# Patient Record
Sex: FEMALE | Race: WHITE | NOT HISPANIC OR LATINO | Employment: FULL TIME | URBAN - METROPOLITAN AREA
[De-identification: names, ages, dates, MRNs, and addresses within clinical notes are randomized per-mention and may not be internally consistent; named-entity substitution may affect disease eponyms.]

---

## 2017-01-05 ENCOUNTER — ALLSCRIPTS OFFICE VISIT (OUTPATIENT)
Dept: OTHER | Facility: OTHER | Age: 47
End: 2017-01-05

## 2017-01-05 LAB
BILIRUB UR QL STRIP: NORMAL
CLARITY UR: NORMAL
COLOR UR: YELLOW
GLUCOSE (HISTORICAL): NORMAL
HGB UR QL STRIP.AUTO: NORMAL
KETONES UR STRIP-MCNC: NORMAL MG/DL
LEUKOCYTE ESTERASE UR QL STRIP: NORMAL
NITRITE UR QL STRIP: NORMAL
PH UR STRIP.AUTO: 6 [PH]
PROT UR STRIP-MCNC: NORMAL MG/DL
SP GR UR STRIP.AUTO: 1.02
UROBILINOGEN UR QL STRIP.AUTO: NORMAL

## 2017-01-09 ENCOUNTER — TRANSCRIBE ORDERS (OUTPATIENT)
Dept: ADMINISTRATIVE | Facility: HOSPITAL | Age: 47
End: 2017-01-09

## 2017-01-09 ENCOUNTER — ALLSCRIPTS OFFICE VISIT (OUTPATIENT)
Dept: OTHER | Facility: OTHER | Age: 47
End: 2017-01-09

## 2017-01-09 ENCOUNTER — GENERIC CONVERSION - ENCOUNTER (OUTPATIENT)
Dept: OTHER | Facility: OTHER | Age: 47
End: 2017-01-09

## 2017-01-09 ENCOUNTER — APPOINTMENT (OUTPATIENT)
Dept: LAB | Facility: CLINIC | Age: 47
End: 2017-01-09
Payer: COMMERCIAL

## 2017-01-09 ENCOUNTER — HOSPITAL ENCOUNTER (OUTPATIENT)
Dept: CT IMAGING | Facility: HOSPITAL | Age: 47
Discharge: HOME | End: 2017-01-09
Payer: COMMERCIAL

## 2017-01-09 DIAGNOSIS — R10.11 RIGHT UPPER QUADRANT PAIN: ICD-10-CM

## 2017-01-09 DIAGNOSIS — R10.11 ABDOMINAL PAIN, RIGHT UPPER QUADRANT: Primary | ICD-10-CM

## 2017-01-09 DIAGNOSIS — R10.11 ABDOMINAL PAIN, RIGHT UPPER QUADRANT: ICD-10-CM

## 2017-01-09 LAB
ALBUMIN SERPL BCP-MCNC: 3.7 G/DL (ref 3.5–5)
ALP SERPL-CCNC: 80 U/L (ref 46–116)
ALT SERPL W P-5'-P-CCNC: 24 U/L (ref 12–78)
AMYLASE SERPL-CCNC: 29 IU/L (ref 25–115)
ANION GAP SERPL CALCULATED.3IONS-SCNC: 7 MMOL/L (ref 4–13)
AST SERPL W P-5'-P-CCNC: 19 U/L (ref 5–45)
B-HCG SERPL-ACNC: <2 MIU/ML
BASOPHILS # BLD AUTO: 0.03 THOUSANDS/ΜL (ref 0–0.1)
BASOPHILS NFR BLD AUTO: 0 % (ref 0–1)
BILIRUB SERPL-MCNC: 0.6 MG/DL (ref 0.2–1)
BILIRUB UR QL STRIP: NORMAL
BUN SERPL-MCNC: 11 MG/DL (ref 5–25)
CALCIUM SERPL-MCNC: 9.2 MG/DL (ref 8.3–10.1)
CHLORIDE SERPL-SCNC: 106 MMOL/L (ref 100–108)
CLARITY UR: NORMAL
CO2 SERPL-SCNC: 26 MMOL/L (ref 21–32)
COLOR UR: YELLOW
CREAT SERPL-MCNC: 0.91 MG/DL (ref 0.6–1.3)
EOSINOPHIL # BLD AUTO: 0.12 THOUSAND/ΜL (ref 0–0.61)
EOSINOPHIL NFR BLD AUTO: 1 % (ref 0–6)
ERYTHROCYTE [DISTWIDTH] IN BLOOD BY AUTOMATED COUNT: 14 % (ref 11.6–15.1)
ERYTHROCYTE [SEDIMENTATION RATE] IN BLOOD: 10 MM/HOUR (ref 0–20)
GFR SERPL CREATININE-BSD FRML MDRD: >60 ML/MIN/1.73SQ M
GLUCOSE (HISTORICAL): NORMAL
GLUCOSE SERPL-MCNC: 104 MG/DL (ref 65–140)
HCT VFR BLD AUTO: 46.6 % (ref 34.8–46.1)
HGB BLD-MCNC: 14.8 G/DL (ref 11.5–15.4)
HGB UR QL STRIP.AUTO: NORMAL
KETONES UR STRIP-MCNC: NORMAL MG/DL
LEUKOCYTE ESTERASE UR QL STRIP: NORMAL
LIPASE SERPL-CCNC: 63 U/L (ref 73–393)
LYMPHOCYTES # BLD AUTO: 2.18 THOUSANDS/ΜL (ref 0.6–4.47)
LYMPHOCYTES NFR BLD AUTO: 20 % (ref 14–44)
MCH RBC QN AUTO: 27.5 PG (ref 26.8–34.3)
MCHC RBC AUTO-ENTMCNC: 31.8 G/DL (ref 31.4–37.4)
MCV RBC AUTO: 87 FL (ref 82–98)
MONOCYTES # BLD AUTO: 0.85 THOUSAND/ΜL (ref 0.17–1.22)
MONOCYTES NFR BLD AUTO: 8 % (ref 4–12)
NEUTROPHILS # BLD AUTO: 7.77 THOUSANDS/ΜL (ref 1.85–7.62)
NEUTS SEG NFR BLD AUTO: 71 % (ref 43–75)
NITRITE UR QL STRIP: NORMAL
PH UR STRIP.AUTO: 6 [PH]
PLATELET # BLD AUTO: 374 THOUSANDS/UL (ref 149–390)
PMV BLD AUTO: 9.8 FL (ref 8.9–12.7)
POTASSIUM SERPL-SCNC: 4.5 MMOL/L (ref 3.5–5.3)
PROT SERPL-MCNC: 7.8 G/DL (ref 6.4–8.2)
PROT UR STRIP-MCNC: NORMAL MG/DL
RBC # BLD AUTO: 5.38 MILLION/UL (ref 3.81–5.12)
SODIUM SERPL-SCNC: 139 MMOL/L (ref 136–145)
SP GR UR STRIP.AUTO: 1.02
UROBILINOGEN UR QL STRIP.AUTO: NORMAL
WBC # BLD AUTO: 10.95 THOUSAND/UL (ref 4.31–10.16)

## 2017-01-09 PROCEDURE — 36415 COLL VENOUS BLD VENIPUNCTURE: CPT

## 2017-01-09 PROCEDURE — 85652 RBC SED RATE AUTOMATED: CPT

## 2017-01-09 PROCEDURE — 82150 ASSAY OF AMYLASE: CPT

## 2017-01-09 PROCEDURE — 80053 COMPREHEN METABOLIC PANEL: CPT

## 2017-01-09 PROCEDURE — 85025 COMPLETE CBC W/AUTO DIFF WBC: CPT

## 2017-01-09 PROCEDURE — 84702 CHORIONIC GONADOTROPIN TEST: CPT

## 2017-01-09 PROCEDURE — 83690 ASSAY OF LIPASE: CPT

## 2017-01-09 PROCEDURE — 74177 CT ABD & PELVIS W/CONTRAST: CPT

## 2017-01-09 RX ADMIN — IOHEXOL 100 ML: 350 INJECTION, SOLUTION INTRAVENOUS at 14:52

## 2017-01-09 RX ADMIN — IOHEXOL 50 ML: 240 INJECTION, SOLUTION INTRATHECAL; INTRAVASCULAR; INTRAVENOUS; ORAL at 14:51

## 2017-06-15 ENCOUNTER — GENERIC CONVERSION - ENCOUNTER (OUTPATIENT)
Dept: OTHER | Facility: OTHER | Age: 47
End: 2017-06-15

## 2017-06-15 DIAGNOSIS — K76.0 FATTY (CHANGE OF) LIVER, NOT ELSEWHERE CLASSIFIED: ICD-10-CM

## 2017-06-15 DIAGNOSIS — I10 ESSENTIAL (PRIMARY) HYPERTENSION: ICD-10-CM

## 2017-06-15 DIAGNOSIS — E03.9 HYPOTHYROIDISM: ICD-10-CM

## 2017-06-15 DIAGNOSIS — F41.9 ANXIETY DISORDER: ICD-10-CM

## 2017-06-15 DIAGNOSIS — E78.5 HYPERLIPIDEMIA: ICD-10-CM

## 2017-06-15 DIAGNOSIS — E66.9 OBESITY: ICD-10-CM

## 2017-06-26 ENCOUNTER — LAB CONVERSION - ENCOUNTER (OUTPATIENT)
Dept: OTHER | Facility: OTHER | Age: 47
End: 2017-06-26

## 2017-06-26 LAB
A/G RATIO (HISTORICAL): 1.4 (CALC) (ref 1–2.5)
ALBUMIN SERPL BCP-MCNC: 4 G/DL (ref 3.6–5.1)
ALP SERPL-CCNC: 84 U/L (ref 33–115)
ALT SERPL W P-5'-P-CCNC: 17 U/L (ref 6–29)
AST SERPL W P-5'-P-CCNC: 16 U/L (ref 10–35)
BASOPHILS # BLD AUTO: 0.5 %
BASOPHILS # BLD AUTO: 47 CELLS/UL (ref 0–200)
BILIRUB SERPL-MCNC: 0.8 MG/DL (ref 0.2–1.2)
BUN SERPL-MCNC: 12 MG/DL (ref 7–25)
BUN/CREA RATIO (HISTORICAL): ABNORMAL (CALC) (ref 6–22)
CALCIUM SERPL-MCNC: 9.1 MG/DL (ref 8.6–10.2)
CHLORIDE SERPL-SCNC: 104 MMOL/L (ref 98–110)
CHOLEST SERPL-MCNC: 202 MG/DL (ref 125–200)
CHOLEST/HDLC SERPL: 5.6 (CALC)
CO2 SERPL-SCNC: 24 MMOL/L (ref 20–31)
CREAT SERPL-MCNC: 0.82 MG/DL (ref 0.5–1.1)
DEPRECATED RDW RBC AUTO: 14.8 % (ref 11–15)
EGFR AFRICAN AMERICAN (HISTORICAL): 100 ML/MIN/1.73M2
EGFR-AMERICAN CALC (HISTORICAL): 86 ML/MIN/1.73M2
EOSINOPHIL # BLD AUTO: 2.8 %
EOSINOPHIL # BLD AUTO: 260 CELLS/UL (ref 15–500)
GAMMA GLOBULIN (HISTORICAL): 2.8 G/DL (CALC) (ref 1.9–3.7)
GLUCOSE (HISTORICAL): 123 MG/DL (ref 65–99)
HBA1C MFR BLD HPLC: 6.1 % OF TOTAL HGB
HCT VFR BLD AUTO: 42.6 % (ref 35–45)
HDLC SERPL-MCNC: 36 MG/DL
HGB BLD-MCNC: 14 G/DL (ref 11.7–15.5)
LDL CHOLESTEROL (HISTORICAL): 126 MG/DL (CALC)
LYMPHOCYTES # BLD AUTO: 2353 CELLS/UL (ref 850–3900)
LYMPHOCYTES # BLD AUTO: 25.3 %
MCH RBC QN AUTO: 28.4 PG (ref 27–33)
MCHC RBC AUTO-ENTMCNC: 32.9 G/DL (ref 32–36)
MCV RBC AUTO: 86.1 FL (ref 80–100)
MONOCYTES # BLD AUTO: 688 CELLS/UL (ref 200–950)
MONOCYTES (HISTORICAL): 7.4 %
NEUTROPHILS # BLD AUTO: 5952 CELLS/UL (ref 1500–7800)
NEUTROPHILS # BLD AUTO: 64 %
NON-HDL-CHOL (CHOL-HDL) (HISTORICAL): 166 MG/DL (CALC)
PLATELET # BLD AUTO: 359 THOUSAND/UL (ref 140–400)
PMV BLD AUTO: 8.6 FL (ref 7.5–12.5)
POTASSIUM SERPL-SCNC: 4.2 MMOL/L (ref 3.5–5.3)
RBC # BLD AUTO: 4.95 MILLION/UL (ref 3.8–5.1)
SODIUM SERPL-SCNC: 138 MMOL/L (ref 135–146)
T4 FREE SERPL-MCNC: 1.2 NG/DL (ref 0.8–1.8)
TOTAL PROTEIN (HISTORICAL): 6.8 G/DL (ref 6.1–8.1)
TRIGL SERPL-MCNC: 198 MG/DL
TSH SERPL DL<=0.05 MIU/L-ACNC: 5.76 MIU/L
WBC # BLD AUTO: 9.3 THOUSAND/UL (ref 3.8–10.8)

## 2017-06-27 ENCOUNTER — GENERIC CONVERSION - ENCOUNTER (OUTPATIENT)
Dept: OTHER | Facility: OTHER | Age: 47
End: 2017-06-27

## 2017-07-03 ENCOUNTER — LAB CONVERSION - ENCOUNTER (OUTPATIENT)
Dept: OTHER | Facility: OTHER | Age: 47
End: 2017-07-03

## 2017-07-03 LAB
BACTERIA UR QL AUTO: ABNORMAL /HPF
BILIRUB UR QL STRIP: NEGATIVE
CALCIUM OXALATE (HISTORICAL): ABNORMAL /HPF
COLOR UR: YELLOW
COMMENT (HISTORICAL): ABNORMAL
CULTURE RESULT (HISTORICAL): ABNORMAL
FECAL OCCULT BLOOD DIAGNOSTIC (HISTORICAL): NEGATIVE
GLUCOSE (HISTORICAL): NEGATIVE
HYALINE CASTS #/AREA URNS LPF: ABNORMAL /LPF
KETONES UR STRIP-MCNC: NEGATIVE MG/DL
LEUKOCYTE ESTERASE UR QL STRIP: NEGATIVE
NITRITE UR QL STRIP: NEGATIVE
PH UR STRIP.AUTO: 6 [PH] (ref 5–8)
PROT UR STRIP-MCNC: NEGATIVE MG/DL
RBC (HISTORICAL): ABNORMAL /HPF
SP GR UR STRIP.AUTO: 1.02 (ref 1–1.03)
SQUAMOUS EPITHELIAL CELLS (HISTORICAL): ABNORMAL /HPF
WBC # BLD AUTO: ABNORMAL /HPF

## 2017-09-24 ENCOUNTER — APPOINTMENT (EMERGENCY)
Dept: CT IMAGING | Facility: HOSPITAL | Age: 47
End: 2017-09-24
Payer: COMMERCIAL

## 2017-09-24 ENCOUNTER — HOSPITAL ENCOUNTER (EMERGENCY)
Facility: HOSPITAL | Age: 47
Discharge: HOME | End: 2017-09-24
Attending: EMERGENCY MEDICINE
Payer: COMMERCIAL

## 2017-09-24 VITALS
WEIGHT: 248 LBS | DIASTOLIC BLOOD PRESSURE: 67 MMHG | OXYGEN SATURATION: 96 % | SYSTOLIC BLOOD PRESSURE: 138 MMHG | HEART RATE: 82 BPM | RESPIRATION RATE: 16 BRPM | TEMPERATURE: 99.4 F

## 2017-09-24 DIAGNOSIS — R10.9 ABDOMINAL PAIN: Primary | ICD-10-CM

## 2017-09-24 DIAGNOSIS — J21.9 BRONCHIOLITIS: ICD-10-CM

## 2017-09-24 LAB
ALBUMIN SERPL BCP-MCNC: 3.8 G/DL (ref 3.5–5)
ALP SERPL-CCNC: 85 U/L (ref 46–116)
ALT SERPL W P-5'-P-CCNC: 31 U/L (ref 12–78)
ANION GAP SERPL CALCULATED.3IONS-SCNC: 9 MMOL/L (ref 4–13)
AST SERPL W P-5'-P-CCNC: 18 U/L (ref 5–45)
BACTERIA UR QL AUTO: ABNORMAL /HPF
BASOPHILS # BLD AUTO: 0.1 THOUSANDS/ΜL (ref 0–0.1)
BASOPHILS NFR BLD AUTO: 1 % (ref 0–1)
BILIRUB DIRECT SERPL-MCNC: 0.09 MG/DL (ref 0–0.2)
BILIRUB SERPL-MCNC: 0.7 MG/DL (ref 0.2–1)
BILIRUB UR QL STRIP: NEGATIVE
BUN SERPL-MCNC: 9 MG/DL (ref 5–25)
CALCIUM SERPL-MCNC: 9.1 MG/DL (ref 8.3–10.1)
CHLORIDE SERPL-SCNC: 103 MMOL/L (ref 100–108)
CLARITY UR: CLEAR
CO2 SERPL-SCNC: 25 MMOL/L (ref 21–32)
COLOR UR: YELLOW
CREAT SERPL-MCNC: 0.97 MG/DL (ref 0.6–1.3)
EOSINOPHIL # BLD AUTO: 0.12 THOUSAND/ΜL (ref 0–0.61)
EOSINOPHIL NFR BLD AUTO: 1 % (ref 0–6)
ERYTHROCYTE [DISTWIDTH] IN BLOOD BY AUTOMATED COUNT: 14.1 % (ref 11.6–15.1)
GFR SERPL CREATININE-BSD FRML MDRD: 71 ML/MIN/1.73SQ M
GLUCOSE SERPL-MCNC: 95 MG/DL (ref 65–140)
GLUCOSE UR STRIP-MCNC: NEGATIVE MG/DL
HCT VFR BLD AUTO: 44.7 % (ref 34.8–46.1)
HGB BLD-MCNC: 14.2 G/DL (ref 11.5–15.4)
HGB UR QL STRIP.AUTO: ABNORMAL
KETONES UR STRIP-MCNC: NEGATIVE MG/DL
LEUKOCYTE ESTERASE UR QL STRIP: NEGATIVE
LIPASE SERPL-CCNC: 68 U/L (ref 73–393)
LYMPHOCYTES # BLD AUTO: 2.23 THOUSANDS/ΜL (ref 0.6–4.47)
LYMPHOCYTES NFR BLD AUTO: 20 % (ref 14–44)
MCH RBC QN AUTO: 27.7 PG (ref 26.8–34.3)
MCHC RBC AUTO-ENTMCNC: 31.8 G/DL (ref 31.4–37.4)
MCV RBC AUTO: 87 FL (ref 82–98)
MONOCYTES # BLD AUTO: 0.73 THOUSAND/ΜL (ref 0.17–1.22)
MONOCYTES NFR BLD AUTO: 7 % (ref 4–12)
MUCOUS THREADS UR QL AUTO: ABNORMAL
NEUTROPHILS # BLD AUTO: 7.74 THOUSANDS/ΜL (ref 1.85–7.62)
NEUTS SEG NFR BLD AUTO: 71 % (ref 43–75)
NITRITE UR QL STRIP: NEGATIVE
NON-SQ EPI CELLS URNS QL MICRO: ABNORMAL /HPF
PH UR STRIP.AUTO: 6 [PH] (ref 4.5–8)
PLATELET # BLD AUTO: 353 THOUSANDS/UL (ref 149–390)
PMV BLD AUTO: 9.4 FL (ref 8.9–12.7)
POTASSIUM SERPL-SCNC: 4.2 MMOL/L (ref 3.5–5.3)
PROT SERPL-MCNC: 7.8 G/DL (ref 6.4–8.2)
PROT UR STRIP-MCNC: NEGATIVE MG/DL
RBC # BLD AUTO: 5.13 MILLION/UL (ref 3.81–5.12)
RBC #/AREA URNS AUTO: ABNORMAL /HPF
SODIUM SERPL-SCNC: 137 MMOL/L (ref 136–145)
SP GR UR STRIP.AUTO: 1.01 (ref 1–1.03)
UROBILINOGEN UR QL STRIP.AUTO: 0.2 E.U./DL
WBC # BLD AUTO: 10.92 THOUSAND/UL (ref 4.31–10.16)
WBC #/AREA URNS AUTO: ABNORMAL /HPF

## 2017-09-24 PROCEDURE — 99284 EMERGENCY DEPT VISIT MOD MDM: CPT

## 2017-09-24 PROCEDURE — 81001 URINALYSIS AUTO W/SCOPE: CPT | Performed by: PHYSICIAN ASSISTANT

## 2017-09-24 PROCEDURE — 80076 HEPATIC FUNCTION PANEL: CPT | Performed by: PHYSICIAN ASSISTANT

## 2017-09-24 PROCEDURE — 85025 COMPLETE CBC W/AUTO DIFF WBC: CPT | Performed by: PHYSICIAN ASSISTANT

## 2017-09-24 PROCEDURE — 83690 ASSAY OF LIPASE: CPT | Performed by: PHYSICIAN ASSISTANT

## 2017-09-24 PROCEDURE — 36415 COLL VENOUS BLD VENIPUNCTURE: CPT | Performed by: PHYSICIAN ASSISTANT

## 2017-09-24 PROCEDURE — 74177 CT ABD & PELVIS W/CONTRAST: CPT

## 2017-09-24 PROCEDURE — 80048 BASIC METABOLIC PNL TOTAL CA: CPT | Performed by: PHYSICIAN ASSISTANT

## 2017-09-24 RX ORDER — LOSARTAN POTASSIUM 100 MG/1
100 TABLET ORAL DAILY
COMMUNITY
End: 2018-07-06 | Stop reason: SDUPTHER

## 2017-09-24 RX ORDER — DOXYCYCLINE HYCLATE 100 MG/1
100 CAPSULE ORAL 2 TIMES DAILY
Qty: 20 CAPSULE | Refills: 0 | Status: SHIPPED | OUTPATIENT
Start: 2017-09-24 | End: 2017-10-04

## 2017-09-24 RX ORDER — LEVOTHYROXINE SODIUM 0.03 MG/1
25 TABLET ORAL DAILY
COMMUNITY
End: 2018-01-03

## 2017-09-24 RX ADMIN — IOHEXOL 100 ML: 350 INJECTION, SOLUTION INTRAVENOUS at 14:27

## 2017-09-25 DIAGNOSIS — E78.5 HYPERLIPIDEMIA: ICD-10-CM

## 2017-09-25 DIAGNOSIS — E03.9 HYPOTHYROIDISM: ICD-10-CM

## 2018-01-03 ENCOUNTER — HOSPITAL ENCOUNTER (INPATIENT)
Facility: HOSPITAL | Age: 48
LOS: 11 days | Discharge: HOME HEALTH | DRG: 330 | End: 2018-01-14
Attending: EMERGENCY MEDICINE | Admitting: SURGERY
Payer: COMMERCIAL

## 2018-01-03 ENCOUNTER — APPOINTMENT (EMERGENCY)
Dept: CT IMAGING | Facility: HOSPITAL | Age: 48
DRG: 330 | End: 2018-01-03
Payer: COMMERCIAL

## 2018-01-03 DIAGNOSIS — K57.20 ABSCESS OF SIGMOID COLON DUE TO DIVERTICULITIS: ICD-10-CM

## 2018-01-03 DIAGNOSIS — K57.20 COLONIC DIVERTICULAR ABSCESS: ICD-10-CM

## 2018-01-03 DIAGNOSIS — K57.32 SIGMOID DIVERTICULITIS: Primary | ICD-10-CM

## 2018-01-03 PROBLEM — I10 HYPERTENSION: Status: ACTIVE | Noted: 2018-01-03

## 2018-01-03 PROBLEM — E03.9 HYPOTHYROIDISM: Status: ACTIVE | Noted: 2018-01-03

## 2018-01-03 PROBLEM — E78.5 HLD (HYPERLIPIDEMIA): Status: ACTIVE | Noted: 2018-01-03

## 2018-01-03 LAB
ALBUMIN SERPL BCP-MCNC: 3.4 G/DL (ref 3.5–5)
ALP SERPL-CCNC: 76 U/L (ref 46–116)
ALT SERPL W P-5'-P-CCNC: 22 U/L (ref 12–78)
ANION GAP SERPL CALCULATED.3IONS-SCNC: 9 MMOL/L (ref 4–13)
APTT PPP: 34 SECONDS (ref 23–35)
AST SERPL W P-5'-P-CCNC: 15 U/L (ref 5–45)
BACTERIA UR QL AUTO: ABNORMAL /HPF
BASOPHILS # BLD AUTO: 0.02 THOUSANDS/ΜL (ref 0–0.1)
BASOPHILS NFR BLD AUTO: 0 % (ref 0–1)
BILIRUB SERPL-MCNC: 0.5 MG/DL (ref 0.2–1)
BILIRUB UR QL STRIP: NEGATIVE
BUN SERPL-MCNC: 7 MG/DL (ref 5–25)
CALCIUM SERPL-MCNC: 9.2 MG/DL (ref 8.3–10.1)
CHLORIDE SERPL-SCNC: 101 MMOL/L (ref 100–108)
CLARITY UR: CLEAR
CO2 SERPL-SCNC: 26 MMOL/L (ref 21–32)
COLOR UR: YELLOW
CREAT SERPL-MCNC: 0.89 MG/DL (ref 0.6–1.3)
EOSINOPHIL # BLD AUTO: 0.07 THOUSAND/ΜL (ref 0–0.61)
EOSINOPHIL NFR BLD AUTO: 1 % (ref 0–6)
ERYTHROCYTE [DISTWIDTH] IN BLOOD BY AUTOMATED COUNT: 13.4 % (ref 11.6–15.1)
EXT PREG TEST URINE: NEGATIVE
GFR SERPL CREATININE-BSD FRML MDRD: 78 ML/MIN/1.73SQ M
GLUCOSE SERPL-MCNC: 101 MG/DL (ref 65–140)
GLUCOSE UR STRIP-MCNC: NEGATIVE MG/DL
HCT VFR BLD AUTO: 41.7 % (ref 34.8–46.1)
HGB BLD-MCNC: 13.1 G/DL (ref 11.5–15.4)
HGB UR QL STRIP.AUTO: ABNORMAL
INR PPP: 1.03 (ref 0.86–1.16)
KETONES UR STRIP-MCNC: NEGATIVE MG/DL
LACTATE SERPL-SCNC: 1 MMOL/L (ref 0.5–2)
LEUKOCYTE ESTERASE UR QL STRIP: NEGATIVE
LIPASE SERPL-CCNC: 56 U/L (ref 73–393)
LYMPHOCYTES # BLD AUTO: 1.87 THOUSANDS/ΜL (ref 0.6–4.47)
LYMPHOCYTES NFR BLD AUTO: 14 % (ref 14–44)
MCH RBC QN AUTO: 27.5 PG (ref 26.8–34.3)
MCHC RBC AUTO-ENTMCNC: 31.4 G/DL (ref 31.4–37.4)
MCV RBC AUTO: 88 FL (ref 82–98)
MONOCYTES # BLD AUTO: 0.91 THOUSAND/ΜL (ref 0.17–1.22)
MONOCYTES NFR BLD AUTO: 7 % (ref 4–12)
NEUTROPHILS # BLD AUTO: 10.28 THOUSANDS/ΜL (ref 1.85–7.62)
NEUTS SEG NFR BLD AUTO: 78 % (ref 43–75)
NITRITE UR QL STRIP: NEGATIVE
NON-SQ EPI CELLS URNS QL MICRO: ABNORMAL /HPF
PH UR STRIP.AUTO: 5.5 [PH] (ref 4.5–8)
PLATELET # BLD AUTO: 428 THOUSANDS/UL (ref 149–390)
PMV BLD AUTO: 9.3 FL (ref 8.9–12.7)
POTASSIUM SERPL-SCNC: 3.7 MMOL/L (ref 3.5–5.3)
PROT SERPL-MCNC: 7.8 G/DL (ref 6.4–8.2)
PROT UR STRIP-MCNC: NEGATIVE MG/DL
PROTHROMBIN TIME: 13.8 SECONDS (ref 12.1–14.4)
RBC # BLD AUTO: 4.76 MILLION/UL (ref 3.81–5.12)
RBC #/AREA URNS AUTO: ABNORMAL /HPF
SODIUM SERPL-SCNC: 136 MMOL/L (ref 136–145)
SP GR UR STRIP.AUTO: >=1.03 (ref 1–1.03)
UROBILINOGEN UR QL STRIP.AUTO: 0.2 E.U./DL
WBC # BLD AUTO: 13.15 THOUSAND/UL (ref 4.31–10.16)
WBC #/AREA URNS AUTO: ABNORMAL /HPF

## 2018-01-03 PROCEDURE — 74177 CT ABD & PELVIS W/CONTRAST: CPT

## 2018-01-03 PROCEDURE — 81001 URINALYSIS AUTO W/SCOPE: CPT | Performed by: PHYSICIAN ASSISTANT

## 2018-01-03 PROCEDURE — 85730 THROMBOPLASTIN TIME PARTIAL: CPT | Performed by: PHYSICIAN ASSISTANT

## 2018-01-03 PROCEDURE — 83690 ASSAY OF LIPASE: CPT | Performed by: PHYSICIAN ASSISTANT

## 2018-01-03 PROCEDURE — 96361 HYDRATE IV INFUSION ADD-ON: CPT

## 2018-01-03 PROCEDURE — 87040 BLOOD CULTURE FOR BACTERIA: CPT | Performed by: PHYSICIAN ASSISTANT

## 2018-01-03 PROCEDURE — 80053 COMPREHEN METABOLIC PANEL: CPT | Performed by: PHYSICIAN ASSISTANT

## 2018-01-03 PROCEDURE — 99285 EMERGENCY DEPT VISIT HI MDM: CPT

## 2018-01-03 PROCEDURE — 96374 THER/PROPH/DIAG INJ IV PUSH: CPT

## 2018-01-03 PROCEDURE — 83605 ASSAY OF LACTIC ACID: CPT | Performed by: PHYSICIAN ASSISTANT

## 2018-01-03 PROCEDURE — 36415 COLL VENOUS BLD VENIPUNCTURE: CPT | Performed by: PHYSICIAN ASSISTANT

## 2018-01-03 PROCEDURE — 85025 COMPLETE CBC W/AUTO DIFF WBC: CPT | Performed by: PHYSICIAN ASSISTANT

## 2018-01-03 PROCEDURE — 85610 PROTHROMBIN TIME: CPT | Performed by: PHYSICIAN ASSISTANT

## 2018-01-03 PROCEDURE — 81025 URINE PREGNANCY TEST: CPT | Performed by: PHYSICIAN ASSISTANT

## 2018-01-03 RX ORDER — ONDANSETRON 2 MG/ML
4 INJECTION INTRAMUSCULAR; INTRAVENOUS EVERY 6 HOURS PRN
Status: DISCONTINUED | OUTPATIENT
Start: 2018-01-03 | End: 2018-01-05

## 2018-01-03 RX ORDER — LOSARTAN POTASSIUM 50 MG/1
100 TABLET ORAL DAILY
Status: DISCONTINUED | OUTPATIENT
Start: 2018-01-04 | End: 2018-01-14 | Stop reason: HOSPADM

## 2018-01-03 RX ORDER — KETOROLAC TROMETHAMINE 30 MG/ML
15 INJECTION, SOLUTION INTRAMUSCULAR; INTRAVENOUS ONCE
Status: COMPLETED | OUTPATIENT
Start: 2018-01-03 | End: 2018-01-03

## 2018-01-03 RX ORDER — METRONIDAZOLE 500 MG/1
500 TABLET ORAL ONCE
Status: DISCONTINUED | OUTPATIENT
Start: 2018-01-03 | End: 2018-01-03

## 2018-01-03 RX ORDER — NICOTINE 21 MG/24HR
1 PATCH, TRANSDERMAL 24 HOURS TRANSDERMAL DAILY
Status: DISCONTINUED | OUTPATIENT
Start: 2018-01-04 | End: 2018-01-14 | Stop reason: HOSPADM

## 2018-01-03 RX ORDER — METOPROLOL TARTRATE 5 MG/5ML
5 INJECTION INTRAVENOUS EVERY 6 HOURS PRN
Status: DISCONTINUED | OUTPATIENT
Start: 2018-01-03 | End: 2018-01-14 | Stop reason: HOSPADM

## 2018-01-03 RX ORDER — SODIUM CHLORIDE 9 MG/ML
125 INJECTION, SOLUTION INTRAVENOUS CONTINUOUS
Status: DISCONTINUED | OUTPATIENT
Start: 2018-01-03 | End: 2018-01-07

## 2018-01-03 RX ORDER — HEPARIN SODIUM 5000 [USP'U]/ML
5000 INJECTION, SOLUTION INTRAVENOUS; SUBCUTANEOUS EVERY 8 HOURS SCHEDULED
Status: DISCONTINUED | OUTPATIENT
Start: 2018-01-03 | End: 2018-01-14 | Stop reason: HOSPADM

## 2018-01-03 RX ADMIN — METRONIDAZOLE 500 MG: 500 INJECTION, SOLUTION INTRAVENOUS at 20:47

## 2018-01-03 RX ADMIN — CEFEPIME HYDROCHLORIDE 2000 MG: 2 INJECTION, POWDER, FOR SOLUTION INTRAVENOUS at 20:08

## 2018-01-03 RX ADMIN — IOHEXOL 100 ML: 350 INJECTION, SOLUTION INTRAVENOUS at 18:06

## 2018-01-03 RX ADMIN — KETOROLAC TROMETHAMINE 15 MG: 30 INJECTION, SOLUTION INTRAMUSCULAR at 17:19

## 2018-01-03 RX ADMIN — SODIUM CHLORIDE 125 ML/HR: 0.9 INJECTION, SOLUTION INTRAVENOUS at 21:25

## 2018-01-03 RX ADMIN — HEPARIN SODIUM 5000 UNITS: 5000 INJECTION, SOLUTION INTRAVENOUS; SUBCUTANEOUS at 22:21

## 2018-01-03 RX ADMIN — SODIUM CHLORIDE 1000 ML: 0.9 INJECTION, SOLUTION INTRAVENOUS at 17:18

## 2018-01-03 NOTE — Clinical Note
Case was discussed with gabriel Dang and the patient's admission status was agreed to be Admission Status: inpatient status to the service of Dr Jones President

## 2018-01-03 NOTE — ED PROVIDER NOTES
History  Chief Complaint   Patient presents with    Abdominal Pain     abd pain for weeks, Hx of diverticulitis     HPI   57 yo female with hx of diverticulitis presenting with bilateral lower abd pain x 1 week  States pain is equal across the lower abd  No appetite  Able to drink liquids  No vomiting  No upper abd pain  Had some loose stool at one point that since resolved  No blood in stool or urine or dysuria  No other recent illness, such as URI; no sick contacts  Denies any pelvic pain or unusual vaginal discharge  Pain reminiscent of previous episodes of diverticulitis  Spoke to her PCP, advised her to call her Gi, who advised her to come here  To have a colonoscopy next month  Ddx includes but is not limited to: diverticulitis, appendicitis, UTI, ovarian torsion or abscess, PID, colitis  Prior to Admission Medications   Prescriptions Last Dose Informant Patient Reported? Taking?   levothyroxine 25 mcg tablet   Yes No   Sig: Take 25 mcg by mouth daily   losartan (COZAAR) 100 MG tablet   Yes No   Sig: Take 100 mg by mouth daily      Facility-Administered Medications: None       Past Medical History:   Diagnosis Date    Disease of thyroid gland     Diverticulitis     HLD (hyperlipidemia)     Hypertension        Past Surgical History:   Procedure Laterality Date    CHOLECYSTECTOMY  1999    laparoscopic    HERNIA REPAIR  0335    umbilical    REDUCTION MAMMAPLASTY  1999       Family History   Problem Relation Age of Onset    Alcohol abuse Mother     Heart disease Father     Diverticulitis Sister     Cancer Brother     No Known Problems Sister      I have reviewed and agree with the history as documented  Social History   Substance Use Topics    Smoking status: Current Every Day Smoker     Packs/day: 1 00     Years: 28 00    Smokeless tobacco: Never Used    Alcohol use No        Review of Systems   Constitutional: Positive for chills  HENT: Negative for congestion and rhinorrhea  Respiratory: Negative for apnea, cough, chest tightness and shortness of breath  Cardiovascular: Negative for chest pain  Gastrointestinal: Positive for abdominal pain, diarrhea and nausea  Negative for anal bleeding, blood in stool, constipation and vomiting  Genitourinary: Negative for dysuria, frequency, hematuria, pelvic pain, vaginal bleeding, vaginal discharge and vaginal pain  Musculoskeletal: Negative for arthralgias and myalgias  All other systems reviewed and are negative  Physical Exam  ED Triage Vitals   Temperature Pulse Respirations Blood Pressure SpO2   01/03/18 1339 01/03/18 1339 01/03/18 1339 01/03/18 1339 01/03/18 1339   97 9 °F (36 6 °C) (!) 111 18 (!) 173/80 99 %      Temp Source Heart Rate Source Patient Position - Orthostatic VS BP Location FiO2 (%)   01/03/18 1339 01/03/18 1648 01/03/18 1339 01/03/18 1339 --   Oral Monitor Sitting Left arm       Pain Score       01/03/18 1339       Worst Possible Pain           Orthostatic Vital Signs  Vitals:    01/03/18 1339 01/03/18 1648 01/03/18 1945   BP: (!) 173/80 132/60 118/70   Pulse: (!) 111 98 99   Patient Position - Orthostatic VS: Sitting Sitting Lying       Physical Exam   Constitutional: She is oriented to person, place, and time  She appears well-developed and well-nourished  No distress  HENT:   Head: Normocephalic and atraumatic  Right Ear: External ear normal    Left Ear: External ear normal    Eyes: EOM are normal  Pupils are equal, round, and reactive to light  Neck: Normal range of motion  Cardiovascular: Normal rate, regular rhythm and normal heart sounds  Pulmonary/Chest: Effort normal and breath sounds normal  No respiratory distress  She has no wheezes  She has no rales  Abdominal: Soft  She exhibits no distension  Tender from the LLQ across the suprapubic area and into the RLQ  No guarding or rigidity   Musculoskeletal: Normal range of motion     Neurological: She is alert and oriented to person, place, and time  Skin: Skin is warm and dry  She is not diaphoretic  Psychiatric: She has a normal mood and affect  Her behavior is normal  Judgment and thought content normal        ED Medications  Medications   cefepime (MAXIPIME) 2 g/50 mL dextrose IVPB (2,000 mg Intravenous New Bag 1/3/18 2008)   metroNIDAZOLE (FLAGYL) tablet 500 mg (0 mg Oral Hold 1/3/18 2000)   sodium chloride 0 9 % infusion (not administered)   ondansetron (ZOFRAN) injection 4 mg (not administered)   nicotine (NICODERM CQ) 21 mg/24 hr TD 24 hr patch 1 patch (not administered)   heparin (porcine) subcutaneous injection 5,000 Units (not administered)   cefepime (MAXIPIME) 2 g/50 mL dextrose IVPB (not administered)   metroNIDAZOLE (FLAGYL) IVPB (premix) 500 mg (not administered)   metoprolol (LOPRESSOR) injection 5 mg (not administered)   HYDROmorphone (DILAUDID) 1 mg/mL injection 1 mg (not administered)   sodium chloride 0 9 % bolus 1,000 mL (0 mL Intravenous Stopped 1/3/18 1939)   ketorolac (TORADOL) injection 15 mg (15 mg Intravenous Given 1/3/18 1719)   iohexol (OMNIPAQUE) 350 MG/ML injection (MULTI-DOSE) 100 mL (100 mL Intravenous Given 1/3/18 1806)       Diagnostic Studies  Results Reviewed     Procedure Component Value Units Date/Time    Lactic acid, plasma [[id number]]  (Normal) Collected:  01/03/18 1942    Lab Status:  Final result Specimen:  Blood from Arm, Left Updated:  01/03/18 2017     LACTIC ACID 1 0 mmol/L     Narrative:         Result may be elevated if tourniquet was used during collection  Blood culture #1 [[id number]] Collected:  01/03/18 2005    Lab Status:   In process Specimen:  Blood from Arm, Right Updated:  01/03/18 2007    Protime-INR [[id number]]  (Normal) Collected:  01/03/18 1942    Lab Status:  Final result Specimen:  Blood from Arm, Right Updated:  01/03/18 2005     Protime 13 8 seconds      INR 1 03    APTT [[id number]]  (Normal) Collected:  01/03/18 1942    Lab Status:  Final result Specimen:  Blood from Arm, Right Updated:  01/03/18 2005     PTT 34 seconds     Narrative: Therapeutic Heparin Range = 60-90 seconds    Platelet count [[id number]]     Lab Status:  No result Specimen:  Blood     Blood culture #2 [[id number]] Collected:  01/03/18 1942    Lab Status: In process Specimen:  Blood from Arm, Left Updated:  01/03/18 1954    Comprehensive metabolic panel [[id number]]  (Abnormal) Collected:  01/03/18 1718    Lab Status:  Final result Specimen:  Blood from Arm, Left Updated:  01/03/18 1743     Sodium 136 mmol/L      Potassium 3 7 mmol/L      Chloride 101 mmol/L      CO2 26 mmol/L      Anion Gap 9 mmol/L      BUN 7 mg/dL      Creatinine 0 89 mg/dL      Glucose 101 mg/dL      Calcium 9 2 mg/dL      AST 15 U/L      ALT 22 U/L      Alkaline Phosphatase 76 U/L      Total Protein 7 8 g/dL      Albumin 3 4 (L) g/dL      Total Bilirubin 0 50 mg/dL      eGFR 78 ml/min/1 73sq m     Narrative:         National Kidney Disease Education Program recommendations are as follows:  GFR calculation is accurate only with a steady state creatinine  Chronic Kidney disease less than 60 ml/min/1 73 sq  meters  Kidney failure less than 15 ml/min/1 73 sq  meters      Lipase [[id number]]  (Abnormal) Collected:  01/03/18 1718    Lab Status:  Final result Specimen:  Blood from Arm, Left Updated:  01/03/18 1743     Lipase 56 (L) u/L     CBC and differential [[id number]]  (Abnormal) Collected:  01/03/18 1718    Lab Status:  Final result Specimen:  Blood from Arm, Left Updated:  01/03/18 1729     WBC 13 15 (H) Thousand/uL      RBC 4 76 Million/uL      Hemoglobin 13 1 g/dL      Hematocrit 41 7 %      MCV 88 fL      MCH 27 5 pg      MCHC 31 4 g/dL      RDW 13 4 %      MPV 9 3 fL      Platelets 155 (H) Thousands/uL      Neutrophils Relative 78 (H) %      Lymphocytes Relative 14 %      Monocytes Relative 7 %      Eosinophils Relative 1 %      Basophils Relative 0 %      Neutrophils Absolute 10 28 (H) Thousands/µL      Lymphocytes Absolute 1 87 Thousands/µL Monocytes Absolute 0 91 Thousand/µL      Eosinophils Absolute 0 07 Thousand/µL      Basophils Absolute 0 02 Thousands/µL     Urine Microscopic [[id number]]  (Abnormal) Collected:  01/03/18 1650    Lab Status:  Final result Specimen:  Urine from Urine, Clean Catch Updated:  01/03/18 1723     RBC, UA None Seen /hpf      WBC, UA 0-1 (A) /hpf      Epithelial Cells Moderate (A) /hpf      Bacteria, UA Occasional /hpf     UA w Reflex to Microscopic w Reflex to Culture [[id number]]  (Abnormal) Collected:  01/03/18 1650    Lab Status:  Final result Specimen:  Urine from Urine, Clean Catch Updated:  01/03/18 1657     Color, UA Yellow     Clarity, UA Clear     Specific Gravity, UA >=1 030     pH, UA 5 5     Leukocytes, UA Negative     Nitrite, UA Negative     Protein, UA Negative mg/dl      Glucose, UA Negative mg/dl      Ketones, UA Negative mg/dl      Urobilinogen, UA 0 2 E U /dl      Bilirubin, UA Negative     Blood, UA Small (A)    POCT pregnancy, urine [[id number]]  (Normal) Resulted:  01/03/18 1655    Lab Status:  Final result Updated:  01/03/18 1655     EXT PREG TEST UR (Ref: Negative) negative                 CT abdomen pelvis with contrast   Final Result by Yeny Yang MD (01/03 1843)         Diverticular abscess containing fecal material, gas and fluid measuring 7 cm  Adjacent diverticulitis in a loop of redundant sigmoid colon  No bowel obstruction  Findings discussed Dr Nina Vora at 6:40 PM EST  Workstation performed: [id number]         IR consult    (Results Pending)              Procedures  Procedures       Phone Contacts  ED Phone Contact    ED Course  ED Course as of Jan 03 2021 Wed Jan 03, 2018   1738 Pt re-evaluated, feeling a little better after Toradol  Resting comfortably  1845 Ct abd/pelvis shows sigmoid diverticulitis and 7cm adjacent abscess  Spoke with surgical resident who will evaluate the patient  Pt updated and feeling well  [address] with surgical PA, Matilde Yanez   Will admit to Dr Demetria Wilson  Per Dr Demetria Wilson, give one dose Flagyl and cefepime now  Initial Sepsis Screening     Row Name 01/03/18 5504                Is the patient's history suggestive of a new or worsening infection? (!)  Yes (Proceed)  -CR        Suspected source of infection acute abdominal infection  -CR        Are two or more of the following signs & symptoms of infection both present and new to the patient? (!)  Yes (Proceed)  -CR        Indicate SIRS criteria Tachycardia > 90 bpm;Leukocytosis (WBC > 14343 IJL)  -CR        If the answer is yes to both questions, suspicion of sepsis is present          If severe sepsis is present AND tissue hypoperfusion perists in the hour after fluid resuscitation or lactate > 4, the patient meets criteria for SEPTIC SHOCK          Are any of the following organ dysfunction criteria present within 6 hours of suspected infection and SIRS criteria that are NOT considered to be chronic conditions? No  -CR        Organ dysfunction          Date of presentation of severe sepsis          Time of presentation of severe sepsis          Tissue hypoperfusion persists in the hour after crystalloid fluid administration, evidenced, by either:          Was hypotension present within one hour of the conclusion of crystalloid fluid administration?         Date of presentation of septic shock          Time of presentation of septic shock            User Key  (r) = Recorded By, (t) = Taken By, (c) = Cosigned By    Initials Name Provider Type    CR Moon Sierra PA-C Physician Assistant                  MDM  Number of Diagnoses or Management Options  Abscess of sigmoid colon due to diverticulitis:   Sigmoid diverticulitis:   Diagnosis management comments: 57 yo female with hx of diverticulitis presenting with bilat lower abd pain x ~1 week  Labs showed mild leukocytosis of 13, mild thrombocytosis; otherwise unremarkable   CT abd showed sigmoid diverticulitis with 7cm adjacent abscess  Her pain had improved after Toradol and 1L NSS  Overall well-appearing and feeling better  Surgery was contacted; seen by Tammy Herzog PA-C  Will admit to Dr Ronnie Mobley service  Cefepime and Flagyl ordered and to be given after blood cx drawn (lactic also ordered)  Dr Joan Jimenez aware of PCN allergy, but low change for crossover with cephalosporins  Pt understood and agreed with the treatment plan and had no questions  Amount and/or Complexity of Data Reviewed  Clinical lab tests: ordered and reviewed  Tests in the radiology section of CPT®: ordered and reviewed  Tests in the medicine section of CPT®: ordered and reviewed    Patient Progress  Patient progress: improved    CritCare Time    Disposition  Final diagnoses:   Sigmoid diverticulitis   Abscess of sigmoid colon due to diverticulitis     Time reflects when diagnosis was documented in both MDM as applicable and the Disposition within this note     Time User Action Codes Description Comment    1/3/2018  7:47 PM Sima Santana [K57 32] Sigmoid diverticulitis     1/3/2018  7:47 PM Sebas Edge [K57 20] Abscess of sigmoid colon due to diverticulitis       ED Disposition     ED Disposition Condition Comment    Admit  Case was discussed with Tammy Herzog PA-C and the patient's admission status was agreed to be Admission Status: inpatient status to the service of Dr Joan Jimenez   Follow-up Information    None       Patient's Medications   Discharge Prescriptions    No medications on file     No discharge procedures on file      ED Provider  Electronically Signed by           Terry Pop PA-C  01/03/18 2021

## 2018-01-04 ENCOUNTER — APPOINTMENT (INPATIENT)
Dept: CT IMAGING | Facility: HOSPITAL | Age: 48
DRG: 330 | End: 2018-01-04
Attending: SURGERY
Payer: COMMERCIAL

## 2018-01-04 LAB
ANION GAP SERPL CALCULATED.3IONS-SCNC: 12 MMOL/L (ref 4–13)
BUN SERPL-MCNC: 7 MG/DL (ref 5–25)
CALCIUM SERPL-MCNC: 8.6 MG/DL (ref 8.3–10.1)
CHLORIDE SERPL-SCNC: 105 MMOL/L (ref 100–108)
CO2 SERPL-SCNC: 21 MMOL/L (ref 21–32)
CREAT SERPL-MCNC: 0.8 MG/DL (ref 0.6–1.3)
ERYTHROCYTE [DISTWIDTH] IN BLOOD BY AUTOMATED COUNT: 13.5 % (ref 11.6–15.1)
GFR SERPL CREATININE-BSD FRML MDRD: 89 ML/MIN/1.73SQ M
GLUCOSE SERPL-MCNC: 87 MG/DL (ref 65–140)
HCT VFR BLD AUTO: 38.9 % (ref 34.8–46.1)
HGB BLD-MCNC: 12 G/DL (ref 11.5–15.4)
MCH RBC QN AUTO: 27.4 PG (ref 26.8–34.3)
MCHC RBC AUTO-ENTMCNC: 30.8 G/DL (ref 31.4–37.4)
MCV RBC AUTO: 89 FL (ref 82–98)
PLATELET # BLD AUTO: 353 THOUSANDS/UL (ref 149–390)
PMV BLD AUTO: 9.9 FL (ref 8.9–12.7)
POTASSIUM SERPL-SCNC: 3.7 MMOL/L (ref 3.5–5.3)
RBC # BLD AUTO: 4.38 MILLION/UL (ref 3.81–5.12)
SODIUM SERPL-SCNC: 138 MMOL/L (ref 136–145)
WBC # BLD AUTO: 10.87 THOUSAND/UL (ref 4.31–10.16)

## 2018-01-04 PROCEDURE — C1729 CATH, DRAINAGE: HCPCS

## 2018-01-04 PROCEDURE — 0W9G30Z DRAINAGE OF PERITONEAL CAVITY WITH DRAINAGE DEVICE, PERCUTANEOUS APPROACH: ICD-10-PCS | Performed by: RADIOLOGY

## 2018-01-04 PROCEDURE — 99152 MOD SED SAME PHYS/QHP 5/>YRS: CPT

## 2018-01-04 PROCEDURE — C1769 GUIDE WIRE: HCPCS

## 2018-01-04 PROCEDURE — 87186 SC STD MICRODIL/AGAR DIL: CPT | Performed by: SURGERY

## 2018-01-04 PROCEDURE — 99153 MOD SED SAME PHYS/QHP EA: CPT

## 2018-01-04 PROCEDURE — 87205 SMEAR GRAM STAIN: CPT | Performed by: SURGERY

## 2018-01-04 PROCEDURE — 80048 BASIC METABOLIC PNL TOTAL CA: CPT | Performed by: PHYSICIAN ASSISTANT

## 2018-01-04 PROCEDURE — 85027 COMPLETE CBC AUTOMATED: CPT | Performed by: PHYSICIAN ASSISTANT

## 2018-01-04 PROCEDURE — 87070 CULTURE OTHR SPECIMN AEROBIC: CPT | Performed by: SURGERY

## 2018-01-04 PROCEDURE — 49406 IMAGE CATH FLUID PERI/RETRO: CPT

## 2018-01-04 RX ORDER — KETOROLAC TROMETHAMINE 30 MG/ML
15 INJECTION, SOLUTION INTRAMUSCULAR; INTRAVENOUS EVERY 6 HOURS PRN
Status: DISPENSED | OUTPATIENT
Start: 2018-01-04 | End: 2018-01-07

## 2018-01-04 RX ORDER — MIDAZOLAM HYDROCHLORIDE 1 MG/ML
INJECTION INTRAMUSCULAR; INTRAVENOUS CODE/TRAUMA/SEDATION MEDICATION
Status: COMPLETED | OUTPATIENT
Start: 2018-01-04 | End: 2018-01-04

## 2018-01-04 RX ORDER — FENTANYL CITRATE 50 UG/ML
INJECTION, SOLUTION INTRAMUSCULAR; INTRAVENOUS CODE/TRAUMA/SEDATION MEDICATION
Status: COMPLETED | OUTPATIENT
Start: 2018-01-04 | End: 2018-01-04

## 2018-01-04 RX ORDER — KETOROLAC TROMETHAMINE 30 MG/ML
30 INJECTION, SOLUTION INTRAMUSCULAR; INTRAVENOUS EVERY 6 HOURS PRN
Status: COMPLETED | OUTPATIENT
Start: 2018-01-04 | End: 2018-01-05

## 2018-01-04 RX ADMIN — KETOROLAC TROMETHAMINE 30 MG: 30 INJECTION, SOLUTION INTRAMUSCULAR at 11:43

## 2018-01-04 RX ADMIN — ONDANSETRON 4 MG: 2 INJECTION INTRAMUSCULAR; INTRAVENOUS at 11:45

## 2018-01-04 RX ADMIN — SODIUM CHLORIDE 125 ML/HR: 0.9 INJECTION, SOLUTION INTRAVENOUS at 21:30

## 2018-01-04 RX ADMIN — MIDAZOLAM HYDROCHLORIDE 1 MG: 1 INJECTION, SOLUTION INTRAMUSCULAR; INTRAVENOUS at 14:33

## 2018-01-04 RX ADMIN — HYDROMORPHONE HYDROCHLORIDE 1 MG: 1 INJECTION, SOLUTION INTRAMUSCULAR; INTRAVENOUS; SUBCUTANEOUS at 03:50

## 2018-01-04 RX ADMIN — CEFEPIME HYDROCHLORIDE 2000 MG: 2 INJECTION, POWDER, FOR SOLUTION INTRAVENOUS at 07:44

## 2018-01-04 RX ADMIN — ONDANSETRON 4 MG: 2 INJECTION INTRAMUSCULAR; INTRAVENOUS at 15:25

## 2018-01-04 RX ADMIN — FENTANYL CITRATE 50 MCG: 50 INJECTION INTRAMUSCULAR; INTRAVENOUS at 14:41

## 2018-01-04 RX ADMIN — CEFEPIME HYDROCHLORIDE 2000 MG: 2 INJECTION, POWDER, FOR SOLUTION INTRAVENOUS at 20:33

## 2018-01-04 RX ADMIN — METRONIDAZOLE 500 MG: 500 INJECTION, SOLUTION INTRAVENOUS at 21:16

## 2018-01-04 RX ADMIN — METRONIDAZOLE 500 MG: 500 INJECTION, SOLUTION INTRAVENOUS at 03:50

## 2018-01-04 RX ADMIN — KETOROLAC TROMETHAMINE 30 MG: 30 INJECTION, SOLUTION INTRAMUSCULAR at 16:08

## 2018-01-04 RX ADMIN — HEPARIN SODIUM 5000 UNITS: 5000 INJECTION, SOLUTION INTRAVENOUS; SUBCUTANEOUS at 21:16

## 2018-01-04 RX ADMIN — SODIUM CHLORIDE 125 ML/HR: 0.9 INJECTION, SOLUTION INTRAVENOUS at 05:13

## 2018-01-04 RX ADMIN — METRONIDAZOLE 500 MG: 500 INJECTION, SOLUTION INTRAVENOUS at 13:00

## 2018-01-04 RX ADMIN — ONDANSETRON 4 MG: 2 INJECTION INTRAMUSCULAR; INTRAVENOUS at 22:10

## 2018-01-04 RX ADMIN — SODIUM CHLORIDE 125 ML/HR: 0.9 INJECTION, SOLUTION INTRAVENOUS at 13:28

## 2018-01-04 RX ADMIN — FENTANYL CITRATE 50 MCG: 50 INJECTION INTRAMUSCULAR; INTRAVENOUS at 14:26

## 2018-01-04 RX ADMIN — MIDAZOLAM HYDROCHLORIDE 1 MG: 1 INJECTION, SOLUTION INTRAMUSCULAR; INTRAVENOUS at 14:26

## 2018-01-04 RX ADMIN — FENTANYL CITRATE 50 MCG: 50 INJECTION INTRAMUSCULAR; INTRAVENOUS at 14:33

## 2018-01-04 RX ADMIN — FENTANYL CITRATE 50 MCG: 50 INJECTION INTRAMUSCULAR; INTRAVENOUS at 14:47

## 2018-01-04 RX ADMIN — KETOROLAC TROMETHAMINE 30 MG: 30 INJECTION, SOLUTION INTRAMUSCULAR at 22:10

## 2018-01-04 NOTE — BRIEF OP NOTE (RAD/CATH)
Drainage Procedure Note    PATIENT NAME: Josephine Siegel  : 1970  MRN: [id number]     Pre-op Diagnosis:   1  Sigmoid diverticulitis    2  Abscess of sigmoid colon due to diverticulitis      Post-op Diagnosis:   1  Sigmoid diverticulitis    2  Abscess of sigmoid colon due to diverticulitis        Surgeon:   Nicki Membreno MD    Estimated Blood Loss:  Minimal  Findings:  8 Kiswahili drain placed into mid abdominal abscess using CT guidance  10 cc of thick bloody fluid was aspirated        Specimens:  Culture    Complications:  None    Anesthesia: Conscious sedation    Nicki Membreno MD     Date: 2018  Time: 3:14 PM

## 2018-01-04 NOTE — CASE MANAGEMENT
Thank you,  [address] [facility] in the [facility] [address] for 2017  Network Utilization Review Department  Phone: [phone]; Fax [phone]  ATTENTION: The Network Utilization Review Department is now centralized for our 7 Facilities  Make a note that we have a new phone and fax numbers for our Department  Please call with any questions or concerns to [phone] and carefully follow the prompts so that you are directed to the right person  All voicemails are confidential  Fax any determinations, approvals, denials, and requests for initial or continue stay review clinical to [phone]  Due to HIGH CALL volume, it would be easier if you could please send faxed requests to expedite your requests and in part, help us provide discharge notifications faster  Initial Clinical Review    Admission: Date/Time/Statement: 1/3/18 @ 1945     Orders Placed This Encounter   Procedures    Inpatient Admission     Standing Status:   Standing     Number of Occurrences:   1     Order Specific Question:   Admitting Physician     Answer:   Allen Arriola [[id number]]     Order Specific Question:   Level of Care     Answer:   Med Surg [[id number]]     Order Specific Question:   Estimated length of stay     Answer:   More than 2 Midnights     Order Specific Question:   Certification     Answer:   I certify that inpatient services are medically necessary for this patient for a duration of greater than two midnights  See H&P and MD Progress Notes for additional information about the patient's course of treatment           ED: Date/Time/Mode of Arrival:   ED Arrival Information     Expected Arrival Acuity Means of Arrival Escorted By Service Admission Type    - 1/3/2018 13:18 Urgent Walk-In Self Surgery-General Urgent    Arrival Complaint    abdominal pain          Chief Complaint:   Chief Complaint   Patient presents with    Abdominal Pain     abd pain for weeks, Hx of diverticulitis History of Illness:   HPI: Hailee Jaime is a 56y o  year old female with history of diverticulitis treated outpatient successfully in the past who presents complaining of worsening BLQ abdominal pain for about the last 10 days  Pain does not radiate nor does it anything make it better  The pain does worsen during defecation and is similar to the pain she has had with previous episodes  The pain has progressively worsened and today and she came to ED  Associated symptoms are poor appetite, decreased flatus, loose stools and chills  She denies blood in stools,  fevers, nausea or vomiting  Last colonoscopy was in 2011 and she is scheduled to have on in 6 weeks with Dr Jacquie Esteban      Cat scan today shows diverticular abscess containing fecal material, gas and fluid measuring 7 cm  Adjacent diverticulitis in a loop of redundant sigmoid colon  No bowel obstruction       Positive for chills  Negative for appetite change and fever     Gastrointestinal: Positive for abdominal pain and diarrhea    ED Vital Signs:   ED Triage Vitals   Temperature Pulse Respirations Blood Pressure SpO2   01/03/18 1339 01/03/18 1339 01/03/18 1339 01/03/18 1339 01/03/18 1339   97 9 °F (36 6 °C) (!) 111 18 (!) 173/80 99 %      Temp Source Heart Rate Source Patient Position - Orthostatic VS BP Location FiO2 (%)   01/03/18 1339 01/03/18 1648 01/03/18 1339 01/03/18 1339 --   Oral Monitor Sitting Left arm       Pain Score       01/03/18 1339       Worst Possible Pain        Wt Readings from Last 1 Encounters:   01/03/18 107 kg (236 lb 15 9 oz)       Vital Signs (abnormal):   01/03/18 2030  --  86  --  --  --  95 %  --  --   01/03/18 1945  --  99  18  118/70  --  95 %  None (Room air)  Lying   01/03/18 1648  --  98  18  132/60  --  100 %  None (Room air)  Sitting   01/03/18 1339  97 9 °F (36 6 °C)   111  18   173/80  --  99 %  None (Room air)           Abnormal Labs/Diagnostic Test Results:   Admission on 01/03/2018   Component Date Value    WBC 01/03/2018 13 15*    RBC 01/03/2018 4 76     Hemoglobin 01/03/2018 13 1     Hematocrit 01/03/2018 41 7     MCV 01/03/2018 88     MCH 01/03/2018 27 5     MCHC 01/03/2018 31 4     RDW 01/03/2018 13 4     MPV 01/03/2018 9 3     Platelets 47/45/6702 428*    Neutrophils Relative 01/03/2018 78*    Lymphocytes Relative 01/03/2018 14     Monocytes Relative 01/03/2018 7     Eosinophils Relative 01/03/2018 1     Basophils Relative 01/03/2018 0     Neutrophils Absolute 01/03/2018 10 28*    Lymphocytes Absolute 01/03/2018 1 87     Monocytes Absolute 01/03/2018 0 91     Eosinophils Absolute 01/03/2018 0 07     Basophils Absolute 01/03/2018 0 02     Sodium 01/03/2018 136     Potassium 01/03/2018 3 7     Chloride 01/03/2018 101     CO2 01/03/2018 26     Anion Gap 01/03/2018 9     BUN 01/03/2018 7     Creatinine 01/03/2018 0 89     Glucose 01/03/2018 101     Calcium 01/03/2018 9 2     AST 01/03/2018 15     ALT 01/03/2018 22     Alkaline Phosphatase 01/03/2018 76     Total Protein 01/03/2018 7 8     Albumin 01/03/2018 3 4*    Total Bilirubin 01/03/2018 0 50     eGFR 01/03/2018 78     Lipase 01/03/2018 56*    EXT PREG TEST UR (Ref: N* 01/03/2018 negative     Color, UA 01/03/2018 Yellow     Clarity, UA 01/03/2018 Clear     Specific Gravity, UA 01/03/2018 >=1 030     pH, UA 01/03/2018 5 5     Leukocytes, UA 01/03/2018 Negative     Nitrite, UA 01/03/2018 Negative     Protein, UA 01/03/2018 Negative     Glucose, UA 01/03/2018 Negative     Ketones, UA 01/03/2018 Negative     Urobilinogen, UA 01/03/2018 0 2     Bilirubin, UA 01/03/2018 Negative     Blood, UA 01/03/2018 Small*    RBC, UA 01/03/2018 None Seen     WBC, UA 01/03/2018 0-1*    Epithelial Cells 01/03/2018 Moderate*    Bacteria, UA 01/03/2018 Occasional       Imaging Studies: I have personally reviewed pertinent reports  1/3- CT A/P-Diverticular abscess containing fecal material, gas and fluid measuring 7 cm  Adjacent diverticulitis in a loop of redundant sigmoid colon  No bowel obstruction          ED Treatment:   Medication Administration from 01/03/2018 1318 to 01/03/2018 2148       Date/Time Order Dose Route Action Action by Comments     01/03/2018 1939 sodium chloride 0 9 % bolus 1,000 mL 0 mL Intravenous Stopped Nasim Neumann RN      01/03/2018 1718 sodium chloride 0 9 % bolus 1,000 mL 1,000 mL Intravenous Abhinav 37 Jae Kapoor RN      01/03/2018 1719 ketorolac (TORADOL) injection 15 mg 15 mg Intravenous Given Colon JOON Kapoor      01/03/2018 1806 iohexol (OMNIPAQUE) 350 MG/ML injection (MULTI-DOSE) 100 mL 100 mL Intravenous Given Danya Woods      01/03/2018 2043 cefepime (MAXIPIME) 2 g/50 mL dextrose IVPB 0 mg Intravenous Stopped Tena Moore RN      01/03/2018 2008 cefepime (MAXIPIME) 2 g/50 mL dextrose IVPB 2,000 mg Intravenous New Bag Merari Neumann RN ok to give as per Dr Janene Riley after speaking to [facility]     01/03/2018 2000 metroNIDAZOLE (FLAGYL) tablet 500 mg 0 mg Oral Hold Jae Kapoor RN to be given IV instead     01/03/2018 2125 sodium chloride 0 9 % infusion 125 mL/hr Intravenous [facility], RN      01/03/2018 2047 metroNIDAZOLE (FLAGYL) IVPB (premix) 500 mg 500 mg Intravenous New Selina Moore RN           Past Medical/Surgical History: Active Ambulatory Problems     Diagnosis Date Noted    No Active Ambulatory Problems     Resolved Ambulatory Problems     Diagnosis Date Noted    No Resolved Ambulatory Problems     Past Medical History:   Diagnosis Date    Disease of thyroid gland     Diverticulitis     HLD (hyperlipidemia)     Hypertension        Admitting Diagnosis: Sigmoid diverticulitis [K57 32]  Unspecified abdominal pain [R10 9]  Abscess of sigmoid colon due to diverticulitis [K57 20]    Age/Sex: 56 y o  female    Assessment/Plan:      Diverticulitis with abscess in 57 yo with leukocytosis   Will admit at this time   - NPO  - IVF  - IV abx  - IR consult for drainage  - Pain control  - AM labs, follow leukocytosis  - serial exams  - DVT PPX with heparin SC  - OOB and ambulate     HTN  - Hold Losartan and will put on IV metoprolol PRN      This patient will require > 2 night hospital stay      Attestation signed by Ghada Jovel DO at 1/4/2018 11:41 AM       Split/Shared Statement  I saw/examined the patient  I agree with the Advanced Practitioner's note with the following additions/exceptions:      Abdomen soft with some lower abdominal tenderness      For IR drainage of diverticular abscess later today  Continue cefepime and Flagyl for now         Admission Orders:  INPT 1/3 @1955  OOB  IS  NPO  SEQ COMP DEVICE    Scheduled Meds:   cefepime 2,000 mg Intravenous Q12H   heparin (porcine) 5,000 Units Subcutaneous Q8H [address]   losartan 100 mg Oral Daily   metroNIDAZOLE 500 mg Intravenous Q8H   nicotine 1 patch Transdermal Daily     Continuous Infusions:   sodium chloride 125 mL/hr Last Rate: 125 mL/hr (01/04/18 0513)     PRN Meds: HYDROmorphone X1    influenza vaccine    Ketorolac X1    ketorolac    metoprolol    Ondansetron X1

## 2018-01-04 NOTE — PLAN OF CARE
DISCHARGE PLANNING     Discharge to home or other facility with appropriate resources Progressing        GASTROINTESTINAL - ADULT     Minimal or absence of nausea and/or vomiting Progressing     Maintains or returns to baseline bowel function Progressing     Maintains adequate nutritional intake Progressing     Establish and maintain optimal ostomy function Progressing        INFECTION - ADULT     Absence or prevention of progression during hospitalization Progressing     Absence of fever/infection during neutropenic period Progressing        Knowledge Deficit     Patient/family/caregiver demonstrates understanding of disease process, treatment plan, medications, and discharge instructions Progressing        METABOLIC, FLUID AND ELECTROLYTES - ADULT     Electrolytes maintained within normal limits Progressing     Fluid balance maintained Progressing     Glucose maintained within target range Progressing        PAIN - ADULT     Verbalizes/displays adequate comfort level or baseline comfort level Progressing        SAFETY ADULT     Patient will remain free of falls Progressing     Maintain or return to baseline ADL function Progressing     Maintain or return mobility status to optimal level Progressing

## 2018-01-04 NOTE — PROGRESS NOTES
Patient medicated for 8/10 lower abdominal pain  Antibiotics administered  Patient resting in bed with call bell at bedside  Will continue to monitor

## 2018-01-04 NOTE — ED PROVIDER NOTES
History  Chief Complaint   Patient presents with    Abdominal Pain     abd pain for weeks, Hx of diverticulitis     HPI    Prior to Admission Medications   Prescriptions Last Dose Informant Patient Reported? Taking?   levothyroxine 25 mcg tablet   Yes No   Sig: Take 25 mcg by mouth daily   losartan (COZAAR) 100 MG tablet   Yes No   Sig: Take 100 mg by mouth daily      Facility-Administered Medications: None       Past Medical History:   Diagnosis Date    Disease of thyroid gland     Diverticulitis     HLD (hyperlipidemia)     Hypertension        Past Surgical History:   Procedure Laterality Date    CHOLECYSTECTOMY  1999    laparoscopic    HERNIA REPAIR  7758    umbilical    REDUCTION MAMMAPLASTY  1999       Family History   Problem Relation Age of Onset    Alcohol abuse Mother     Heart disease Father     Diverticulitis Sister     Cancer Brother     No Known Problems Sister      I have reviewed and agree with the history as documented      Social History   Substance Use Topics    Smoking status: Current Every Day Smoker     Packs/day: 1 00     Years: 28 00    Smokeless tobacco: Never Used    Alcohol use No        Review of Systems    Physical Exam  ED Triage Vitals   Temperature Pulse Respirations Blood Pressure SpO2   01/03/18 1339 01/03/18 1339 01/03/18 1339 01/03/18 1339 01/03/18 1339   97 9 °F (36 6 °C) (!) 111 18 (!) 173/80 99 %      Temp Source Heart Rate Source Patient Position - Orthostatic VS BP Location FiO2 (%)   01/03/18 1339 01/03/18 1648 01/03/18 1339 01/03/18 1339 --   Oral Monitor Sitting Left arm       Pain Score       01/03/18 1339       Worst Possible Pain           Orthostatic Vital Signs  Vitals:    01/03/18 1339 01/03/18 1648   BP: (!) 173/80 132/60   Pulse: (!) 111 98   Patient Position - Orthostatic VS: Sitting Sitting       Physical Exam    ED Medications  Medications   sodium chloride 0 9 % bolus 1,000 mL (0 mL Intravenous Stopped 1/3/18 1939)   ketorolac (TORADOL) injection 15 mg (15 mg Intravenous Given 1/3/18 1719)   iohexol (OMNIPAQUE) 350 MG/ML injection (MULTI-DOSE) 100 mL (100 mL Intravenous Given 1/3/18 1806)       Diagnostic Studies  Results Reviewed     Procedure Component Value Units Date/Time    Protime-INR [[id number]]     Lab Status:  No result Specimen:  Blood     APTT [[id number]]     Lab Status:  No result Specimen:  Blood     Lactic acid, plasma [[id number]]     Lab Status:  No result Specimen:  Blood     Blood culture #1 [[id number]]     Lab Status:  No result Specimen:  Blood from Line, Venous     Blood culture #2 [[id number]]     Lab Status:  No result Specimen:  Blood from Line, Venous     Comprehensive metabolic panel [[id number]]  (Abnormal) Collected:  01/03/18 1718    Lab Status:  Final result Specimen:  Blood from Arm, Left Updated:  01/03/18 1743     Sodium 136 mmol/L      Potassium 3 7 mmol/L      Chloride 101 mmol/L      CO2 26 mmol/L      Anion Gap 9 mmol/L      BUN 7 mg/dL      Creatinine 0 89 mg/dL      Glucose 101 mg/dL      Calcium 9 2 mg/dL      AST 15 U/L      ALT 22 U/L      Alkaline Phosphatase 76 U/L      Total Protein 7 8 g/dL      Albumin 3 4 (L) g/dL      Total Bilirubin 0 50 mg/dL      eGFR 78 ml/min/1 73sq m     Narrative:         National Kidney Disease Education Program recommendations are as follows:  GFR calculation is accurate only with a steady state creatinine  Chronic Kidney disease less than 60 ml/min/1 73 sq  meters  Kidney failure less than 15 ml/min/1 73 sq  meters      Lipase [[id number]]  (Abnormal) Collected:  01/03/18 1718    Lab Status:  Final result Specimen:  Blood from Arm, Left Updated:  01/03/18 1743     Lipase 56 (L) u/L     CBC and differential [[id number]]  (Abnormal) Collected:  01/03/18 1718    Lab Status:  Final result Specimen:  Blood from Arm, Left Updated:  01/03/18 1729     WBC 13 15 (H) Thousand/uL      RBC 4 76 Million/uL      Hemoglobin 13 1 g/dL      Hematocrit 41 7 %      MCV 88 fL      MCH 27 5 pg      MCHC 31 4 g/dL      RDW 13 4 %      MPV 9 3 fL      Platelets 121 (H) Thousands/uL      Neutrophils Relative 78 (H) %      Lymphocytes Relative 14 %      Monocytes Relative 7 %      Eosinophils Relative 1 %      Basophils Relative 0 %      Neutrophils Absolute 10 28 (H) Thousands/µL      Lymphocytes Absolute 1 87 Thousands/µL      Monocytes Absolute 0 91 Thousand/µL      Eosinophils Absolute 0 07 Thousand/µL      Basophils Absolute 0 02 Thousands/µL     Urine Microscopic [[id number]]  (Abnormal) Collected:  01/03/18 1650    Lab Status:  Final result Specimen:  Urine from Urine, Clean Catch Updated:  01/03/18 1723     RBC, UA None Seen /hpf      WBC, UA 0-1 (A) /hpf      Epithelial Cells Moderate (A) /hpf      Bacteria, UA Occasional /hpf     UA w Reflex to Microscopic w Reflex to Culture [[id number]]  (Abnormal) Collected:  01/03/18 1650    Lab Status:  Final result Specimen:  Urine from Urine, Clean Catch Updated:  01/03/18 1657     Color, UA Yellow     Clarity, UA Clear     Specific Gravity, UA >=1 030     pH, UA 5 5     Leukocytes, UA Negative     Nitrite, UA Negative     Protein, UA Negative mg/dl      Glucose, UA Negative mg/dl      Ketones, UA Negative mg/dl      Urobilinogen, UA 0 2 E U /dl      Bilirubin, UA Negative     Blood, UA Small (A)    POCT pregnancy, urine [[id number]]  (Normal) Resulted:  01/03/18 1655    Lab Status:  Final result Updated:  01/03/18 1655     EXT PREG TEST UR (Ref: Negative) negative                 CT abdomen pelvis with contrast   Final Result by Moise Alston MD (01/03 1843)         Diverticular abscess containing fecal material, gas and fluid measuring 7 cm  Adjacent diverticulitis in a loop of redundant sigmoid colon  No bowel obstruction  Findings discussed Dr Kelvin Contreras at 6:40 PM EST           Workstation performed: [id number]                    Procedures  Procedures       Phone Contacts  ED Phone Contact    ED Course  ED Course                          Initial Sepsis Screening [id number] Name 01/03/18 1827                Is the patient's history suggestive of a new or worsening infection? (!)  Yes (Proceed)  -CR        Suspected source of infection acute abdominal infection  -CR        Are two or more of the following signs & symptoms of infection both present and new to the patient? (!)  Yes (Proceed)  -CR        Indicate SIRS criteria Tachycardia > 90 bpm;Leukocytosis (WBC > 12042 IJL)  -CR        If the answer is yes to both questions, suspicion of sepsis is present          If severe sepsis is present AND tissue hypoperfusion perists in the hour after fluid resuscitation or lactate > 4, the patient meets criteria for SEPTIC SHOCK          Are any of the following organ dysfunction criteria present within 6 hours of suspected infection and SIRS criteria that are NOT considered to be chronic conditions? No  -CR        Organ dysfunction          Date of presentation of severe sepsis          Time of presentation of severe sepsis          Tissue hypoperfusion persists in the hour after crystalloid fluid administration, evidenced, by either:          Was hypotension present within one hour of the conclusion of crystalloid fluid administration?         Date of presentation of septic shock          Time of presentation of septic shock            User Key  (r) = Recorded By, (t) = Taken By, (c) = Cosigned By    Initials Name Provider Type    CR Jamel Tavarez PA-C Physician Assistant                  [facility] Time    Disposition  Final diagnoses:   None     ED Disposition     None      Follow-up Information    None       Patient's Medications   Discharge Prescriptions    No medications on file     No discharge procedures on file      ED Provider  Electronically Signed by           Atif Tran MD  01/03/18 0144

## 2018-01-04 NOTE — H&P
History and Physical -General Surgery  Laura West 56 y o  female MRN: [id number]  Unit/Bed#: ED 25 Encounter: [id number]        Reason for Consult / Principal Problem: BLQ abdominal pain    HPI: Laura West is a 56y o  year old female with history of diverticulitis treated outpatient successfully in the past who presents complaining of worsening BLQ abdominal pain for about the last 10 days  Pain does not radiate nor does it anything make it better  The pain does worsen during defecation and is similar to the pain she has had with previous episodes  The pain has progressively worsened and today and she came to ED  Associated symptoms are poor appetite, decreased flatus, loose stools and chills  She denies blood in stools,  fevers, nausea or vomiting  Last colonoscopy was in 2011 and she is scheduled to have on in 6 weeks with Dr Magda Krishnan  Cat scan today shows diverticular abscess containing fecal material, gas and fluid measuring 7 cm  Adjacent diverticulitis in a loop of redundant sigmoid colon  No bowel obstruction  Review of Systems   Constitutional: Positive for chills  Negative for appetite change and fever  Gastrointestinal: Positive for abdominal pain and diarrhea  Negative for blood in stool, constipation, nausea and vomiting  Skin: Negative for color change         Historical Information   Past Medical History:   Diagnosis Date    Disease of thyroid gland     Diverticulitis     HLD (hyperlipidemia)     Hypertension      Past Surgical History:   Procedure Laterality Date    CHOLECYSTECTOMY  1999    laparoscopic    HERNIA REPAIR  2454    umbilical    REDUCTION MAMMAPLASTY  1999     Social History   History   Alcohol Use No     History   Drug Use No     History   Smoking Status    Current Every Day Smoker    Packs/day: 1 00    Years: 28 00   Smokeless Tobacco    Never Used     Family History   Problem Relation Age of Onset    Alcohol abuse Mother     Heart disease Father  Diverticulitis Sister     Cancer Brother     No Known Problems Sister        Meds/Allergies     Home medications:   Losartan 100 mg QD    Allergies   Allergen Reactions    Penicillins Angioedema       Objective     Blood pressure 132/60, pulse 98, temperature 97 9 °F (36 6 °C), temperature source Oral, resp  rate 18, height 5' 6" (1 676 m), weight 108 kg (237 lb), SpO2 100 %    No intake or output data in the 24 hours ending 01/03/18 1934    PHYSICAL EXAM  General appearance: alert, no distress and morbidly obese  Skin: Skin color, texture, turgor normal  No rashes or lesions  Head: Normocephalic, without obvious abnormality  Heart: regular rate and rhythm, S1, S2 normal, no murmur, click, rub or gallop  Lungs: clear to auscultation bilaterally  Abdomen: obese, rounded and soft, min BL, no rebound or guarding  Neurological: normal without focal findings    Lab Results:   Admission on 01/03/2018   Component Date Value    WBC 01/03/2018 13 15*    RBC 01/03/2018 4 76     Hemoglobin 01/03/2018 13 1     Hematocrit 01/03/2018 41 7     MCV 01/03/2018 88     MCH 01/03/2018 27 5     MCHC 01/03/2018 31 4     RDW 01/03/2018 13 4     MPV 01/03/2018 9 3     Platelets 23/02/7691 428*    Neutrophils Relative 01/03/2018 78*    Lymphocytes Relative 01/03/2018 14     Monocytes Relative 01/03/2018 7     Eosinophils Relative 01/03/2018 1     Basophils Relative 01/03/2018 0     Neutrophils Absolute 01/03/2018 10 28*    Lymphocytes Absolute 01/03/2018 1 87     Monocytes Absolute 01/03/2018 0 91     Eosinophils Absolute 01/03/2018 0 07     Basophils Absolute 01/03/2018 0 02     Sodium 01/03/2018 136     Potassium 01/03/2018 3 7     Chloride 01/03/2018 101     CO2 01/03/2018 26     Anion Gap 01/03/2018 9     BUN 01/03/2018 7     Creatinine 01/03/2018 0 89     Glucose 01/03/2018 101     Calcium 01/03/2018 9 2     AST 01/03/2018 15     ALT 01/03/2018 22     Alkaline Phosphatase 01/03/2018 76     Total Protein 01/03/2018 7 8     Albumin 01/03/2018 3 4*    Total Bilirubin 01/03/2018 0 50     eGFR 01/03/2018 78     Lipase 01/03/2018 56*    EXT PREG TEST UR (Ref: N* 01/03/2018 negative     Color, UA 01/03/2018 Yellow     Clarity, UA 01/03/2018 Clear     Specific Gravity, UA 01/03/2018 >=1 030     pH, UA 01/03/2018 5 5     Leukocytes, UA 01/03/2018 Negative     Nitrite, UA 01/03/2018 Negative     Protein, UA 01/03/2018 Negative     Glucose, UA 01/03/2018 Negative     Ketones, UA 01/03/2018 Negative     Urobilinogen, UA 01/03/2018 0 2     Bilirubin, UA 01/03/2018 Negative     Blood, UA 01/03/2018 Small*    RBC, UA 01/03/2018 None Seen     WBC, UA 01/03/2018 0-1*    Epithelial Cells 01/03/2018 Moderate*    Bacteria, UA 01/03/2018 Occasional      Imaging Studies: I have personally reviewed pertinent reports  1/3- CT A/P-Diverticular abscess containing fecal material, gas and fluid measuring 7 cm  Adjacent diverticulitis in a loop of redundant sigmoid colon  No bowel obstruction  ASSESSMENT/PLAN:    Diverticulitis with abscess in 57 yo with leukocytosis  Will admit at this time   - NPO  - IVF  - IV abx  - IR consult for drainage  - Pain control  - AM labs, follow leukocytosis  - serial exams  - DVT PPX with heparin SC  - OOB and ambulate    HTN  - Hold Losartan and will put on IV metoprolol PRN     This patient will require > 2 night hospital stay  Counseling / Coordination of Care  Total time spent today  30 minutes  Greater than 50% of total time was spent with the patient and / or family counseling and / or coordination of care

## 2018-01-04 NOTE — ED ATTENDING ATTESTATION
Mabel King MD, saw and evaluated the patient  I have discussed the patient with the resident/non-physician practitioner and agree with the resident's/non-physician practitioner's findings, Plan of Care, and MDM as documented in the resident's/non-physician practitioner's note, except where noted  All available labs and Radiology studies were reviewed  At this point I agree with the current assessment done in the Emergency Department  I have conducted an independent evaluation of this patient a history and physical is as follows:      Critical Care Time  CritCare Time    Procedures patient seen and examined in conjunction with the physician assistant fellow Ruthy Daigle  Workup has diagnosed her with sigmoid diverticulitis with an abscess  No perforation  She will be admitted to the surgical service with IV antibiotics

## 2018-01-04 NOTE — PROGRESS NOTES
H and P from admission reviewed prior to procedure  There have been no interval changes  Prior imaging was reviewed  Mid abdominal air-fluid collection most consistent with diverticular abscess  Plan for percutaneous drainage was sedation  Questions answered  Informed written consent was obtained      Mitul Allison MD

## 2018-01-04 NOTE — PROGRESS NOTES
Progress Note -Surgery PA  Larissa Anand 56 y o  female MRN: [id number]  Unit/Bed#: -01 Encounter: [id number]      Subjective/Objective     Subjective: States her pain is about the same this AM  Would rather toradol than dilaudid for pain  No N/V overnight  WBC 11      Objective:     /84   Pulse 91   Temp 98 1 °F (36 7 °C) (Oral)   Resp 16   Ht 5' 6" (1 676 m)   Wt 107 kg (236 lb 15 9 oz)   SpO2 97%   BMI 38 25 kg/m²       Intake/Output Summary (Last 24 hours) at 01/04/18 0757  Last data filed at 01/04/18 0744   Gross per 24 hour   Intake                0 ml   Output              175 ml   Net             -175 ml       Invasive Devices     Peripheral Intravenous Line            Peripheral IV 01/03/18 Left Antecubital less than 1 day                Physical Exam:  General appearance: alert, appears stated age and cooperative  Lungs: clear to auscultation bilaterally  Heart: regular rate and rhythm, S1, S2 normal, no murmur, click, rub or gallop  Abdomen: soft, bowel sounds +   Tender to palpation pelvic region  Extremities: extremities normal, atraumatic, no cyanosis or edema      Current Facility-Administered Medications:     cefepime (MAXIPIME) 2 g/50 mL dextrose IVPB, 2,000 mg, Intravenous, Q12H, Bridgett Aguero PA-C, Last Rate: 100 mL/hr at 01/04/18 0744, 2,000 mg at 01/04/18 0744    heparin (porcine) subcutaneous injection 5,000 Units, 5,000 Units, Subcutaneous, Q8H [address], 5,000 Units at 01/03/18 2221 **AND** Platelet count, , , Once, Bridgett Aguero PA-C    HYDROmorphone (DILAUDID) 1 mg/mL injection 1 mg, 1 mg, Intravenous, Q3H PRN, Bridgett Aguero PA-C, 1 mg at 01/04/18 0350    influenza inactivated quadrivalent vaccine (FLULAVAL) IM injection 0 5 mL, 0 5 mL, Intramuscular, Prior to discharge, Walt Gardiner DO    ketorolac (TORADOL) injection 15 mg, 15 mg, Intravenous, Q6H PRN, Karen D Fatmata, PA-C    ketorolac (TORADOL) injection 30 mg, 30 mg, Intravenous, Q6H PRN, Naylor Romance, PA-C    losartan (COZAAR) tablet 100 mg, 100 mg, Oral, Daily, Dar [place], PA-C    metoprolol (LOPRESSOR) injection 5 mg, 5 mg, Intravenous, Q6H PRN, Dar Ksenia, PA-C    metroNIDAZOLE (FLAGYL) IVPB (premix) 500 mg, 500 mg, Intravenous, Q8H, Dar [place], PA-C, Last Rate: 200 mL/hr at 01/04/18 0350, 500 mg at 01/04/18 0350    nicotine (NICODERM CQ) 21 mg/24 hr TD 24 hr patch 1 patch, 1 patch, Transdermal, Daily, Dar [place], PA-C    ondansetron [facility]) injection 4 mg, 4 mg, Intravenous, Q6H PRN, Dar [place], PA-C    sodium chloride 0 9 % infusion, 125 mL/hr, Intravenous, Continuous, Dar Ksenia, PA-C, Last Rate: 125 mL/hr at 01/04/18 0513, 125 mL/hr at 01/04/18 0513              Lab, Imaging and other studies:  I have personally reviewed pertinent lab results  , CBC:   Lab Results   Component Value Date    WBC 10 87 (H) 01/04/2018    HGB 12 0 01/04/2018    HCT 38 9 01/04/2018    MCV 89 01/04/2018     01/04/2018    MCH 27 4 01/04/2018    MCHC 30 8 (L) 01/04/2018    RDW 13 5 01/04/2018    MPV 9 9 01/04/2018   , CMP:   Lab Results   Component Value Date     01/04/2018    K 3 7 01/04/2018     01/04/2018    CO2 21 01/04/2018    ANIONGAP 12 01/04/2018    BUN 7 01/04/2018    CREATININE 0 80 01/04/2018    GLUCOSE 87 01/04/2018    CALCIUM 8 6 01/04/2018    AST 15 01/03/2018    ALT 22 01/03/2018    ALKPHOS 76 01/03/2018    PROT 7 8 01/03/2018    ALBUMIN 3 4 (L) 01/03/2018    BILITOT 0 50 01/03/2018    EGFR 89 01/04/2018     Labs in chart were reviewed    Lab Results   Component Value Date    WBC 10 87 (H) 01/04/2018    WBC 8 97 07/17/2014    HGB 12 0 01/04/2018    HGB 14 1 07/17/2014    HCT 38 9 01/04/2018    HCT 45 0 07/17/2014     01/04/2018     07/17/2014     Lab Results   Component Value Date     01/04/2018     07/17/2014    K 3 7 01/04/2018    K 4 6 07/17/2014     01/04/2018     07/17/2014    CO2 21 01/04/2018    CO2 28 07/17/2014    BUN 7 01/04/2018    BUN 11 07/17/2014    CREATININE 0 80 01/04/2018    CREATININE 0 79 07/17/2014    GLUCOSE 87 01/04/2018    GLUCOSE 108 07/17/2014       VTE Pharmacologic Prophylaxis: Heparin  VTE Mechanical Prophylaxis: sequential compression device    Assessment:    56year old female with diverticulitis with abscess  - Appreciate IR consult for possible drain placement  - Continue IV cefapime/flagyl  - Keep NPO/IVF  - Pain control with toradol  - Follow up AM labs  - DVT ppx    HTN  - IV metoprolol as needed until can restart home medication    This text is generated with voice recognition software  There may be translation, syntax,  or grammatical errors  If you have any questions, please contact the dictating provider      Qamar Sousa PA-C

## 2018-01-05 LAB
ANION GAP SERPL CALCULATED.3IONS-SCNC: 10 MMOL/L (ref 4–13)
BUN SERPL-MCNC: 4 MG/DL (ref 5–25)
CALCIUM SERPL-MCNC: 8.3 MG/DL (ref 8.3–10.1)
CHLORIDE SERPL-SCNC: 107 MMOL/L (ref 100–108)
CO2 SERPL-SCNC: 20 MMOL/L (ref 21–32)
CREAT SERPL-MCNC: 0.73 MG/DL (ref 0.6–1.3)
ERYTHROCYTE [DISTWIDTH] IN BLOOD BY AUTOMATED COUNT: 13.3 % (ref 11.6–15.1)
GFR SERPL CREATININE-BSD FRML MDRD: 99 ML/MIN/1.73SQ M
GLUCOSE SERPL-MCNC: 113 MG/DL (ref 65–140)
HCT VFR BLD AUTO: 37.8 % (ref 34.8–46.1)
HGB BLD-MCNC: 11.7 G/DL (ref 11.5–15.4)
MCH RBC QN AUTO: 27.3 PG (ref 26.8–34.3)
MCHC RBC AUTO-ENTMCNC: 31 G/DL (ref 31.4–37.4)
MCV RBC AUTO: 88 FL (ref 82–98)
PLATELET # BLD AUTO: 315 THOUSANDS/UL (ref 149–390)
PMV BLD AUTO: 9.5 FL (ref 8.9–12.7)
POTASSIUM SERPL-SCNC: 3.4 MMOL/L (ref 3.5–5.3)
RBC # BLD AUTO: 4.29 MILLION/UL (ref 3.81–5.12)
SODIUM SERPL-SCNC: 137 MMOL/L (ref 136–145)
WBC # BLD AUTO: 9.11 THOUSAND/UL (ref 4.31–10.16)

## 2018-01-05 PROCEDURE — 85027 COMPLETE CBC AUTOMATED: CPT | Performed by: PHYSICIAN ASSISTANT

## 2018-01-05 PROCEDURE — 80048 BASIC METABOLIC PNL TOTAL CA: CPT | Performed by: PHYSICIAN ASSISTANT

## 2018-01-05 PROCEDURE — 87493 C DIFF AMPLIFIED PROBE: CPT | Performed by: SURGERY

## 2018-01-05 RX ORDER — METOCLOPRAMIDE HYDROCHLORIDE 5 MG/ML
10 INJECTION INTRAMUSCULAR; INTRAVENOUS EVERY 6 HOURS PRN
Status: DISCONTINUED | OUTPATIENT
Start: 2018-01-05 | End: 2018-01-14 | Stop reason: HOSPADM

## 2018-01-05 RX ORDER — ONDANSETRON 2 MG/ML
4 INJECTION INTRAMUSCULAR; INTRAVENOUS EVERY 4 HOURS PRN
Status: DISCONTINUED | OUTPATIENT
Start: 2018-01-05 | End: 2018-01-14 | Stop reason: HOSPADM

## 2018-01-05 RX ADMIN — POTASSIUM CHLORIDE 20 MEQ: 2 INJECTION, SOLUTION, CONCENTRATE INTRAVENOUS at 12:49

## 2018-01-05 RX ADMIN — KETOROLAC TROMETHAMINE 30 MG: 30 INJECTION, SOLUTION INTRAMUSCULAR at 11:52

## 2018-01-05 RX ADMIN — KETOROLAC TROMETHAMINE 15 MG: 30 INJECTION, SOLUTION INTRAMUSCULAR at 22:50

## 2018-01-05 RX ADMIN — HEPARIN SODIUM 5000 UNITS: 5000 INJECTION, SOLUTION INTRAVENOUS; SUBCUTANEOUS at 20:49

## 2018-01-05 RX ADMIN — KETOROLAC TROMETHAMINE 15 MG: 30 INJECTION, SOLUTION INTRAMUSCULAR at 15:04

## 2018-01-05 RX ADMIN — HEPARIN SODIUM 5000 UNITS: 5000 INJECTION, SOLUTION INTRAVENOUS; SUBCUTANEOUS at 04:57

## 2018-01-05 RX ADMIN — CEFEPIME HYDROCHLORIDE 2000 MG: 2 INJECTION, POWDER, FOR SOLUTION INTRAVENOUS at 19:48

## 2018-01-05 RX ADMIN — CEFEPIME HYDROCHLORIDE 2000 MG: 2 INJECTION, POWDER, FOR SOLUTION INTRAVENOUS at 09:36

## 2018-01-05 RX ADMIN — METRONIDAZOLE 500 MG: 500 INJECTION, SOLUTION INTRAVENOUS at 04:07

## 2018-01-05 RX ADMIN — SODIUM CHLORIDE 125 ML/HR: 0.9 INJECTION, SOLUTION INTRAVENOUS at 23:57

## 2018-01-05 RX ADMIN — ONDANSETRON 4 MG: 2 INJECTION INTRAMUSCULAR; INTRAVENOUS at 03:59

## 2018-01-05 RX ADMIN — LOSARTAN POTASSIUM 100 MG: 50 TABLET, FILM COATED ORAL at 09:40

## 2018-01-05 RX ADMIN — METRONIDAZOLE 500 MG: 500 INJECTION, SOLUTION INTRAVENOUS at 16:35

## 2018-01-05 RX ADMIN — KETOROLAC TROMETHAMINE 30 MG: 30 INJECTION, SOLUTION INTRAMUSCULAR at 03:59

## 2018-01-05 RX ADMIN — METRONIDAZOLE 500 MG: 500 INJECTION, SOLUTION INTRAVENOUS at 20:49

## 2018-01-05 RX ADMIN — ONDANSETRON 4 MG: 2 INJECTION INTRAMUSCULAR; INTRAVENOUS at 20:49

## 2018-01-05 RX ADMIN — HEPARIN SODIUM 5000 UNITS: 5000 INJECTION, SOLUTION INTRAVENOUS; SUBCUTANEOUS at 14:29

## 2018-01-05 RX ADMIN — KETOROLAC TROMETHAMINE 30 MG: 30 INJECTION, SOLUTION INTRAMUSCULAR at 19:48

## 2018-01-05 RX ADMIN — METOCLOPRAMIDE 10 MG: 5 INJECTION, SOLUTION INTRAMUSCULAR; INTRAVENOUS at 16:34

## 2018-01-05 RX ADMIN — HYDROMORPHONE HYDROCHLORIDE 0.5 MG: 1 INJECTION, SOLUTION INTRAMUSCULAR; INTRAVENOUS; SUBCUTANEOUS at 08:25

## 2018-01-05 RX ADMIN — ONDANSETRON 4 MG: 2 INJECTION INTRAMUSCULAR; INTRAVENOUS at 08:25

## 2018-01-05 NOTE — SOCIAL WORK
LOS 2   Not a bundle; Not a readmission  Cm met with pt and family regarding VNA services at home  Pt states the surgeon said she will be going home tomorrow  Pt will return hoem with a drain  Cm asked if she feels she needs VNA to assist her with the drain or if her family would be willing to assist her  Her daughter stated she feels comfortable working the drain at home  Cm explained nursing will teach her and daughter prior to DC  Cm reviewed DASH  CM reviewed d/c planning process including the following: identifying help at home, patient preference for d/c planning needs, [facility] Meds to Bed program, availability of treatment team to discuss questions or concerns patient and/or family may have regarding understanding medications and recognizing signs and symptoms once discharged  CM also encouraged patient to follow up with all recommended appointments after discharge  Patient advised of importance for patient and family to participate in managing patients medical well being

## 2018-01-05 NOTE — PROGRESS NOTES
Patient had 4 loose bm's spoke with surgery per protocol to rule out c-diff  A sample was sent down and patient is on contact precautions

## 2018-01-05 NOTE — PROGRESS NOTES
Progress Note -Surgery VIELKA Mcnair 56 y o  female MRN: [id number]  Unit/Bed#: -01 Encounter: [id number]      Subjective/Objective     Subjective: Pt states she had multiple episodes of nausea and vomiting overnight  Has also begun to have diarrhea  Stating she has abdominal pain  MORRIS drain put out 125cc overnight  Has changed from bloody color to light brown  Objective:     /67   Pulse 102   Temp (!) 97 4 °F (36 3 °C) (Oral)   Resp 18   Ht 5' 6" (1 676 m)   Wt 107 kg (236 lb 15 9 oz)   SpO2 96%   BMI 38 25 kg/m²       Intake/Output Summary (Last 24 hours) at 01/05/18 9343  Last data filed at 01/05/18 0751   Gross per 24 hour   Intake                5 ml   Output             1900 ml   Net            -1895 ml       Invasive Devices     Peripheral Intravenous Line            Peripheral IV 01/03/18 Left Antecubital 1 day          Drain            Closed/Suction Drain Left Abdomen Bulb 8 5 Fr  less than 1 day                Physical Exam:    General: alert and oriented, mild distress  Heart: RRR  Lungs: CTA bilaterally  Abdomen: distended, some tenderness to palpation   MORRIS drain x1 light brown output        Current Facility-Administered Medications:     cefepime (MAXIPIME) 2 g/50 mL dextrose IVPB, 2,000 mg, Intravenous, Q12H, Anup Noguera PA-C, Last Rate: 100 mL/hr at 01/04/18 2033, 2,000 mg at 01/04/18 2033    heparin (porcine) subcutaneous injection 5,000 Units, 5,000 Units, Subcutaneous, Q8H [facility] & assisted, 5,000 Units at 01/05/18 0457 **AND** Platelet count, , , Once, Anup Noguera PA-C    HYDROmorphone (DILAUDID) 1 mg/mL injection 1 mg, 1 mg, Intravenous, Q3H PRN, Anup Noguera PA-C, 1 mg at 01/04/18 0350    influenza inactivated quadrivalent vaccine (FLULAVAL) IM injection 0 5 mL, 0 5 mL, Intramuscular, Prior to discharge, Walt Gardiner,     ketorolac (TORADOL) injection 15 mg, 15 mg, Intravenous, Q6H PRN, Karen Avilez PA-C    ketorolac (TORADOL) injection 30 mg, 30 mg, Intravenous, Q6H PRN, Norma Kent PA-C, 30 mg at 01/05/18 0359    losartan (COZAAR) tablet 100 mg, 100 mg, Oral, Daily, Lissy Leon PA-C    metoclopramide (REGLAN) injection 10 mg, 10 mg, Intravenous, Q6H PRN, Karen Avielz PA-C    metoprolol (LOPRESSOR) injection 5 mg, 5 mg, Intravenous, Q6H PRN, Lissy Leon PA-C    metroNIDAZOLE (FLAGYL) IVPB (premix) 500 mg, 500 mg, Intravenous, Q8H, Lissy Leon PA-C, Last Rate: 200 mL/hr at 01/05/18 0407, 500 mg at 01/05/18 0407    nicotine (NICODERM CQ) 21 mg/24 hr TD 24 hr patch 1 patch, 1 patch, Transdermal, Daily, Lissy Leon PA-C    ondansetron [facility]) injection 4 mg, 4 mg, Intravenous, Q4H PRN, Karen Avilez PA-C    potassium chloride 20 mEq in D5W 100 mL, 20 mEq, Intravenous, Once, Karen Avilez PA-C    sodium chloride 0 9 % infusion, 125 mL/hr, Intravenous, Continuous, Lissy Leon PA-C, Last Rate: 125 mL/hr at 01/05/18 0459, 125 mL/hr at 01/05/18 0459              Lab, Imaging and other studies:  I have personally reviewed pertinent lab results  , CBC:   Lab Results   Component Value Date    WBC 9 11 01/05/2018    HGB 11 7 01/05/2018    HCT 37 8 01/05/2018    MCV 88 01/05/2018     01/05/2018    MCH 27 3 01/05/2018    MCHC 31 0 (L) 01/05/2018    RDW 13 3 01/05/2018    MPV 9 5 01/05/2018   , CMP:   Lab Results   Component Value Date     01/05/2018    K 3 4 (L) 01/05/2018     01/05/2018    CO2 20 (L) 01/05/2018    ANIONGAP 10 01/05/2018    BUN 4 (L) 01/05/2018    CREATININE 0 73 01/05/2018    GLUCOSE 113 01/05/2018    CALCIUM 8 3 01/05/2018    EGFR 99 01/05/2018     Labs in chart were reviewed    Lab Results   Component Value Date    WBC 9 11 01/05/2018    WBC 8 97 07/17/2014    HGB 11 7 01/05/2018    HGB 14 1 07/17/2014    HCT 37 8 01/05/2018    HCT 45 0 07/17/2014     01/05/2018     07/17/2014     Lab Results   Component Value Date     01/05/2018     07/17/2014    K 3 4 (L) 01/05/2018    K 4 6 07/17/2014     01/05/2018     07/17/2014    CO2 20 (L) 01/05/2018    CO2 28 07/17/2014    BUN 4 (L) 01/05/2018    BUN 11 07/17/2014    CREATININE 0 73 01/05/2018    CREATININE 0 79 07/17/2014    GLUCOSE 113 01/05/2018    GLUCOSE 108 07/17/2014       VTE Pharmacologic Prophylaxis: Heparin  VTE Mechanical Prophylaxis: sequential compression device    Assessment/plan:  56year old female with diverticulitis with pelvic abscess  - Continue IV cefapime/flagyl  - Will give reglan to help with nausea  - Currently on clears  - Pain control  Patient stating she does not want to take dilaudid and would rather take toradol    - Follow up AM labs  - DVT ppx    HTN  - IV metoprolol  Will hold off on po form due to vomiting  This text is generated with voice recognition software  There may be translation, syntax,  or grammatical errors  If you have any questions, please contact the dictating provider      Shahzad Hagan PA-C

## 2018-01-05 NOTE — CASE MANAGEMENT
Continued Stay Review    Date: 01/05/2018    Vital Signs: /67   Pulse 102   Temp (!) 97 4 °F (36 3 °C) (Oral)   Resp 18   Ht 5' 6" (1 676 m)   Wt 107 kg (236 lb 15 9 oz)   SpO2 96%   BMI 38 25 kg/m²     Medications:   Scheduled Meds:   cefepime 2,000 mg Intravenous Q12H   heparin (porcine) 5,000 Units Subcutaneous Q8H [address]   losartan 100 mg Oral Daily   metroNIDAZOLE 500 mg Intravenous Q8H   nicotine 1 patch Transdermal Daily   potassium chloride 20 mEq Intravenous Once     Continuous Infusions:   sodium chloride 125 mL/hr Last Rate: 125 mL/hr (01/05/18 2571)     Abnormal Labs/Diagnostic Results: K 3 4, Bun 4    Age/Sex: 56 y o  female     Assessment/Plan:     56year old female with diverticulitis with pelvic abscess  - Continue IV cefapime/flagyl  - Will give reglan to help with nausea  - Currently on clears  - Pain control  Patient stating she does not want to take dilaudid and would rather take toradol    - Follow up AM labs  - DVT ppx     HTN  - IV metoprolol  Will hold off on po form due to vomiting  01/04/2018  OP Note: Findings:  8 Kisw[place]ili drain placed into mid abdominal abscess using CT guidance    10 cc of thick bloody fluid was aspirated

## 2018-01-05 NOTE — PLAN OF CARE
Problem: DISCHARGE PLANNING - CARE MANAGEMENT  Goal: Discharge to post-acute care or home with appropriate resources  INTERVENTIONS:  - Conduct assessment to determine patient/family and health care team treatment goals, and need for post-acute services based on payer coverage, community resources, and patient preferences, and barriers to discharge  - Address psychosocial, clinical, and financial barriers to discharge as identified in assessment in conjunction with the patient/family and health care team  - Arrange appropriate level of post-acute services according to patient's   needs and preference and payer coverage in collaboration with the physician and health care team  - Communicate with and update the patient/family, physician, and health care team regarding progress on the discharge plan  - Arrange appropriate transportation to post-acute venues  Outcome: Progressing  LOS 2  Not a bundle; Not a readmission  Cm met with pt and family regarding VNA services at home  Pt states the surgeon said she will be going home tomorrow  Pt will return hoem with a drain  Cm asked if she feels she needs VNA to assist her with the drain or if her family would be willing to assist her  Her daughter stated she feels comfortable working the drain at home  Cm explained nursing will teach her and daughter prior to DC  Cm reviewed DASH  CM reviewed d/c planning process including the following: identifying help at home, patient preference for d/c planning needs, [facility] Meds to Bed program, availability of treatment team to discuss questions or concerns patient and/or family may have regarding understanding medications and recognizing signs and symptoms once discharged  CM also encouraged patient to follow up with all recommended appointments after discharge  Patient advised of importance for patient and family to participate in managing patients medical well being

## 2018-01-06 LAB
ANION GAP SERPL CALCULATED.3IONS-SCNC: 9 MMOL/L (ref 4–13)
BACTERIA SPEC BFLD CULT: ABNORMAL
BACTERIA SPEC BFLD CULT: ABNORMAL
BUN SERPL-MCNC: 3 MG/DL (ref 5–25)
C DIFF TOX GENS STL QL NAA+PROBE: NORMAL
CALCIUM SERPL-MCNC: 8.4 MG/DL (ref 8.3–10.1)
CHLORIDE SERPL-SCNC: 106 MMOL/L (ref 100–108)
CO2 SERPL-SCNC: 21 MMOL/L (ref 21–32)
CREAT SERPL-MCNC: 0.76 MG/DL (ref 0.6–1.3)
ERYTHROCYTE [DISTWIDTH] IN BLOOD BY AUTOMATED COUNT: 13.7 % (ref 11.6–15.1)
GFR SERPL CREATININE-BSD FRML MDRD: 94 ML/MIN/1.73SQ M
GLUCOSE SERPL-MCNC: 137 MG/DL (ref 65–140)
GRAM STN SPEC: ABNORMAL
GRAM STN SPEC: ABNORMAL
HCT VFR BLD AUTO: 42 % (ref 34.8–46.1)
HGB BLD-MCNC: 12.8 G/DL (ref 11.5–15.4)
MCH RBC QN AUTO: 27.1 PG (ref 26.8–34.3)
MCHC RBC AUTO-ENTMCNC: 30.5 G/DL (ref 31.4–37.4)
MCV RBC AUTO: 89 FL (ref 82–98)
PLATELET # BLD AUTO: 336 THOUSANDS/UL (ref 149–390)
PMV BLD AUTO: 10 FL (ref 8.9–12.7)
POTASSIUM SERPL-SCNC: 3.7 MMOL/L (ref 3.5–5.3)
RBC # BLD AUTO: 4.73 MILLION/UL (ref 3.81–5.12)
SODIUM SERPL-SCNC: 136 MMOL/L (ref 136–145)
WBC # BLD AUTO: 12.52 THOUSAND/UL (ref 4.31–10.16)

## 2018-01-06 PROCEDURE — 80048 BASIC METABOLIC PNL TOTAL CA: CPT | Performed by: PHYSICIAN ASSISTANT

## 2018-01-06 PROCEDURE — 85027 COMPLETE CBC AUTOMATED: CPT | Performed by: PHYSICIAN ASSISTANT

## 2018-01-06 RX ORDER — LEVOFLOXACIN 5 MG/ML
750 INJECTION, SOLUTION INTRAVENOUS EVERY 24 HOURS
Status: DISCONTINUED | OUTPATIENT
Start: 2018-01-06 | End: 2018-01-11

## 2018-01-06 RX ORDER — LEVOFLOXACIN 25 MG/ML
750 INJECTION INTRAVENOUS EVERY 24 HOURS
Status: DISCONTINUED | OUTPATIENT
Start: 2018-01-06 | End: 2018-01-06

## 2018-01-06 RX ADMIN — KETOROLAC TROMETHAMINE 15 MG: 30 INJECTION, SOLUTION INTRAMUSCULAR at 16:54

## 2018-01-06 RX ADMIN — LOSARTAN POTASSIUM 100 MG: 50 TABLET, FILM COATED ORAL at 07:56

## 2018-01-06 RX ADMIN — KETOROLAC TROMETHAMINE 15 MG: 30 INJECTION, SOLUTION INTRAMUSCULAR at 07:56

## 2018-01-06 RX ADMIN — HYDROMORPHONE HYDROCHLORIDE 0.5 MG: 1 INJECTION, SOLUTION INTRAMUSCULAR; INTRAVENOUS; SUBCUTANEOUS at 20:27

## 2018-01-06 RX ADMIN — ONDANSETRON 4 MG: 2 INJECTION INTRAMUSCULAR; INTRAVENOUS at 11:22

## 2018-01-06 RX ADMIN — METOCLOPRAMIDE 10 MG: 5 INJECTION, SOLUTION INTRAMUSCULAR; INTRAVENOUS at 07:56

## 2018-01-06 RX ADMIN — HYDROMORPHONE HYDROCHLORIDE 0.5 MG: 1 INJECTION, SOLUTION INTRAMUSCULAR; INTRAVENOUS; SUBCUTANEOUS at 02:44

## 2018-01-06 RX ADMIN — CEFEPIME HYDROCHLORIDE 2000 MG: 2 INJECTION, POWDER, FOR SOLUTION INTRAVENOUS at 07:56

## 2018-01-06 RX ADMIN — ONDANSETRON 4 MG: 2 INJECTION INTRAMUSCULAR; INTRAVENOUS at 05:06

## 2018-01-06 RX ADMIN — LEVOFLOXACIN 750 MG: 5 INJECTION, SOLUTION INTRAVENOUS at 11:22

## 2018-01-06 RX ADMIN — HEPARIN SODIUM 5000 UNITS: 5000 INJECTION, SOLUTION INTRAVENOUS; SUBCUTANEOUS at 05:06

## 2018-01-06 RX ADMIN — HYDROMORPHONE HYDROCHLORIDE 0.5 MG: 1 INJECTION, SOLUTION INTRAMUSCULAR; INTRAVENOUS; SUBCUTANEOUS at 11:22

## 2018-01-06 RX ADMIN — ONDANSETRON 4 MG: 2 INJECTION INTRAMUSCULAR; INTRAVENOUS at 16:54

## 2018-01-06 RX ADMIN — HEPARIN SODIUM 5000 UNITS: 5000 INJECTION, SOLUTION INTRAVENOUS; SUBCUTANEOUS at 21:12

## 2018-01-06 RX ADMIN — METRONIDAZOLE 500 MG: 500 INJECTION, SOLUTION INTRAVENOUS at 21:12

## 2018-01-06 RX ADMIN — METRONIDAZOLE 500 MG: 500 INJECTION, SOLUTION INTRAVENOUS at 05:06

## 2018-01-06 RX ADMIN — METRONIDAZOLE 500 MG: 500 INJECTION, SOLUTION INTRAVENOUS at 11:23

## 2018-01-06 RX ADMIN — HEPARIN SODIUM 5000 UNITS: 5000 INJECTION, SOLUTION INTRAVENOUS; SUBCUTANEOUS at 13:03

## 2018-01-06 NOTE — PROGRESS NOTES
Progress Note -Surgery PA  Chasidynicola Pascual 56 y o  female MRN: [id number]  Unit/Bed#: -01 Encounter: [id number]      Assessment:  56year old female with diverticulitis with abscess  Plan: 1 check cdiff  2  Ambulate  3  Continue IV antibiotics-leukocytosis with slight increase today  4  Pain control  5  MORRIS to suction with flushes  Subjective/Objective     Subjective:had 4 loose BM overnight  Cdiff pending  Has drain site pain  Drain putting out 225 of stool like material yesterday  Objective:     /62   Pulse (!) 106   Temp 97 6 °F (36 4 °C) (Oral)   Resp 18   Ht 5' 6" (1 676 m)   Wt 107 kg (236 lb 15 9 oz)   SpO2 99%   BMI 38 25 kg/m²   I/O last 24 hours: In: 4373 8 [P O :2440; I V :1933 8]  Out: 931 [Urine:700; Drains:231]        Intake/Output Summary (Last 24 hours) at 01/06/18 0813  Last data filed at 01/06/18 0801   Gross per 24 hour   Intake          4373 75 ml   Output              831 ml   Net          3542 75 ml       Invasive Devices     Peripheral Intravenous Line            Peripheral IV 01/03/18 Left Antecubital 2 days          Drain            Closed/Suction Drain Left Abdomen Bulb 8 5 Fr  1 day                Physical Exam:  /62   Pulse (!) 106   Temp 97 6 °F (36 4 °C) (Oral)   Resp 18   Ht 5' 6" (1 676 m)   Wt 107 kg (236 lb 15 9 oz)   SpO2 99%   BMI 38 25 kg/m²   General appearance: alert and oriented, in no acute distress and alert  Lungs: clear to auscultation bilaterally  Heart: regular rate and rhythm, S1, S2 normal, no murmur, click, rub or gallop  Abdomen: abdomen soft and distended  Tenderness to palp around drain site  MORRIS functioning       Current Facility-Administered Medications:     cefepime (MAXIPIME) 2 g/50 mL dextrose IVPB, 2,000 mg, Intravenous, Q12H, Krista Centeno PA-C, Last Rate: 100 mL/hr at 01/06/18 0756, 2,000 mg at 01/06/18 0756    heparin (porcine) subcutaneous injection 5,000 Units, 5,000 Units, Subcutaneous, Q8H [address], 5,000 Units at 01/06/18 0506 **AND** Platelet count, , , Once, Felice Mccullough PA-C    HYDROmorphone (DILAUDID) 1 mg/mL injection 1 mg, 1 mg, Intravenous, Q3H PRN, Felice Mccullough PA-C, 0 5 mg at 01/06/18 0244    influenza inactivated quadrivalent vaccine (FLULAVAL) IM injection 0 5 mL, 0 5 mL, Intramuscular, Prior to discharge, Walt Gardiner,     ketorolac (TORADOL) injection 15 mg, 15 mg, Intravenous, Q6H PRN, Yaakov Avilez PA-C, 15 mg at 01/06/18 0756    losartan (COZAAR) tablet 100 mg, 100 mg, Oral, Daily, Felice Mccullough PA-C, 100 mg at 01/06/18 0756    metoclopramide (REGLAN) injection 10 mg, 10 mg, Intravenous, Q6H PRN, Yaakov Avilez PA-C, 10 mg at 01/06/18 0756    metoprolol (LOPRESSOR) injection 5 mg, 5 mg, Intravenous, Q6H PRN, Felice Mccullough PA-C    metroNIDAZOLE (FLAGYL) IVPB (premix) 500 mg, 500 mg, Intravenous, Q8H, Felice Mccullough PA-C, Last Rate: 200 mL/hr at 01/06/18 0506, 500 mg at 01/06/18 0506    nicotine (NICODERM CQ) 21 mg/24 hr TD 24 hr patch 1 patch, 1 patch, Transdermal, Daily, Felice Mccullough PA-C, Stopped at 01/06/18 0900    ondansetron (ZOFRAN) injection 4 mg, 4 mg, Intravenous, Q4H PRN, Karen Avilez PA-C, 4 mg at 01/06/18 0506    sodium chloride 0 9 % infusion, 125 mL/hr, Intravenous, Continuous, Felice Mccullough PA-C, Last Rate: 125 mL/hr at 01/05/18 2357, 125 mL/hr at 01/05/18 2357           Lab, Imaging and other studies:  CBC:   Lab Results   Component Value Date    WBC 12 52 (H) 01/06/2018    HGB 12 8 01/06/2018    HCT 42 0 01/06/2018    MCV 89 01/06/2018     01/06/2018    MCH 27 1 01/06/2018    MCHC 30 5 (L) 01/06/2018    RDW 13 7 01/06/2018    MPV 10 0 01/06/2018   , CMP:   Lab Results   Component Value Date     01/06/2018    K 3 7 01/06/2018     01/06/2018    CO2 21 01/06/2018    ANIONGAP 9 01/06/2018    BUN 3 (L) 01/06/2018    CREATININE 0 76 01/06/2018    GLUCOSE 137 01/06/2018    CALCIUM 8 4 01/06/2018    EGFR 94 01/06/2018         VTE Pharmacologic Prophylaxis: Sequential compression device (Venodyne)  and Heparin  VTE Mechanical Prophylaxis: sequential compression device    Rounds performed with nursing

## 2018-01-07 ENCOUNTER — ANESTHESIA EVENT (INPATIENT)
Dept: PERIOP | Facility: HOSPITAL | Age: 48
DRG: 330 | End: 2018-01-07
Payer: COMMERCIAL

## 2018-01-07 ENCOUNTER — APPOINTMENT (INPATIENT)
Dept: CT IMAGING | Facility: HOSPITAL | Age: 48
DRG: 330 | End: 2018-01-07
Payer: COMMERCIAL

## 2018-01-07 ENCOUNTER — ANESTHESIA (INPATIENT)
Dept: PERIOP | Facility: HOSPITAL | Age: 48
DRG: 330 | End: 2018-01-07
Payer: COMMERCIAL

## 2018-01-07 PROBLEM — K57.20 ABSCESS OF SIGMOID COLON DUE TO DIVERTICULITIS: Status: ACTIVE | Noted: 2018-01-03

## 2018-01-07 LAB
ABO GROUP BLD: NORMAL
ANION GAP SERPL CALCULATED.3IONS-SCNC: 10 MMOL/L (ref 4–13)
BASOPHILS # BLD MANUAL: 0 THOUSAND/UL (ref 0–0.1)
BASOPHILS NFR MAR MANUAL: 0 % (ref 0–1)
BLD GP AB SCN SERPL QL: NEGATIVE
BUN SERPL-MCNC: 7 MG/DL (ref 5–25)
CALCIUM SERPL-MCNC: 8.1 MG/DL (ref 8.3–10.1)
CHLORIDE SERPL-SCNC: 108 MMOL/L (ref 100–108)
CO2 SERPL-SCNC: 19 MMOL/L (ref 21–32)
CREAT SERPL-MCNC: 0.88 MG/DL (ref 0.6–1.3)
EOSINOPHIL # BLD MANUAL: 0 THOUSAND/UL (ref 0–0.4)
EOSINOPHIL NFR BLD MANUAL: 0 % (ref 0–6)
ERYTHROCYTE [DISTWIDTH] IN BLOOD BY AUTOMATED COUNT: 13.9 % (ref 11.6–15.1)
GFR SERPL CREATININE-BSD FRML MDRD: 79 ML/MIN/1.73SQ M
GLUCOSE SERPL-MCNC: 133 MG/DL (ref 65–140)
HCT VFR BLD AUTO: 39.1 % (ref 34.8–46.1)
HGB BLD-MCNC: 12.3 G/DL (ref 11.5–15.4)
LYMPHOCYTES # BLD AUTO: 0.69 THOUSAND/UL (ref 0.6–4.47)
LYMPHOCYTES # BLD AUTO: 5 % (ref 14–44)
MCH RBC QN AUTO: 27.5 PG (ref 26.8–34.3)
MCHC RBC AUTO-ENTMCNC: 31.5 G/DL (ref 31.4–37.4)
MCV RBC AUTO: 87 FL (ref 82–98)
MONOCYTES # BLD AUTO: 0 THOUSAND/UL (ref 0–1.22)
MONOCYTES NFR BLD: 0 % (ref 4–12)
NEUTROPHILS # BLD MANUAL: 13.16 THOUSAND/UL (ref 1.85–7.62)
NEUTS BAND NFR BLD MANUAL: 10 % (ref 0–8)
NEUTS SEG NFR BLD AUTO: 85 % (ref 43–75)
PLATELET # BLD AUTO: 364 THOUSANDS/UL (ref 149–390)
PLATELET BLD QL SMEAR: ADEQUATE
PMV BLD AUTO: 10.1 FL (ref 8.9–12.7)
POTASSIUM SERPL-SCNC: 3.2 MMOL/L (ref 3.5–5.3)
RBC # BLD AUTO: 4.48 MILLION/UL (ref 3.81–5.12)
RH BLD: POSITIVE
SODIUM SERPL-SCNC: 137 MMOL/L (ref 136–145)
SPECIMEN EXPIRATION DATE: NORMAL
TOTAL CELLS COUNTED SPEC: 100
WBC # BLD AUTO: 13.85 THOUSAND/UL (ref 4.31–10.16)

## 2018-01-07 PROCEDURE — 94760 N-INVAS EAR/PLS OXIMETRY 1: CPT

## 2018-01-07 PROCEDURE — 74177 CT ABD & PELVIS W/CONTRAST: CPT

## 2018-01-07 PROCEDURE — 86850 RBC ANTIBODY SCREEN: CPT | Performed by: NURSE ANESTHETIST, CERTIFIED REGISTERED

## 2018-01-07 PROCEDURE — 88307 TISSUE EXAM BY PATHOLOGIST: CPT | Performed by: SURGERY

## 2018-01-07 PROCEDURE — 86900 BLOOD TYPING SEROLOGIC ABO: CPT | Performed by: NURSE ANESTHETIST, CERTIFIED REGISTERED

## 2018-01-07 PROCEDURE — 85027 COMPLETE CBC AUTOMATED: CPT | Performed by: PHYSICIAN ASSISTANT

## 2018-01-07 PROCEDURE — 0DTN0ZZ RESECTION OF SIGMOID COLON, OPEN APPROACH: ICD-10-PCS | Performed by: SURGERY

## 2018-01-07 PROCEDURE — 0D1M0Z4 BYPASS DESCENDING COLON TO CUTANEOUS, OPEN APPROACH: ICD-10-PCS | Performed by: SURGERY

## 2018-01-07 PROCEDURE — C1765 ADHESION BARRIER: HCPCS | Performed by: SURGERY

## 2018-01-07 PROCEDURE — 3E0M05Z INTRODUCTION OF ADHESION BARRIER INTO PERITONEAL CAVITY, OPEN APPROACH: ICD-10-PCS | Performed by: SURGERY

## 2018-01-07 PROCEDURE — 80048 BASIC METABOLIC PNL TOTAL CA: CPT | Performed by: PHYSICIAN ASSISTANT

## 2018-01-07 PROCEDURE — 86901 BLOOD TYPING SEROLOGIC RH(D): CPT | Performed by: NURSE ANESTHETIST, CERTIFIED REGISTERED

## 2018-01-07 PROCEDURE — 85007 BL SMEAR W/DIFF WBC COUNT: CPT | Performed by: PHYSICIAN ASSISTANT

## 2018-01-07 RX ORDER — ROCURONIUM BROMIDE 10 MG/ML
INJECTION, SOLUTION INTRAVENOUS AS NEEDED
Status: DISCONTINUED | OUTPATIENT
Start: 2018-01-07 | End: 2018-01-07 | Stop reason: SURG

## 2018-01-07 RX ORDER — ONDANSETRON 2 MG/ML
4 INJECTION INTRAMUSCULAR; INTRAVENOUS EVERY 4 HOURS PRN
Status: DISCONTINUED | OUTPATIENT
Start: 2018-01-07 | End: 2018-01-14 | Stop reason: HOSPADM

## 2018-01-07 RX ORDER — MAGNESIUM HYDROXIDE 1200 MG/15ML
LIQUID ORAL AS NEEDED
Status: DISCONTINUED | OUTPATIENT
Start: 2018-01-07 | End: 2018-01-07 | Stop reason: HOSPADM

## 2018-01-07 RX ORDER — KETOROLAC TROMETHAMINE 30 MG/ML
30 INJECTION, SOLUTION INTRAMUSCULAR; INTRAVENOUS EVERY 6 HOURS PRN
Status: ACTIVE | OUTPATIENT
Start: 2018-01-07 | End: 2018-01-12

## 2018-01-07 RX ORDER — PROPOFOL 10 MG/ML
INJECTION, EMULSION INTRAVENOUS AS NEEDED
Status: DISCONTINUED | OUTPATIENT
Start: 2018-01-07 | End: 2018-01-07 | Stop reason: SURG

## 2018-01-07 RX ORDER — FENTANYL CITRATE/PF 50 MCG/ML
50 SYRINGE (ML) INJECTION
Status: DISCONTINUED | OUTPATIENT
Start: 2018-01-07 | End: 2018-01-07 | Stop reason: HOSPADM

## 2018-01-07 RX ORDER — ONDANSETRON 2 MG/ML
INJECTION INTRAMUSCULAR; INTRAVENOUS AS NEEDED
Status: DISCONTINUED | OUTPATIENT
Start: 2018-01-07 | End: 2018-01-07 | Stop reason: SURG

## 2018-01-07 RX ORDER — SODIUM CHLORIDE 9 MG/ML
INJECTION, SOLUTION INTRAVENOUS CONTINUOUS PRN
Status: DISCONTINUED | OUTPATIENT
Start: 2018-01-07 | End: 2018-01-07 | Stop reason: SURG

## 2018-01-07 RX ORDER — GLYCOPYRROLATE 0.2 MG/ML
INJECTION INTRAMUSCULAR; INTRAVENOUS AS NEEDED
Status: DISCONTINUED | OUTPATIENT
Start: 2018-01-07 | End: 2018-01-07 | Stop reason: SURG

## 2018-01-07 RX ORDER — SUCCINYLCHOLINE/SOD CL,ISO/PF 100 MG/5ML
SYRINGE (ML) INTRAVENOUS AS NEEDED
Status: DISCONTINUED | OUTPATIENT
Start: 2018-01-07 | End: 2018-01-07 | Stop reason: SURG

## 2018-01-07 RX ORDER — POTASSIUM CHLORIDE, DEXTROSE MONOHYDRATE AND SODIUM CHLORIDE 300; 5; 900 MG/100ML; G/100ML; MG/100ML
75 INJECTION, SOLUTION INTRAVENOUS CONTINUOUS
Status: DISCONTINUED | OUTPATIENT
Start: 2018-01-07 | End: 2018-01-13

## 2018-01-07 RX ORDER — FENTANYL CITRATE 50 UG/ML
INJECTION, SOLUTION INTRAMUSCULAR; INTRAVENOUS AS NEEDED
Status: DISCONTINUED | OUTPATIENT
Start: 2018-01-07 | End: 2018-01-07 | Stop reason: SURG

## 2018-01-07 RX ADMIN — Medication: at 21:02

## 2018-01-07 RX ADMIN — FENTANYL CITRATE 50 MCG: 50 INJECTION INTRAMUSCULAR; INTRAVENOUS at 18:00

## 2018-01-07 RX ADMIN — FENTANYL CITRATE 50 MCG: 50 INJECTION INTRAMUSCULAR; INTRAVENOUS at 18:06

## 2018-01-07 RX ADMIN — ROCURONIUM BROMIDE 30 MG: 10 INJECTION INTRAVENOUS at 17:55

## 2018-01-07 RX ADMIN — HYDROMORPHONE HYDROCHLORIDE 0.5 MG: 1 INJECTION, SOLUTION INTRAMUSCULAR; INTRAVENOUS; SUBCUTANEOUS at 19:50

## 2018-01-07 RX ADMIN — IOHEXOL 100 ML: 350 INJECTION, SOLUTION INTRAVENOUS at 13:39

## 2018-01-07 RX ADMIN — NEOSTIGMINE METHYLSULFATE 3 MG: 1 INJECTION, SOLUTION INTRAMUSCULAR; INTRAVENOUS; SUBCUTANEOUS at 19:50

## 2018-01-07 RX ADMIN — POTASSIUM CHLORIDE, DEXTROSE MONOHYDRATE AND SODIUM CHLORIDE 125 ML/HR: 300; 5; 900 INJECTION, SOLUTION INTRAVENOUS at 21:34

## 2018-01-07 RX ADMIN — HYDROMORPHONE HYDROCHLORIDE 1 MG: 1 INJECTION, SOLUTION INTRAMUSCULAR; INTRAVENOUS; SUBCUTANEOUS at 09:37

## 2018-01-07 RX ADMIN — IOHEXOL 50 ML: 240 INJECTION, SOLUTION INTRATHECAL; INTRAVASCULAR; INTRAVENOUS; ORAL at 12:00

## 2018-01-07 RX ADMIN — SODIUM CHLORIDE: 0.9 INJECTION, SOLUTION INTRAVENOUS at 17:45

## 2018-01-07 RX ADMIN — FENTANYL CITRATE 50 MCG: 50 INJECTION INTRAMUSCULAR; INTRAVENOUS at 20:34

## 2018-01-07 RX ADMIN — ONDANSETRON 4 MG: 2 INJECTION INTRAMUSCULAR; INTRAVENOUS at 17:52

## 2018-01-07 RX ADMIN — HYDROMORPHONE HYDROCHLORIDE 0.5 MG: 1 INJECTION, SOLUTION INTRAMUSCULAR; INTRAVENOUS; SUBCUTANEOUS at 05:45

## 2018-01-07 RX ADMIN — METRONIDAZOLE 500 MG: 500 INJECTION, SOLUTION INTRAVENOUS at 05:04

## 2018-01-07 RX ADMIN — KETOROLAC TROMETHAMINE 15 MG: 30 INJECTION, SOLUTION INTRAMUSCULAR at 14:02

## 2018-01-07 RX ADMIN — DEXAMETHASONE SODIUM PHOSPHATE 4 MG: 10 INJECTION INTRAMUSCULAR; INTRAVENOUS at 17:50

## 2018-01-07 RX ADMIN — POTASSIUM CHLORIDE, DEXTROSE MONOHYDRATE AND SODIUM CHLORIDE 125 ML/HR: 300; 5; 900 INJECTION, SOLUTION INTRAVENOUS at 15:15

## 2018-01-07 RX ADMIN — ONDANSETRON 4 MG: 2 INJECTION INTRAMUSCULAR; INTRAVENOUS at 19:50

## 2018-01-07 RX ADMIN — LOSARTAN POTASSIUM 100 MG: 50 TABLET, FILM COATED ORAL at 09:36

## 2018-01-07 RX ADMIN — Medication 1 SPRAY: at 23:26

## 2018-01-07 RX ADMIN — SODIUM CHLORIDE 125 ML/HR: 0.9 INJECTION, SOLUTION INTRAVENOUS at 03:37

## 2018-01-07 RX ADMIN — ROCURONIUM BROMIDE 20 MG: 10 INJECTION INTRAVENOUS at 18:10

## 2018-01-07 RX ADMIN — KETOROLAC TROMETHAMINE 15 MG: 30 INJECTION, SOLUTION INTRAMUSCULAR at 02:13

## 2018-01-07 RX ADMIN — METRONIDAZOLE 500 MG: 500 INJECTION, SOLUTION INTRAVENOUS at 15:16

## 2018-01-07 RX ADMIN — SODIUM CHLORIDE: 0.9 INJECTION, SOLUTION INTRAVENOUS at 17:36

## 2018-01-07 RX ADMIN — HYDROMORPHONE HYDROCHLORIDE 0.5 MG: 1 INJECTION, SOLUTION INTRAMUSCULAR; INTRAVENOUS; SUBCUTANEOUS at 16:41

## 2018-01-07 RX ADMIN — Medication 200 MG: at 17:39

## 2018-01-07 RX ADMIN — HEPARIN SODIUM 5000 UNITS: 5000 INJECTION, SOLUTION INTRAVENOUS; SUBCUTANEOUS at 15:26

## 2018-01-07 RX ADMIN — Medication 1 SPRAY: at 21:20

## 2018-01-07 RX ADMIN — DEXMEDETOMIDINE HYDROCHLORIDE 0.4 MCG/KG/HR: 100 INJECTION, SOLUTION INTRAVENOUS at 18:00

## 2018-01-07 RX ADMIN — FENTANYL CITRATE 100 MCG: 50 INJECTION INTRAMUSCULAR; INTRAVENOUS at 17:36

## 2018-01-07 RX ADMIN — LEVOFLOXACIN 750 MG: 5 INJECTION, SOLUTION INTRAVENOUS at 12:20

## 2018-01-07 RX ADMIN — PROPOFOL 200 MG: 10 INJECTION, EMULSION INTRAVENOUS at 17:39

## 2018-01-07 RX ADMIN — GLYCOPYRROLATE 0.6 MG: 0.2 INJECTION, SOLUTION INTRAMUSCULAR; INTRAVENOUS at 19:50

## 2018-01-07 RX ADMIN — HEPARIN SODIUM 5000 UNITS: 5000 INJECTION, SOLUTION INTRAVENOUS; SUBCUTANEOUS at 05:05

## 2018-01-07 RX ADMIN — ONDANSETRON 4 MG: 2 INJECTION INTRAMUSCULAR; INTRAVENOUS at 16:41

## 2018-01-07 NOTE — PLAN OF CARE
Problem: PAIN - ADULT  Goal: Verbalizes/displays adequate comfort level or baseline comfort level  Interventions:  - Encourage patient to monitor pain and request assistance  - Assess pain using appropriate pain scale  - Administer analgesics based on type and severity of pain and evaluate response  - Implement non-pharmacological measures as appropriate and evaluate response  - Consider cultural and social influences on pain and pain management  - Notify physician/advanced practitioner if interventions unsuccessful or patient reports new pain   Outcome: Progressing      Problem: INFECTION - ADULT  Goal: Absence or prevention of progression during hospitalization  INTERVENTIONS:  - Assess and monitor for signs and symptoms of infection  - Monitor lab/diagnostic results  - Monitor all insertion sites, i e  indwelling lines, tubes, and drains  - Monitor endotracheal (as able) and nasal secretions for changes in amount and color  - [place] appropriate cooling/warming therapies per order  - Administer medications as ordered  - Instruct and encourage patient and family to use good hand hygiene technique  - Identify and instruct in appropriate isolation precautions for identified infection/condition   Outcome: Progressing      Problem: SAFETY ADULT  Goal: Patient will remain free of falls  INTERVENTIONS:  - Assess patient frequently for physical needs  -  Identify cognitive and physical deficits and behaviors that affect risk of falls    -  [place] fall precautions as indicated by assessment   - Educate patient/family on patient safety including physical limitations  - Instruct patient to call for assistance with activity based on assessment  - Modify environment to reduce risk of injury  - Consider OT/PT consult to assist with strengthening/mobility    Outcome: Progressing    Goal: Maintain or return to baseline ADL function  INTERVENTIONS:  -  Assess patient's ability to carry out ADLs; assess patient's baseline for ADL function and identify physical deficits which impact ability to perform ADLs (bathing, care of mouth/teeth, toileting, grooming, dressing, etc )  - Assess/evaluate cause of self-care deficits   - Assess range of motion  - Assess patient's mobility; develop plan if impaired  - Assess patient's need for assistive devices and provide as appropriate  - Encourage maximum independence but intervene and supervise when necessary  ¯ Involve family in performance of ADLs  ¯ Assess for home care needs following discharge   ¯ Request OT consult to assist with ADL evaluation and planning for discharge  ¯ Provide patient education as appropriate   Outcome: Progressing    Goal: Maintain or return mobility status to optimal level  INTERVENTIONS:  - Assess patient's baseline mobility status (ambulation, transfers, stairs, etc )    - Identify cognitive and physical deficits and behaviors that affect mobility  - Identify mobility aids required to assist with transfers and/or ambulation (gait belt, sit-to-stand, lift, walker, cane, etc )  - [place] fall precautions as indicated by assessment  - Record patient progress and toleration of activity level on Mobility SBAR; progress patient to next Phase/Stage  - Instruct patient to call for assistance with activity based on assessment  - Request Rehabilitation consult to assist with strengthening/weightbearing, etc    Outcome: Progressing      Problem: DISCHARGE PLANNING  Goal: Discharge to home or other facility with appropriate resources  INTERVENTIONS:  - Identify barriers to discharge w/patient and caregiver  - Arrange for needed discharge resources and transportation as appropriate  - Identify discharge learning needs (meds, wound care, etc )  - Arrange for interpretive services to assist at discharge as needed  - Refer to Case Management Department for coordinating discharge planning if the patient needs post-hospital services based on physician/advanced practitioner order or complex needs related to functional status, cognitive ability, or social support system   Outcome: Progressing      Problem: Knowledge Deficit  Goal: Patient/family/caregiver demonstrates understanding of disease process, treatment plan, medications, and discharge instructions  Complete learning assessment and assess knowledge base    Interventions:  - Provide teaching at level of understanding  - Provide teaching via preferred learning methods   Outcome: Progressing      Problem: GASTROINTESTINAL - ADULT  Goal: Minimal or absence of nausea and/or vomiting  INTERVENTIONS:  - Administer IV fluids as ordered to ensure adequate hydration  - Maintain NPO status until nausea and vomiting are resolved  - Nasogastric tube as ordered  - Administer ordered antiemetic medications as needed  - Provide nonpharmacologic comfort measures as appropriate  - Advance diet as tolerated, if ordered  - Nutrition services referral to assist patient with adequate nutrition and appropriate food choices   Outcome: Completed Date Met: 01/07/18    Goal: Maintains or returns to baseline bowel function  INTERVENTIONS:  - Assess bowel function  - Encourage oral fluids to ensure adequate hydration  - Administer IV fluids as ordered to ensure adequate hydration  - Administer ordered medications as needed  - Encourage mobilization and activity  - Nutrition services referral to assist patient with appropriate food choices   Outcome: Completed Date Met: 01/07/18    Goal: Maintains adequate nutritional intake  INTERVENTIONS:  - Monitor percentage of each meal consumed  - Identify factors contributing to decreased intake, treat as appropriate  - Assist with meals as needed  - Monitor I&O, WT and lab values  - Obtain nutrition services referral as needed   Outcome: Progressing      Problem: METABOLIC, FLUID AND ELECTROLYTES - ADULT  Goal: Electrolytes maintained within normal limits  INTERVENTIONS:  - Monitor labs and assess patient for signs and symptoms of electrolyte imbalances  - Administer electrolyte replacement as ordered  - Monitor response to electrolyte replacements, including repeat lab results as appropriate  - Instruct patient on fluid and nutrition as appropriate   Outcome: Progressing    Goal: Fluid balance maintained  INTERVENTIONS:  - Monitor labs and assess for signs and symptoms of volume excess or deficit  - Monitor I/O and WT  - Instruct patient on fluid and nutrition as appropriate   Outcome: Progressing      Problem: Potential for Falls  Goal: Patient will remain free of falls  INTERVENTIONS:  - Assess patient frequently for physical needs  -  Identify cognitive and physical deficits and behaviors that affect risk of falls    -  Rule fall precautions as indicated by assessment   - Educate patient/family on patient safety including physical limitations  - Instruct patient to call for assistance with activity based on assessment  - Modify environment to reduce risk of injury  - Consider OT/PT consult to assist with strengthening/mobility    Outcome: Progressing

## 2018-01-07 NOTE — PROGRESS NOTES
Pt IV leaking removed and inserted new peripheral   Pt returned from ct scan administered IV antx  Complained of abdominal pain toradol was given  Pt is OOB in chair tolerating well will continue to monitor

## 2018-01-07 NOTE — PROGRESS NOTES
Discussed CT scan with radiology and Interventional radiology    Has developed several undrained fluid collections and moderate amount of free air  Manipulating present catheter will do little to improve present situation      Discussed need for Fernandez's procedure with patient including risks of bowel injury, ureteral injury and need for colostomy    She agrees to surgery

## 2018-01-08 LAB
ANION GAP SERPL CALCULATED.3IONS-SCNC: 9 MMOL/L (ref 4–13)
BACTERIA BLD CULT: NORMAL
BACTERIA BLD CULT: NORMAL
BASOPHILS # BLD MANUAL: 0 THOUSAND/UL (ref 0–0.1)
BASOPHILS NFR MAR MANUAL: 0 % (ref 0–1)
BUN SERPL-MCNC: 10 MG/DL (ref 5–25)
CALCIUM SERPL-MCNC: 7.7 MG/DL (ref 8.3–10.1)
CHLORIDE SERPL-SCNC: 112 MMOL/L (ref 100–108)
CO2 SERPL-SCNC: 19 MMOL/L (ref 21–32)
CREAT SERPL-MCNC: 0.71 MG/DL (ref 0.6–1.3)
EOSINOPHIL # BLD MANUAL: 0 THOUSAND/UL (ref 0–0.4)
EOSINOPHIL NFR BLD MANUAL: 0 % (ref 0–6)
ERYTHROCYTE [DISTWIDTH] IN BLOOD BY AUTOMATED COUNT: 14.3 % (ref 11.6–15.1)
GFR SERPL CREATININE-BSD FRML MDRD: 102 ML/MIN/1.73SQ M
GLUCOSE SERPL-MCNC: 210 MG/DL (ref 65–140)
HCT VFR BLD AUTO: 37.3 % (ref 34.8–46.1)
HGB BLD-MCNC: 11.5 G/DL (ref 11.5–15.4)
LYMPHOCYTES # BLD AUTO: 0.37 THOUSAND/UL (ref 0.6–4.47)
LYMPHOCYTES # BLD AUTO: 3 % (ref 14–44)
MCH RBC QN AUTO: 27.4 PG (ref 26.8–34.3)
MCHC RBC AUTO-ENTMCNC: 30.8 G/DL (ref 31.4–37.4)
MCV RBC AUTO: 89 FL (ref 82–98)
MONOCYTES # BLD AUTO: 0.99 THOUSAND/UL (ref 0–1.22)
MONOCYTES NFR BLD: 8 % (ref 4–12)
NEUTROPHILS # BLD MANUAL: 11 THOUSAND/UL (ref 1.85–7.62)
NEUTS BAND NFR BLD MANUAL: 10 % (ref 0–8)
NEUTS SEG NFR BLD AUTO: 79 % (ref 43–75)
PLATELET # BLD AUTO: 377 THOUSANDS/UL (ref 149–390)
PLATELET BLD QL SMEAR: ADEQUATE
PMV BLD AUTO: 10.2 FL (ref 8.9–12.7)
POTASSIUM SERPL-SCNC: 4.3 MMOL/L (ref 3.5–5.3)
RBC # BLD AUTO: 4.19 MILLION/UL (ref 3.81–5.12)
SODIUM SERPL-SCNC: 140 MMOL/L (ref 136–145)
TOTAL CELLS COUNTED SPEC: 100
WBC # BLD AUTO: 12.36 THOUSAND/UL (ref 4.31–10.16)

## 2018-01-08 PROCEDURE — 85027 COMPLETE CBC AUTOMATED: CPT | Performed by: PHYSICIAN ASSISTANT

## 2018-01-08 PROCEDURE — 94760 N-INVAS EAR/PLS OXIMETRY 1: CPT

## 2018-01-08 PROCEDURE — 85007 BL SMEAR W/DIFF WBC COUNT: CPT | Performed by: PHYSICIAN ASSISTANT

## 2018-01-08 PROCEDURE — 80048 BASIC METABOLIC PNL TOTAL CA: CPT | Performed by: PHYSICIAN ASSISTANT

## 2018-01-08 RX ORDER — LORAZEPAM 2 MG/ML
0.5 INJECTION INTRAMUSCULAR EVERY 4 HOURS PRN
Status: DISCONTINUED | OUTPATIENT
Start: 2018-01-08 | End: 2018-01-14 | Stop reason: HOSPADM

## 2018-01-08 RX ADMIN — LEVOFLOXACIN 750 MG: 5 INJECTION, SOLUTION INTRAVENOUS at 12:53

## 2018-01-08 RX ADMIN — Medication 1 SPRAY: at 02:49

## 2018-01-08 RX ADMIN — METRONIDAZOLE 500 MG: 500 INJECTION, SOLUTION INTRAVENOUS at 04:27

## 2018-01-08 RX ADMIN — POTASSIUM CHLORIDE, DEXTROSE MONOHYDRATE AND SODIUM CHLORIDE 125 ML/HR: 300; 5; 900 INJECTION, SOLUTION INTRAVENOUS at 05:09

## 2018-01-08 RX ADMIN — HEPARIN SODIUM 5000 UNITS: 5000 INJECTION, SOLUTION INTRAVENOUS; SUBCUTANEOUS at 13:42

## 2018-01-08 RX ADMIN — POTASSIUM CHLORIDE, DEXTROSE MONOHYDRATE AND SODIUM CHLORIDE 125 ML/HR: 300; 5; 900 INJECTION, SOLUTION INTRAVENOUS at 15:02

## 2018-01-08 RX ADMIN — METRONIDAZOLE 500 MG: 500 INJECTION, SOLUTION INTRAVENOUS at 16:29

## 2018-01-08 RX ADMIN — Medication: at 21:16

## 2018-01-08 RX ADMIN — HEPARIN SODIUM 5000 UNITS: 5000 INJECTION, SOLUTION INTRAVENOUS; SUBCUTANEOUS at 05:09

## 2018-01-08 RX ADMIN — LORAZEPAM 0.5 MG: 2 INJECTION INTRAMUSCULAR; INTRAVENOUS at 21:38

## 2018-01-08 RX ADMIN — HEPARIN SODIUM 5000 UNITS: 5000 INJECTION, SOLUTION INTRAVENOUS; SUBCUTANEOUS at 21:44

## 2018-01-08 NOTE — CONSULTS
Patient is s/p day 1 with new ostomy creation  Patient seen in bed with NG tube, PCA pump and rubio in place  Introduced self to patient and role of assisting with ostomy care  Patient verbalized understanding but states "I am having a lot of pain and  I am tired" patient is not ready for teaching at this time  Educational materials briefly explained and left at bedside for patient to review when she is feeling better  Encouraged patient to read her materials and write down questions  Will follow along with patient during her hospital stay

## 2018-01-08 NOTE — ADDENDUM NOTE
Addendum  created 01/07/18 2019 by Chela Murillo MD    Anesthesia Intra LDAs edited, LDA properties accepted

## 2018-01-08 NOTE — PROGRESS NOTES
Pt resting comfortably in bed  All 3 MORRIS drains empty  Ice placed on her abd  Dressing dry and intact  Pt on 3 liters, capnography and a continuous pulse ox  She is 97%  Will continue to monitor

## 2018-01-08 NOTE — CASE MANAGEMENT
Continued Stay Review    Date: 1/8/2018     Vital Signs: /63   Pulse 102   Temp 97 5 °F (36 4 °C) (Oral)   Resp 20   Ht 5' 6" (1 676 m)   Wt 107 kg (236 lb 15 9 oz)   SpO2 97%   BMI 38 25 kg/m²     Medications:   Scheduled Meds:   heparin (porcine) 5,000 Units Subcutaneous Q8H [facility]   levofloxacin 750 mg Intravenous Q24H   losartan 100 mg Oral Daily   metroNIDAZOLE 500 mg Intravenous Q8H   nicotine 1 patch Transdermal Daily     Continuous Infusions:   dextrose 5 % and sodium chloride 0 9 % with KCl 40 mEq/L 125 mL/hr Last Rate: 125 mL/hr (01/08/18 1502)   HYDROmorphone       PRN Meds: HYDROmorphone    influenza vaccine    ketorolac    LORazepam    metoclopramide    metoprolol    naloxone    ondansetron    ondansetron    Phenol - used x 1  Abnormal Labs/Diagnostic Results:   Wbc 12 36  Cl 112  CO2-19  Glucose 210  Calcium 7 7  Age/Sex: 56 y o  female     Assessment/Plan: 56year old female s/p Procedure(s):  LAPAROTOMY EXPLORATORY, sigmoid colon resection, colostomy, appendectomy  Plan:  - Continue NGT until ostomy functioning    -Keep rubio for I&O's-urine is dark  - Continue PCA  -OOB to chair  -Patient can have cough drops  -Monitor MORRIS output     Discharge Plan: To be determined  Thank you,  [facility] in the [facility] by Eduardo Staley for 2017  Network Utilization Review Department  Phone: [phone]; Fax [phone]  ATTENTION: The Network Utilization Review Department is now centralized for our 7 Facilities  Make a note that we have a new phone and fax numbers for our Department  Please call with any questions or concerns to [phone] and carefully follow the prompts so that you are directed to the right person  All voicemails are confidential  Fax any determinations, approvals, denials, and requests for initial or continue stay review clinical to [phone]   Due to HIGH CALL volume, it would be easier if you could please send faxed requests to expedite your requests and in part, help us provide discharge notifications faster

## 2018-01-08 NOTE — OP NOTE
OPERATIVE REPORT  PATIENT NAME: Antionette Ochoa    :  1970  MRN: [id number]  Pt Location: AN OR ROOM 01    SURGERY DATE: 2018    Surgeon(s) and Role:     * Tracey Hernadez, DO - Primary     * Marcia Cantu, [address]  No qualified resident was available  Her assistance was required for exposure and retraction throughout the case    Preop Diagnosis:  Abscess of sigmoid colon due to diverticulitis [K57 20]    Post-Op Diagnosis Codes:     * Abscess of sigmoid colon due to diverticulitis [K57 20]    Procedure(s) (LRB):  LAPAROTOMY EXPLORATORY, sigmoid colon resection, colostomy, appendectomy (N/A)    Specimen(s):  ID Type Source Tests Collected by Time Destination   1 : sigmoid colon  single black suture marks appendix  blue stitch marks proximal sigmoid Tissue Large Intestine, Sigmoid Colon TISSUE EXAM Walt Gardiner DO 2018 1835        Estimated Blood Loss:   200 mL    Drains:  Closed/Suction Drain RLQ Bulb 19 Fr  (Active)   Dressing Status Clean;Dry; Intact 2018  8:01 PM   Drainage Appearance Bloody 2018  8:01 PM   Status To bulb suction 2018  8:01 PM   Number of days: 0       Closed/Suction Drain RUQ Bulb 19 Fr  (Active)   Dressing Status Clean;Dry; Intact 2018  8:01 PM   Drainage Appearance Bloody 2018  8:01 PM   Status To bulb suction 2018  8:01 PM   Number of days: 0       Closed/Suction Drain Left LLQ Bulb 19 Fr  (Active)   Dressing Status Clean;Dry; Intact 2018  8:01 PM   Drainage Appearance Bloody 2018  8:01 PM   Status To bulb suction 2018  8:01 PM   Number of days: 0       NG/OG/Enteral Tube Nasogastric 16 Fr Left nares (Active)   Site Assessment Clean;Dry; Intact 2018  8:01 PM   Drainage Appearance Green 2018  8:01 PM   Number of days: 0       Colostomy Descending/sigmoid LUQ (Active)   Stomal Appliance 2 piece;Clean;Dry; Intact 2018  8:01 PM   Output (mL) 0 mL 2018  8:01 PM   Number of days: 0       Urethral Catheter Latex 16 Fr  (Active)   Site Assessment Skin intact 1/7/2018  8:01 PM   Collection Container Standard drainage bag 1/7/2018  8:01 PM   Securement Method Securing device (Describe) 1/7/2018  8:01 PM   Number of days: 0       Anesthesia Type:   General    Operative Indications:  Abscess of sigmoid colon due to diverticulitis [K57 20]  Increasing air-fluid levels and pneumoperitoneum on today's CT scan    Operative Findings:  The perforated sigmoid colon with gross peritonitis  Appendix was adherent within the sigmoid phlegmon and was taken with the specimen  Review of Systems/Medical History  No history of anesthetic complications     Cardiovascular  Hyperlipidemia, Hypertension ,  Pulmonary  Smoker , ,    GI/Hepatic  Negative GI/hepatic ROS       Negative  ROS    Endo/Other  History of thyroid disease ,  GYN   Hematology  Negative hematology ROS     Musculoskeletal  Obesity ,    Neurology  Negative neurology ROS     Psychology            ASA 3  Wound class 3  Height 66 inches weight 107 kg/237 lb BMI 38      Complications:   None    Procedure and Technique:  Patient was brought into the operative suite and identified by visualization, conversation, by armband  Sequential compression pumps were placed  She was on Levaquin and Flagyl perioperatively  Once under general anesthesia Hammer catheter was placed under sterile technique  Abdomen is then prepped and draped in a sterile fashion  Time-out was performed and was assured that the prep was dry  Periumbilical midline incision was made down towards the pelvis  Underlying subcutaneous tissue was divided hot cautery down to the fascia fascia was then carefully divided gaining access into the abdominal cavity  Free air was immediately and countered  I opened up the fascia she had obvious gross spillage and contamination throughout the abdomen  I did fall and interventional radiology catheter down and this was superior to the initial bend the sigmoid colon    This was removed  A Bookwalter retractors then placed for exposure  This was in place I could visualize the sigmoid colon  She certainly had quite inflammatory action of the sigmoid colon down in the pelvis  Due to the amount of contamination I ran the entire small bowel to assure that there was nothing else noted  Aside from gross soilage all the bowel appeared normal  Bowels were packed into the upper abdomen with a Bookwalter retractor  Then scored each side of the rectosigmoid mesentery got around the top of the rectum  This was divided with a blue load of the contour stapler  Using the EnSeal device, the sigmoid mesentery was then divided  This was quite inflamed  As I got up to the mid sigmoid was obvious that the tip of the appendix was adherent into the sigmoid colon  As this was not easily peeled away from the sigmoid I did take the appendix with the sigmoid colon  GI 75 stapler was used to divide the appendix off the cecum  EnSeal device was used to divide the mesoappendix  Appendiceal stump was oversewn with 2 0 silk  The appendix was marked with a single black stitch  Junction of the descending and sigmoid colon was then divided with another fire of a JEFFERY 75 mm stapler  Remaining aspects of the sigmoid colon were then divided with the EnSeal device  I then mobilized the lateral avascular attachments as well as some of the deep mesentery to allow room for the descending colon to come up for a colostomy  Copious irrigation was carried out throughout the entire abdomen cavity  NG tube was found to be in the mid body of the stomach  Opening was made in the left anterior abdominal wall through the rectus muscle and the descending colon was brought out as a colostomy  There was no tension on it this appeared healthy and viable  Copious irrigation was carried out with several L of saline  Ultimately 3 Irving drains were placed    Right lower quadrant drain was placed into the pelvis and up into the midline where there appeared to be a phlegmonous reaction of the cut sigmoid mesentery  Right upper quadrant drain was placed along the right gutter and into the mid aspect of the small bowel where there was contamination  Final left lower quadrant drain was placed along the left gutter  All drains were anchored to the skin with a 3 0 nylon  The corners of the rectal stump staple line were marked with 2 0 Prolene  The irrigating the pelvis out I did place Seprafilm down on top of the rectal stump  Bowels placed down into the pelvis  Omentum was placed over the small bowel  I they changed gloves  Fascia was closed using 1  PDS in a running continuous fashion  Irrigation was carried to the subcutaneous tissues  Two 0 Vicryl was used to close subcutaneous tissues  Skin was then closed using skin staples  The colostomy was then matured  The staple line was cut with Metzenbaum scissors  There was good bleeding edge  It was matured using 4 0 Monocryl  The abdomen is then washed and dried clean colostomy appliance was applied  Sterile dressings were applied  She was awakened in the operating returned to the recovery area in stable condition having tolerated the procedure well     I was present for the entire procedure    Patient Disposition:  PACU     SIGNATURE: Marilu Frazier DO  DATE: January 7, 2018  TIME: 8:24 PM

## 2018-01-08 NOTE — PROGRESS NOTES
Progress Note -Surgery PA  Wendy Gupta 56 y o  female MRN: [id number]  Unit/Bed#: -01 Encounter: [id number]      Assessment:  56year old female s/p Procedure(s):  LAPAROTOMY EXPLORATORY, sigmoid colon resection, colostomy, appendectomy  Plan:  - Continue NGT until ostomy functioning    -Keep rubio for I&O's-urine is dark  - Continue PCA  -OOB to chair  -Patient can have cough drops  -Monitor MORRIS output        Subjective/Objective     Subjective: Pain is controlled with PCA  NGT bothersome  Objective:     /66   Pulse 100   Temp 98 5 °F (36 9 °C) (Oral)   Resp 20   Ht 5' 6" (1 676 m)   Wt 107 kg (236 lb 15 9 oz)   SpO2 96%   BMI 38 25 kg/m²   I/O last 24 hours: In: 3261 6 [P O :240;  I V :3001 6; NG/GT:20]  Out: 1265 [Urine:650; Emesis/NG output:50; Drains:365; Blood:200]        Intake/Output Summary (Last 24 hours) at 01/08/18 0833  Last data filed at 01/08/18 0600   Gross per 24 hour   Intake           3261 6 ml   Output             1265 ml   Net           1996 6 ml       Invasive Devices     Peripheral Intravenous Line            Peripheral IV 01/07/18 Left Arm less than 1 day    Peripheral IV 01/07/18 Right Wrist less than 1 day          Drain            Closed/Suction Drain Left LLQ Bulb 19 Fr  less than 1 day    Closed/Suction Drain RLQ Bulb 19 Fr  less than 1 day    Closed/Suction Drain RUQ Bulb 19 Fr  less than 1 day    Colostomy Descending/sigmoid LUQ less than 1 day    NG/OG/Enteral Tube Nasogastric 16 Fr Left nares less than 1 day    Urethral Catheter Latex 16 Fr  less than 1 day                Physical Exam:  /66   Pulse 100   Temp 98 5 °F (36 9 °C) (Oral)   Resp 20   Ht 5' 6" (1 676 m)   Wt 107 kg (236 lb 15 9 oz)   SpO2 96%   BMI 38 25 kg/m²   General appearance: alert and oriented, in no acute distress and alert  Lungs: clear to auscultation bilaterally  Heart: regular rate and rhythm, S1, S2 normal, no murmur, click, rub or gallop  Abdomen: soft, ostomy with sweat  Dry dressing on midline  JPs functioning        Current Facility-Administered Medications:     dextrose 5 % and sodium chloride 0 9 % with KCl 40 mEq/L infusion (premix), 125 mL/hr, Intravenous, Continuous, Walt Gardiner DO, Last Rate: 125 mL/hr at 01/08/18 0509, 125 mL/hr at 01/08/18 0509    heparin (porcine) subcutaneous injection 5,000 Units, 5,000 Units, Subcutaneous, Q8H [facility], 5,000 Units at 01/08/18 0509 **AND** Platelet count, , , Once, Vida Galindo PA-C    HYDROmorphone (DILAUDID) 1 mg/mL 50 mL PCA, , Intravenous, Continuous, Walt Gardiner DO    HYDROmorphone (DILAUDID) 1 mg/mL injection 1 mg, 1 mg, Intravenous, Q2H PRN, Walt Gardiner DO, 0 5 mg at 01/07/18 1641    influenza inactivated quadrivalent vaccine (FLULAVAL) IM injection 0 5 mL, 0 5 mL, Intramuscular, Prior to discharge, Walt Gardiner DO    ketorolac (TORADOL) injection 30 mg, 30 mg, Intravenous, Q6H PRN, Walt Gardiner DO    levofloxacin (LEVAQUIN) IVPB (premix) 750 mg, 750 mg, Intravenous, Q24H, Walt Gardiner DO, Last Rate: 100 mL/hr at 01/07/18 1356, 750 mg at 01/07/18 1356    losartan (COZAAR) tablet 100 mg, 100 mg, Oral, Daily, VIELKA Casiano-C, 100 mg at 01/07/18 0936    metoclopramide (REGLAN) injection 10 mg, 10 mg, Intravenous, Q6H PRN, VIELKA Walsh-ALEJANDRO, 10 mg at 01/06/18 0756    metoprolol (LOPRESSOR) injection 5 mg, 5 mg, Intravenous, Q6H PRN, Vida Galindo PA-C    metroNIDAZOLE (FLAGYL) IVPB (premix) 500 mg, 500 mg, Intravenous, Q8H, Vida Galindo PA-C, Last Rate: 200 mL/hr at 01/08/18 0427, 500 mg at 01/08/18 0427    naloxone (NARCAN) 0 04 mg/mL syringe 0 04 mg, 0 04 mg, Intravenous, Q1MIN PRN, Walt Gardiner DO    nicotine (NICODERM CQ) 21 mg/24 hr TD 24 hr patch 1 patch, 1 patch, Transdermal, Daily, Vida Galindo PA-C, Stopped at 01/06/18 0900    ondansetron (ZOFRAN) injection 4 mg, 4 mg, Intravenous, Q4H PRN, Karen Avilez PA-C, 4 mg at 01/07/18 1641    ondansetron (Kaleen Severe) injection 4 mg, 4 mg, Intravenous, Q4H PRN, Walt Gardiner DO    phenol (CHLORASEPTIC) 1 4 % mucosal liquid 1 spray, 1 spray, Mouth/Throat, Q2H PRN, Walt Gardiner DO, 1 spray at 01/08/18 0249           Lab, Imaging and other studies:  CBC:   Lab Results   Component Value Date    WBC 12 36 (H) 01/08/2018    HGB 11 5 01/08/2018    HCT 37 3 01/08/2018    MCV 89 01/08/2018     01/08/2018    MCH 27 4 01/08/2018    MCHC 30 8 (L) 01/08/2018    RDW 14 3 01/08/2018    MPV 10 2 01/08/2018   , CMP:   Lab Results   Component Value Date     01/08/2018    K 4 3 01/08/2018     (H) 01/08/2018    CO2 19 (L) 01/08/2018    ANIONGAP 9 01/08/2018    BUN 10 01/08/2018    CREATININE 0 71 01/08/2018    GLUCOSE 210 (H) 01/08/2018    CALCIUM 7 7 (L) 01/08/2018    EGFR 102 01/08/2018         VTE Pharmacologic Prophylaxis: Sequential compression device (Venodyne)  and Heparin  VTE Mechanical Prophylaxis: sequential compression device    Rounds performed with nursing

## 2018-01-08 NOTE — PLAN OF CARE

## 2018-01-08 NOTE — ANESTHESIA POSTPROCEDURE EVALUATION
Post-Op Assessment Note      CV Status:  Stable    Mental Status:  Alert and awake    Hydration Status:  Stable and euvolemic    PONV Controlled:  Controlled    Airway Patency:  Patent and adequate    Post Op Vitals Reviewed: Yes          Staff: CRNA           /55 (01/07/18 2001)    Temp      Pulse (!) 112 (01/07/18 2001)   Resp 20 (01/07/18 2001)    SpO2 100 % (01/07/18 2001)

## 2018-01-09 LAB
ANION GAP SERPL CALCULATED.3IONS-SCNC: 9 MMOL/L (ref 4–13)
BASOPHILS # BLD AUTO: 0.01 THOUSANDS/ΜL (ref 0–0.1)
BASOPHILS NFR BLD AUTO: 0 % (ref 0–1)
BUN SERPL-MCNC: 12 MG/DL (ref 5–25)
CALCIUM SERPL-MCNC: 8.2 MG/DL (ref 8.3–10.1)
CHLORIDE SERPL-SCNC: 116 MMOL/L (ref 100–108)
CO2 SERPL-SCNC: 22 MMOL/L (ref 21–32)
CREAT SERPL-MCNC: 0.73 MG/DL (ref 0.6–1.3)
EOSINOPHIL # BLD AUTO: 0.02 THOUSAND/ΜL (ref 0–0.61)
EOSINOPHIL NFR BLD AUTO: 0 % (ref 0–6)
ERYTHROCYTE [DISTWIDTH] IN BLOOD BY AUTOMATED COUNT: 14.6 % (ref 11.6–15.1)
GFR SERPL CREATININE-BSD FRML MDRD: 99 ML/MIN/1.73SQ M
GLUCOSE SERPL-MCNC: 151 MG/DL (ref 65–140)
HCT VFR BLD AUTO: 35.4 % (ref 34.8–46.1)
HGB BLD-MCNC: 10.6 G/DL (ref 11.5–15.4)
LYMPHOCYTES # BLD AUTO: 1.37 THOUSANDS/ΜL (ref 0.6–4.47)
LYMPHOCYTES NFR BLD AUTO: 12 % (ref 14–44)
MCH RBC QN AUTO: 27.2 PG (ref 26.8–34.3)
MCHC RBC AUTO-ENTMCNC: 29.9 G/DL (ref 31.4–37.4)
MCV RBC AUTO: 91 FL (ref 82–98)
MONOCYTES # BLD AUTO: 0.97 THOUSAND/ΜL (ref 0.17–1.22)
MONOCYTES NFR BLD AUTO: 8 % (ref 4–12)
NEUTROPHILS # BLD AUTO: 9.38 THOUSANDS/ΜL (ref 1.85–7.62)
NEUTS SEG NFR BLD AUTO: 80 % (ref 43–75)
PLATELET # BLD AUTO: 385 THOUSANDS/UL (ref 149–390)
PMV BLD AUTO: 9.4 FL (ref 8.9–12.7)
POTASSIUM SERPL-SCNC: 4.2 MMOL/L (ref 3.5–5.3)
RBC # BLD AUTO: 3.9 MILLION/UL (ref 3.81–5.12)
SODIUM SERPL-SCNC: 147 MMOL/L (ref 136–145)
WBC # BLD AUTO: 11.75 THOUSAND/UL (ref 4.31–10.16)

## 2018-01-09 PROCEDURE — 85025 COMPLETE CBC W/AUTO DIFF WBC: CPT | Performed by: PHYSICIAN ASSISTANT

## 2018-01-09 PROCEDURE — 80048 BASIC METABOLIC PNL TOTAL CA: CPT | Performed by: PHYSICIAN ASSISTANT

## 2018-01-09 RX ADMIN — POTASSIUM CHLORIDE, DEXTROSE MONOHYDRATE AND SODIUM CHLORIDE 125 ML/HR: 300; 5; 900 INJECTION, SOLUTION INTRAVENOUS at 19:43

## 2018-01-09 RX ADMIN — METRONIDAZOLE 500 MG: 500 INJECTION, SOLUTION INTRAVENOUS at 17:14

## 2018-01-09 RX ADMIN — HEPARIN SODIUM 5000 UNITS: 5000 INJECTION, SOLUTION INTRAVENOUS; SUBCUTANEOUS at 05:09

## 2018-01-09 RX ADMIN — Medication: at 22:02

## 2018-01-09 RX ADMIN — HEPARIN SODIUM 5000 UNITS: 5000 INJECTION, SOLUTION INTRAVENOUS; SUBCUTANEOUS at 13:46

## 2018-01-09 RX ADMIN — POTASSIUM CHLORIDE, DEXTROSE MONOHYDRATE AND SODIUM CHLORIDE 125 ML/HR: 300; 5; 900 INJECTION, SOLUTION INTRAVENOUS at 00:30

## 2018-01-09 RX ADMIN — METRONIDAZOLE 500 MG: 500 INJECTION, SOLUTION INTRAVENOUS at 09:00

## 2018-01-09 RX ADMIN — LOSARTAN POTASSIUM 100 MG: 50 TABLET, FILM COATED ORAL at 09:00

## 2018-01-09 RX ADMIN — METRONIDAZOLE 500 MG: 500 INJECTION, SOLUTION INTRAVENOUS at 23:39

## 2018-01-09 RX ADMIN — HEPARIN SODIUM 5000 UNITS: 5000 INJECTION, SOLUTION INTRAVENOUS; SUBCUTANEOUS at 22:07

## 2018-01-09 RX ADMIN — METRONIDAZOLE 500 MG: 500 INJECTION, SOLUTION INTRAVENOUS at 00:29

## 2018-01-09 RX ADMIN — POTASSIUM CHLORIDE, DEXTROSE MONOHYDRATE AND SODIUM CHLORIDE 125 ML/HR: 300; 5; 900 INJECTION, SOLUTION INTRAVENOUS at 08:59

## 2018-01-09 RX ADMIN — LEVOFLOXACIN 750 MG: 5 INJECTION, SOLUTION INTRAVENOUS at 11:20

## 2018-01-09 NOTE — PLAN OF CARE
DISCHARGE PLANNING - CARE MANAGEMENT     Discharge to post-acute care or home with appropriate resources Progressing        GASTROINTESTINAL - ADULT     Maintains adequate nutritional intake Progressing     Establish and maintain optimal ostomy function Progressing        INFECTION - ADULT     Absence or prevention of progression during hospitalization Progressing              Knowledge Deficit     Patient/family/caregiver demonstrates understanding of disease process, treatment plan, medications, and discharge instructions Progressing        METABOLIC, FLUID AND ELECTROLYTES - ADULT     Electrolytes maintained within normal limits Progressing     Fluid balance maintained Progressing     Glucose maintained within target range Progressing        Nutrition/Hydration-ADULT     Nutrient/Hydration intake appropriate for improving, restoring or maintaining nutritional needs Progressing        PAIN - ADULT     Verbalizes/displays adequate comfort level or baseline comfort level Progressing        Potential for Falls     Patient will remain free of falls Progressing        Prexisting or High Potential for Compromised Skin Integrity     Skin integrity is maintained or improved Progressing        SAFETY ADULT     Patient will remain free of falls Progressing     Maintain or return to baseline ADL function Progressing     Maintain or return mobility status to optimal level Progressing

## 2018-01-09 NOTE — PROGRESS NOTES
Progress Note -Surgery PA  Hailee Means 56 y o  female MRN: [id number]  Unit/Bed#: -01 Encounter: [id number]      Assessment:  56year old female s/p Procedure(s):  LAPAROTOMY EXPLORATORY, sigmoid colon resection, colostomy, appendectomy  Plan:  1  DC rubio  2  Continue PCA  3  OOB and walking  4  Continue JPs  5  pulmonary toilet  6  Continue levaquin flagyl   7  Patient can gave ice chips      Subjective/Objective     Subjective: patient feels overall well  Only pain is with movement  Objective:     /65   Pulse 105   Temp 97 8 °F (36 6 °C) (Oral)   Resp 20   Ht 5' 6" (1 676 m)   Wt 107 kg (236 lb 15 9 oz)   SpO2 96%   BMI 38 25 kg/m²   I/O last 24 hours: In: 3035 7 [I V :2975 7; NG/GT:60]  Out: 3985 [Urine:1150; Emesis/NG output:1000; Drains:270]        Intake/Output Summary (Last 24 hours) at 01/09/18 0814  Last data filed at 01/09/18 0729   Gross per 24 hour   Intake          3035 72 ml   Output             2420 ml   Net           615 72 ml       Invasive Devices     Peripheral Intravenous Line            Peripheral IV 01/07/18 Left Arm 1 day    Peripheral IV 01/07/18 Right Wrist 1 day          Drain            Closed/Suction Drain Left LLQ Bulb 19 Fr  1 day    Closed/Suction Drain RLQ Bulb 19 Fr  1 day    Closed/Suction Drain RUQ Bulb 19 Fr  1 day    Colostomy Descending/sigmoid LUQ 1 day    NG/OG/Enteral Tube Nasogastric 16 Fr Left nares 1 day    Urethral Catheter Latex 16 Fr  1 day                Physical Exam:  /65   Pulse 105   Temp 97 8 °F (36 6 °C) (Oral)   Resp 20   Ht 5' 6" (1 676 m)   Wt 107 kg (236 lb 15 9 oz)   SpO2 96%   BMI 38 25 kg/m²   General appearance: alert and oriented, in no acute distress and alert  Lungs: clear to auscultation bilaterally  Heart: regular rate and rhythm, S1, S2 normal, no murmur, click, rub or gallop  Abdomen: ostomy with some air and sweat  dressing dry  rubens functioning         Current Facility-Administered Medications:    dextrose 5 % and sodium chloride 0 9 % with KCl 40 mEq/L infusion (premix), 125 mL/hr, Intravenous, Continuous, Walt Gardiner DO, Last Rate: 125 mL/hr at 01/09/18 0030, 125 mL/hr at 01/09/18 0030    heparin (porcine) subcutaneous injection 5,000 Units, 5,000 Units, Subcutaneous, Q8H [address], 5,000 Units at 01/09/18 0509 **AND** [CANCELED] Platelet count, , , Once, Krista Centeno PA-C    HYDROmorphone (DILAUDID) 1 mg/mL 50 mL PCA, , Intravenous, Continuous, Walt Gardiner DO    HYDROmorphone (DILAUDID) 1 mg/mL injection 1 mg, 1 mg, Intravenous, Q2H PRN, Walt Gardiner DO, 0 5 mg at 01/07/18 1641    influenza inactivated quadrivalent vaccine (FLULAVAL) IM injection 0 5 mL, 0 5 mL, Intramuscular, Prior to discharge, Walt Gardiner DO    ketorolac (TORADOL) injection 30 mg, 30 mg, Intravenous, Q6H PRN, Walt Gardiner DO    levofloxacin (LEVAQUIN) IVPB (premix) 750 mg, 750 mg, Intravenous, Q24H, Walt Gardiner DO, Last Rate: 100 mL/hr at 01/08/18 1253, 750 mg at 01/08/18 1253    LORazepam (ATIVAN) 2 mg/mL injection 0 5 mg, 0 5 mg, Intravenous, Q4H PRN, Walt Gardiner DO, 0 5 mg at 01/08/18 2138    losartan (COZAAR) tablet 100 mg, 100 mg, Oral, Daily, Krista Centeno PA-C, 100 mg at 01/07/18 0936    metoclopramide (REGLAN) injection 10 mg, 10 mg, Intravenous, Q6H PRN, Gael Avilez PA-C, 10 mg at 01/06/18 0756    metoprolol (LOPRESSOR) injection 5 mg, 5 mg, Intravenous, Q6H PRN, Krista Centeno PA-C    metroNIDAZOLE (FLAGYL) IVPB (premix) 500 mg, 500 mg, Intravenous, Q8H, Walt Gardiner DO, Last Rate: 200 mL/hr at 01/09/18 0029, 500 mg at 01/09/18 0029    naloxone (NARCAN) 0 04 mg/mL syringe 0 04 mg, 0 04 mg, Intravenous, Q1MIN PRN, Walt Gardiner DO    nicotine (NICODERM CQ) 21 mg/24 hr TD 24 hr patch 1 patch, 1 patch, Transdermal, Daily, Krista Centeno PA-C, Stopped at 01/06/18 0900    ondansetron (ZOFRAN) injection 4 mg, 4 mg, Intravenous, Q4H PRN, Karen Avilez PA-C, 4 mg at 01/07/18 1641    ondansetron (ZOFRAN) injection 4 mg, 4 mg, Intravenous, Q4H PRN, Walt Gardiner DO    phenol (CHLORASEPTIC) 1 4 % mucosal liquid 1 spray, 1 spray, Mouth/Throat, Q2H PRN, Walt Gardiner DO, 1 spray at 01/08/18 0249           Lab, Imaging and other studies:  CBC:   Lab Results   Component Value Date    WBC 11 75 (H) 01/09/2018    HGB 10 6 (L) 01/09/2018    HCT 35 4 01/09/2018    MCV 91 01/09/2018     01/09/2018    MCH 27 2 01/09/2018    MCHC 29 9 (L) 01/09/2018    RDW 14 6 01/09/2018    MPV 9 4 01/09/2018   , CMP:   Lab Results   Component Value Date     (H) 01/09/2018    K 4 2 01/09/2018     (H) 01/09/2018    CO2 22 01/09/2018    ANIONGAP 9 01/09/2018    BUN 12 01/09/2018    CREATININE 0 73 01/09/2018    GLUCOSE 151 (H) 01/09/2018    CALCIUM 8 2 (L) 01/09/2018    EGFR 99 01/09/2018         VTE Pharmacologic Prophylaxis: Sequential compression device (Venodyne)  and Enoxaparin (Lovenox)  VTE Mechanical Prophylaxis: sequential compression device    Rounds performed with nursing

## 2018-01-10 LAB
ANION GAP SERPL CALCULATED.3IONS-SCNC: 5 MMOL/L (ref 4–13)
BASOPHILS # BLD MANUAL: 0 THOUSAND/UL (ref 0–0.1)
BASOPHILS NFR MAR MANUAL: 0 % (ref 0–1)
BUN SERPL-MCNC: 8 MG/DL (ref 5–25)
CALCIUM SERPL-MCNC: 8.1 MG/DL (ref 8.3–10.1)
CHLORIDE SERPL-SCNC: 113 MMOL/L (ref 100–108)
CO2 SERPL-SCNC: 25 MMOL/L (ref 21–32)
CREAT SERPL-MCNC: 0.66 MG/DL (ref 0.6–1.3)
EOSINOPHIL # BLD MANUAL: 0.21 THOUSAND/UL (ref 0–0.4)
EOSINOPHIL NFR BLD MANUAL: 2 % (ref 0–6)
ERYTHROCYTE [DISTWIDTH] IN BLOOD BY AUTOMATED COUNT: 14.5 % (ref 11.6–15.1)
GFR SERPL CREATININE-BSD FRML MDRD: 106 ML/MIN/1.73SQ M
GLUCOSE SERPL-MCNC: 146 MG/DL (ref 65–140)
HCT VFR BLD AUTO: 36.6 % (ref 34.8–46.1)
HGB BLD-MCNC: 10.9 G/DL (ref 11.5–15.4)
LYMPHOCYTES # BLD AUTO: 1.68 THOUSAND/UL (ref 0.6–4.47)
LYMPHOCYTES # BLD AUTO: 16 % (ref 14–44)
MCH RBC QN AUTO: 26.9 PG (ref 26.8–34.3)
MCHC RBC AUTO-ENTMCNC: 29.8 G/DL (ref 31.4–37.4)
MCV RBC AUTO: 90 FL (ref 82–98)
MONOCYTES # BLD AUTO: 0.84 THOUSAND/UL (ref 0–1.22)
MONOCYTES NFR BLD: 8 % (ref 4–12)
NEUTROPHILS # BLD MANUAL: 7.77 THOUSAND/UL (ref 1.85–7.62)
NEUTS BAND NFR BLD MANUAL: 1 % (ref 0–8)
NEUTS SEG NFR BLD AUTO: 73 % (ref 43–75)
PLATELET # BLD AUTO: 385 THOUSANDS/UL (ref 149–390)
PLATELET BLD QL SMEAR: ADEQUATE
PMV BLD AUTO: 9.4 FL (ref 8.9–12.7)
POTASSIUM SERPL-SCNC: 4.1 MMOL/L (ref 3.5–5.3)
RBC # BLD AUTO: 4.05 MILLION/UL (ref 3.81–5.12)
SODIUM SERPL-SCNC: 143 MMOL/L (ref 136–145)
TOTAL CELLS COUNTED SPEC: 100
WBC # BLD AUTO: 10.5 THOUSAND/UL (ref 4.31–10.16)

## 2018-01-10 PROCEDURE — 85027 COMPLETE CBC AUTOMATED: CPT | Performed by: PHYSICIAN ASSISTANT

## 2018-01-10 PROCEDURE — 85007 BL SMEAR W/DIFF WBC COUNT: CPT | Performed by: PHYSICIAN ASSISTANT

## 2018-01-10 PROCEDURE — 80048 BASIC METABOLIC PNL TOTAL CA: CPT | Performed by: PHYSICIAN ASSISTANT

## 2018-01-10 RX ADMIN — Medication: at 21:23

## 2018-01-10 RX ADMIN — METRONIDAZOLE 500 MG: 500 INJECTION, SOLUTION INTRAVENOUS at 09:35

## 2018-01-10 RX ADMIN — LORAZEPAM 0.5 MG: 2 INJECTION INTRAMUSCULAR; INTRAVENOUS at 21:22

## 2018-01-10 RX ADMIN — POTASSIUM CHLORIDE, DEXTROSE MONOHYDRATE AND SODIUM CHLORIDE 125 ML/HR: 300; 5; 900 INJECTION, SOLUTION INTRAVENOUS at 07:17

## 2018-01-10 RX ADMIN — HEPARIN SODIUM 5000 UNITS: 5000 INJECTION, SOLUTION INTRAVENOUS; SUBCUTANEOUS at 14:47

## 2018-01-10 RX ADMIN — LOSARTAN POTASSIUM 100 MG: 50 TABLET, FILM COATED ORAL at 09:34

## 2018-01-10 RX ADMIN — METRONIDAZOLE 500 MG: 500 INJECTION, SOLUTION INTRAVENOUS at 23:30

## 2018-01-10 RX ADMIN — POTASSIUM CHLORIDE, DEXTROSE MONOHYDRATE AND SODIUM CHLORIDE 75 ML/HR: 300; 5; 900 INJECTION, SOLUTION INTRAVENOUS at 21:30

## 2018-01-10 RX ADMIN — HEPARIN SODIUM 5000 UNITS: 5000 INJECTION, SOLUTION INTRAVENOUS; SUBCUTANEOUS at 06:26

## 2018-01-10 RX ADMIN — METRONIDAZOLE 500 MG: 500 INJECTION, SOLUTION INTRAVENOUS at 15:40

## 2018-01-10 RX ADMIN — LEVOFLOXACIN 750 MG: 5 INJECTION, SOLUTION INTRAVENOUS at 11:58

## 2018-01-10 RX ADMIN — HEPARIN SODIUM 5000 UNITS: 5000 INJECTION, SOLUTION INTRAVENOUS; SUBCUTANEOUS at 21:21

## 2018-01-10 RX ADMIN — LORAZEPAM 0.5 MG: 2 INJECTION INTRAMUSCULAR; INTRAVENOUS at 12:03

## 2018-01-10 NOTE — PROGRESS NOTES
Patient PCA pump alarm ringing  Air in [employer]  Primed pump for 3 1ml and wasted with nurse in accudose  Restarted pump with ordered settings and patient administered a dose without complications

## 2018-01-10 NOTE — ASSESSMENT & PLAN NOTE
Doing well s/p Fernandez/nilda, tolerating ice chips and some gas in ostomy bag  -OOB and ambulate, encouraged   -IS  -PCA for pain until taking PO  -probably DC NGT 10/10  -Probable DC later this week

## 2018-01-10 NOTE — PROGRESS NOTES
Progress Note - Shashank Ascencio 1970, 56 y o  female MRN: [id number]    Unit/Bed#: -Luigi Encounter: [id number]    Primary Care Provider: GABBY Schneider   Date and time admitted to hospital: 1/3/2018  4:16 PM      * Colonic diverticular abscess   Assessment & Plan    Doing well s/p Fernandez/appy, tolerating ice chips and some gas in ostomy bag, no fevers, MORRIS drains-all serous, RLQ- 60cc, RUQ-70cc, LLQ-110cc  -OOB and ambulate, encouraged   -IS  -PCA for pain until taking PO  -Continue IV abx   -Ostomy teaching  -probably DC NGT 10/10  -Probable DC later this week       Hypertension   Assessment & Plan    Stable, restart home meds when taking PO  Continue IV metoprolol until PO meds started        Hypothyroidism   Assessment & Plan    Stable, restart home meds when taking PO        HLD (hyperlipidemia)   Assessment & Plan    Stable, restart home meds when taking PO       VTE Pharmacologic Prophylaxis: Sequential compression device (Venodyne)  and Heparin    Subjective/Objective     Subjective: Doing well and passing gas into ostomy  Tolerating ice chips    Objective/Physical Exam: Blood pressure 138/79, pulse 105, temperature 97 8 °F (36 6 °C), temperature source Oral, resp  rate 16, height 5' 6" (1 676 m), weight 107 kg (236 lb 15 9 oz), SpO2 96 %  ,Body mass index is 38 25 kg/m²  General appearance: alert and oriented, in no acute distress  Heart: regular rate and rhythm, S1, S2 normal, no murmur, click, rub or gallop  Lungs: clear to auscultation bilaterally  Abdomen: soft, MORRIS's in place with serous fluid    Ostomy in place   Neurological: normal without focal findings      Current Facility-Administered Medications:     dextrose 5 % and sodium chloride 0 9 % with KCl 40 mEq/L infusion (premix), 75 mL/hr, Intravenous, Continuous, Walt Gardiner DO, Last Rate: 75 mL/hr at 01/10/18 0723, 75 mL/hr at 01/10/18 0723    heparin (porcine) subcutaneous injection 5,000 Units, 5,000 Units, Subcutaneous, Q8H [facility], 5,000 Units at 01/10/18 0626 **AND** [CANCELED] Platelet count, , , Once, Vita Vidal PA-C    HYDROmorphone (DILAUDID) 1 mg/mL 50 mL PCA, , Intravenous, Continuous, Walt Gardiner DO    HYDROmorphone (DILAUDID) 1 mg/mL injection 1 mg, 1 mg, Intravenous, Q2H PRN, Walt Gardiner DO, 0 5 mg at 01/07/18 1641    influenza inactivated quadrivalent vaccine (FLULAVAL) IM injection 0 5 mL, 0 5 mL, Intramuscular, Prior to discharge, Walt Gardiner DO    ketorolac (TORADOL) injection 30 mg, 30 mg, Intravenous, Q6H PRN, Walt Gardiner DO    levofloxacin (LEVAQUIN) IVPB (premix) 750 mg, 750 mg, Intravenous, Q24H, Walt Gardiner DO, Last Rate: 100 mL/hr at 01/09/18 1120, 750 mg at 01/09/18 1120    LORazepam (ATIVAN) 2 mg/mL injection 0 5 mg, 0 5 mg, Intravenous, Q4H PRN, Walt Gardiner DO, 0 5 mg at 01/08/18 2138    losartan (COZAAR) tablet 100 mg, 100 mg, Oral, Daily, Vita Vidal PA-C, 100 mg at 01/10/18 0934    metoclopramide (REGLAN) injection 10 mg, 10 mg, Intravenous, Q6H PRN, Anali Avilez PA-C, 10 mg at 01/06/18 0756    metoprolol (LOPRESSOR) injection 5 mg, 5 mg, Intravenous, Q6H PRN, Vita Vidal PA-C    metroNIDAZOLE (FLAGYL) IVPB (premix) 500 mg, 500 mg, Intravenous, Q8H, Walt Gardiner DO, Last Rate: 200 mL/hr at 01/10/18 0935, 500 mg at 01/10/18 0935    naloxone (NARCAN) 0 04 mg/mL syringe 0 04 mg, 0 04 mg, Intravenous, Q1MIN PRN, Walt Gardiner DO    nicotine (NICODERM CQ) 21 mg/24 hr TD 24 hr patch 1 patch, 1 patch, Transdermal, Daily, Vita Vidal PA-C, Stopped at 01/06/18 0900    ondansetron (ZOFRAN) injection 4 mg, 4 mg, Intravenous, Q4H PRN, Karen Avilez PA-C, 4 mg at 01/07/18 1641    ondansetron (ZOFRAN) injection 4 mg, 4 mg, Intravenous, Q4H PRN, Walt Gardiner DO    phenol (CHLORASEPTIC) 1 4 % mucosal liquid 1 spray, 1 spray, Mouth/Throat, Q2H PRN, Walt Gardiner DO, 1 spray at 01/08/18 0249      Intake/Output Summary (Last 24 hours) at 01/10/18 2700 [facility] filed at 01/10/18 0950   Gross per 24 hour   Intake             4080 ml   Output             3240 ml   Net              840 ml

## 2018-01-10 NOTE — PROGRESS NOTES
After dilaudid pca bag change on 1-8-18, the previous bag was wasted under the same day instead of the day before (1-7-18)  The Dilaudid PCA bag change on 1-9-18 could not be wasted on 1-8-18 because previous bag change on 1-8-18 wasted in accudose on 1-8-18 instead of 1-7-18  Called pharmacy, spoke with Mary Ellen Tan, who stated to write a progress note  41 5ml wasted on 1-8-18 should have been wasted on 1-7-18 bag   43 8ml wasted on 1-9-18 should have been wasted on 1-8-18 bag

## 2018-01-10 NOTE — WOUND OSTOMY CARE
Attempted to see patient for ostomy care and education  Patient sleeping in bed  Discussed plan with primary nurse who states patient received IV ativan and has no been sleeping  Per primary nurse patient has been reading her educational materials  Will follow along with patient and see later in the week for continued education, and bag change

## 2018-01-10 NOTE — CASE MANAGEMENT
Continued Stay Review    Date: 1/10/2018    Vital Signs: /71   Pulse 96   Temp 98 2 °F (36 8 °C) (Oral)   Resp 17   Ht 5' 6" (1 676 m)   Wt 107 kg (236 lb 15 9 oz)   SpO2 98%   BMI 38 25 kg/m²     Medications:   Scheduled Meds:   heparin (porcine) 5,000 Units Subcutaneous Q8H [address]   levofloxacin 750 mg Intravenous Q24H   losartan 100 mg Oral Daily   metroNIDAZOLE 500 mg Intravenous Q8H   nicotine 1 patch Transdermal Daily     Continuous Infusions:   dextrose 5 % and sodium chloride 0 9 % with KCl 40 mEq/L 75 mL/hr Last Rate: 75 mL/hr (01/10/18 0723)   HYDROmorphone       PRN Meds: HYDROmorphone    influenza vaccine    ketorolac    LORazepam  0 5 mg  Iv - used x 1      metoclopramide    metoprolol    naloxone    ondansetron    ondansetron    phenol    Abnormal Labs/Diagnostic Results:   Wbc 10 50, hgb 10 9  Cl 113  Glucose 146  Calcium 8 1  Age/Sex: 56 y o  female     Assessment/Plan:   Colonic diverticular abscess   Assessment & Plan     Doing well s/p Fernandez/appy, tolerating ice chips and some gas in ostomy bag, no fevers, MORRIS drains-all serous, RLQ- 60cc, RUQ-70cc, LLQ-110cc  -OOB and ambulate, encouraged   -IS  -PCA for pain until taking PO  -Continue IV abx   -Ostomy teaching  -probably DC NGT 10/10  -Probable DC later this week          Hypertension   Assessment & Plan     Stable, restart home meds when taking PO  Continue IV metoprolol until PO meds started       Hypothyroidism   Assessment & Plan     Stable, restart home meds when taking PO       HLD (hyperlipidemia)   Assessment & Plan     Stable, restart home meds when taking PO         Discharge Plan: To be determined  Thank you,  [address] [facility] in the [facility] by Eduardo Staley for 2017  Network Utilization Review Department  Phone: [phone]; Fax [phone]  ATTENTION: The Network Utilization Review Department is now centralized for our 7 Facilities   Make a note that we have a new phone and fax numbers for our Department  Please call with any questions or concerns to [phone] and carefully follow the prompts so that you are directed to the right person  All voicemails are confidential  Fax any determinations, approvals, denials, and requests for initial or continue stay review clinical to [phone]  Due to HIGH CALL volume, it would be easier if you could please send faxed requests to expedite your requests and in part, help us provide discharge notifications faster

## 2018-01-11 LAB
ANION GAP SERPL CALCULATED.3IONS-SCNC: 6 MMOL/L (ref 4–13)
BUN SERPL-MCNC: 6 MG/DL (ref 5–25)
CALCIUM SERPL-MCNC: 8.2 MG/DL (ref 8.3–10.1)
CHLORIDE SERPL-SCNC: 108 MMOL/L (ref 100–108)
CO2 SERPL-SCNC: 28 MMOL/L (ref 21–32)
CREAT SERPL-MCNC: 0.54 MG/DL (ref 0.6–1.3)
ERYTHROCYTE [DISTWIDTH] IN BLOOD BY AUTOMATED COUNT: 14.3 % (ref 11.6–15.1)
GFR SERPL CREATININE-BSD FRML MDRD: 114 ML/MIN/1.73SQ M
GLUCOSE SERPL-MCNC: 137 MG/DL (ref 65–140)
HCT VFR BLD AUTO: 36.6 % (ref 34.8–46.1)
HGB BLD-MCNC: 10.9 G/DL (ref 11.5–15.4)
MCH RBC QN AUTO: 26.7 PG (ref 26.8–34.3)
MCHC RBC AUTO-ENTMCNC: 29.8 G/DL (ref 31.4–37.4)
MCV RBC AUTO: 90 FL (ref 82–98)
PLATELET # BLD AUTO: 409 THOUSANDS/UL (ref 149–390)
PMV BLD AUTO: 9.2 FL (ref 8.9–12.7)
POTASSIUM SERPL-SCNC: 4 MMOL/L (ref 3.5–5.3)
RBC # BLD AUTO: 4.08 MILLION/UL (ref 3.81–5.12)
SODIUM SERPL-SCNC: 142 MMOL/L (ref 136–145)
WBC # BLD AUTO: 9.02 THOUSAND/UL (ref 4.31–10.16)

## 2018-01-11 PROCEDURE — 85027 COMPLETE CBC AUTOMATED: CPT | Performed by: PHYSICIAN ASSISTANT

## 2018-01-11 PROCEDURE — 80048 BASIC METABOLIC PNL TOTAL CA: CPT | Performed by: PHYSICIAN ASSISTANT

## 2018-01-11 PROCEDURE — C9113 INJ PANTOPRAZOLE SODIUM, VIA: HCPCS | Performed by: PHYSICIAN ASSISTANT

## 2018-01-11 RX ORDER — OXYCODONE HYDROCHLORIDE AND ACETAMINOPHEN 5; 325 MG/1; MG/1
2 TABLET ORAL EVERY 4 HOURS PRN
Status: DISCONTINUED | OUTPATIENT
Start: 2018-01-11 | End: 2018-01-14 | Stop reason: HOSPADM

## 2018-01-11 RX ORDER — PANTOPRAZOLE SODIUM 40 MG/1
40 INJECTION, POWDER, FOR SOLUTION INTRAVENOUS EVERY 12 HOURS SCHEDULED
Status: DISCONTINUED | OUTPATIENT
Start: 2018-01-11 | End: 2018-01-12

## 2018-01-11 RX ORDER — METRONIDAZOLE 500 MG/1
500 TABLET ORAL EVERY 8 HOURS SCHEDULED
Status: DISCONTINUED | OUTPATIENT
Start: 2018-01-11 | End: 2018-01-14 | Stop reason: HOSPADM

## 2018-01-11 RX ADMIN — METRONIDAZOLE 500 MG: 500 TABLET ORAL at 14:28

## 2018-01-11 RX ADMIN — OXYCODONE HYDROCHLORIDE AND ACETAMINOPHEN 2 TABLET: 5; 325 TABLET ORAL at 22:28

## 2018-01-11 RX ADMIN — HEPARIN SODIUM 5000 UNITS: 5000 INJECTION, SOLUTION INTRAVENOUS; SUBCUTANEOUS at 21:27

## 2018-01-11 RX ADMIN — METRONIDAZOLE 500 MG: 500 TABLET ORAL at 21:26

## 2018-01-11 RX ADMIN — HEPARIN SODIUM 5000 UNITS: 5000 INJECTION, SOLUTION INTRAVENOUS; SUBCUTANEOUS at 14:28

## 2018-01-11 RX ADMIN — PANTOPRAZOLE SODIUM 40 MG: 40 INJECTION, POWDER, FOR SOLUTION INTRAVENOUS at 21:27

## 2018-01-11 RX ADMIN — HEPARIN SODIUM 5000 UNITS: 5000 INJECTION, SOLUTION INTRAVENOUS; SUBCUTANEOUS at 07:06

## 2018-01-11 RX ADMIN — LORAZEPAM 0.5 MG: 2 INJECTION INTRAMUSCULAR; INTRAVENOUS at 17:02

## 2018-01-11 RX ADMIN — LEVOFLOXACIN 750 MG: 500 TABLET, FILM COATED ORAL at 12:13

## 2018-01-11 RX ADMIN — LOSARTAN POTASSIUM 100 MG: 50 TABLET, FILM COATED ORAL at 07:18

## 2018-01-11 RX ADMIN — PANTOPRAZOLE SODIUM 40 MG: 40 INJECTION, POWDER, FOR SOLUTION INTRAVENOUS at 14:28

## 2018-01-11 RX ADMIN — METRONIDAZOLE 500 MG: 500 INJECTION, SOLUTION INTRAVENOUS at 08:22

## 2018-01-11 RX ADMIN — POTASSIUM CHLORIDE, DEXTROSE MONOHYDRATE AND SODIUM CHLORIDE 75 ML/HR: 300; 5; 900 INJECTION, SOLUTION INTRAVENOUS at 12:13

## 2018-01-11 NOTE — WOUND OSTOMY CARE
Progress Note - Wound   Laurie Cerna 56 y o  female MRN: [id number]  Unit/Bed#: -01 Encounter: [id number]      Assessment:  Patient seen for continued education and care of her ostomy  Attempted to see patient this morning at approx 1100  Patient was agreeable to education and teaching at that time  Ready for teaching, NG tube and rubio are out  PCA pump in place  Tolerating clear liquids per patient  Patient aware that she needs to wear a "bag" now to collect her stool and gas, and is aware of her surgery, and that it is reversible  Patient states her and her daughters have read the educational materials that were left  The following topics were discussed: What is stoma / normal appearance of the a stoma  How to empty pouch, how to clean the end of the pouch ( can use wet wipes to assist with cleansing), and when to empty (1/3-1/2 full - to avoid pulling on the barrier  Demonstrated how to open and close the pouch  Showed patient examples of one piece, two piece and closed end pouches, and explained that the choice of the barrier is personal preference  Discussed how to burp a two piece and the gas valve on a one piece barrier  Discussed odor and gas producing foods  Discussed how to obtain supplies, and patient will likely have VNA to assist her  After we discussed these topics patient stated, "I really want to take a nap, I'm falling asleep, can you come back when my daughter are here"  Patient requested for wound ostomy nurse to return at 1:30pm     Returned at 1:30pm for continued ostomy teaching and education, to include bag change  Daughters Kostas Callejas and Óscar Kaba at bedside  Kostas Callejas very engaged in teaching, active learner along with her mother (patient)  Óscar Kaba seemed more squeamish regarding this, but did engage later in the session after the new barrier was applied  Step-by step demonstration of barrier / pouch change done with patient for teaching session   reivewed the best times to perform a barrier change is early in the morning or late at night as the stoma is less active  Reviewed she will empty the bag of stool as needed, and change the pouching system approx  every 5-7 days  Demonstrated how to remove the barrier pushing the skin away from the pouch gently from top to bottom  Cleaning the skin only with water to prevent leaving a film on the skin which will impede adhesion  Reviewed how to measure and measure weekly for about 6 weeks as her stoma size can change  Demonstrated how mold a two piece barrier  Reviewed to peel off backing and apply the barrier to the skin with gentle firm pressure  Patient is able to visualize her stoma well, watched the whole procedure  Dicussed wearing clothing and seatbelts above or below the stoma to prevent rubbing which can cause bleeding  Patient states she wears her pants above her stoma  Reviewed showering with the barrier on or off, as this is patient preference, and she can dry the adhesive tape with air dryer on low  Patient tolerated well  Patient states, "That was easier than I thought!" "I am happy that my bowel movements aren't painful anymore"  Daughter Merlinda Blmicheline and patient verbalizes understanding of teaching  Encouraged them to continue to review the educational materials  Stoma is slightly budded and slightly oval in shape  Mucocutaneous junction is intact  Noemi-stomal skin is intact with no redness or wounds noted  No apparent creases or skin folds  2 piece barrier applied to the stoma  Skin prep applied to the skin prior to barrier placement  Barrier applied with gentle pressure and heating pack to assist with adhesion  Adhesive tape trimmed to avoid the midline incision  Midline incision SHARMILA, no redness or drainage noted  Flatus and brown liquid effluent  approx 200 ml noted in bag  Patient requesting VNA for support at home  CM made aware of patient request  Patient appreciative with teaching   Patient agreeable to sample supplies being sent  Sample supplies ordered from [employer] and [employer], patient aware  Plan:   1  Continue to encourage patient to engage in self care of her ostomy  2  Empty when 1/3 full     Objective:    Vitals: Blood pressure 154/72, pulse 88, temperature 97 9 °F (36 6 °C), temperature source Oral, resp  rate 18, height 5' 6" (1 676 m), weight 107 kg (236 lb 15 9 oz), SpO2 96 %  ,Body mass index is 38 25 kg/m²  Will continue to follow along  Candice Harvey RN BSN     Approx 1 hour spent with patient providing teaching, answering questions and applying barrier

## 2018-01-11 NOTE — PROGRESS NOTES
The levofloxacin and metronidazole have been converted to Oral per [facility] IV-to-PO Auto-Conversion Protocol for Adults as approved by the Pharmacy and Therapeutics Committee  The patient met all eligible criteria:  3 Age = 26years old   2) Received at least one dose of the IV form   3) Receiving at least one other scheduled oral/enteral medication   4) Tolerating an oral/enteral diet   and did not have any exclusions:   1) Critical care patient   2) Active GI bleed (IF assessing H2RAs or PPIs)   3) Continuous tube feeding (IF assessing cipro, doxycycline, levofloxacin, minocycline, rifampin, or voriconazole)   4) Receiving PO vancomycin (IF assessing metronidazole)   5) Persistent nausea and/or vomiting   6) Ileus or gastrointestinal obstruction   7) Marco A/nasogastric tube set for continuous suction   8) Specific order not to automatically convert to PO (in the order's comments or if discussed in the most recent Infectious Disease or primary team's progress notes)

## 2018-01-11 NOTE — PROGRESS NOTES
Progress Note -Surgery VIELKA Mensah 56 y o  female MRN: [id number]  Unit/Bed#: -01 Encounter: [id number]      Assessment:  56year old female s/p Procedure(s):  LAPAROTOMY EXPLORATORY, sigmoid colon resection, colostomy, appendectomy  Plan:  -DC NGT and start clears  -DC pca, transition to oral and IV pain control  -ambulate  -ostomy teaching  -pulmonary toiley  -continue JPs to suction  -continue flagyl        Subjective/Objective     Subjective: Patient feels very well  States she is moving around fine  Not using PCA that much  Nicholette Motts for NGT removal      Objective:     /77   Pulse 102   Temp 97 9 °F (36 6 °C) (Oral)   Resp 16   Ht 5' 6" (1 676 m)   Wt 107 kg (236 lb 15 9 oz)   SpO2 96%   BMI 38 25 kg/m²   I/O last 24 hours: In: 3551 [P O :300; I V :2800; NG/GT:430]  Out: 2510 [Urine:1250; Emesis/NG output:800; Drains:460]        Intake/Output Summary (Last 24 hours) at 01/11/18 0741  Last data filed at 01/11/18 0723   Gross per 24 hour   Intake              730 ml   Output             2360 ml   Net            -1630 ml       Invasive Devices     Peripheral Intravenous Line            Peripheral IV 01/09/18 Right Arm 1 day          Drain            Closed/Suction Drain Left LLQ Bulb 19 Fr  3 days    Closed/Suction Drain RLQ Bulb 19 Fr  3 days    Closed/Suction Drain RUQ Bulb 19 Fr  3 days    Colostomy Descending/sigmoid LUQ 3 days    NG/OG/Enteral Tube Nasogastric 16 Fr Left nares 3 days                Physical Exam:  /77   Pulse 102   Temp 97 9 °F (36 6 °C) (Oral)   Resp 16   Ht 5' 6" (1 676 m)   Wt 107 kg (236 lb 15 9 oz)   SpO2 96%   BMI 38 25 kg/m²   General appearance: alert and oriented, in no acute distress and alert  Lungs: clear to auscultation bilaterally  Heart: regular rate and rhythm, S1, S2 normal, no murmur, click, rub or gallop  Abdomen: soft, non distended  air and bloody drainage in bag  incision healing   JPs functioning      Current Facility-Administered Medications:     dextrose 5 % and sodium chloride 0 9 % with KCl 40 mEq/L infusion (premix), 75 mL/hr, Intravenous, Continuous, Walt Gardiner DO, Last Rate: 75 mL/hr at 01/10/18 2130, 75 mL/hr at 01/10/18 2130    heparin (porcine) subcutaneous injection 5,000 Units, 5,000 Units, Subcutaneous, Q8H [address], 5,000 Units at 01/11/18 0706 **AND** [CANCELED] Platelet count, , , Once, Ruslan White PA-C    HYDROmorphone (DILAUDID) 1 mg/mL 50 mL PCA, , Intravenous, Continuous, Walt Gardiner DO    HYDROmorphone (DILAUDID) 1 mg/mL injection 1 mg, 1 mg, Intravenous, Q2H PRN, Walt Gardiner DO, 0 5 mg at 01/07/18 1641    influenza inactivated quadrivalent vaccine (FLULAVAL) IM injection 0 5 mL, 0 5 mL, Intramuscular, Prior to discharge, Walt Gardiner DO    ketorolac (TORADOL) injection 30 mg, 30 mg, Intravenous, Q6H PRN, Walt Gardiner DO    levofloxacin (LEVAQUIN) IVPB (premix) 750 mg, 750 mg, Intravenous, Q24H, Walt Gardiner DO, Last Rate: 100 mL/hr at 01/10/18 1158, 750 mg at 01/10/18 1158    LORazepam (ATIVAN) 2 mg/mL injection 0 5 mg, 0 5 mg, Intravenous, Q4H PRN, Walt Gardiner DO, 0 5 mg at 01/10/18 2122    losartan (COZAAR) tablet 100 mg, 100 mg, Oral, Daily, Ruslan White PA-C, 100 mg at 01/11/18 0718    metoclopramide (REGLAN) injection 10 mg, 10 mg, Intravenous, Q6H PRN, Ishan Avilez PA-C, 10 mg at 01/06/18 0756    metoprolol (LOPRESSOR) injection 5 mg, 5 mg, Intravenous, Q6H PRN, Ruslan White PA-C    metroNIDAZOLE (FLAGYL) IVPB (premix) 500 mg, 500 mg, Intravenous, Q8H, Walt Gardiner DO, Last Rate: 200 mL/hr at 01/10/18 2330, 500 mg at 01/10/18 2330    naloxone (NARCAN) 0 04 mg/mL syringe 0 04 mg, 0 04 mg, Intravenous, Q1MIN PRN, Walt Gardiner DO    nicotine (NICODERM CQ) 21 mg/24 hr TD 24 hr patch 1 patch, 1 patch, Transdermal, Daily, Ruslan White PA-C, Stopped at 01/06/18 0900    ondansetron (ZOFRAN) injection 4 mg, 4 mg, Intravenous, Q4H PRN, Karen Avilez PA-C, 4 mg at 01/07/18 1641    ondansetron (ZOFRAN) injection 4 mg, 4 mg, Intravenous, Q4H PRN, Walt Gardiner DO    phenol (CHLORASEPTIC) 1 4 % mucosal liquid 1 spray, 1 spray, Mouth/Throat, Q2H PRN, Walt Gardiner DO, 1 spray at 01/08/18 0249           Lab, Imaging and other studies:  CBC:   Lab Results   Component Value Date    WBC 9 02 01/11/2018    HGB 10 9 (L) 01/11/2018    HCT 36 6 01/11/2018    MCV 90 01/11/2018     (H) 01/11/2018    MCH 26 7 (L) 01/11/2018    MCHC 29 8 (L) 01/11/2018    RDW 14 3 01/11/2018    MPV 9 2 01/11/2018   , CMP:   Lab Results   Component Value Date     01/11/2018    K 4 0 01/11/2018     01/11/2018    CO2 28 01/11/2018    ANIONGAP 6 01/11/2018    BUN 6 01/11/2018    CREATININE 0 54 (L) 01/11/2018    GLUCOSE 137 01/11/2018    CALCIUM 8 2 (L) 01/11/2018    EGFR 114 01/11/2018         VTE Pharmacologic Prophylaxis: Sequential compression device (Venodyne)  and Heparin  VTE Mechanical Prophylaxis: sequential compression device    Rounds performed with nursing

## 2018-01-11 NOTE — PLAN OF CARE
GASTROINTESTINAL - ADULT     Maintains adequate nutritional intake Progressing     Establish and maintain optimal ostomy function Progressing        INFECTION - ADULT     Absence or prevention of progression during hospitalization Progressing              Knowledge Deficit     Patient/family/caregiver demonstrates understanding of disease process, treatment plan, medications, and discharge instructions Progressing        METABOLIC, FLUID AND ELECTROLYTES - ADULT     Electrolytes maintained within normal limits Progressing     Fluid balance maintained Progressing     Glucose maintained within target range Progressing        Nutrition/Hydration-ADULT     Nutrient/Hydration intake appropriate for improving, restoring or maintaining nutritional needs Progressing        PAIN - ADULT     Verbalizes/displays adequate comfort level or baseline comfort level Progressing        Potential for Falls     Patient will remain free of falls Progressing        Prexisting or High Potential for Compromised Skin Integrity     Skin integrity is maintained or improved Progressing        SAFETY ADULT     Patient will remain free of falls Progressing     Maintain or return to baseline ADL function Progressing     Maintain or return mobility status to optimal level Progressing

## 2018-01-11 NOTE — PLAN OF CARE

## 2018-01-12 LAB
ANION GAP SERPL CALCULATED.3IONS-SCNC: 5 MMOL/L (ref 4–13)
BASOPHILS # BLD MANUAL: 0.08 THOUSAND/UL (ref 0–0.1)
BASOPHILS NFR MAR MANUAL: 1 % (ref 0–1)
BUN SERPL-MCNC: 5 MG/DL (ref 5–25)
CALCIUM SERPL-MCNC: 8.1 MG/DL (ref 8.3–10.1)
CHLORIDE SERPL-SCNC: 107 MMOL/L (ref 100–108)
CO2 SERPL-SCNC: 27 MMOL/L (ref 21–32)
CREAT SERPL-MCNC: 0.61 MG/DL (ref 0.6–1.3)
EOSINOPHIL # BLD MANUAL: 0.16 THOUSAND/UL (ref 0–0.4)
EOSINOPHIL NFR BLD MANUAL: 2 % (ref 0–6)
ERYTHROCYTE [DISTWIDTH] IN BLOOD BY AUTOMATED COUNT: 13.9 % (ref 11.6–15.1)
GFR SERPL CREATININE-BSD FRML MDRD: 109 ML/MIN/1.73SQ M
GLUCOSE SERPL-MCNC: 129 MG/DL (ref 65–140)
HCT VFR BLD AUTO: 34.4 % (ref 34.8–46.1)
HGB BLD-MCNC: 10.3 G/DL (ref 11.5–15.4)
LYMPHOCYTES # BLD AUTO: 1.55 THOUSAND/UL (ref 0.6–4.47)
LYMPHOCYTES # BLD AUTO: 19 % (ref 14–44)
MCH RBC QN AUTO: 26.6 PG (ref 26.8–34.3)
MCHC RBC AUTO-ENTMCNC: 29.9 G/DL (ref 31.4–37.4)
MCV RBC AUTO: 89 FL (ref 82–98)
MONOCYTES # BLD AUTO: 0.65 THOUSAND/UL (ref 0–1.22)
MONOCYTES NFR BLD: 8 % (ref 4–12)
NEUTROPHILS # BLD MANUAL: 5.7 THOUSAND/UL (ref 1.85–7.62)
NEUTS SEG NFR BLD AUTO: 70 % (ref 43–75)
PLATELET # BLD AUTO: 380 THOUSANDS/UL (ref 149–390)
PLATELET BLD QL SMEAR: ADEQUATE
PMV BLD AUTO: 9.5 FL (ref 8.9–12.7)
POTASSIUM SERPL-SCNC: 3.9 MMOL/L (ref 3.5–5.3)
RBC # BLD AUTO: 3.87 MILLION/UL (ref 3.81–5.12)
SODIUM SERPL-SCNC: 139 MMOL/L (ref 136–145)
TOTAL CELLS COUNTED SPEC: 100
WBC # BLD AUTO: 8.14 THOUSAND/UL (ref 4.31–10.16)

## 2018-01-12 PROCEDURE — 85027 COMPLETE CBC AUTOMATED: CPT | Performed by: PHYSICIAN ASSISTANT

## 2018-01-12 PROCEDURE — 80048 BASIC METABOLIC PNL TOTAL CA: CPT | Performed by: PHYSICIAN ASSISTANT

## 2018-01-12 PROCEDURE — 85007 BL SMEAR W/DIFF WBC COUNT: CPT | Performed by: PHYSICIAN ASSISTANT

## 2018-01-12 RX ORDER — PANTOPRAZOLE SODIUM 40 MG/1
40 TABLET, DELAYED RELEASE ORAL
Status: DISCONTINUED | OUTPATIENT
Start: 2018-01-12 | End: 2018-01-14 | Stop reason: HOSPADM

## 2018-01-12 RX ADMIN — METRONIDAZOLE 500 MG: 500 TABLET ORAL at 22:53

## 2018-01-12 RX ADMIN — HEPARIN SODIUM 5000 UNITS: 5000 INJECTION, SOLUTION INTRAVENOUS; SUBCUTANEOUS at 22:53

## 2018-01-12 RX ADMIN — PANTOPRAZOLE SODIUM 40 MG: 40 TABLET, DELAYED RELEASE ORAL at 15:48

## 2018-01-12 RX ADMIN — LOSARTAN POTASSIUM 100 MG: 50 TABLET, FILM COATED ORAL at 08:52

## 2018-01-12 RX ADMIN — HEPARIN SODIUM 5000 UNITS: 5000 INJECTION, SOLUTION INTRAVENOUS; SUBCUTANEOUS at 05:37

## 2018-01-12 RX ADMIN — OXYCODONE HYDROCHLORIDE AND ACETAMINOPHEN 2 TABLET: 5; 325 TABLET ORAL at 03:35

## 2018-01-12 RX ADMIN — OXYCODONE HYDROCHLORIDE AND ACETAMINOPHEN 2 TABLET: 5; 325 TABLET ORAL at 19:58

## 2018-01-12 RX ADMIN — POTASSIUM CHLORIDE, DEXTROSE MONOHYDRATE AND SODIUM CHLORIDE 75 ML/HR: 300; 5; 900 INJECTION, SOLUTION INTRAVENOUS at 00:27

## 2018-01-12 RX ADMIN — POTASSIUM CHLORIDE, DEXTROSE MONOHYDRATE AND SODIUM CHLORIDE 75 ML/HR: 300; 5; 900 INJECTION, SOLUTION INTRAVENOUS at 13:45

## 2018-01-12 RX ADMIN — PANTOPRAZOLE SODIUM 40 MG: 40 TABLET, DELAYED RELEASE ORAL at 08:52

## 2018-01-12 RX ADMIN — METRONIDAZOLE 500 MG: 500 TABLET ORAL at 13:41

## 2018-01-12 RX ADMIN — METRONIDAZOLE 500 MG: 500 TABLET ORAL at 05:38

## 2018-01-12 RX ADMIN — HEPARIN SODIUM 5000 UNITS: 5000 INJECTION, SOLUTION INTRAVENOUS; SUBCUTANEOUS at 13:41

## 2018-01-12 RX ADMIN — LEVOFLOXACIN 750 MG: 500 TABLET, FILM COATED ORAL at 12:58

## 2018-01-12 NOTE — PROGRESS NOTES
The pantoprazole has been converted to Oral per [facility] IV-to-PO Auto-Conversion Protocol for Adults as approved by the Pharmacy and Therapeutics Committee  The patient met all eligible criteria:  3 Age = 26years old   2) Received at least one dose of the IV form   3) Receiving at least one other scheduled oral/enteral medication   4) Tolerating an oral/enteral diet   and did not have any exclusions:   1) Critical care patient   2) Active GI bleed (IF assessing H2RAs or PPIs)   3) Continuous tube feeding (IF assessing cipro, doxycycline, levofloxacin, minocycline, rifampin, or voriconazole)   4) Receiving PO vancomycin (IF assessing metronidazole)   5) Persistent nausea and/or vomiting   6) Ileus or gastrointestinal obstruction   7) Marco A/nasogastric tube set for continuous suction   8) Specific order not to automatically convert to PO (in the order's comments or if discussed in the most recent Infectious Disease or primary team's progress notes)

## 2018-01-12 NOTE — PLAN OF CARE
DISCHARGE PLANNING     Discharge to home or other facility with appropriate resources Progressing        GASTROINTESTINAL - ADULT     Maintains adequate nutritional intake Progressing     Establish and maintain optimal ostomy function Progressing        INFECTION - ADULT     Absence or prevention of progression during hospitalization Progressing     Absence of fever/infection during neutropenic period Progressing        Knowledge Deficit     Patient/family/caregiver demonstrates understanding of disease process, treatment plan, medications, and discharge instructions Progressing        METABOLIC, FLUID AND ELECTROLYTES - ADULT     Electrolytes maintained within normal limits Progressing     Fluid balance maintained Progressing     Glucose maintained within target range Progressing        Nutrition/Hydration-ADULT     Nutrient/Hydration intake appropriate for improving, restoring or maintaining nutritional needs Progressing        PAIN - ADULT     Verbalizes/displays adequate comfort level or baseline comfort level Progressing        Potential for Falls     Patient will remain free of falls Progressing        Prexisting or High Potential for Compromised Skin Integrity     Skin integrity is maintained or improved Progressing        SAFETY ADULT     Patient will remain free of falls Progressing     Maintain or return to baseline ADL function Progressing     Maintain or return mobility status to optimal level Progressing

## 2018-01-12 NOTE — PROGRESS NOTES
Progress Note -Surgery PA  Maren Dia 56 y o  female MRN: [id number]  Unit/Bed#: -01 Encounter: [id number]      Assessment:  56year old female s/p Procedure(s):  LAPAROTOMY EXPLORATORY, sigmoid colon resection, colostomy, appendectomy  Plan:  -continue antibiotics after discharge for another week  -advance to fulls toast crackers  -ambulate  -continue fluids till on soft diet  -dvt ppx  -continue JPs to suction  -Pulm toilet  -PO pain control  -dispo planning the next few days  Subjective/Objective     Subjective: Patient feels well  States she's tolerating liquids without vomiting  Was taught ostomy care yesterday  Objective:     /68   Pulse 87   Temp 97 8 °F (36 6 °C) (Oral)   Resp 18   Ht 5' 6" (1 676 m)   Wt 107 kg (236 lb 15 9 oz)   SpO2 96%   BMI 38 25 kg/m²   I/O last 24 hours: In: 2222 5 [I V :2162 5; NG/GT:60]  Out: 7092 [Urine:600; Emesis/NG output:200; Drains:425; Stool:150]        Intake/Output Summary (Last 24 hours) at 01/12/18 0723  Last data filed at 01/12/18 0350   Gross per 24 hour   Intake           2162 5 ml   Output              865 ml   Net           1297 5 ml       Invasive Devices     Peripheral Intravenous Line            Peripheral IV 01/11/18 Left Arm less than 1 day          Drain            Closed/Suction Drain Left LLQ Bulb 19 Fr  4 days    Closed/Suction Drain RLQ Bulb 19 Fr  4 days    Closed/Suction Drain RUQ Bulb 19 Fr  4 days    Colostomy Descending/sigmoid LUQ 4 days                Physical Exam:  /68   Pulse 87   Temp 97 8 °F (36 6 °C) (Oral)   Resp 18   Ht 5' 6" (1 676 m)   Wt 107 kg (236 lb 15 9 oz)   SpO2 96%   BMI 38 25 kg/m²   General appearance: alert and oriented, in no acute distress and alert  Lungs: clear to auscultation bilaterally  Heart: regular rate and rhythm, S1, S2 normal, no murmur, click, rub or gallop  Abdomen: soft, nontender, incision healing well  JPs to suction  Ostomy functioning         Current Facility-Administered Medications:     dextrose 5 % and sodium chloride 0 9 % with KCl 40 mEq/L infusion (premix), 75 mL/hr, Intravenous, Continuous, Walt Gardiner DO, Last Rate: 75 mL/hr at 01/12/18 0027, 75 mL/hr at 01/12/18 0027    heparin (porcine) subcutaneous injection 5,000 Units, 5,000 Units, Subcutaneous, Q8H [address], 5,000 Units at 01/12/18 0537 **AND** [CANCELED] Platelet count, , , Once, Reji Jensen PA-C    HYDROmorphone (DILAUDID) 1 mg/mL injection 1 mg, 1 mg, Intravenous, Q2H PRN, Walt Gardiner DO, 0 5 mg at 01/07/18 1641    influenza inactivated quadrivalent vaccine (FLULAVAL) IM injection 0 5 mL, 0 5 mL, Intramuscular, Prior to discharge, Walt Gardiner DO    levofloxacin (LEVAQUIN) tablet 750 mg, 750 mg, Oral, Q24H, Walt Gardiner DO, 750 mg at 01/11/18 1213    LORazepam (ATIVAN) 2 mg/mL injection 0 5 mg, 0 5 mg, Intravenous, Q4H PRN, Walt Gardiner DO, 0 5 mg at 01/11/18 1702    losartan (COZAAR) tablet 100 mg, 100 mg, Oral, Daily, Reji Jensen PA-C, 100 mg at 01/11/18 0718    metoclopramide (REGLAN) injection 10 mg, 10 mg, Intravenous, Q6H PRN, Jimbo Avilez PA-C, 10 mg at 01/06/18 0756    metoprolol (LOPRESSOR) injection 5 mg, 5 mg, Intravenous, Q6H PRN, Reji Jensen PA-C    metroNIDAZOLE (FLAGYL) tablet 500 mg, 500 mg, Oral, Q8H [address], Walt Gardiner DO, 500 mg at 01/12/18 0538    naloxone (NARCAN) 0 04 mg/mL syringe 0 04 mg, 0 04 mg, Intravenous, Q1MIN PRN, Walt Gardiner DO    nicotine (NICODERM CQ) 21 mg/24 hr TD 24 hr patch 1 patch, 1 patch, Transdermal, Daily, Reji Jensen PA-C, Stopped at 01/06/18 0900    ondansetron (ZOFRAN) injection 4 mg, 4 mg, Intravenous, Q4H PRN, Karen Avilez PA-C, 4 mg at 01/07/18 1641    ondansetron (ZOFRAN) injection 4 mg, 4 mg, Intravenous, Q4H PRN, Taisha Fortune DO    oxyCODONE-acetaminophen (PERCOCET) 5-325 mg per tablet 2 tablet, 2 tablet, Oral, Q4H PRN, Phani Desir PA-C, 2 tablet at 01/12/18 0335    pantoprazole (PROTONIX) injection 40 mg, 40 mg, Intravenous, Q12H [facility], Kayce Martines PA-C, 40 mg at 01/11/18 2127    phenol (CHLORASEPTIC) 1 4 % mucosal liquid 1 spray, 1 spray, Mouth/Throat, Q2H PRN, Walt Gardiner, , 1 spray at 01/08/18 0249           Lab, Imaging and other studies:  CBC:   Lab Results   Component Value Date    WBC 8 14 01/12/2018    HGB 10 3 (L) 01/12/2018    HCT 34 4 (L) 01/12/2018    MCV 89 01/12/2018     01/12/2018    MCH 26 6 (L) 01/12/2018    MCHC 29 9 (L) 01/12/2018    RDW 13 9 01/12/2018    MPV 9 5 01/12/2018   , CMP:   Lab Results   Component Value Date     01/12/2018    K 3 9 01/12/2018     01/12/2018    CO2 27 01/12/2018    ANIONGAP 5 01/12/2018    BUN 5 01/12/2018    CREATININE 0 61 01/12/2018    GLUCOSE 129 01/12/2018    CALCIUM 8 1 (L) 01/12/2018    EGFR 109 01/12/2018         VTE Pharmacologic Prophylaxis: Sequential compression device (Venodyne)  and Heparin  VTE Mechanical Prophylaxis: sequential compression device    Rounds performed with nursing

## 2018-01-12 NOTE — CASE MANAGEMENT
Continued Stay Review    Date:   1-12-18    Vital Signs: /82   Pulse 84   Temp 97 7 °F (36 5 °C) (Oral)   Resp (!) 24 Comment: Patient came out of the bathroom  Ht 5' 6" (1 676 m)   Wt 107 kg (236 lb 15 9 oz)   SpO2 98%   BMI 38 25 kg/m²     Medications:   Scheduled Meds:   heparin (porcine) 5,000 Units Subcutaneous Q8H [facility]   levofloxacin 750 mg Oral Q24H   losartan 100 mg Oral Daily   metroNIDAZOLE 500 mg Oral Q8H [facility]   nicotine 1 patch Transdermal Daily   pantoprazole 40 mg Oral BID AC     Continuous Infusions:   dextrose 5 % and sodium chloride 0 9 % with KCl 40 mEq/L 75 mL/hr Last Rate: 75 mL/hr (01/12/18 0027)     PRN Meds: HYDROmorphone    influenza vaccine    LORazepam    metoclopramide    metoprolol    naloxone    ondansetron    ondansetron    oxyCODONE-acetaminophen    phenol      Age/Sex: 56 y o  female     Assessment/Plan:     56year old female s/p Procedure(s):  LAPAROTOMY EXPLORATORY, sigmoid colon resection, colostomy, appendectomy  Plan:  -continue antibiotics after discharge for another week  -advance to fulls toast crackers  -ambulate  -continue fluids till on soft diet  -dvt ppx  -continue JPs to suction  -Pulm toilet  -PO pain control  -dispo planning the next few days          Discharge [address] [facility]

## 2018-01-12 NOTE — RESULT NOTES
Verified Results  (1) TSH WITH FT4 REFLEX 09Apr2016 08:09AM Tania Boss     Test Name Result Flag Reference   TSH 3 680 uIU/mL  0 450-4 500     (1) LIPID PANEL FASTING W DIRECT LDL REFLEX 09Apr2016 08:09AM Tania Boss     Test Name Result Flag Reference   Cholesterol, Total 207 mg/dL H 100-199   Triglycerides 128 mg/dL  0-149   HDL Cholesterol 43 mg/dL  >39   According to ATP-III Guidelines, HDL-C >59 mg/dL is considered a  negative risk factor for CHD  LDL Cholesterol Calc 138 mg/dL H 0-99       Discussion/Summary   Much better results for both the thyroid and the cholesterol   Dr Kamala Ly

## 2018-01-13 VITALS
DIASTOLIC BLOOD PRESSURE: 74 MMHG | SYSTOLIC BLOOD PRESSURE: 118 MMHG | WEIGHT: 244 LBS | RESPIRATION RATE: 16 BRPM | BODY MASS INDEX: 38.3 KG/M2 | HEIGHT: 67 IN | TEMPERATURE: 99 F | HEART RATE: 88 BPM

## 2018-01-13 RX ADMIN — POTASSIUM CHLORIDE, DEXTROSE MONOHYDRATE AND SODIUM CHLORIDE 75 ML/HR: 300; 5; 900 INJECTION, SOLUTION INTRAVENOUS at 02:54

## 2018-01-13 RX ADMIN — LEVOFLOXACIN 750 MG: 500 TABLET, FILM COATED ORAL at 13:00

## 2018-01-13 RX ADMIN — METRONIDAZOLE 500 MG: 500 TABLET ORAL at 13:14

## 2018-01-13 RX ADMIN — METRONIDAZOLE 500 MG: 500 TABLET ORAL at 05:37

## 2018-01-13 RX ADMIN — PANTOPRAZOLE SODIUM 40 MG: 40 TABLET, DELAYED RELEASE ORAL at 15:45

## 2018-01-13 RX ADMIN — PANTOPRAZOLE SODIUM 40 MG: 40 TABLET, DELAYED RELEASE ORAL at 06:15

## 2018-01-13 RX ADMIN — HEPARIN SODIUM 5000 UNITS: 5000 INJECTION, SOLUTION INTRAVENOUS; SUBCUTANEOUS at 13:14

## 2018-01-13 RX ADMIN — HEPARIN SODIUM 5000 UNITS: 5000 INJECTION, SOLUTION INTRAVENOUS; SUBCUTANEOUS at 22:50

## 2018-01-13 RX ADMIN — HEPARIN SODIUM 5000 UNITS: 5000 INJECTION, SOLUTION INTRAVENOUS; SUBCUTANEOUS at 05:38

## 2018-01-13 RX ADMIN — METRONIDAZOLE 500 MG: 500 TABLET ORAL at 22:50

## 2018-01-13 RX ADMIN — LOSARTAN POTASSIUM 100 MG: 50 TABLET, FILM COATED ORAL at 08:09

## 2018-01-13 NOTE — RESULT NOTES
Discussion/Summary   Blood work results notable for:   --Fair cholesterol numbers  Somewhat increased triglycerides + total cholesterol  Healthy diet/weight, decreasing smoking will help with this  --Elevated blood sugar level --in "prediabetes" range  Decreasing weight, avoiding sugar + unhealthy carbs will help with his  --Thyroid level (T4) in normal range, but trending a bit towards underreplacement  Would keep same dose of thyroid medication at this point, rechecking level in 3 months or so  Will mail slip for recheck of thyroid level and blood sugar in 3 months  Should schedule f/u visit a week later to review results  Call if any questions prior--Kevin      Verified Results  (1) CBC/PLT/DIFF [id number] 07:09AM Marcos Macario     Test Name Result Flag Reference   WHITE BLOOD CELL COUNT 9 3 Thousand/uL  3 8-10 8   RED BLOOD CELL COUNT 4 95 Million/uL  3 80-5 10   HEMOGLOBIN 14 0 g/dL  11 7-15 5   HEMATOCRIT 42 6 %  35 0-45 0   MCV 86 1 fL  80 0-100 0   MCH 28 4 pg  27 0-33 0   MCHC 32 9 g/dL  32 0-36 0   RDW 14 8 %  11 0-15 0   PLATELET COUNT 693 Thousand/uL  140-400   ABSOLUTE NEUTROPHILS 5952 cells/uL  3008-4964   ABSOLUTE LYMPHOCYTES 2353 cells/uL  850-3900   ABSOLUTE MONOCYTES 688 cells/uL  200-950   ABSOLUTE EOSINOPHILS 260 cells/uL     ABSOLUTE BASOPHILS 47 cells/uL  0-200   NEUTROPHILS 64 0 %     LYMPHOCYTES 25 3 %     MONOCYTES 7 4 %     EOSINOPHILS 2 8 %     BASOPHILS 0 5 %     MPV 8 6 fL  7 5-12 5     (1) COMPREHENSIVE METABOLIC PANEL [id number] 97:05GS Marcos Macario     Test Name Result Flag Reference   GLUCOSE 123 mg/dL H 65-99   Fasting reference interval     For someone without known diabetes, a glucose value  between 100 and 125 mg/dL is consistent with  prediabetes and should be confirmed with a  follow-up test    UREA NITROGEN (BUN) 12 mg/dL  7-25   CREATININE 0 82 mg/dL  0 50-1 10   eGFR NON-AFR   AMERICAN 86 mL/min/1 73m2  > OR = 60   eGFR AFRICAN AMERICAN 100 mL/min/1 73m2 > OR = 60   BUN/CREATININE RATIO   1-89   NOT APPLICABLE (calc)   SODIUM 138 mmol/L  135-146   POTASSIUM 4 2 mmol/L  3 5-5 3   CHLORIDE 104 mmol/L     CARBON DIOXIDE 24 mmol/L  20-31   CALCIUM 9 1 mg/dL  8 6-10 2   PROTEIN, TOTAL 6 8 g/dL  6 1-8 1   ALBUMIN 4 0 g/dL  3 6-5 1   GLOBULIN 2 8 g/dL (calc)  1 9-3 7   ALBUMIN/GLOBULIN RATIO 1 4 (calc)  1 0-2 5   BILIRUBIN, TOTAL 0 8 mg/dL  0 2-1 2   ALKALINE PHOSPHATASE 84 U/L     AST 16 U/L  10-35   ALT 17 U/L  6-29     (Q) LIPID PANEL WITH REFLEX TO DIRECT LDL [id number] 07:09AM Diamond Rodriguez     Test Name Result Flag Reference   CHOLESTEROL, TOTAL 202 mg/dL H 125-200   HDL CHOLESTEROL 36 mg/dL L > OR = 46   TRIGLICERIDES 210 mg/dL H <150   LDL-CHOLESTEROL 126 mg/dL (calc)  <130   Desirable range <100 mg/dL for patients with CHD or  diabetes and <70 mg/dL for diabetic patients with  known heart disease  CHOL/HDLC RATIO 5 6 (calc) H < OR = 5 0   NON HDL CHOLESTEROL 166 mg/dL (calc) H    Target for non-HDL cholesterol is 30 mg/dL higher than   LDL cholesterol target  (Q) TSH, 3RD GENERATION W/REFLEX TO FT4 [id number] 07:09AM Diamond Rodriguez     Test Name Result Flag Reference   TSH W/REFLEX TO FT4 5 76 mIU/L H    Reference Range                         > or = 20 Years  0 40-4 50                              Pregnancy Ranges            First trimester    0 26-2 66            Second trimester   0 55-2 73            Third trimester    0 43-2 91   T4, FREE 1 2 ng/dL  0 8-1 8     (Q) HEMOGLOBIN A1c [id number] 07:09AM Diamond Rodriguez   REPORT COMMENT:  FASTING:YES  PATIENT UNABLE TO VOID; ADVISED TO RETURN FOR COLLECTION  Test Name Result Flag Reference   HEMOGLOBIN A1c 6 1 % of total Hgb H <5 7   For someone without known diabetes, a hemoglobin   A1c value between 5 7% and 6 4% is consistent with  prediabetes and should be confirmed with a   follow-up test      For someone with known diabetes, a value <7%  indicates that their diabetes is well controlled  A1c  targets should be individualized based on duration of  diabetes, age, comorbid conditions, and other  considerations  This assay result is consistent with an increased risk  of diabetes  Currently, no consensus exists regarding use of  hemoglobin A1c for diagnosis of diabetes for children  Plan  Dyslipidemia, Hyperlipemia, Hypothyroidism    · (1) COMPREHENSIVE METABOLIC PANEL; Status:Active; Requested for:[id number];    · (1) HEMOGLOBIN A1C; Status:Active; Requested for:[id number];    · (1) LIPID PANEL FASTING W DIRECT LDL REFLEX; Status:Active; Requested  for:[id number];    · (1) TSH WITH FT4 REFLEX; Status:Active;  Requested AFD:[id number];

## 2018-01-13 NOTE — PROGRESS NOTES
Progress Note - General Surgery   Concetta Mcdowell 56 y o  female MRN: [id number]  Unit/Bed#: -01 Encounter: [id number]    Assessment:  56 Y O F s/p hartmanns procedure 1/7/2018 for perforated diverticulitis    Plan:  Surgical soft diet  Pain control  Antibx: levo/flagyl  Morris drains, remove prior to D/C  SQH  D/c today vs tomorrow pending case management    Subjective/Objective       Subjective: No issues overnight, tolerating diet, having bowel function  MORRIS serous         Blood pressure 141/65, pulse 81, temperature 98 °F (36 7 °C), temperature source Oral, resp  rate 18, height 5' 6" (1 676 m), weight 107 kg (236 lb 15 9 oz), SpO2 94 %  ,Body mass index is 38 25 kg/m²  Intake/Output Summary (Last 24 hours) at 01/13/18 0958  Last data filed at 01/13/18 0758   Gross per 24 hour   Intake             2135 ml   Output             2860 ml   Net             -725 ml       Invasive Devices     Peripheral Intravenous Line            Peripheral IV 01/11/18 Left Arm 1 day          Drain            Closed/Suction Drain Left LLQ Bulb 19 Fr  5 days    Closed/Suction Drain RLQ Bulb 19 Fr  5 days    Closed/Suction Drain RUQ Bulb 19 Fr  5 days    Colostomy Descending/sigmoid LUQ 5 days                Physical Exam:   Gen: AOAx3  Lungs CTA B/L  Abd: soft, NT, ND, incision, CDI, Jps serous  Stoma moist and pink with stool     Lab, Imaging and other studies:I have personally reviewed pertinent lab results    , CBC: No results found for: WBC, HGB, HCT, MCV, PLT, ADJUSTEDWBC, MCH, MCHC, RDW, MPV, NRBC, CMP: No results found for: NA, K, CL, CO2, ANIONGAP, BUN, CREATININE, GLUCOSE, CALCIUM, AST, ALT, ALKPHOS, PROT, ALBUMIN, BILITOT, EGFR, Coagulation: No results found for: PT, INR, APTT, Urinalysis: No results found for: COLORU, CLARITYU, SPECGRAV, PHUR, LEUKOCYTESUR, NITRITE, PROTEINUA, GLUCOSEU, KETONESU, BILIRUBINUR, BLOODU, Amylase: No results found for: AMYLASE, Lipase: No results found for: LIPASE  VTE Pharmacologic Prophylaxis: Heparin  VTE Mechanical Prophylaxis: sequential compression device

## 2018-01-14 VITALS
SYSTOLIC BLOOD PRESSURE: 133 MMHG | OXYGEN SATURATION: 93 % | WEIGHT: 236.99 LBS | RESPIRATION RATE: 18 BRPM | BODY MASS INDEX: 38.09 KG/M2 | TEMPERATURE: 98.5 F | DIASTOLIC BLOOD PRESSURE: 61 MMHG | HEART RATE: 76 BPM | HEIGHT: 66 IN

## 2018-01-14 RX ORDER — METRONIDAZOLE 500 MG/1
500 TABLET ORAL EVERY 8 HOURS SCHEDULED
Qty: 6 TABLET | Refills: 0 | Status: SHIPPED | OUTPATIENT
Start: 2018-01-14 | End: 2018-01-17

## 2018-01-14 RX ORDER — OXYCODONE HYDROCHLORIDE AND ACETAMINOPHEN 5; 325 MG/1; MG/1
1-2 TABLET ORAL EVERY 4 HOURS PRN
Qty: 30 TABLET | Refills: 0
Start: 2018-01-14 | End: 2018-01-24

## 2018-01-14 RX ORDER — LEVOFLOXACIN 750 MG/1
750 TABLET ORAL EVERY 24 HOURS
Qty: 3 TABLET | Refills: 0 | Status: SHIPPED | OUTPATIENT
Start: 2018-01-14 | End: 2018-01-16

## 2018-01-14 RX ADMIN — LOSARTAN POTASSIUM 100 MG: 50 TABLET, FILM COATED ORAL at 08:42

## 2018-01-14 RX ADMIN — HEPARIN SODIUM 5000 UNITS: 5000 INJECTION, SOLUTION INTRAVENOUS; SUBCUTANEOUS at 06:12

## 2018-01-14 RX ADMIN — HEPARIN SODIUM 5000 UNITS: 5000 INJECTION, SOLUTION INTRAVENOUS; SUBCUTANEOUS at 13:12

## 2018-01-14 RX ADMIN — METRONIDAZOLE 500 MG: 500 TABLET ORAL at 06:12

## 2018-01-14 RX ADMIN — PANTOPRAZOLE SODIUM 40 MG: 40 TABLET, DELAYED RELEASE ORAL at 06:12

## 2018-01-14 RX ADMIN — METRONIDAZOLE 500 MG: 500 TABLET ORAL at 14:39

## 2018-01-14 RX ADMIN — LEVOFLOXACIN 750 MG: 500 TABLET, FILM COATED ORAL at 13:09

## 2018-01-14 NOTE — PLAN OF CARE
Problem: DISCHARGE PLANNING - CARE MANAGEMENT  Goal: Discharge to post-acute care or home with appropriate resources  INTERVENTIONS:  - Conduct assessment to determine patient/family and health care team treatment goals, and need for post-acute services based on payer coverage, community resources, and patient preferences, and barriers to discharge  - Address psychosocial, clinical, and financial barriers to discharge as identified in assessment in conjunction with the patient/family and health care team  - Arrange appropriate level of post-acute services according to patient's   needs and preference and payer coverage in collaboration with the physician and health care team  - Communicate with and update the patient/family, physician, and health care team regarding progress on the discharge plan  - Arrange appropriate transportation to post-acute venues   Outcome: Completed Date Met: 01/14/18  [facility] is able to see pt Tuesday  Cm added this to pt's AVS   Pt has been updated  No further Cm interventions at this time

## 2018-01-14 NOTE — PLAN OF CARE
Problem: DISCHARGE PLANNING - CARE MANAGEMENT  Goal: Discharge to post-acute care or home with appropriate resources  INTERVENTIONS:  - Conduct assessment to determine patient/family and health care team treatment goals, and need for post-acute services based on payer coverage, community resources, and patient preferences, and barriers to discharge  - Address psychosocial, clinical, and financial barriers to discharge as identified in assessment in conjunction with the patient/family and health care team  - Arrange appropriate level of post-acute services according to patient's   needs and preference and payer coverage in collaboration with the physician and health care team  - Communicate with and update the patient/family, physician, and health care team regarding progress on the discharge plan  - Arrange appropriate transportation to post-acute venues   Outcome: Progressing  LOS 11  Cm met with pt to review DCP  Cm offered VNA as she now has a new colostomy and it would benefit her to have someone assisting her and teaching her how to care for it  Pt is agreeable to have VNA  Cm asked if she would like to have [facility] and she agreed however pt has [address] is not able to accept that insurance  Cm made referrals to [facility] and [facility] in order to obtain SN for pt at home  Cm will follow to assist with DC arrangements for pt

## 2018-01-14 NOTE — PROGRESS NOTES
Progress Note - General Surgery   Cristina Mensah 56 y o  female MRN: [id number]  Unit/Bed#: -01 Encounter: [id number]    Assessment:  56 Y O F s/p hartmanns procedure 1/7/2018 for perforated diverticulitis    Plan:  Surgical soft diet  Pain control  Antibx: levo/flagyl x3 more days   D/C MORRIS drains today   SQH  D/c today     Subjective/Objective       Subjective: No issues overnight, tolerating diet, having bowel function  MORRIS serous         Blood pressure 133/61, pulse 76, temperature 98 5 °F (36 9 °C), temperature source Oral, resp  rate 18, height 5' 6" (1 676 m), weight 107 kg (236 lb 15 9 oz), SpO2 93 %  ,Body mass index is 38 25 kg/m²  Intake/Output Summary (Last 24 hours) at 01/14/18 1125  Last data filed at 01/14/18 0900   Gross per 24 hour   Intake          2433 75 ml   Output             1165 ml   Net          1268 75 ml       Invasive Devices     Peripheral Intravenous Line            Peripheral IV 01/11/18 Left Arm 2 days          Drain            Colostomy Descending/sigmoid LUQ 6 days                Physical Exam:   Gen: AOAx3  Lungs CTA B/L  Abd: soft, NT, ND, incision, CDI, Jps serous  Stoma moist and pink with stool     Lab, Imaging and other studies:I have personally reviewed pertinent lab results    , CBC: No results found for: WBC, HGB, HCT, MCV, PLT, ADJUSTEDWBC, MCH, MCHC, RDW, MPV, NRBC, CMP: No results found for: NA, K, CL, CO2, ANIONGAP, BUN, CREATININE, GLUCOSE, CALCIUM, AST, ALT, ALKPHOS, PROT, ALBUMIN, BILITOT, EGFR, Coagulation: No results found for: PT, INR, APTT, Urinalysis: No results found for: COLORU, CLARITYU, SPECGRAV, PHUR, LEUKOCYTESUR, NITRITE, PROTEINUA, GLUCOSEU, KETONESU, BILIRUBINUR, BLOODU, Amylase: No results found for: AMYLASE, Lipase: No results found for: LIPASE  VTE Pharmacologic Prophylaxis: Heparin  VTE Mechanical Prophylaxis: sequential compression device

## 2018-01-14 NOTE — DISCHARGE INSTRUCTIONS
Your incision is closed with staples  These will be removed in the office  Ok to shower daily  Dress prior drain sites with 4x4 gauzes and tape   Tale pain medications as needed for pain   No driving while on narcotic medications  Please call to set up a follow up appointment in 2 weeks  Please call or return to the emergency department with any increased abdominal pain, nausea, vomiting, shortness of breath or fevers greater than 101F  No heavy lifting of anything greater than 10 pounds in 4 weeks

## 2018-01-14 NOTE — SOCIAL WORK
[facility] is able to see pt Tuesday  Cm added this to pt's AVS   Pt has been updated  No further Cm interventions at this time

## 2018-01-14 NOTE — DISCHARGE SUMMARY
Discharge Summary - Maren Dia 56 y o  female MRN: [id number]    Unit/Bed#: -01 Encounter: [id number]    Admission Date: 1/3/2018     Admitting Diagnosis: Sigmoid diverticulitis [K57 32]  Unspecified abdominal pain [R10 9]  Abscess of sigmoid colon due to diverticulitis [K57 20]    HPI: 11year old female who presented with hinchey 2 diverticulitis on cat scan of the abdomen and pelvis  and left lower quadrant abdominal pain  Poor oral intake, + nausea, no vomitting  No shortness of breath  No fevers  Patient underwent percutaneous drainage by our interventional radiology colleagues of pelvic abscess on 1/4/2018    Procedures Performed:   IR drainage of pelvic abscess  1/4/2018  Exploratory laparotomy, Hartmanns procedure  Dr Gaudencio Dunn 1/7/2018      Hospital Course: 56year old female who presented with hinchey 2 diverticulitis and left lower quadrant abdominal pain  Patient underwent percutaneous drainage by our interventional radiology colleagues of pelvic abscess  However 2 days after drainage, she was noted to have severe abdominal pain  Repeat cat scan of the abdomen and pelvis showed stool in her abdomen  She was thus taken to the operating room and underwent a sigmoidectomy with end colostomy  Postoperatively, she was made nothing to eat by mouth and placed on intravenous antibiotics and pain medications  She had ramon lemus drains placed in her pelvis  At the time of her discharge, she was ambulating, tolerating a house diet and in no acute distress  Her Gretel Roll lemus drains were discontinued  She will follow up in the office in 2 weeks with Dr Gaudencio Dunn  She will continue her antibiotics for 3 more days      Significant Findings, Care, Treatment and Services Provided: Interventional radiology consult 1/4/2017 for drainage of pelvic abscess    Complications: NONE     Discharge Diagnosis: perforated feculent diverticulitis     Condition at Discharge: good     Discharge instructions/Information to patient and family:   See after visit summary for information provided to patient and family  Provisions for Follow-Up Care:  See after visit summary for information related to follow-up care and any pertinent home health orders  Disposition: Home    Planned Readmission: No    Discharge Statement   I spent 15 minutes discharging the patient  This time was spent on the day of discharge  I had direct contact with the patient on the day of discharge  Additional documentation is required if more than 30 minutes were spent on discharge  Discharge Medications:  See after visit summary for reconciled discharge medications provided to patient and family

## 2018-01-14 NOTE — SOCIAL WORK
LOS 11  Cm met with pt to review DCP  Cm offered VNA as she now has a new colostomy and it would benefit her to have someone assisting her and teaching her how to care for it  Pt is agreeable to have VNA  Cm asked if she would like to have [facility] and she agreed however pt has [address] [facility] is not able to accept that insurance  Cm made referrals to [facility] and [facility] in order to obtain SN for pt at home  Cm will follow to assist with DC arrangements for pt

## 2018-01-15 ENCOUNTER — GENERIC CONVERSION - ENCOUNTER (OUTPATIENT)
Dept: OTHER | Facility: OTHER | Age: 48
End: 2018-01-15

## 2018-01-15 VITALS
SYSTOLIC BLOOD PRESSURE: 128 MMHG | WEIGHT: 245 LBS | RESPIRATION RATE: 18 BRPM | HEART RATE: 84 BPM | HEIGHT: 67 IN | OXYGEN SATURATION: 98 % | BODY MASS INDEX: 38.45 KG/M2 | TEMPERATURE: 97.3 F | DIASTOLIC BLOOD PRESSURE: 84 MMHG

## 2018-01-15 NOTE — CASE MANAGEMENT
Notification of Discharge  This is a Notification of Discharge from our facility [address]  Please be advised that this patient has been discharge from our facility  Below you will find the admission and discharge date and time including the patients disposition  PRESENTATION DATE: 1/3/2018  4:16 PM  IP ADMISSION DATE: 1/3/18 1945  DISCHARGE DATE: 1/14/2018  3:16 PM  DISPOSITION: Home with [address] [place] [address] in the [facility] by Eduardo Staley for 2017  Network Utilization Review Department  Phone: [phone]; Fax [phone]  ATTENTION: The Network Utilization Review Department is now centralized for our 7 Facilities  Make a note that we have a new phone and fax numbers for our Department  Please call with any questions or concerns to [phone] and carefully follow the prompts so that you are directed to the right person  All voicemails are confidential  Fax any determinations, approvals, denials, and requests for initial or continue stay review clinical to [phone]  Due to HIGH CALL volume, it would be easier if you could please send faxed requests to expedite your requests and in part, help us provide discharge notifications faster

## 2018-01-15 NOTE — RESULT NOTES
Verified Results  * CT ABDOMEN PELVIS W CONTRAST [id number] 12:12PM Michelle Sewell     Test Name Result Flag Reference   CT ABDOMEN PELVIS W CONTRAST (Report)     Please note the following addition to the conclusion:     7  Possible small hepatic cyst  Follow-up contrast enhanced MRI of the pancreas with MRCP is recommended  ##sigslh##sigslh     ##fuslh01##fuslh01     CT ABDOMEN AND PELVIS WITH IV CONTRAST     INDICATION: Left lower quadrant abdominal pain, history of diverticulitis  COMPARISON: None  TECHNIQUE: CT examination of the abdomen and pelvis  Axial, sagittal and coronal reformatted projections were created  This examination, like all CT scans performed in the [facility], was performed utilizing techniques to    minimize radiation dose exposure, including the use of iterative reconstruction and automated exposure control  IV Contrast: iohexol (OMNIPAQUE) 240 MG/ML solution 50 mL--iohexol (OMNIPAQUE) 350 MG/ML injection (MULTI-DOSE) 100 mL Note: (SINGLE DOSE/MULTI DOSE) information refers to the container from which the contrast was acquired  Contrast was injected one    time intravenously without immediate complication  Enteric Contrast: Enteric contrast was administered  FINDINGS:     ABDOMEN     LOWER CHEST: There is a 3 mm right lower lobe nodule on series 2, image 9  There is a 4 mm right lower lobe nodule on series 2, image 2  There is a 2 mm left lower lobe nodule on image 4  LIVER/BILIARY TREE: There is diffuse hepatic steatosis  GALLBLADDER: Gallbladder is surgically absent  SPLEEN: Unremarkable  PANCREAS: There is a possible 4 mm pancreas head cyst versus focal fat  ADRENAL GLANDS: Unremarkable  KIDNEYS/URETERS: Unremarkable  No hydronephrosis  STOMACH AND BOWEL: There is colonic diverticulosis without evidence of acute diverticulitis       APPENDIX: Appendix is normal in appearance though the tip is contiguous with the sigmoid colon  This appearance may be secondary to a prior episode of diverticulitis with scarring  ABDOMINOPELVIC CAVITY: No ascites or free intraperitoneal air  No lymphadenopathy  VESSELS: Unremarkable for patient's age  PELVIS     REPRODUCTIVE ORGANS: There is a right ovarian cyst      URINARY BLADDER: Unremarkable  ABDOMINAL WALL/INGUINAL REGIONS: Unremarkable  OSSEOUS STRUCTURES: No acute fracture or destructive osseous lesion  IMPRESSION:     1  No acute inflammatory process in the abdomen or pelvis  2  Adhesion of the appendix to the sigmoid colon without evidence of acute inflammation  This may be on the basis of prior diverticulitis with scarring  3  Diffuse hepatic steatosis  4  Diverticulosis coli  5  Right ovarian cyst      6  Small bibasilar lung nodules  According to guidelines by the Fleischner society (Radiology 2005; 939:130-871) in patients with low risk for lung cancer and nodules less than 5 mm in diameter no further imaging is required  In patients with a higher   risk, such as those who are smokers, follow-up is recommended in one year  Patients with a known malignancy and are at increased risk of metastasis should receive three month follow-up  ##sigslh##sigslh     ##fuslh12##fuslh12       Workstation performed: [id number]     Signed by:   Gabi Lechuga MD   1/9/17   RAD_DOSE   Modality Radiation Exposure Data    Order Radiation    Type Dose Range    Radiation Dose 1650 mGy 0 - 6000 mGy     (1) CBC/PLT/DIFF [id number] 12:09PM Sharon SERRA Order Number: [id number]     Test Name Result Flag Reference   WBC COUNT 10 95 Thousand/uL H 4 31-10 16   RBC COUNT 5 38 Million/uL H 3 81-5 12   HEMOGLOBIN 14 8 g/dL  11 5-15 4   HEMATOCRIT 46 6 % H 34 8-46  1   MCV 87 fL  82-98   MCH 27 5 pg  26 8-34 3   MCHC 31 8 g/dL  31 4-37 4   RDW 14 0 %  11 6-15 1   MPV 9 8 fL  8 9-12 7   PLATELET COUNT 594 Thousands/uL  149-390   NEUTROPHILS RELATIVE PERCENT 71 %  43-75   LYMPHOCYTES RELATIVE PERCENT 20 %  14-44   MONOCYTES RELATIVE PERCENT 8 %  4-12   EOSINOPHILS RELATIVE PERCENT 1 %  0-6   BASOPHILS RELATIVE PERCENT 0 %  0-1   NEUTROPHILS ABSOLUTE COUNT 7 77 Thousands/?L H 1 85-7 62   LYMPHOCYTES ABSOLUTE COUNT 2 18 Thousands/?L  0 60-4 47   MONOCYTES ABSOLUTE COUNT 0 85 Thousand/?L  0 17-1 22   EOSINOPHILS ABSOLUTE COUNT 0 12 Thousand/?L  0 00-0 61   BASOPHILS ABSOLUTE COUNT 0 03 Thousands/?L  0 00-0 10   - Patient Instructions: This bloodwork is non-fasting  Please drink two glasses of water morning of bloodwork  - Patient Instructions: This bloodwork is non-fasting  Please drink two glasses of water morning of bloodwork  (1) COMPREHENSIVE METABOLIC PANEL [id number] 24:20FC [employer] Order Number: [id number]     Test Name Result Flag Reference   GLUCOSE,RANDM 104 mg/dL     If the patient is fasting, the ADA then defines impaired fasting glucose as > 100 mg/dL and diabetes as > or equal to 123 mg/dL  SODIUM 139 mmol/L  136-145   POTASSIUM 4 5 mmol/L  3 5-5 3   CHLORIDE 106 mmol/L  100-108   CARBON DIOXIDE 26 mmol/L  21-32   ANION GAP (CALC) 7 mmol/L  4-13   BLOOD UREA NITROGEN 11 mg/dL  5-25   CREATININE 0 91 mg/dL  0 60-1 30   Standardized to IDMS reference method   CALCIUM 9 2 mg/dL  8 3-10 1   BILI, TOTAL 0 60 mg/dL  0 20-1 00   ALK PHOSPHATAS 80 U/L     ALT (SGPT) 24 U/L  12-78   AST(SGOT) 19 U/L  5-45   ALBUMIN 3 7 g/dL  3 5-5 0   TOTAL PROTEIN 7 8 g/dL  6 4-8 2   eGFR Non-African American      >60 0 ml/min/1 73sq m   [facility] Disease Education Program recommendations are as follows:  GFR calculation is accurate only with a steady state creatinine  Chronic Kidney disease less than 60 ml/min/1 73 sq  meters  Kidney failure less than 15 ml/min/1 73 sq  meters       (1) SED RATE [id number] 12:09PM [employer] Order Number: [id number]     Test Name Result Flag Reference   SED RATE 10 mm/hour  0-20 (1) AMYLASE [id number] 12:09PM Braden Oscar Order Number: [id number]     Test Name Result Flag Reference   AMYLASE 29 IU/L       (1) LIPASE [id number] 12:09PM Marni Ferguson    Order Number: [id number]     Test Name Result Flag Reference   LIPASE 63 u/L L      (1) HCG QUANT [id number] 12:09PM Marni Ferguson    Order Number: [id number]     Test Name Result Flag Reference   HCG QUANTITATIVE <2 mIU/mL  <=6   Expected Ranges:     Approximate               Approximate HCG  Gestation age          Concentration ( mIU/mL)  _____________          ______________________   Rigo Peterson                      HCG values  0 2-1                       5-50  1-2                           2-3                         100-5000  3-4                         500-82245  4-5                         1000-83387  5-6                         67303-200832  6-8                         75111-826595  8-12                        17909-852447       Discussion/Summary   Patient called and notified of test results  No acute inflammation or other cause of right sided abdominal pain found, although it is possible that right ovarian cyst may be contributing  Numerous small lung nodules-->repeat chest CT in 1 year  Advise rest, Tylenol for now  Call your GI if pain is continued over the next 2-3 days  ER for worsening/associated red flag symptoms  Patient verbalized understanding

## 2018-01-16 NOTE — RESULT NOTES
Verified Results  (Q) TSH, 3RD GENERATION W/REFLEX TO FT4 [id number] 09:41AM Mary Loss   REPORT COMMENT:  FASTING:YES     Test Name Result Flag Reference   TSH W/REFLEX TO FT4 2 57 mIU/L     Reference Range                         > or = 20 Years  0 40-4 50                              Pregnancy Ranges            First trimester    0 26-2 66            Second trimester   0 55-2 73            Third trimester    0 43-2 91       Discussion/Summary   This is good  Continue the same dose of thyroid medicine   Dr Bebeto Chou

## 2018-01-23 NOTE — MISCELLANEOUS
History of Present Illness  TCM Communication [facility]: The patient is being contacted for follow-up after hospitalization and Dr Janak Reinoso and PCP  She was hospitalized at [facility] [place]  Diagnosis: SIGMOID DIVERTICULITIS, ABSCESS OF SIGMOID COLON DUE TO DIVERTICULITIS  She was discharged to home  Medications reviewed and updated today  She did not schedule a follow up appointment  Follow-up appointments with other specialists: OXYCODONE -ACETAMINOPHEN  Symptoms: weakness  Counseling was provided to the patient  Communication performed and completed by BO DILL      Active Problems    1  Abdominal pain, LLQ (left lower quadrant) (789 04) (R10 32)   2  Acute bronchitis (466 0) (J20 9)   3  Acute laryngitis (464 00) (J04 0)   4  Anxiety (300 00) (F41 9)   5  Benign essential hypertension (401 1) (I10)   6  Colon, diverticulosis (562 10) (K57 30)   7  Diverticulitis (562 11) (K57 92)   8  Dyslipidemia (272 4) (E78 5)   9  Fatty liver (571 8) (K76 0)   10  Hyperlipemia (272 4) (E78 5)   11  Hypothyroidism (244 9) (E03 9)   12  Lung nodules (793 19) (R91 8)   13  Nicotine dependence (305 1) (F17 200)   14  Obesity (278 00) (E66 9)   15  Prediabetes (790 29) (R73 03)   16  Right upper quadrant abdominal pain (789 01) (R10 11)   17  Strain of trapezius muscle, left, initial encounter (840 8) (S46 812A)   18  Viral upper respiratory infection (465 9) (J06 9,B97 89)    Past Medical History    1  Abdominal pain, LLQ (left lower quadrant) (789 04) (R10 32)   2  Acute bronchitis (466 0) (J20 9)   3  History of Acute maxillary sinusitis, recurrence not specified (461 0) (J01 00)   4  History of Calculus of distal right ureter (592 1) (N20 1)   5  History of Closed Fracture Of The Head Of The Right First Metacarpal (815 00)   6  History of Contusion With Intact Skin Surface Of The Forearm (923 10)   7  Diverticulitis (562 11) (K57 92)   8  Dyslipidemia (272 4) (E78 5)   9  Fatty liver (571 8) (K76 0)   10   History of acute conjunctivitis (V12 49) (Z86 69)   11  History of acute pharyngitis (V12 69) (Z87 09)   12  History of diverticulitis of colon (V12 79) (Z87 19)   13  History of migraine (V12 49) (Z86 69)   14  Lung nodules (793 19) (R91 8)   15  Prediabetes (790 29) (R73 03)   16  Right upper quadrant abdominal pain (789 01) (R10 11)   17  Viral upper respiratory infection (465 9) (J06 9,B97 89)    Surgical History    1  History of Breast Surgery Reduction Procedure   2  History of Cholecystectomy Laparoscopic   3  History of Complete Colonoscopy   4  History of Tubal Ligation   5  History of Umbilical Hernia Repair    Family History  Mother    1  Family history of Chronic Hepatitis, C Virus  Father    2  Family history of Heart Disease (V17 49)   3  Family history of Hypertension (V17 49)  Sister    4  Family history of Diabetes Mellitus (V18 0)   5  Family history of Hypertension (V17 49)  Family History    6  Family history of Father  At Age 51   10  Family history of Mother  At Age ___    Social History    · Denied: History of Alcohol   · Current Every Day Smoker (305 1)   · Denied: History of Drug Use   · Educational Level - Has High School Diploma   · Marital History - Currently    · Occupation:    Current Meds   1  ALPRAZolam 0 25 MG Oral Tablet; q 6hours prn anxiety; Therapy: 46DAE3203 to (Last Rx:07Ecn0492) Ordered   2  Levothyroxine Sodium 25 MCG Oral Tablet; Take 1 tablet daily; Therapy: 84JGZ4726 to (Last Rx:2016)  Requested for: 88XPP4904 Ordered   3  Losartan Potassium 100 MG Oral Tablet; take 1 tablet by mouth every day; Therapy: 66YCK7179 to (Faina Leal)  Requested for: 87GLI4930; Last   Rx:2018 Ordered   4  ProAir  (90 Base) MCG/ACT Inhalation Aerosol Solution; INHALE 2 PUFFS   EVERY 4-6 HOURS AS NEEDED FOR CHEST TIGHTNESS/SHORTNESS OF BREATH; Therapy: 44NXG3641 to (Last Rx:22Yfy7362)  Requested for: 90BGW5889 Ordered    Allergies    1  Amoxicillin TABS   2  Doxycycline Monohydrate CAPS   3  Vicodin HP TABS    Message   Recorded as Task   Date: 01/14/2018 03:16 PM, Created By: System   Task Name: Hospital DG   Assigned To:  Silvia Torres   Regarding Patient: Malachi Montanez, Status: Active   Comment:    System - 14 Jan 2018 3:16 PM     Patient discharged from hospital   Patient Name: Ashok Song  Patient YOB: 1970  Discharge Date: 1/14/2018  Facility: [facility]   Electronically signed by : Paola Paredes DO; Jesus 15 2018  2:22PM EST                       (Author)

## 2018-03-21 NOTE — PRE-PROCEDURE INSTRUCTIONS
Pre-Surgery Instructions:   Medication Instructions    losartan (COZAAR) 100 MG tablet Instructed patient per Anesthesia Guidelines  Pre op and bathing instructions reviewed  Pt has hibiclens

## 2018-04-01 ENCOUNTER — ANESTHESIA EVENT (OUTPATIENT)
Dept: GASTROENTEROLOGY | Facility: HOSPITAL | Age: 48
End: 2018-04-01
Payer: COMMERCIAL

## 2018-04-02 ENCOUNTER — ANESTHESIA (OUTPATIENT)
Dept: GASTROENTEROLOGY | Facility: HOSPITAL | Age: 48
End: 2018-04-02
Payer: COMMERCIAL

## 2018-04-02 ENCOUNTER — HOSPITAL ENCOUNTER (OUTPATIENT)
Facility: HOSPITAL | Age: 48
Setting detail: OUTPATIENT SURGERY
Discharge: HOME | End: 2018-04-02
Attending: SURGERY | Admitting: SURGERY
Payer: COMMERCIAL

## 2018-04-02 VITALS
OXYGEN SATURATION: 96 % | TEMPERATURE: 99 F | WEIGHT: 240 LBS | BODY MASS INDEX: 36.37 KG/M2 | DIASTOLIC BLOOD PRESSURE: 68 MMHG | HEIGHT: 68 IN | RESPIRATION RATE: 16 BRPM | SYSTOLIC BLOOD PRESSURE: 121 MMHG | HEART RATE: 75 BPM

## 2018-04-02 RX ORDER — SODIUM CHLORIDE 9 MG/ML
20 INJECTION, SOLUTION INTRAVENOUS CONTINUOUS
Status: DISCONTINUED | OUTPATIENT
Start: 2018-04-02 | End: 2018-04-02 | Stop reason: HOSPADM

## 2018-04-02 RX ORDER — PROPOFOL 10 MG/ML
INJECTION, EMULSION INTRAVENOUS AS NEEDED
Status: DISCONTINUED | OUTPATIENT
Start: 2018-04-02 | End: 2018-04-02 | Stop reason: SURG

## 2018-04-02 RX ORDER — PROPOFOL 10 MG/ML
INJECTION, EMULSION INTRAVENOUS CONTINUOUS PRN
Status: DISCONTINUED | OUTPATIENT
Start: 2018-04-02 | End: 2018-04-02 | Stop reason: SURG

## 2018-04-02 RX ORDER — SODIUM CHLORIDE 9 MG/ML
125 INJECTION, SOLUTION INTRAVENOUS CONTINUOUS
Status: CANCELLED | OUTPATIENT
Start: 2018-04-02

## 2018-04-02 RX ADMIN — PROPOFOL 100 MCG/KG/MIN: 10 INJECTION, EMULSION INTRAVENOUS at 09:32

## 2018-04-02 RX ADMIN — PROPOFOL 100 MCG/KG/MIN: 10 INJECTION, EMULSION INTRAVENOUS at 09:36

## 2018-04-02 RX ADMIN — PROPOFOL 120 MG: 10 INJECTION, EMULSION INTRAVENOUS at 09:28

## 2018-04-02 RX ADMIN — SODIUM CHLORIDE: 0.9 INJECTION, SOLUTION INTRAVENOUS at 09:28

## 2018-04-02 NOTE — OP NOTE
**** GI/ENDOSCOPY REPORT ****     PATIENT NAME: Marisol Soriano ------ VISIT ID:  Patient ID:   [id number] YOB: 1970     INTRODUCTION: Colonoscopy - A 56 female patient presents for an outpatient   Colonoscopy at [facility]  PREVIOUS COLONOSCOPY: No prior colonoscopy  INDICATIONS: History of diverticulitis  S/p colostomy for Fernandez's  CONSENT:  The benefits, risks, and alternatives to the procedure were   discussed and informed consent was obtained from the patient  PREPARATION: EKG, pulse, pulse oximetry and blood pressure were monitored   throughout the procedure  The patient was identified by myself both   verbally and by visual inspection of ID band  ASA Classification: Class 2   - Patient has mild to moderate systemic disturbance that may or may not be   related to the disorder requiring surgery  Airway Assessment   Classification: Airway class 2 - Visualization of the soft palate, fauces   and uvula  MEDICATIONS: Anesthesia-check records     PROCEDURE:  The endoscope was passed without difficulty through the   colonic stoma under direct visualization and advanced to the cecum,   confirmed by appendiceal orifice and ileocecal valve  The scope was   withdrawn and the mucosa was carefully examined  The quality of the   preparation was adequate prep  Cecal Intubation Time: Minute(s) Scope   Withdrawal Time: Minute(s)     RECTAL EXAM: Normal rectal exam  External hemorrhoids were found  FINDINGS:  The colon appeared to be normal  Proctoscopy also performed   woth no abnormalities  COMPLICATIONS: There were no complications  IMPRESSIONS: External hemorrhoids, external were found during the rectal   exam  Normal colon  Proctoscopy also performed woth no abnormalities  RECOMMENDATIONS: Resume regular diet as tolerated  Perform colon prep on   Wed 4/4/2018 as previously instructed  Fleet's enema as instructed     Nothing to eat or drinl after MAICO Thursday 4/5/2018 for OR  Repeat   colonoscopy in 1 year or sooner if clinically indicated  PATHOLOGY SPECIMENS: No     PROCEDURE CODES: Colonoscopy     ICD-9 Codes: 455 3 External hemorrhoids without mention of complication     PREET-83 Codes: K64 9 Unspecified hemorrhoids     PERFORMED BY: EARL Altman  [facility]  on 04/02/2018  Version 1, electronically signed by EARL Altman , D O  on   04/02/2018 at 10:02

## 2018-04-02 NOTE — ANESTHESIA PREPROCEDURE EVALUATION
Review of Systems/Medical History  Patient summary reviewed  Chart reviewed      Cardiovascular  Exercise tolerance: good,  Hyperlipidemia, Hypertension ,    Pulmonary  Smoker , Tobacco cessation counseling given ,        GI/Hepatic  Negative GI/hepatic ROS          Negative  ROS        Endo/Other  History of thyroid disease , hypothyroidism,   Obesity    GYN       Hematology  Negative hematology ROS      Musculoskeletal       Neurology  Negative neurology ROS      Psychology   Anxiety,              Physical Exam    Airway    Mallampati score: II  TM Distance: >3 FB  Neck ROM: full     Dental   upper dentures,     Cardiovascular      Pulmonary      Other Findings        Anesthesia Plan  ASA Score- 2     Anesthesia Type- IV sedation with anesthesia with ASA Monitors  Additional Monitors:   Airway Plan:     Comment: I, Dr Jayy Castaneda, the attending physician, has personally seen and evaluated the patient prior to anesthetic care  I have reviewed the pre-anesthetic record, and other medical records if appropriate to the anesthetic care  Risks and benefits discussed with patient; patient consented and agrees to proceed  If a CRNA is involved in the case, I have reviewed the CRNA assessment, if present, and agree  The patient is in a suitable condition to proceed with my formulated anesthetic plan        Plan Factors-  Patient did not smoke on day of surgery  Induction- intravenous  Postoperative Plan- Plan for postoperative opioid use  Informed Consent- Anesthetic plan and risks discussed with patient  I personally reviewed this patient with the CRNA  Discussed and agreed on the Anesthesia Plan with the CRNA  Rosella Goldmann

## 2018-04-02 NOTE — DISCHARGE INSTRUCTIONS
Repeat bowel prep as instructed starting Wednesday April 4th procedure on April 5th    Nothing to eat or drink after midnight and performed for procedure April 5th    Please administer a fleets enema rectally to clean out mucus    Some bloody drainage may be expected    Call with questions or concerns, [phone]

## 2018-04-02 NOTE — ANESTHESIA POSTPROCEDURE EVALUATION
Post-Op Assessment Note      CV Status:  Stable    Mental Status:  Alert and awake    Hydration Status:  Euvolemic    PONV Controlled:  Controlled    Airway Patency:  Patent    Post Op Vitals Reviewed: Yes          Staff: CRNA, Anesthesiologist           BP      Temp      Pulse     Resp      SpO2

## 2018-04-04 ENCOUNTER — TRANSCRIBE ORDERS (OUTPATIENT)
Dept: LAB | Facility: CLINIC | Age: 48
End: 2018-04-04

## 2018-04-04 ENCOUNTER — OFFICE VISIT (OUTPATIENT)
Dept: LAB | Facility: CLINIC | Age: 48
End: 2018-04-04
Payer: COMMERCIAL

## 2018-04-04 ENCOUNTER — APPOINTMENT (OUTPATIENT)
Dept: LAB | Facility: CLINIC | Age: 48
End: 2018-04-04
Payer: COMMERCIAL

## 2018-04-04 ENCOUNTER — ANESTHESIA EVENT (OUTPATIENT)
Dept: PERIOP | Facility: HOSPITAL | Age: 48
DRG: 331 | End: 2018-04-04
Payer: COMMERCIAL

## 2018-04-04 DIAGNOSIS — K94.03 COLOSTOMY AND ENTEROSTOMY MALFUNCTION (HCC): ICD-10-CM

## 2018-04-04 DIAGNOSIS — Z01.818 PREOP EXAMINATION: ICD-10-CM

## 2018-04-04 DIAGNOSIS — K94.13 COLOSTOMY AND ENTEROSTOMY MALFUNCTION (HCC): Primary | ICD-10-CM

## 2018-04-04 DIAGNOSIS — K94.03 COLOSTOMY AND ENTEROSTOMY MALFUNCTION (HCC): Primary | ICD-10-CM

## 2018-04-04 DIAGNOSIS — K94.13 COLOSTOMY AND ENTEROSTOMY MALFUNCTION (HCC): ICD-10-CM

## 2018-04-04 LAB
ABO GROUP BLD: NORMAL
ANION GAP SERPL CALCULATED.3IONS-SCNC: 11 MMOL/L (ref 4–13)
BASOPHILS # BLD AUTO: 0.03 THOUSANDS/ΜL (ref 0–0.1)
BASOPHILS NFR BLD AUTO: 0 % (ref 0–1)
BLD GP AB SCN SERPL QL: NEGATIVE
BUN SERPL-MCNC: 10 MG/DL (ref 5–25)
CALCIUM SERPL-MCNC: 8.9 MG/DL (ref 8.3–10.1)
CHLORIDE SERPL-SCNC: 103 MMOL/L (ref 100–108)
CO2 SERPL-SCNC: 24 MMOL/L (ref 21–32)
CREAT SERPL-MCNC: 0.89 MG/DL (ref 0.6–1.3)
EOSINOPHIL # BLD AUTO: 0.17 THOUSAND/ΜL (ref 0–0.61)
EOSINOPHIL NFR BLD AUTO: 2 % (ref 0–6)
ERYTHROCYTE [DISTWIDTH] IN BLOOD BY AUTOMATED COUNT: 14.4 % (ref 11.6–15.1)
EST. AVERAGE GLUCOSE BLD GHB EST-MCNC: 131 MG/DL
GFR SERPL CREATININE-BSD FRML MDRD: 78 ML/MIN/1.73SQ M
GLUCOSE P FAST SERPL-MCNC: 151 MG/DL (ref 65–99)
HBA1C MFR BLD: 6.2 % (ref 4.2–6.3)
HCT VFR BLD AUTO: 42.2 % (ref 34.8–46.1)
HGB BLD-MCNC: 13.6 G/DL (ref 11.5–15.4)
LYMPHOCYTES # BLD AUTO: 1.89 THOUSANDS/ΜL (ref 0.6–4.47)
LYMPHOCYTES NFR BLD AUTO: 19 % (ref 14–44)
MCH RBC QN AUTO: 27.3 PG (ref 26.8–34.3)
MCHC RBC AUTO-ENTMCNC: 32.2 G/DL (ref 31.4–37.4)
MCV RBC AUTO: 85 FL (ref 82–98)
MONOCYTES # BLD AUTO: 0.76 THOUSAND/ΜL (ref 0.17–1.22)
MONOCYTES NFR BLD AUTO: 8 % (ref 4–12)
NEUTROPHILS # BLD AUTO: 7.2 THOUSANDS/ΜL (ref 1.85–7.62)
NEUTS SEG NFR BLD AUTO: 71 % (ref 43–75)
PLATELET # BLD AUTO: 365 THOUSANDS/UL (ref 149–390)
PMV BLD AUTO: 9.5 FL (ref 8.9–12.7)
POTASSIUM SERPL-SCNC: 3.9 MMOL/L (ref 3.5–5.3)
RBC # BLD AUTO: 4.99 MILLION/UL (ref 3.81–5.12)
RH BLD: POSITIVE
SODIUM SERPL-SCNC: 138 MMOL/L (ref 136–145)
SPECIMEN EXPIRATION DATE: NORMAL
WBC # BLD AUTO: 10.05 THOUSAND/UL (ref 4.31–10.16)

## 2018-04-04 PROCEDURE — 85025 COMPLETE CBC W/AUTO DIFF WBC: CPT

## 2018-04-04 PROCEDURE — 36415 COLL VENOUS BLD VENIPUNCTURE: CPT

## 2018-04-04 PROCEDURE — 86850 RBC ANTIBODY SCREEN: CPT

## 2018-04-04 PROCEDURE — 86901 BLOOD TYPING SEROLOGIC RH(D): CPT

## 2018-04-04 PROCEDURE — 86900 BLOOD TYPING SEROLOGIC ABO: CPT

## 2018-04-04 PROCEDURE — 93010 ELECTROCARDIOGRAM REPORT: CPT | Performed by: INTERNAL MEDICINE

## 2018-04-04 PROCEDURE — 93005 ELECTROCARDIOGRAM TRACING: CPT

## 2018-04-04 PROCEDURE — 80048 BASIC METABOLIC PNL TOTAL CA: CPT

## 2018-04-04 PROCEDURE — 83036 HEMOGLOBIN GLYCOSYLATED A1C: CPT

## 2018-04-05 ENCOUNTER — HOSPITAL ENCOUNTER (INPATIENT)
Facility: HOSPITAL | Age: 48
LOS: 4 days | Discharge: HOME | DRG: 331 | End: 2018-04-09
Attending: SURGERY | Admitting: SURGERY
Payer: COMMERCIAL

## 2018-04-05 ENCOUNTER — DOCUMENTATION (OUTPATIENT)
Dept: GASTROENTEROLOGY | Facility: HOSPITAL | Age: 48
End: 2018-04-05

## 2018-04-05 ENCOUNTER — ANESTHESIA (OUTPATIENT)
Dept: PERIOP | Facility: HOSPITAL | Age: 48
DRG: 331 | End: 2018-04-05
Payer: COMMERCIAL

## 2018-04-05 DIAGNOSIS — Z93.3 COLOSTOMY STATUS (HCC): ICD-10-CM

## 2018-04-05 DIAGNOSIS — K57.80 DIVERTICULITIS OF INTESTINE, PART UNSPECIFIED, WITH PERFORATION AND ABSCESS WITHOUT BLEEDING: ICD-10-CM

## 2018-04-05 PROCEDURE — 94762 N-INVAS EAR/PLS OXIMTRY CONT: CPT

## 2018-04-05 PROCEDURE — 86923 COMPATIBILITY TEST ELECTRIC: CPT

## 2018-04-05 PROCEDURE — 88304 TISSUE EXAM BY PATHOLOGIST: CPT | Performed by: PATHOLOGY

## 2018-04-05 PROCEDURE — 0DJD8ZZ INSPECTION OF LOWER INTESTINAL TRACT, VIA NATURAL OR ARTIFICIAL OPENING ENDOSCOPIC: ICD-10-PCS | Performed by: SURGERY

## 2018-04-05 PROCEDURE — 0DBE0ZZ EXCISION OF LARGE INTESTINE, OPEN APPROACH: ICD-10-PCS | Performed by: SURGERY

## 2018-04-05 PROCEDURE — 94760 N-INVAS EAR/PLS OXIMETRY 1: CPT

## 2018-04-05 RX ORDER — ROCURONIUM BROMIDE 10 MG/ML
INJECTION, SOLUTION INTRAVENOUS AS NEEDED
Status: DISCONTINUED | OUTPATIENT
Start: 2018-04-05 | End: 2018-04-05 | Stop reason: SURG

## 2018-04-05 RX ORDER — ESMOLOL HYDROCHLORIDE 10 MG/ML
INJECTION INTRAVENOUS AS NEEDED
Status: DISCONTINUED | OUTPATIENT
Start: 2018-04-05 | End: 2018-04-05 | Stop reason: SURG

## 2018-04-05 RX ORDER — GLYCOPYRROLATE 0.2 MG/ML
INJECTION INTRAMUSCULAR; INTRAVENOUS AS NEEDED
Status: DISCONTINUED | OUTPATIENT
Start: 2018-04-05 | End: 2018-04-05 | Stop reason: SURG

## 2018-04-05 RX ORDER — ONDANSETRON 2 MG/ML
4 INJECTION INTRAMUSCULAR; INTRAVENOUS ONCE AS NEEDED
Status: COMPLETED | OUTPATIENT
Start: 2018-04-05 | End: 2018-04-05

## 2018-04-05 RX ORDER — SODIUM CHLORIDE, SODIUM LACTATE, POTASSIUM CHLORIDE, CALCIUM CHLORIDE 600; 310; 30; 20 MG/100ML; MG/100ML; MG/100ML; MG/100ML
50 INJECTION, SOLUTION INTRAVENOUS CONTINUOUS
Status: DISCONTINUED | OUTPATIENT
Start: 2018-04-05 | End: 2018-04-09 | Stop reason: HOSPADM

## 2018-04-05 RX ORDER — LEVOFLOXACIN 5 MG/ML
500 INJECTION, SOLUTION INTRAVENOUS ONCE
Status: CANCELLED | OUTPATIENT
Start: 2018-04-05

## 2018-04-05 RX ORDER — MAGNESIUM HYDROXIDE 1200 MG/15ML
LIQUID ORAL AS NEEDED
Status: DISCONTINUED | OUTPATIENT
Start: 2018-04-05 | End: 2018-04-05 | Stop reason: HOSPADM

## 2018-04-05 RX ORDER — SODIUM CHLORIDE 9 MG/ML
INJECTION, SOLUTION INTRAVENOUS CONTINUOUS PRN
Status: DISCONTINUED | OUTPATIENT
Start: 2018-04-05 | End: 2018-04-05 | Stop reason: SURG

## 2018-04-05 RX ORDER — ONDANSETRON 2 MG/ML
INJECTION INTRAMUSCULAR; INTRAVENOUS AS NEEDED
Status: DISCONTINUED | OUTPATIENT
Start: 2018-04-05 | End: 2018-04-05 | Stop reason: SURG

## 2018-04-05 RX ORDER — ONDANSETRON 2 MG/ML
4 INJECTION INTRAMUSCULAR; INTRAVENOUS EVERY 4 HOURS PRN
Status: DISCONTINUED | OUTPATIENT
Start: 2018-04-05 | End: 2018-04-09 | Stop reason: HOSPADM

## 2018-04-05 RX ORDER — ACETAMINOPHEN 325 MG/1
650 TABLET ORAL EVERY 6 HOURS PRN
Status: DISCONTINUED | OUTPATIENT
Start: 2018-04-05 | End: 2018-04-09 | Stop reason: HOSPADM

## 2018-04-05 RX ORDER — LEVOFLOXACIN 5 MG/ML
500 INJECTION, SOLUTION INTRAVENOUS ONCE
Status: COMPLETED | OUTPATIENT
Start: 2018-04-05 | End: 2018-04-05

## 2018-04-05 RX ORDER — PROPOFOL 10 MG/ML
INJECTION, EMULSION INTRAVENOUS AS NEEDED
Status: DISCONTINUED | OUTPATIENT
Start: 2018-04-05 | End: 2018-04-05 | Stop reason: SURG

## 2018-04-05 RX ORDER — NALBUPHINE HCL 10 MG/ML
5 AMPUL (ML) INJECTION
Status: DISCONTINUED | OUTPATIENT
Start: 2018-04-05 | End: 2018-04-09

## 2018-04-05 RX ORDER — FENTANYL CITRATE 50 UG/ML
INJECTION, SOLUTION INTRAMUSCULAR; INTRAVENOUS AS NEEDED
Status: DISCONTINUED | OUTPATIENT
Start: 2018-04-05 | End: 2018-04-05 | Stop reason: SURG

## 2018-04-05 RX ORDER — PROMETHAZINE HYDROCHLORIDE 25 MG/ML
12.5 INJECTION, SOLUTION INTRAMUSCULAR; INTRAVENOUS ONCE
Status: COMPLETED | OUTPATIENT
Start: 2018-04-05 | End: 2018-04-05

## 2018-04-05 RX ORDER — HEPARIN SODIUM 5000 [USP'U]/ML
5000 INJECTION, SOLUTION INTRAVENOUS; SUBCUTANEOUS EVERY 12 HOURS SCHEDULED
Status: DISCONTINUED | OUTPATIENT
Start: 2018-04-05 | End: 2018-04-08

## 2018-04-05 RX ORDER — MIDAZOLAM HYDROCHLORIDE 1 MG/ML
INJECTION INTRAMUSCULAR; INTRAVENOUS AS NEEDED
Status: DISCONTINUED | OUTPATIENT
Start: 2018-04-05 | End: 2018-04-05 | Stop reason: SURG

## 2018-04-05 RX ORDER — BUPIVACAINE HYDROCHLORIDE 2.5 MG/ML
INJECTION, SOLUTION INFILTRATION; PERINEURAL AS NEEDED
Status: DISCONTINUED | OUTPATIENT
Start: 2018-04-05 | End: 2018-04-05 | Stop reason: SURG

## 2018-04-05 RX ORDER — LEVOFLOXACIN 25 MG/ML
500 INJECTION INTRAVENOUS EVERY 24 HOURS
Status: DISCONTINUED | OUTPATIENT
Start: 2018-04-05 | End: 2018-04-05 | Stop reason: SDUPTHER

## 2018-04-05 RX ORDER — FENTANYL CITRATE/PF 50 MCG/ML
50 SYRINGE (ML) INJECTION
Status: DISCONTINUED | OUTPATIENT
Start: 2018-04-05 | End: 2018-04-05 | Stop reason: HOSPADM

## 2018-04-05 RX ADMIN — SODIUM CHLORIDE, SODIUM LACTATE, POTASSIUM CHLORIDE, AND CALCIUM CHLORIDE 125 ML/HR: .6; .31; .03; .02 INJECTION, SOLUTION INTRAVENOUS at 15:48

## 2018-04-05 RX ADMIN — MIDAZOLAM HYDROCHLORIDE 2 MG: 1 INJECTION, SOLUTION INTRAMUSCULAR; INTRAVENOUS at 07:20

## 2018-04-05 RX ADMIN — LEVOFLOXACIN 500 MG: 5 INJECTION, SOLUTION INTRAVENOUS at 07:54

## 2018-04-05 RX ADMIN — ROPIVACAINE HYDROCHLORIDE: 5 INJECTION, SOLUTION EPIDURAL; INFILTRATION; PERINEURAL at 11:27

## 2018-04-05 RX ADMIN — Medication 500 MG: at 08:00

## 2018-04-05 RX ADMIN — BUPIVACAINE HYDROCHLORIDE 5 ML: 2.5 INJECTION, SOLUTION INFILTRATION; PERINEURAL at 09:38

## 2018-04-05 RX ADMIN — BUPIVACAINE HYDROCHLORIDE 10 ML: 2.5 INJECTION, SOLUTION INFILTRATION; PERINEURAL at 08:00

## 2018-04-05 RX ADMIN — ESMOLOL HYDROCHLORIDE 20 MG: 10 INJECTION, SOLUTION INTRAVENOUS at 07:49

## 2018-04-05 RX ADMIN — ESMOLOL HYDROCHLORIDE 20 MG: 10 INJECTION, SOLUTION INTRAVENOUS at 08:13

## 2018-04-05 RX ADMIN — SODIUM CHLORIDE, SODIUM LACTATE, POTASSIUM CHLORIDE, AND CALCIUM CHLORIDE: .6; .31; .03; .02 INJECTION, SOLUTION INTRAVENOUS at 07:00

## 2018-04-05 RX ADMIN — PROMETHAZINE HYDROCHLORIDE 12.5 MG: 25 INJECTION INTRAMUSCULAR; INTRAVENOUS at 13:22

## 2018-04-05 RX ADMIN — HYDROMORPHONE HYDROCHLORIDE 0.5 MG: 1 INJECTION, SOLUTION INTRAMUSCULAR; INTRAVENOUS; SUBCUTANEOUS at 08:26

## 2018-04-05 RX ADMIN — PROPOFOL 200 MG: 10 INJECTION, EMULSION INTRAVENOUS at 07:49

## 2018-04-05 RX ADMIN — ONDANSETRON 4 MG: 2 INJECTION INTRAMUSCULAR; INTRAVENOUS at 10:55

## 2018-04-05 RX ADMIN — BUPIVACAINE HYDROCHLORIDE 10 ML: 2.5 INJECTION, SOLUTION INFILTRATION; PERINEURAL at 09:00

## 2018-04-05 RX ADMIN — GLYCOPYRROLATE 0.6 MG: 0.2 INJECTION, SOLUTION INTRAMUSCULAR; INTRAVENOUS at 10:07

## 2018-04-05 RX ADMIN — NEOSTIGMINE METHYLSULFATE 4 MG: 1 INJECTION, SOLUTION INTRAMUSCULAR; INTRAVENOUS; SUBCUTANEOUS at 10:07

## 2018-04-05 RX ADMIN — SODIUM CHLORIDE, SODIUM LACTATE, POTASSIUM CHLORIDE, AND CALCIUM CHLORIDE 1000 ML: .6; .31; .03; .02 INJECTION, SOLUTION INTRAVENOUS at 16:45

## 2018-04-05 RX ADMIN — BUPIVACAINE HYDROCHLORIDE 5 ML: 2.5 INJECTION, SOLUTION INFILTRATION; PERINEURAL at 10:08

## 2018-04-05 RX ADMIN — LIDOCAINE HYDROCHLORIDE 100 MG: 20 INJECTION, SOLUTION INTRAVENOUS at 07:49

## 2018-04-05 RX ADMIN — LEVOFLOXACIN 500 MG: 5 INJECTION, SOLUTION INTRAVENOUS at 18:26

## 2018-04-05 RX ADMIN — BUPIVACAINE HYDROCHLORIDE 3 ML: 2.5 INJECTION, SOLUTION INFILTRATION; PERINEURAL at 09:16

## 2018-04-05 RX ADMIN — ROCURONIUM BROMIDE 25 MG: 10 INJECTION INTRAVENOUS at 08:59

## 2018-04-05 RX ADMIN — SODIUM CHLORIDE: 0.9 INJECTION, SOLUTION INTRAVENOUS at 07:52

## 2018-04-05 RX ADMIN — ESMOLOL HYDROCHLORIDE 30 MG: 10 INJECTION, SOLUTION INTRAVENOUS at 08:51

## 2018-04-05 RX ADMIN — FENTANYL CITRATE 50 MCG: 50 INJECTION INTRAMUSCULAR; INTRAVENOUS at 09:00

## 2018-04-05 RX ADMIN — ONDANSETRON 4 MG: 2 INJECTION INTRAMUSCULAR; INTRAVENOUS at 09:51

## 2018-04-05 RX ADMIN — FENTANYL CITRATE 50 MCG: 50 INJECTION INTRAMUSCULAR; INTRAVENOUS at 08:00

## 2018-04-05 RX ADMIN — HEPARIN SODIUM 5000 UNITS: 5000 INJECTION, SOLUTION INTRAVENOUS; SUBCUTANEOUS at 23:34

## 2018-04-05 RX ADMIN — DEXAMETHASONE SODIUM PHOSPHATE 4 MG: 10 INJECTION INTRAMUSCULAR; INTRAVENOUS at 08:09

## 2018-04-05 RX ADMIN — FENTANYL CITRATE 50 MCG: 50 INJECTION INTRAMUSCULAR; INTRAVENOUS at 10:11

## 2018-04-05 RX ADMIN — METRONIDAZOLE 500 MG: 500 INJECTION, SOLUTION INTRAVENOUS at 19:12

## 2018-04-05 RX ADMIN — METRONIDAZOLE 500 MG: 500 INJECTION, SOLUTION INTRAVENOUS at 23:33

## 2018-04-05 RX ADMIN — ROCURONIUM BROMIDE 50 MG: 10 INJECTION INTRAVENOUS at 07:49

## 2018-04-05 RX ADMIN — BUPIVACAINE HYDROCHLORIDE 3 ML: 2.5 INJECTION, SOLUTION INFILTRATION; PERINEURAL at 09:35

## 2018-04-05 RX ADMIN — BUPIVACAINE HYDROCHLORIDE 3 ML: 2.5 INJECTION, SOLUTION INFILTRATION; PERINEURAL at 08:18

## 2018-04-05 NOTE — ANESTHESIA PREPROCEDURE EVALUATION
Review of Systems/Medical History  Patient summary reviewed  Chart reviewed  No history of anesthetic complications     Cardiovascular  Hyperlipidemia, Hypertension ,    Pulmonary  Smoker cigarette smoker  ,        GI/Hepatic            Endo/Other  History of thyroid disease , hypothyroidism,   Obesity    GYN       Hematology   Musculoskeletal       Neurology   Psychology   Anxiety,              Physical Exam    Airway    Mallampati score: I  TM Distance: >3 FB       Dental   Comment: Some lower teeth, No notable dental hx upper dentures,     Cardiovascular      Pulmonary      Other Findings        Anesthesia Plan  ASA Score- 2     Anesthesia Type- general with ASA Monitors  Additional Monitors:   Airway Plan: ETT  Comment: Epidural for postop pain, 2nd IV    I, Dr Liliya Guy, the attending physician, have personally seen and evaluated the patient prior to anesthetic care  I have reviewed the pre-anesthetic record, and other medical records if appropriate to the anesthetic care  If a CRNA is involved in the case, I have reviewed the CRNA assessment, if present, and agree  The patient is in a suitable condition to proceed with my formulated anesthetic plan        Plan Factors-    Induction- intravenous  Postoperative Plan-     Informed Consent- Anesthetic plan and risks discussed with patient  I personally reviewed this patient with the CRNA  Discussed and agreed on the Anesthesia Plan with the CRNA  Lynda Valerio

## 2018-04-05 NOTE — PROGRESS NOTES
Asked by surgical team to decrease epidural infusion rate due to relative hypotension  Patient receiving bolus of fluid, and her blood pressure has recovered somewhat  The patient pleaded with me not to decrease the rate too much as she is not currently having any pain  I decreased the dose to 8 mL/hr from 12 mL/hr  Hopefully this will satisfy everyone involved  Please do not hesitate to call the anesthesiologist on call for anything  Thank you      Nai Guallpa MD

## 2018-04-05 NOTE — ANESTHESIA PROCEDURE NOTES
Epidural Block    Patient location during procedure: pre-op  Start time: 4/5/2018 7:20 AM  Reason for block: at surgeon's request and post-op pain management  Staffing  Anesthesiologist: Lidia Meraz  Performed: anesthesiologist   Preanesthetic Checklist  Completed: patient identified, site marked, surgical consent, pre-op evaluation, timeout performed, IV checked, risks and benefits discussed and monitors and equipment checked  Epidural  Patient position: sitting  Prep: Betadine  Patient monitoring: continuous pulse ox  Approach: midline  Location: thoracic (1-12)  Injection technique: MARILYN saline  Needle  Needle type: Tuohy   Needle gauge: 18 G  Catheter type: end hole  Catheter size: 20 G  Catheter at skin depth: 15 cm  Assessment  Sensory level: [id number] aspiration for CSF, negative aspiration for heme and no paresthesia on injection  patient tolerated the procedure well with no immediate complications

## 2018-04-05 NOTE — PROGRESS NOTES
Patient transferred to floor   Epidural settings reviewed with SPU RN   Continuous: 12ml/hr  Dose: 5 mL  Lockout: 10 min  Max Dose: 3

## 2018-04-05 NOTE — ANESTHESIA POSTPROCEDURE EVALUATION
Post-Op Assessment Note      CV Status:  Stable    Mental Status:  Alert and awake    Hydration Status:  Stable    PONV Controlled:  None    Airway Patency:  Patent    Post Op Vitals Reviewed: Yes          Staff: Anesthesiologist, CRNA     Post-op block assessment: catheter intact        BP (P) 118/54 (04/05/18 1022)    Temp (!) 97 4 °F (36 3 °C) (04/05/18 1022)    Pulse (P) 98 (04/05/18 1022)   Resp (P) 18 (04/05/18 1022)    SpO2 (P) 98 % (04/05/18 1022)    Postop VS in PACU noted above, SV non-obstructed, on RA, pt denies pain  Able to move feet BL  PACU RN will be starting epidural infusion

## 2018-04-05 NOTE — OP NOTE
OPERATIVE REPORT  PATIENT NAME: Christiano Streeter    :  1970  MRN: [id number]  Pt Location: AN OR ROOM 02    SURGERY DATE: 2018    Surgeon(s) and Role:     * Olivia Castillo DO - Primary  Laurita Wayne MD-assist    Preop Diagnosis:  Colostomy status ([facility] ) [Z93 3]  Diverticulitis of intestine, part unspecified, with perforation and abscess without bleeding [K57 80]    Post-Op Diagnosis Codes:     * Colostomy status ([facility] ) [Z93 3]     * Diverticulitis of intestine, part unspecified, with perforation and abscess without bleeding [K57 80]    Procedure(s) (LRB):  EXPLORATORY LAPAROTOMY (N/A)  REVERSAL COLOSTOMY (N/A)  FLEX SIGMOIDOSCOPY (N/A)  Partial colon resection    Specimen(s):  ID Type Source Tests Collected by Time Destination   1 : Portion of colon Tissue Colon TISSUE EXAM Walt Gardiner,  2018 9911    2 : colon donuts Tissue Colon TISSUE EXAM Olivia Castillo DO 2018 0922        Estimated Blood Loss:   Minimal    Drains:       Anesthesia Type:   General/epidural    Operative Indications:  Colostomy status ([facility] ) [Z93 3]  Diverticulitis of intestine, part unspecified, with perforation and abscess without bleeding [K57 80]      Operative Findings:  Abdominal he is in size expected  Colorectal anastomosis with a 29 mm EEA stapler  Flexible sigmoidoscopy revealed anastomosis to be just to the right of the upper aspect of the rectum  This was intact with no signs of a leak on air testing :  Colon bundle was used for closure    Complications:   None    Procedure and Technique:  Patient was brought the operative suite and identified by visualization, conversation, by armband  Sequential compression pumps were placed  She received Levaquin and Flagyl perioperatively  Prior to presenting into the operative suite and epidural catheter was placed by anesthesia  Once supine in the operating room table she was placed under general anesthesia  Sequential compression pumps were also placed    Legs were then placed in low lithotomy  Colostomy was closed with 2 0 Vicryl suture  Abdomen is then prepped and draped in a sterile fashion  An Ioban drape was used  Time-out was performed and was assured that the prep was dry  Midline incision was made through her old scar underlying subcutaneous tissue divided hot cautery until the fascia was then countered fascia was carefully opened up gaining access into the abdominal cavity  Omentum was underneath the fascia this protected the underlying viscera  Fascia was opened up along the entire length of the wound  Small bowel was then brought up and packed into the upper aspect of the abdomen  Some flimsy adhesions down to the rectal stump were divided with a combination of Metzenbaum scissors and hot cautery  The uterus was secured up to the anterior abdominal wall to keep this out of the pelvis  Next we took the colostomy down  Elliptical skin incision was made of the skin and underlying subcutaneous tissue was divided hot cautery to free up the descending colon/stoma  This was then brought into the abdominal cavity  Other adhesions were freed up allowing ample length for anastomosis  To provide for adequate anastomosis and prevent redundancy of the colon approximately 6 inches of the descending colon/colostomy were removed  Mesentery was divided between hemostats and tied off with 2 0 Vicryl tie  A pursestring was then placed on the appropriate side of the descending colon  Two 0 Prolene on a Bret needle was then used to create our pursestring suture  Distal aspect of the descending colon was clamped and this was divided handing a segment of the colon off of the field  The EEA sizers were used and a 29 mm stapler was chosen  The anvil was placed into the descending colon and the pursestring was tied down  This reached down to the pelvis adequately with no signs of any tension  A Bookwalter retractor was used for proper exposure for anastomosis   I then went below and sized up the rectum to a 29 mm EEA Sizer  The female and the stapler was put into the rectum and the end was extended  The anvil was brought down and was assured that there descending colon came down to the top of the rectum with ease  Stapler was screwed down and then fired  Stapler was removed and there were 2 clean complete donuts noted  A flexible sigmoidoscopy was then performed  Fluid was placed into the pelvis  As air was insufflated into the colon there was no signs of any leakage  Anastomosis was clean and intact  It appeared to be slightly to the right side of the top of the rectum  This point I then changed gloves and gowns returned to the operative field  Copious irrigation was carried out  I closed the inside of the old colostomy site with 1  PDS in a figure-of-eight fashion x2  We then closed the anterior fascia with 1  PDS in a figure-eight fashion x2  After copious irrigation omentum was placed over the small bowel  At this point all instruments were then removed off the field following the colon bundle  All operative staff changed gloves and gowns  Closing tray was then brought to the field  Fascia was closed using 1  PDS in a running continuous fashion to the midline  Irrigation was carried out of both the midline as well as the old colostomy site  Two 0 Vicryl was used to close subcutaneous tissue  Skin was closed with skin staples of both wounds  Three saline soaked ayana were placed in the old colostomy site  All wounds were washed and dried  Sterile dressings were applied  She was awakened in the operating returned to the recovery area in stable condition having tolerated procedure well     I was present for the entire procedure    Patient Disposition:  PACU     SIGNATURE: Yg Amado DO  DATE: April 5, 2018  TIME: 10:16 AM

## 2018-04-06 LAB
ANION GAP SERPL CALCULATED.3IONS-SCNC: 9 MMOL/L (ref 4–13)
ATRIAL RATE: 77 BPM
BASOPHILS # BLD AUTO: 0.01 THOUSANDS/ΜL (ref 0–0.1)
BASOPHILS NFR BLD AUTO: 0 % (ref 0–1)
BUN SERPL-MCNC: 7 MG/DL (ref 5–25)
CALCIUM SERPL-MCNC: 8.4 MG/DL (ref 8.3–10.1)
CHLORIDE SERPL-SCNC: 109 MMOL/L (ref 100–108)
CO2 SERPL-SCNC: 21 MMOL/L (ref 21–32)
CREAT SERPL-MCNC: 0.78 MG/DL (ref 0.6–1.3)
EOSINOPHIL # BLD AUTO: 0.03 THOUSAND/ΜL (ref 0–0.61)
EOSINOPHIL NFR BLD AUTO: 0 % (ref 0–6)
ERYTHROCYTE [DISTWIDTH] IN BLOOD BY AUTOMATED COUNT: 14.5 % (ref 11.6–15.1)
GFR SERPL CREATININE-BSD FRML MDRD: 91 ML/MIN/1.73SQ M
GLUCOSE SERPL-MCNC: 82 MG/DL (ref 65–140)
HCT VFR BLD AUTO: 37.1 % (ref 34.8–46.1)
HGB BLD-MCNC: 11.7 G/DL (ref 11.5–15.4)
LYMPHOCYTES # BLD AUTO: 2.28 THOUSANDS/ΜL (ref 0.6–4.47)
LYMPHOCYTES NFR BLD AUTO: 17 % (ref 14–44)
MAGNESIUM SERPL-MCNC: 1.7 MG/DL (ref 1.6–2.6)
MCH RBC QN AUTO: 27.4 PG (ref 26.8–34.3)
MCHC RBC AUTO-ENTMCNC: 31.5 G/DL (ref 31.4–37.4)
MCV RBC AUTO: 87 FL (ref 82–98)
MONOCYTES # BLD AUTO: 1.13 THOUSAND/ΜL (ref 0.17–1.22)
MONOCYTES NFR BLD AUTO: 9 % (ref 4–12)
NEUTROPHILS # BLD AUTO: 9.63 THOUSANDS/ΜL (ref 1.85–7.62)
NEUTS SEG NFR BLD AUTO: 74 % (ref 43–75)
P AXIS: 51 DEGREES
PLATELET # BLD AUTO: 308 THOUSANDS/UL (ref 149–390)
PMV BLD AUTO: 9.3 FL (ref 8.9–12.7)
POTASSIUM SERPL-SCNC: 3.7 MMOL/L (ref 3.5–5.3)
PR INTERVAL: 118 MS
QRS AXIS: 35 DEGREES
QRSD INTERVAL: 86 MS
QT INTERVAL: 370 MS
QTC INTERVAL: 418 MS
RBC # BLD AUTO: 4.27 MILLION/UL (ref 3.81–5.12)
SODIUM SERPL-SCNC: 139 MMOL/L (ref 136–145)
T WAVE AXIS: 61 DEGREES
VENTRICULAR RATE: 77 BPM
WBC # BLD AUTO: 13.08 THOUSAND/UL (ref 4.31–10.16)

## 2018-04-06 PROCEDURE — 94760 N-INVAS EAR/PLS OXIMETRY 1: CPT

## 2018-04-06 PROCEDURE — 80048 BASIC METABOLIC PNL TOTAL CA: CPT | Performed by: SURGERY

## 2018-04-06 PROCEDURE — 85025 COMPLETE CBC W/AUTO DIFF WBC: CPT | Performed by: SURGERY

## 2018-04-06 PROCEDURE — 83735 ASSAY OF MAGNESIUM: CPT | Performed by: SURGERY

## 2018-04-06 PROCEDURE — 94762 N-INVAS EAR/PLS OXIMTRY CONT: CPT

## 2018-04-06 RX ADMIN — HEPARIN SODIUM 5000 UNITS: 5000 INJECTION, SOLUTION INTRAVENOUS; SUBCUTANEOUS at 22:30

## 2018-04-06 RX ADMIN — ROPIVACAINE HYDROCHLORIDE: 5 INJECTION, SOLUTION EPIDURAL; INFILTRATION; PERINEURAL at 08:27

## 2018-04-06 RX ADMIN — SODIUM CHLORIDE, SODIUM LACTATE, POTASSIUM CHLORIDE, AND CALCIUM CHLORIDE 125 ML/HR: .6; .31; .03; .02 INJECTION, SOLUTION INTRAVENOUS at 23:00

## 2018-04-06 RX ADMIN — NALBUPHINE HYDROCHLORIDE 5 MG: 10 INJECTION, SOLUTION INTRAMUSCULAR; INTRAVENOUS; SUBCUTANEOUS at 15:39

## 2018-04-06 RX ADMIN — HEPARIN SODIUM 5000 UNITS: 5000 INJECTION, SOLUTION INTRAVENOUS; SUBCUTANEOUS at 09:30

## 2018-04-06 NOTE — PLAN OF CARE
INFECTION - ADULT     Absence or prevention of progression during hospitalization Progressing              Knowledge Deficit     Patient/family/caregiver demonstrates understanding of disease process, treatment plan, medications, and discharge instructions Progressing        PAIN - ADULT     Verbalizes/displays adequate comfort level or baseline comfort level Progressing        SAFETY ADULT     Patient will remain free of falls Progressing     Maintain or return to baseline ADL function Progressing     Maintain or return mobility status to optimal level Progressing

## 2018-04-06 NOTE — POST OP PROGRESS NOTES
Progress Note -Surgery PA  Marcie Torres 56 y o  female MRN: [id number]  Unit/Bed#: -01 Encounter: [id number]    ASSESSMENT/PLAN:  Problem List     * (Principal)Colostomy status ([facility] [address] )    HLD (hyperlipidemia)    Hypothyroidism    Hypertension   POD #1 s/p EXPLORATORY LAPAROTOMY, REVERSAL COLOSTOMY, FLEX SIGMOIDOSCOPY, Partial colon resection  No complaints  Good urine output and pain control  She is passing flatus and has good BS  · Poss clears diet today  · OOB and ambulate when cleared by anesthesia  · IS   · Epidural per anesthesia  · IVF  · BP - home medication on hold for now  VTE Pharmacologic Prophylaxis: Sequential compression device (Venodyne)     Subjective/Objective     Subjective: no complaints  Feels more awake since epidural rate decreased  Pain controlled    Objective/Physical Exam: Blood pressure 106/51, pulse 80, temperature 98 2 °F (36 8 °C), temperature source Oral, resp  rate 18, height 5' 8" (1 727 m), weight 107 kg (235 lb), last menstrual period 03/08/2018, SpO2 96 %  ,Body mass index is 35 73 kg/m²      General appearance: alert  Heart: regular rate and rhythm, S1, S2 normal, no murmur, click, rub or gallop  Lungs: clear to auscultation bilaterally  Abdomen: rounded and soft, +BS, dressing in place  Neurological: normal without focal findings      Current Facility-Administered Medications:     acetaminophen (TYLENOL) tablet 650 mg, 650 mg, Oral, Q6H PRN, Noy Young MD    heparin (porcine) subcutaneous injection 5,000 Units, 5,000 Units, Subcutaneous, Q12H [address], 5,000 Units at 04/05/18 2334 **AND** Platelet count, , , Once, Noy Young MD    HYDROmorphone (DILAUDID) injection 0 5 mg, 0 5 mg, Intravenous, Q1H PRN, Juan Manuel Dai MD    lactated ringers infusion, 125 mL/hr, Intravenous, Continuous, Juan Manuel Dai MD, Last Rate: 125 mL/hr at 04/05/18 1548, 125 mL/hr at 04/05/18 1548    nalbuphine (NUBAIN) injection 5 mg, 5 mg, Intravenous, Q3H PRN, MD Kathia Anderson ondansetron (ZOFRAN) injection 4 mg, 4 mg, Intravenous, Q4H PRN, Jett Vargas MD    ropivacaine 0 1% and fentaNYL 2 mcg/mL PCEA, , Epidural, Continuous, Maureen Bonilla MD      Intake/Output Summary (Last 24 hours) at 04/06/18 0835  Last data filed at 04/06/18 0600   Gross per 24 hour   Intake           2323 3 ml   Output              850 ml   Net           1473 3 ml       Lab, Imaging and other studies:   WBC 04/06/2018 13 08* 4 31 - 10 16 Thousand/uL Final    RBC 04/06/2018 4 27  3 81 - 5 12 Million/uL Final    Hemoglobin 04/06/2018 11 7  11 5 - 15 4 g/dL Final    Hematocrit 04/06/2018 37 1  34 8 - 46 1 % Final    MCV 04/06/2018 87  82 - 98 fL Final    MCH 04/06/2018 27 4  26 8 - 34 3 pg Final    MCHC 04/06/2018 31 5  31 4 - 37 4 g/dL Final    RDW 04/06/2018 14 5  11 6 - 15 1 % Final    MPV 04/06/2018 9 3  8 9 - 12 7 fL Final    Platelets 74/20/8668 308  149 - 390 Thousands/uL Final    Neutrophils Relative 04/06/2018 74  43 - 75 % Final    Lymphocytes Relative 04/06/2018 17  14 - 44 % Final    Monocytes Relative 04/06/2018 9  4 - 12 % Final    Eosinophils Relative 04/06/2018 0  0 - 6 % Final    Basophils Relative 04/06/2018 0  0 - 1 % Final    Neutrophils Absolute 04/06/2018 9 63* 1 85 - 7 62 Thousands/µL Final    Lymphocytes Absolute 04/06/2018 2 28  0 60 - 4 47 Thousands/µL Final    Monocytes Absolute 04/06/2018 1 13  0 17 - 1 22 Thousand/µL Final    Eosinophils Absolute 04/06/2018 0 03  0 00 - 0 61 Thousand/µL Final    Basophils Absolute 04/06/2018 0 01  0 00 - 0 10 Thousands/µL Final    Sodium 04/06/2018 139  136 - 145 mmol/L Final    Potassium 04/06/2018 3 7  3 5 - 5 3 mmol/L Final    Chloride 04/06/2018 109* 100 - 108 mmol/L Final    CO2 04/06/2018 21  21 - 32 mmol/L Final    Anion Gap 04/06/2018 9  4 - 13 mmol/L Final    BUN 04/06/2018 7  5 - 25 mg/dL Final    Creatinine 04/06/2018 0 78  0 60 - 1 30 mg/dL Final    Glucose 04/06/2018 82  65 - 140 mg/dL Final    Calcium 04/06/2018 8 4  8 3 - 10 1 mg/dL Final    eGFR 04/06/2018 91  ml/min/1 73sq m Final    Magnesium 04/06/2018 1 7  1 6 - 2 6 mg/dL Final

## 2018-04-06 NOTE — CASE MANAGEMENT
Initial Clinical Review    Age/Sex: 56 y o  female    Surgery Date: 04/05/2018    Procedure: Preop Diagnosis:  Colostomy status ([facility] [address] ) [Z93 3]  Diverticulitis of intestine, part unspecified, with perforation and abscess without bleeding [K57 80]     Post-Op Diagnosis Codes:     * Colostomy status ([facility] [address] ) [Z93 3]     * Diverticulitis of intestine, part unspecified, with perforation and abscess without bleeding [K57 80]     Procedure(s) (LRB):  EXPLORATORY LAPAROTOMY (N/A)  REVERSAL COLOSTOMY (N/A)  FLEX SIGMOIDOSCOPY (N/A)  Partial colon resection    Anesthesia: general/epidural    Admission Orders: Date/Time/Statement: 4/5/18 @ 1024     Orders Placed This Encounter   Procedures    Inpatient Admission     Standing Status:   Standing     Number of Occurrences:   1     Order Specific Question:   Admitting Physician     Answer:   Casey Cuevas [[id number]]     Order Specific Question:   Level of Care     Answer:   Med Surg [[id number]]     Order Specific Question:   Estimated length of stay     Answer:   More than 2 Midnights     Order Specific Question:   Certification     Answer:   I certify that inpatient services are medically necessary for this patient for a duration of greater than two midnights  See H&P and MD Progress Notes for additional information about the patient's course of treatment  Vital Signs: /51 (BP Location: Right arm)   Pulse 80   Temp 98 2 °F (36 8 °C) (Oral)   Resp 18   Ht 5' 8" (1 727 m)   Wt 107 kg (235 lb)   LMP 03/08/2018   SpO2 96%   BMI 35 73 kg/m²     Diet:        Diet Orders            Start     Ordered    04/06/18 3354  Diet Clear Liquid  Diet effective now     Question Answer Comment   Diet Type Clear Liquid    RD to adjust diet per protocol?  Yes        04/06/18 0926    04/05/18 1659  Room Service  Once     Question:  Type of Service  Answer:  Room Service - Appropriate with Assistance    04/05/18 1658          Mobility: oob q8    DVT Prophylaxis: scd    Pain Control: pcea-epidural  Dilaudid iv prn  Tylenol prn    Pulse ox continuous  Hammer d/c'd  Clears  Pt/ot

## 2018-04-07 LAB
ANION GAP SERPL CALCULATED.3IONS-SCNC: 10 MMOL/L (ref 4–13)
BUN SERPL-MCNC: 5 MG/DL (ref 5–25)
CALCIUM SERPL-MCNC: 7.9 MG/DL (ref 8.3–10.1)
CHLORIDE SERPL-SCNC: 104 MMOL/L (ref 100–108)
CO2 SERPL-SCNC: 22 MMOL/L (ref 21–32)
CREAT SERPL-MCNC: 0.7 MG/DL (ref 0.6–1.3)
ERYTHROCYTE [DISTWIDTH] IN BLOOD BY AUTOMATED COUNT: 14.4 % (ref 11.6–15.1)
GFR SERPL CREATININE-BSD FRML MDRD: 104 ML/MIN/1.73SQ M
GLUCOSE SERPL-MCNC: 101 MG/DL (ref 65–140)
HCT VFR BLD AUTO: 36.5 % (ref 34.8–46.1)
HGB BLD-MCNC: 11.5 G/DL (ref 11.5–15.4)
MCH RBC QN AUTO: 27.4 PG (ref 26.8–34.3)
MCHC RBC AUTO-ENTMCNC: 31.5 G/DL (ref 31.4–37.4)
MCV RBC AUTO: 87 FL (ref 82–98)
PLATELET # BLD AUTO: 283 THOUSANDS/UL (ref 149–390)
PMV BLD AUTO: 9.3 FL (ref 8.9–12.7)
POTASSIUM SERPL-SCNC: 3.3 MMOL/L (ref 3.5–5.3)
RBC # BLD AUTO: 4.19 MILLION/UL (ref 3.81–5.12)
SODIUM SERPL-SCNC: 136 MMOL/L (ref 136–145)
WBC # BLD AUTO: 10.88 THOUSAND/UL (ref 4.31–10.16)

## 2018-04-07 PROCEDURE — 85027 COMPLETE CBC AUTOMATED: CPT | Performed by: PHYSICIAN ASSISTANT

## 2018-04-07 PROCEDURE — 80048 BASIC METABOLIC PNL TOTAL CA: CPT | Performed by: PHYSICIAN ASSISTANT

## 2018-04-07 RX ORDER — POTASSIUM CHLORIDE 20 MEQ/1
40 TABLET, EXTENDED RELEASE ORAL ONCE
Status: DISCONTINUED | OUTPATIENT
Start: 2018-04-07 | End: 2018-04-09 | Stop reason: HOSPADM

## 2018-04-07 RX ORDER — LOSARTAN POTASSIUM 50 MG/1
100 TABLET ORAL DAILY
Status: DISCONTINUED | OUTPATIENT
Start: 2018-04-07 | End: 2018-04-09 | Stop reason: HOSPADM

## 2018-04-07 RX ADMIN — HEPARIN SODIUM 5000 UNITS: 5000 INJECTION, SOLUTION INTRAVENOUS; SUBCUTANEOUS at 21:35

## 2018-04-07 RX ADMIN — HEPARIN SODIUM 5000 UNITS: 5000 INJECTION, SOLUTION INTRAVENOUS; SUBCUTANEOUS at 08:41

## 2018-04-07 RX ADMIN — ONDANSETRON 4 MG: 2 INJECTION INTRAMUSCULAR; INTRAVENOUS at 05:28

## 2018-04-07 RX ADMIN — SODIUM CHLORIDE, SODIUM LACTATE, POTASSIUM CHLORIDE, AND CALCIUM CHLORIDE 125 ML/HR: .6; .31; .03; .02 INJECTION, SOLUTION INTRAVENOUS at 06:38

## 2018-04-07 RX ADMIN — NALBUPHINE HYDROCHLORIDE 5 MG: 10 INJECTION, SOLUTION INTRAMUSCULAR; INTRAVENOUS; SUBCUTANEOUS at 21:36

## 2018-04-07 RX ADMIN — SODIUM CHLORIDE, SODIUM LACTATE, POTASSIUM CHLORIDE, AND CALCIUM CHLORIDE 75 ML/HR: .6; .31; .03; .02 INJECTION, SOLUTION INTRAVENOUS at 18:35

## 2018-04-07 RX ADMIN — LOSARTAN POTASSIUM 100 MG: 50 TABLET, FILM COATED ORAL at 08:58

## 2018-04-07 RX ADMIN — ROPIVACAINE HYDROCHLORIDE: 5 INJECTION, SOLUTION EPIDURAL; INFILTRATION; PERINEURAL at 06:28

## 2018-04-07 NOTE — POST OP PROGRESS NOTES
Progress Note -Surgery PA  René Sheehan 56 y o  female MRN: [id number]  Unit/Bed#: -01 Encounter: [id number]      Subjective/Objective     Subjective:  Patient feeling well this a m   Had 1 bowel movement yesterday  Is passing gas  Tolerating diet    Pain is controlled with epidural     Objective:     /87 (BP Location: Left arm)   Pulse 86   Temp 98 5 °F (36 9 °C) (Oral)   Resp 18   Ht 5' 8" (1 727 m)   Wt 107 kg (235 lb)   LMP 03/08/2018   SpO2 93%   BMI 35 73 kg/m²       Intake/Output Summary (Last 24 hours) at 04/07/18 0840  Last data filed at 04/07/18 7405   Gross per 24 hour   Intake           538 85 ml   Output             1100 ml   Net          -561 15 ml       Invasive Devices     Peripheral Intravenous Line            Peripheral IV 04/05/18 Left Hand 2 days    Peripheral IV 04/05/18 Right Hand 2 days          Epidural Line            Epidural Catheter 04/05/18 2 days          Drain            Colostomy Descending/sigmoid LUQ 89 days                Physical Exam:  General appearance: alert and oriented, in no acute distress  Lungs: clear to auscultation bilaterally  Heart: regular rate and rhythm, S1, S2 normal, no murmur, click, rub or gallop  Abdomen: soft, non-tender; bowel sounds normal; no masses,  no organomegaly, incision C/D/I      Current Facility-Administered Medications:     acetaminophen (TYLENOL) tablet 650 mg, 650 mg, Oral, Q6H PRN, Kumar Goldberg MD    heparin (porcine) subcutaneous injection 5,000 Units, 5,000 Units, Subcutaneous, Q12H [facility], 5,000 Units at 04/06/18 2230 **AND** Platelet count, , , Once, Kumar Goldberg MD    HYDROmorphone (DILAUDID) injection 0 5 mg, 0 5 mg, Intravenous, Q1H PRN, Sherly Berger MD    lactated ringers infusion, 125 mL/hr, Intravenous, Continuous, Sherly Berger MD, Last Rate: 125 mL/hr at 04/07/18 0638, 125 mL/hr at 04/07/18 0638    nalbuphine (NUBAIN) injection 5 mg, 5 mg, Intravenous, Q3H PRN, Sherly Berger MD, 5 mg at 04/06/18 1539    ondansetron (ZOFRAN) injection 4 mg, 4 mg, Intravenous, Q4H PRN, Pradeep Munoz MD, 4 mg at 04/07/18 0528    potassium chloride (K-DUR,KLOR-CON) CR tablet 40 mEq, 40 mEq, Oral, Once, Karen Avilez PA-C    ropivacaine 0 1% and fentaNYL 2 mcg/mL PCEA, , Epidural, Continuous, Catherine Mcbride MD              Lab, Imaging and other studies:  I have personally reviewed pertinent lab results  , CBC:   Lab Results   Component Value Date    WBC 10 88 (H) 04/07/2018    HGB 11 5 04/07/2018    HCT 36 5 04/07/2018    MCV 87 04/07/2018     04/07/2018    MCH 27 4 04/07/2018    MCHC 31 5 04/07/2018    RDW 14 4 04/07/2018    MPV 9 3 04/07/2018   , CMP:   Lab Results   Component Value Date     04/07/2018    K 3 3 (L) 04/07/2018     04/07/2018    CO2 22 04/07/2018    ANIONGAP 10 04/07/2018    BUN 5 04/07/2018    CREATININE 0 70 04/07/2018    GLUCOSE 101 04/07/2018    CALCIUM 7 9 (L) 04/07/2018    EGFR 104 04/07/2018     Labs in chart were reviewed  Lab Results   Component Value Date    WBC 10 88 (H) 04/07/2018    WBC 6-10 (A) 07/01/2017    HGB 11 5 04/07/2018    HGB 14 0 06/24/2017    HCT 36 5 04/07/2018    HCT 42 6 06/24/2017     04/07/2018     06/24/2017     Lab Results   Component Value Date     04/07/2018     06/24/2017    K 3 3 (L) 04/07/2018    K 4 2 06/24/2017     04/07/2018     06/24/2017    CO2 22 04/07/2018    CO2 24 06/24/2017    BUN 5 04/07/2018    BUN 12 06/24/2017    CREATININE 0 70 04/07/2018    CREATININE 0 82 06/24/2017    GLUCOSE 101 04/07/2018    GLUCOSE 111 (H) 01/05/2016       VTE Pharmacologic Prophylaxis: Heparin  VTE Mechanical Prophylaxis: sequential compression device    Assessment/plan:    64-year-old female postop day 2  Status post colostomy reversal, partial colon resection  -advance diet to fulls, toast, crackers  -epidural per Anesthesia  Will keep until Sunday per Anesthesia  Pain is controlled    -will decrease IV fluids  -will reorder home medication for blood pressure    Hypokalemia  -will replace with p o  potassium    Patient Active Problem List   Diagnosis    HLD (hyperlipidemia)    Colonic diverticular abscess    Hypothyroidism    Hypertension    Abscess of sigmoid colon due to diverticulitis    Colostomy status ([facility] )          This text is generated with voice recognition software  There may be translation, syntax,  or grammatical errors  If you have any questions, please contact the dictating provider      Manas Minaya PA-C

## 2018-04-07 NOTE — PROGRESS NOTES
Anesthesia Progress Note - [facility]    Ijeoma [place] MRN: [id number]  Unit/Bed#: -01 Encounter: [id number]        Epidural is runnin 1% Ropivacaine plus 2ug/cc fentanyl at 8cc/hr, bolus 5 cc q15 minutes prn    Plan:   Patient comfortable  Continue same        Assessment:   56 y o  female status post Reversal Colostomy POD# 2  Epidural Site:cleana nd dry  Neurological assessment : Patient neurologically intact    Subjective:  Current pain location(s): abdomen  Pain Scale:   2/10      Meds/Allergies   current meds:   Current Facility-Administered Medications   Medication Dose Route Frequency    acetaminophen (TYLENOL) tablet 650 mg  650 mg Oral Q6H PRN    heparin (porcine) subcutaneous injection 5,000 Units  5,000 Units Subcutaneous Q12H [address]    HYDROmorphone (DILAUDID) injection 0 5 mg  0 5 mg Intravenous Q1H PRN    lactated ringers infusion  75 mL/hr Intravenous Continuous    losartan (COZAAR) tablet 100 mg  100 mg Oral Daily    nalbuphine (NUBAIN) injection 5 mg  5 mg Intravenous Q3H PRN    ondansetron (ZOFRAN) injection 4 mg  4 mg Intravenous Q4H PRN    potassium chloride (K-DUR,KLOR-CON) CR tablet 40 mEq  40 mEq Oral Once    ropivacaine 0 1% and fentaNYL 2 mcg/mL PCEA   Epidural Continuous       Allergies   Allergen Reactions    Penicillins Angioedema       Objective     Temp:  [98 °F (36 7 °C)-98 5 °F (36 9 °C)] 98 5 °F (36 9 °C)  HR:  [82-98] 83  Resp:  [18] 18  BP: ()/(52-87) 115/55    SIGNATURE: Luca Youssef MD  DATE: 2018  TIME: 2:20 PM    Please call  ( [facility] [phone]) with any questions

## 2018-04-07 NOTE — PROGRESS NOTES
Patient comfortable with  Continue current treatment  D/W Dr Barbosa Barboza   Plan to keep until Sunday

## 2018-04-08 LAB
ANION GAP SERPL CALCULATED.3IONS-SCNC: 8 MMOL/L (ref 4–13)
BUN SERPL-MCNC: 4 MG/DL (ref 5–25)
CALCIUM SERPL-MCNC: 8.3 MG/DL (ref 8.3–10.1)
CHLORIDE SERPL-SCNC: 105 MMOL/L (ref 100–108)
CO2 SERPL-SCNC: 26 MMOL/L (ref 21–32)
CREAT SERPL-MCNC: 0.61 MG/DL (ref 0.6–1.3)
ERYTHROCYTE [DISTWIDTH] IN BLOOD BY AUTOMATED COUNT: 14.2 % (ref 11.6–15.1)
GFR SERPL CREATININE-BSD FRML MDRD: 109 ML/MIN/1.73SQ M
GLUCOSE SERPL-MCNC: 89 MG/DL (ref 65–140)
HCT VFR BLD AUTO: 34.2 % (ref 34.8–46.1)
HGB BLD-MCNC: 10.7 G/DL (ref 11.5–15.4)
MCH RBC QN AUTO: 27.2 PG (ref 26.8–34.3)
MCHC RBC AUTO-ENTMCNC: 31.3 G/DL (ref 31.4–37.4)
MCV RBC AUTO: 87 FL (ref 82–98)
PLATELET # BLD AUTO: 278 THOUSANDS/UL (ref 149–390)
PMV BLD AUTO: 9.5 FL (ref 8.9–12.7)
POTASSIUM SERPL-SCNC: 3.3 MMOL/L (ref 3.5–5.3)
RBC # BLD AUTO: 3.93 MILLION/UL (ref 3.81–5.12)
SODIUM SERPL-SCNC: 139 MMOL/L (ref 136–145)
WBC # BLD AUTO: 8.67 THOUSAND/UL (ref 4.31–10.16)

## 2018-04-08 PROCEDURE — 80048 BASIC METABOLIC PNL TOTAL CA: CPT | Performed by: PHYSICIAN ASSISTANT

## 2018-04-08 PROCEDURE — 85027 COMPLETE CBC AUTOMATED: CPT | Performed by: PHYSICIAN ASSISTANT

## 2018-04-08 RX ORDER — POTASSIUM CHLORIDE 20 MEQ/1
40 TABLET, EXTENDED RELEASE ORAL ONCE
Status: COMPLETED | OUTPATIENT
Start: 2018-04-08 | End: 2018-04-08

## 2018-04-08 RX ORDER — POLYETHYLENE GLYCOL 3350 17 G/17G
17 POWDER, FOR SOLUTION ORAL DAILY
Status: DISCONTINUED | OUTPATIENT
Start: 2018-04-08 | End: 2018-04-09

## 2018-04-08 RX ADMIN — NALBUPHINE HYDROCHLORIDE 5 MG: 10 INJECTION, SOLUTION INTRAMUSCULAR; INTRAVENOUS; SUBCUTANEOUS at 16:23

## 2018-04-08 RX ADMIN — POTASSIUM CHLORIDE 40 MEQ: 1500 TABLET, EXTENDED RELEASE ORAL at 08:39

## 2018-04-08 RX ADMIN — HEPARIN SODIUM 5000 UNITS: 5000 INJECTION, SOLUTION INTRAVENOUS; SUBCUTANEOUS at 08:35

## 2018-04-08 RX ADMIN — LOSARTAN POTASSIUM 100 MG: 50 TABLET, FILM COATED ORAL at 08:35

## 2018-04-08 RX ADMIN — NALBUPHINE HYDROCHLORIDE 5 MG: 10 INJECTION, SOLUTION INTRAMUSCULAR; INTRAVENOUS; SUBCUTANEOUS at 21:50

## 2018-04-08 RX ADMIN — SODIUM CHLORIDE, SODIUM LACTATE, POTASSIUM CHLORIDE, AND CALCIUM CHLORIDE 75 ML/HR: .6; .31; .03; .02 INJECTION, SOLUTION INTRAVENOUS at 08:46

## 2018-04-08 RX ADMIN — ROPIVACAINE HYDROCHLORIDE: 5 INJECTION, SOLUTION EPIDURAL; INFILTRATION; PERINEURAL at 12:08

## 2018-04-08 NOTE — POST OP PROGRESS NOTES
Progress Note -Surgery VIELKA Chandra Mast 56 y o  female MRN: [id number]  Unit/Bed#: -01 Encounter: [id number]      Subjective/Objective     Subjective:  Patient feels well this a m   States she had 2 bowel movements yesterday and 1 this morning  Tolerating diet  Objective:     /73 (BP Location: Left arm)   Pulse 76   Temp 98 2 °F (36 8 °C) (Oral)   Resp 18   Ht 5' 8" (1 727 m)   Wt 107 kg (235 lb)   SpO2 96%   BMI 35 73 kg/m²       Intake/Output Summary (Last 24 hours) at 04/08/18 9669  Last data filed at 04/08/18 0600   Gross per 24 hour   Intake           329 15 ml   Output              950 ml   Net          -620 85 ml       Invasive Devices     Peripheral Intravenous Line            Peripheral IV 04/05/18 Left Hand 3 days    Peripheral IV 04/05/18 Right Hand 3 days          Epidural Line            Epidural Catheter 04/05/18 3 days          Drain            Colostomy Descending/sigmoid LUQ 90 days                Physical Exam:  General appearance: alert and oriented, in no acute distress  Lungs: clear to auscultation bilaterally  Heart: regular rate and rhythm, S1, S2 normal, no murmur, click, rub or gallop  Abdomen: Soft, nontender to palpation  Dressings clean        Current Facility-Administered Medications:     acetaminophen (TYLENOL) tablet 650 mg, 650 mg, Oral, Q6H PRN, Rachel Kowalski MD    heparin (porcine) subcutaneous injection 5,000 Units, 5,000 Units, Subcutaneous, Q12H [address], 5,000 Units at 04/07/18 2135 **AND** Platelet count, , , Once, Rachel Kowalski MD    HYDROmorphone (DILAUDID) injection 0 5 mg, 0 5 mg, Intravenous, Q1H PRN, Cris Rodríguez MD    lactated ringers infusion, 75 mL/hr, Intravenous, Continuous, Jennifer Berrios PA-C, Last Rate: 75 mL/hr at 04/07/18 1835, 75 mL/hr at 04/07/18 1835    losartan (COZAAR) tablet 100 mg, 100 mg, Oral, Daily, Karen Avilez PA-C, 100 mg at 04/07/18 0858    nalbuphine (NUBAIN) injection 5 mg, 5 mg, Intravenous, Q3H PRN, Maria Esther Boateng MD, 5 mg at 04/07/18 2136    ondansetron [facility] injection 4 mg, 4 mg, Intravenous, Q4H PRN, Emory Giordano MD, 4 mg at 04/07/18 2597    potassium chloride (K-DUR,KLOR-CON) CR tablet 40 mEq, 40 mEq, Oral, Once, Karen Avilez PA-C    potassium chloride (K-DUR,KLOR-CON) CR tablet 40 mEq, 40 mEq, Oral, Once, Karen Avilez PA-C    ropivacaine 0 1% and fentaNYL 2 mcg/mL PCEA, , Epidural, Continuous, Maria Esther Boateng MD              Lab, Imaging and other studies:  I have personally reviewed pertinent lab results  , CBC:   Lab Results   Component Value Date    WBC 8 67 04/08/2018    HGB 10 7 (L) 04/08/2018    HCT 34 2 (L) 04/08/2018    MCV 87 04/08/2018     04/08/2018    MCH 27 2 04/08/2018    MCHC 31 3 (L) 04/08/2018    RDW 14 2 04/08/2018    MPV 9 5 04/08/2018   , CMP:   Lab Results   Component Value Date     04/08/2018    K 3 3 (L) 04/08/2018     04/08/2018    CO2 26 04/08/2018    ANIONGAP 8 04/08/2018    BUN 4 (L) 04/08/2018    CREATININE 0 61 04/08/2018    GLUCOSE 89 04/08/2018    CALCIUM 8 3 04/08/2018    EGFR 109 04/08/2018     Labs in chart were reviewed  Lab Results   Component Value Date    WBC 8 67 04/08/2018    WBC 6-10 (A) 07/01/2017    HGB 10 7 (L) 04/08/2018    HGB 14 0 06/24/2017    HCT 34 2 (L) 04/08/2018    HCT 42 6 06/24/2017     04/08/2018     06/24/2017     Lab Results   Component Value Date     04/08/2018     06/24/2017    K 3 3 (L) 04/08/2018    K 4 2 06/24/2017     04/08/2018     06/24/2017    CO2 26 04/08/2018    CO2 24 06/24/2017    BUN 4 (L) 04/08/2018    BUN 12 06/24/2017    CREATININE 0 61 04/08/2018    CREATININE 0 82 06/24/2017    GLUCOSE 89 04/08/2018    GLUCOSE 111 (H) 01/05/2016       VTE Pharmacologic Prophylaxis: Heparin  VTE Mechanical Prophylaxis: sequential compression device    Assessment/plan:     56year-old female postop day 3    Status post colostomy reversal, partial colon resection  -advance diet to surgical soft  -epidural per Anesthesia  Hopefully can D/C today and transition to PO pain meds  -will reorder home medication for blood pressure     Hypokalemia  -will replace with p o  potassium      Patient Active Problem List   Diagnosis    HLD (hyperlipidemia)    Colonic diverticular abscess    Hypothyroidism    Hypertension    Abscess of sigmoid colon due to diverticulitis    [facility]          This text is generated with voice recognition software  There may be translation, syntax,  or grammatical errors  If you have any questions, please contact the dictating provider      Gracie Ramirez PA-C

## 2018-04-09 VITALS
HEIGHT: 68 IN | OXYGEN SATURATION: 97 % | WEIGHT: 235 LBS | HEART RATE: 85 BPM | BODY MASS INDEX: 35.61 KG/M2 | SYSTOLIC BLOOD PRESSURE: 133 MMHG | TEMPERATURE: 97.9 F | DIASTOLIC BLOOD PRESSURE: 70 MMHG | RESPIRATION RATE: 20 BRPM

## 2018-04-09 LAB
ANION GAP SERPL CALCULATED.3IONS-SCNC: 8 MMOL/L (ref 4–13)
BUN SERPL-MCNC: 7 MG/DL (ref 5–25)
CALCIUM SERPL-MCNC: 8.1 MG/DL (ref 8.3–10.1)
CHLORIDE SERPL-SCNC: 105 MMOL/L (ref 100–108)
CO2 SERPL-SCNC: 26 MMOL/L (ref 21–32)
CREAT SERPL-MCNC: 0.62 MG/DL (ref 0.6–1.3)
ERYTHROCYTE [DISTWIDTH] IN BLOOD BY AUTOMATED COUNT: 14 % (ref 11.6–15.1)
GFR SERPL CREATININE-BSD FRML MDRD: 108 ML/MIN/1.73SQ M
GLUCOSE SERPL-MCNC: 98 MG/DL (ref 65–140)
HCT VFR BLD AUTO: 34.7 % (ref 34.8–46.1)
HGB BLD-MCNC: 11 G/DL (ref 11.5–15.4)
MCH RBC QN AUTO: 27.6 PG (ref 26.8–34.3)
MCHC RBC AUTO-ENTMCNC: 31.7 G/DL (ref 31.4–37.4)
MCV RBC AUTO: 87 FL (ref 82–98)
PLATELET # BLD AUTO: 310 THOUSANDS/UL (ref 149–390)
PMV BLD AUTO: 9.5 FL (ref 8.9–12.7)
POTASSIUM SERPL-SCNC: 3.6 MMOL/L (ref 3.5–5.3)
RBC # BLD AUTO: 3.99 MILLION/UL (ref 3.81–5.12)
SODIUM SERPL-SCNC: 139 MMOL/L (ref 136–145)
WBC # BLD AUTO: 8.1 THOUSAND/UL (ref 4.31–10.16)

## 2018-04-09 PROCEDURE — 85027 COMPLETE CBC AUTOMATED: CPT | Performed by: PHYSICIAN ASSISTANT

## 2018-04-09 PROCEDURE — 80048 BASIC METABOLIC PNL TOTAL CA: CPT | Performed by: PHYSICIAN ASSISTANT

## 2018-04-09 RX ORDER — OXYCODONE HYDROCHLORIDE 10 MG/1
10 TABLET ORAL EVERY 4 HOURS PRN
Status: DISCONTINUED | OUTPATIENT
Start: 2018-04-09 | End: 2018-04-09 | Stop reason: HOSPADM

## 2018-04-09 RX ORDER — OXYCODONE HYDROCHLORIDE AND ACETAMINOPHEN 5; 325 MG/1; MG/1
1 TABLET ORAL EVERY 4 HOURS PRN
Status: DISCONTINUED | OUTPATIENT
Start: 2018-04-09 | End: 2018-04-09 | Stop reason: HOSPADM

## 2018-04-09 RX ORDER — MORPHINE SULFATE 2 MG/ML
2 INJECTION, SOLUTION INTRAMUSCULAR; INTRAVENOUS
Status: DISCONTINUED | OUTPATIENT
Start: 2018-04-09 | End: 2018-04-09

## 2018-04-09 RX ORDER — OXYCODONE HYDROCHLORIDE AND ACETAMINOPHEN 5; 325 MG/1; MG/1
1 TABLET ORAL EVERY 4 HOURS PRN
Qty: 30 TABLET | Refills: 0 | Status: SHIPPED | OUTPATIENT
Start: 2018-04-09 | End: 2018-04-19

## 2018-04-09 RX ADMIN — LOSARTAN POTASSIUM 100 MG: 50 TABLET, FILM COATED ORAL at 08:39

## 2018-04-09 RX ADMIN — SODIUM CHLORIDE, SODIUM LACTATE, POTASSIUM CHLORIDE, AND CALCIUM CHLORIDE 50 ML/HR: .6; .31; .03; .02 INJECTION, SOLUTION INTRAVENOUS at 04:26

## 2018-04-09 RX ADMIN — POLYETHYLENE GLYCOL 3350 17 G: 17 POWDER, FOR SOLUTION ORAL at 08:40

## 2018-04-09 NOTE — CASE MANAGEMENT
Notification of Discharge  This is a Notification of Discharge from our facility [address]  Please be advised that this patient has been discharge from our facility  Below you will find the admission and discharge date and time including the patients disposition  PRESENTATION DATE: 4/5/2018  6:10 AM  IP ADMISSION DATE: 4/5/18 1024  DISCHARGE DATE: 4/9/2018  1:16 PM  DISPOSITION: [address] [facility] Eduardo Staley for 2017  Network Utilization Review Department  Phone: [phone]; Fax [phone]  ATTENTION: The Network Utilization Review Department is now centralized for our 7 Facilities  Make a note that we have a new phone and fax numbers for our Department  Please call with any questions or concerns to [phone] and carefully follow the prompts so that you are directed to the right person  All voicemails are confidential  Fax any determinations, approvals, denials, and requests for initial or continue stay review clinical to [phone]  Due to HIGH CALL volume, it would be easier if you could please send faxed requests to expedite your requests and in part, help us provide discharge notifications faster        Reference #[id number]

## 2018-04-09 NOTE — DISCHARGE INSTRUCTIONS
A light diet as tolerated  Puddings, milk shakes, Ensure, eggs, whipped potatoes, anything in a     May advance to a soft diet with fish chicken and pasta in 4 days  May shower  May drive when not taking pain medication  Dry dressing to incision  Please call [phone] for an appointment with Dr Cynthia Barone for 2-3 weeks

## 2018-04-09 NOTE — PROGRESS NOTES
Epidural catheter removed at 0830 at request of primary service  Tip intact, no complications  OK to resume SQ heparin if desired at 1030  Epidural orders d/latrice - pt still has IV dilaudid prn available    Analgesia now as per primary team       Signature: Trung Minor MD, April 9, 2018 at 8:31 AM

## 2018-04-09 NOTE — DISCHARGE SUMMARY
Date: 11/13/2018    Patient Name: Shirley Fan          To Whom it may concern: The above patient was seen at the [facility] for treatment of a medical condition. This patient should be excused from attending work/school 11/13/2018. Discharge Summary - General Surgery   Steven Magaña 56 y o  female MRN: [id number]  Unit/Bed#: -01 Encounter: [id number]    Admission Date: 4/5/2018     Discharge Date: 4/9/18    Admitting Diagnosis: Colostomy status ([facility] [address] ) [Z93 3]  Diverticulitis of intestine, part unspecified, with perforation and abscess without bleeding [K57 80]    Discharge Diagnosis: Colostomy status ([facility] [address] ) [Z93 3]  Diverticulitis of intestine, part unspecified, with perforation and abscess without bleeding [K57 80]    Procedures: Exploratory laparotomy, reversal colostomy, flex sigmoidoscopy, and partial colon resection     Hospital Course: Steven Magaña is a 52-year-old female who had a Harmann's procedure this past January for perforated diverticulitis  She scheduled an elective surgery for colostomy reversal for this past Thursday 4/5/18  The surgery went well, she had epidural placed for pain control and had a rubio in post-op  She was doing well post-op  On Friday 4/6 the rubio was removed and she was given clear liquids, later that day she began having bowel function  On Saturday, her diet was advanced, IV fluids were decreased, and her home blood pressure medications were restarted  On Sunday 4/8, the patient continued doing well and her diet was advanced again to surgical soft  She had mild hypokalemia on 4/7 and 4/8 so her potassium was replaced  On Monday 4/9, pt was cleared to be discharged  She was having bowel function and was tolerating her diet  The epidural and ayana were removed  On discharge, pt was instructed to follow-up with Dr Scott Pineda in 2-3 weeks  She was told to continue a soft diet, no lifting more than 25lbs for 1 month, and no driving while on pain medications  Condition at Discharge: good     Discharge instructions/Information to patient and family:   See after visit summary for information provided to patient and family        Provisions for Follow-Up Care:  See after visit summary for information related to follow-up care and any pertinent home health orders  Disposition: Home    Planned Readmission: No    Discharge Statement   I spent 20 minutes discharging the patient  This time was spent on the day of discharge  I had direct contact with the patient on the day of discharge  Additional documentation is required if more than 30 minutes were spent on discharge  Discharge Medications:  See after visit summary for reconciled discharge medications provided to patient and family        [address] Bernie Mendez PA-C  4/9/2018  12:02 PM

## 2018-04-09 NOTE — ADDENDUM NOTE
Addendum  created 04/09/18 3524 by China Garcias MD    Anesthesia Intra LDAs edited, LDA properties accepted

## 2018-04-09 NOTE — POST OP PROGRESS NOTES
Progress Note -Surgery PA  Trina Ye 56 y o  female MRN: [id number]  Unit/Bed#: -01 Encounter: [id number]      Subjective/Objective     Subjective: Pt feels great this morning  Would like the epidural out  Tolerating surgical soft diet  Objective:     /65 (BP Location: Left arm)   Pulse 70   Temp 98 7 °F (37 1 °C) (Oral)   Resp 20   Ht 5' 8" (1 727 m)   Wt 107 kg (235 lb)   SpO2 97%   BMI 35 73 kg/m²       Intake/Output Summary (Last 24 hours) at 04/09/18 0746  Last data filed at 04/09/18 0700   Gross per 24 hour   Intake          2647 82 ml   Output              400 ml   Net          2247 82 ml       Invasive Devices     Peripheral Intravenous Line            Peripheral IV 04/05/18 Right Hand 3 days          Epidural Line            Epidural Catheter 04/05/18 4 days          Drain            Colostomy Descending/sigmoid LUQ 91 days                Physical Exam:  General appearance: alert and oriented, in no acute distress  Lungs: clear to auscultation bilaterally  Heart: regular rate and rhythm, S1, S2 normal, no murmur, click, rub or gallop  Abdomen: lower abdominal tenderness as expected  non distended  Midline incision C/D/I  colostomy reversal incision with 3 ayana present         Current Facility-Administered Medications:     acetaminophen (TYLENOL) tablet 650 mg, 650 mg, Oral, Q6H PRN, Aden Rondon MD    HYDROmorphone (DILAUDID) injection 0 5 mg, 0 5 mg, Intravenous, Q1H PRN, Lexii Zimmer MD    lactated ringers infusion, 50 mL/hr, Intravenous, Continuous, Kathya Soriano MD, Last Rate: 50 mL/hr at 04/09/18 0426, 50 mL/hr at 04/09/18 0426    losartan (COZAAR) tablet 100 mg, 100 mg, Oral, Daily, Karen Avilez PA-C, 100 mg at 04/08/18 0835    nalbuphine (NUBAIN) injection 5 mg, 5 mg, Intravenous, Q3H PRN, Lexii Zimmer MD, 5 mg at 04/08/18 2150    ondansetron [facility]) injection 4 mg, 4 mg, Intravenous, Q4H PRN, Aden Rondon MD, 4 mg at 04/07/18 0528    polyethylene glycol (MIRALAX) packet 17 g, 17 g, Oral, Daily, Karen Avilez PA-C    potassium chloride (K-DUR,KLOR-CON) CR tablet 40 mEq, 40 mEq, Oral, Once, Karen Avilez PA-C    ropivacaine 0 1% and fentaNYL 2 mcg/mL PCEA, , Epidural, Continuous, Ginette Sierra MD              Lab, Imaging and other studies:  I have personally reviewed pertinent lab results  , CBC:   Lab Results   Component Value Date    WBC 8 10 04/09/2018    HGB 11 0 (L) 04/09/2018    HCT 34 7 (L) 04/09/2018    MCV 87 04/09/2018     04/09/2018    MCH 27 6 04/09/2018    MCHC 31 7 04/09/2018    RDW 14 0 04/09/2018    MPV 9 5 04/09/2018   , CMP:   Lab Results   Component Value Date     04/09/2018    K 3 6 04/09/2018     04/09/2018    CO2 26 04/09/2018    ANIONGAP 8 04/09/2018    BUN 7 04/09/2018    CREATININE 0 62 04/09/2018    GLUCOSE 98 04/09/2018    CALCIUM 8 1 (L) 04/09/2018    EGFR 108 04/09/2018     Labs in chart were reviewed  Lab Results   Component Value Date    WBC 8 10 04/09/2018    WBC 6-10 (A) 07/01/2017    HGB 11 0 (L) 04/09/2018    HGB 14 0 06/24/2017    HCT 34 7 (L) 04/09/2018    HCT 42 6 06/24/2017     04/09/2018     06/24/2017     Lab Results   Component Value Date     04/09/2018     06/24/2017    K 3 6 04/09/2018    K 4 2 06/24/2017     04/09/2018     06/24/2017    CO2 26 04/09/2018    CO2 24 06/24/2017    BUN 7 04/09/2018    BUN 12 06/24/2017    CREATININE 0 62 04/09/2018    CREATININE 0 82 06/24/2017    GLUCOSE 98 04/09/2018    GLUCOSE 111 (H) 01/05/2016       VTE Mechanical Prophylaxis: sequential compression device    Assessment:    60-year-old female postop day 3   Status post colostomy reversal, partial colon resection  -removed 1 wick today  Will remove other two prior to discharge  -surgical soft diet  -epidural per Anesthesia   Hopefully can D/C today and transition to PO pain meds  -dispo planning     Hypokalemia  -3 6 today      Patient Active Problem List Diagnosis    HLD (hyperlipidemia)    Colonic diverticular abscess    Hypothyroidism    Hypertension    Abscess of sigmoid colon due to diverticulitis    [facility]          This text is generated with voice recognition software  There may be translation, syntax,  or grammatical errors  If you have any questions, please contact the dictating provider      Sher Corbin PA-C

## 2018-04-10 ENCOUNTER — TRANSITIONAL CARE MANAGEMENT (OUTPATIENT)
Dept: FAMILY MEDICINE CLINIC | Facility: OTHER | Age: 48
End: 2018-04-10

## 2018-04-10 LAB
ABO GROUP BLD BPU: NORMAL
ABO GROUP BLD BPU: NORMAL
BPU ID: NORMAL
BPU ID: NORMAL
UNIT DISPENSE STATUS: NORMAL
UNIT DISPENSE STATUS: NORMAL
UNIT PRODUCT CODE: NORMAL
UNIT PRODUCT CODE: NORMAL
UNIT RH: NORMAL
UNIT RH: NORMAL

## 2018-06-01 ENCOUNTER — TELEPHONE (OUTPATIENT)
Dept: FAMILY MEDICINE CLINIC | Facility: OTHER | Age: 48
End: 2018-06-01

## 2018-06-01 DIAGNOSIS — R73.03 PREDIABETES: ICD-10-CM

## 2018-06-01 DIAGNOSIS — I10 BENIGN ESSENTIAL HYPERTENSION: ICD-10-CM

## 2018-06-01 DIAGNOSIS — E66.9 CLASS 2 OBESITY WITHOUT SERIOUS COMORBIDITY WITH BODY MASS INDEX (BMI) OF 35.0 TO 35.9 IN ADULT, UNSPECIFIED OBESITY TYPE: ICD-10-CM

## 2018-06-01 DIAGNOSIS — E03.9 HYPOTHYROIDISM, UNSPECIFIED TYPE: Primary | ICD-10-CM

## 2018-06-01 DIAGNOSIS — E78.2 MIXED HYPERLIPIDEMIA: ICD-10-CM

## 2018-06-01 PROBLEM — K57.30 COLON, DIVERTICULOSIS: Status: ACTIVE | Noted: 2018-01-03

## 2018-06-01 PROBLEM — K76.0 FATTY LIVER: Status: ACTIVE | Noted: 2017-01-09

## 2018-06-01 PROBLEM — R91.8 LUNG NODULES: Status: ACTIVE | Noted: 2017-01-09

## 2018-06-01 PROBLEM — E78.5 DYSLIPIDEMIA: Status: ACTIVE | Noted: 2017-06-27

## 2018-06-06 LAB
ALBUMIN SERPL-MCNC: 4.3 G/DL (ref 3.6–5.1)
ALBUMIN/GLOB SERPL: 1.3 (CALC) (ref 1–2.5)
ALP SERPL-CCNC: 82 U/L (ref 33–115)
ALT SERPL-CCNC: 14 U/L (ref 6–29)
AMORPH SED URNS QL MICRO: ABNORMAL /HPF
APPEARANCE UR: ABNORMAL
AST SERPL-CCNC: 14 U/L (ref 10–35)
BACTERIA UR QL AUTO: ABNORMAL /HPF
BASOPHILS # BLD AUTO: 59 CELLS/UL (ref 0–200)
BASOPHILS NFR BLD AUTO: 0.7 %
BILIRUB SERPL-MCNC: 0.5 MG/DL (ref 0.2–1.2)
BILIRUB UR QL STRIP: NEGATIVE
BUN SERPL-MCNC: 10 MG/DL (ref 7–25)
BUN/CREAT SERPL: ABNORMAL (CALC) (ref 6–22)
CALCIUM SERPL-MCNC: 9.5 MG/DL (ref 8.6–10.2)
CHLORIDE SERPL-SCNC: 102 MMOL/L (ref 98–110)
CHOLEST SERPL-MCNC: 212 MG/DL
CHOLEST/HDLC SERPL: 6.1 (CALC)
CO2 SERPL-SCNC: 28 MMOL/L (ref 20–31)
COLOR UR: YELLOW
CREAT SERPL-MCNC: 0.87 MG/DL (ref 0.5–1.1)
EOSINOPHIL # BLD AUTO: 210 CELLS/UL (ref 15–500)
EOSINOPHIL NFR BLD AUTO: 2.5 %
ERYTHROCYTE [DISTWIDTH] IN BLOOD BY AUTOMATED COUNT: 14.1 % (ref 11–15)
EST. AVERAGE GLUCOSE BLD GHB EST-MCNC: 120 (CALC)
EST. AVERAGE GLUCOSE BLD GHB EST-SCNC: 6.6 (CALC)
GLOBULIN SER CALC-MCNC: 3.2 G/DL (CALC) (ref 1.9–3.7)
GLUCOSE SERPL-MCNC: 116 MG/DL (ref 65–99)
GLUCOSE UR QL STRIP: NEGATIVE
GRAN CASTS #/AREA URNS LPF: ABNORMAL /LPF
HBA1C MFR BLD: 5.8 % OF TOTAL HGB
HCT VFR BLD AUTO: 42 % (ref 35–45)
HDLC SERPL-MCNC: 35 MG/DL
HGB BLD-MCNC: 14 G/DL (ref 11.7–15.5)
HGB UR QL STRIP: ABNORMAL
HYALINE CASTS #/AREA URNS LPF: ABNORMAL /LPF
KETONES UR QL STRIP: NEGATIVE
LDLC SERPL CALC-MCNC: 146 MG/DL (CALC)
LEUKOCYTE ESTERASE UR QL STRIP: ABNORMAL
LYMPHOCYTES # BLD AUTO: 2117 CELLS/UL (ref 850–3900)
LYMPHOCYTES NFR BLD AUTO: 25.2 %
MCH RBC QN AUTO: 27.9 PG (ref 27–33)
MCHC RBC AUTO-ENTMCNC: 33.3 G/DL (ref 32–36)
MCV RBC AUTO: 83.8 FL (ref 80–100)
MONOCYTES # BLD AUTO: 512 CELLS/UL (ref 200–950)
MONOCYTES NFR BLD AUTO: 6.1 %
NEUTROPHILS # BLD AUTO: 5502 CELLS/UL (ref 1500–7800)
NEUTROPHILS NFR BLD AUTO: 65.5 %
NITRITE UR QL STRIP: NEGATIVE
NONHDLC SERPL-MCNC: 177 MG/DL (CALC)
PH UR STRIP: 6 [PH] (ref 5–8)
PLATELET # BLD AUTO: 377 THOUSAND/UL (ref 140–400)
PMV BLD REES-ECKER: 9.6 FL (ref 7.5–12.5)
POTASSIUM SERPL-SCNC: 4.6 MMOL/L (ref 3.5–5.3)
PROT SERPL-MCNC: 7.5 G/DL (ref 6.1–8.1)
PROT UR QL STRIP: ABNORMAL
RBC # BLD AUTO: 5.01 MILLION/UL (ref 3.8–5.1)
RBC #/AREA URNS HPF: ABNORMAL /HPF
SERVICE CMNT-IMP: ABNORMAL
SL AMB EGFR AFRICAN AMERICAN: 93 ML/MIN/1.73M2
SL AMB EGFR NON AFRICAN AMERICAN: 80 ML/MIN/1.73M2
SODIUM SERPL-SCNC: 136 MMOL/L (ref 135–146)
SP GR UR STRIP: 1.01 (ref 1–1.03)
SQUAMOUS #/AREA URNS HPF: ABNORMAL /HPF
T4 FREE SERPL-MCNC: 1.1 NG/DL (ref 0.8–1.8)
TRIGL SERPL-MCNC: 173 MG/DL
TSH SERPL-ACNC: 7.24 MIU/L
WBC # BLD AUTO: 8.4 THOUSAND/UL (ref 3.8–10.8)
WBC #/AREA URNS HPF: ABNORMAL /HPF

## 2018-06-07 ENCOUNTER — TELEPHONE (OUTPATIENT)
Dept: FAMILY MEDICINE CLINIC | Facility: OTHER | Age: 48
End: 2018-06-07

## 2018-06-07 DIAGNOSIS — R31.29 HEMATURIA, MICROSCOPIC: ICD-10-CM

## 2018-06-07 DIAGNOSIS — N30.01 ACUTE CYSTITIS WITH HEMATURIA: ICD-10-CM

## 2018-06-07 DIAGNOSIS — E03.9 HYPOTHYROIDISM, UNSPECIFIED TYPE: Primary | ICD-10-CM

## 2018-06-07 RX ORDER — NITROFURANTOIN 25; 75 MG/1; MG/1
100 CAPSULE ORAL 2 TIMES DAILY
Qty: 10 CAPSULE | Refills: 0 | Status: SHIPPED | OUTPATIENT
Start: 2018-06-07 | End: 2018-11-13

## 2018-06-07 RX ORDER — LEVOTHYROXINE SODIUM 0.05 MG/1
50 TABLET ORAL DAILY
Qty: 30 TABLET | Refills: 5 | Status: SHIPPED | OUTPATIENT
Start: 2018-06-07 | End: 2018-06-08 | Stop reason: SDUPTHER

## 2018-06-07 RX ORDER — LEVOTHYROXINE SODIUM 0.03 MG/1
1 TABLET ORAL DAILY
COMMUNITY
Start: 2016-01-12 | End: 2018-06-07 | Stop reason: SDUPTHER

## 2018-06-07 NOTE — TELEPHONE ENCOUNTER
Left message for pt to call back     ----- Message from Bridger Hooker sent at 6/7/2018  2:47 PM EDT -----  Can we notify of Derral Slice of recent test results:   --1) Average blood sugar level (A1C) improved, remains in prediabetes range, but almost normal   Keep up the good work!     --2) Mildly elevated cholesterol numbers  Continuing to maintain healthy diet and weight will help with this, along with blood sugar  --3) Thyroid level trending towards being too low  At this point, we should probably increase the dose of your thyroid medication 25 mcg-->50 mcg  New Rx sent to pharmacy  Recheck level in 2 months (slip printed, for mailing to patient)  --4) Urine test results showing some blood and bacteria  UTI (urine/bladder infection), most likely  Will send Rx for antibiotic to pharmacy  Should drink plenty of fluids  Recheck urine in a month to make sure blood is resolved (slip printed, for mailing to patient)       Thanks

## 2018-06-08 ENCOUNTER — OFFICE VISIT (OUTPATIENT)
Dept: FAMILY MEDICINE CLINIC | Facility: OTHER | Age: 48
End: 2018-06-08
Payer: COMMERCIAL

## 2018-06-08 VITALS
HEART RATE: 88 BPM | WEIGHT: 237.3 LBS | SYSTOLIC BLOOD PRESSURE: 124 MMHG | TEMPERATURE: 97.9 F | HEIGHT: 67 IN | OXYGEN SATURATION: 98 % | DIASTOLIC BLOOD PRESSURE: 76 MMHG | BODY MASS INDEX: 37.25 KG/M2

## 2018-06-08 DIAGNOSIS — R91.8 LUNG NODULES: ICD-10-CM

## 2018-06-08 DIAGNOSIS — E78.2 MIXED HYPERLIPIDEMIA: ICD-10-CM

## 2018-06-08 DIAGNOSIS — R73.03 PREDIABETES: ICD-10-CM

## 2018-06-08 DIAGNOSIS — N30.01 ACUTE CYSTITIS WITH HEMATURIA: ICD-10-CM

## 2018-06-08 DIAGNOSIS — R31.29 HEMATURIA, MICROSCOPIC: ICD-10-CM

## 2018-06-08 DIAGNOSIS — Z12.39 SCREENING FOR BREAST CANCER: ICD-10-CM

## 2018-06-08 DIAGNOSIS — F41.9 ANXIETY: ICD-10-CM

## 2018-06-08 DIAGNOSIS — E66.9 CLASS 2 OBESITY WITHOUT SERIOUS COMORBIDITY WITH BODY MASS INDEX (BMI) OF 35.0 TO 35.9 IN ADULT, UNSPECIFIED OBESITY TYPE: ICD-10-CM

## 2018-06-08 DIAGNOSIS — K76.0 FATTY LIVER: ICD-10-CM

## 2018-06-08 DIAGNOSIS — I10 BENIGN ESSENTIAL HYPERTENSION: Primary | ICD-10-CM

## 2018-06-08 DIAGNOSIS — F17.210 CIGARETTE NICOTINE DEPENDENCE WITHOUT COMPLICATION: ICD-10-CM

## 2018-06-08 DIAGNOSIS — E03.9 HYPOTHYROIDISM, UNSPECIFIED TYPE: ICD-10-CM

## 2018-06-08 PROBLEM — K57.20 ABSCESS OF SIGMOID COLON DUE TO DIVERTICULITIS: Status: RESOLVED | Noted: 2018-01-03 | Resolved: 2018-06-08

## 2018-06-08 PROCEDURE — 3008F BODY MASS INDEX DOCD: CPT | Performed by: NURSE PRACTITIONER

## 2018-06-08 PROCEDURE — 3074F SYST BP LT 130 MM HG: CPT | Performed by: NURSE PRACTITIONER

## 2018-06-08 PROCEDURE — 3078F DIAST BP <80 MM HG: CPT | Performed by: NURSE PRACTITIONER

## 2018-06-08 PROCEDURE — 99214 OFFICE O/P EST MOD 30 MIN: CPT | Performed by: NURSE PRACTITIONER

## 2018-06-08 RX ORDER — LEVOTHYROXINE SODIUM 0.03 MG/1
25 TABLET ORAL DAILY
Qty: 90 TABLET | Refills: 2 | Status: SHIPPED | OUTPATIENT
Start: 2018-06-08 | End: 2018-08-06 | Stop reason: SDUPTHER

## 2018-06-08 RX ORDER — ALPRAZOLAM 0.25 MG/1
TABLET ORAL EVERY 6 HOURS PRN
COMMUNITY
Start: 2014-07-16 | End: 2018-07-06 | Stop reason: SDUPTHER

## 2018-06-08 RX ORDER — ALBUTEROL SULFATE 90 UG/1
2 AEROSOL, METERED RESPIRATORY (INHALATION) AS NEEDED
COMMUNITY
Start: 2017-09-26 | End: 2019-01-18 | Stop reason: SDUPTHER

## 2018-06-08 NOTE — PATIENT INSTRUCTIONS

## 2018-06-08 NOTE — PROGRESS NOTES
Assessment/Plan:         Problem List Items Addressed This Visit     HLD (hyperlipidemia) + FH CAD   --Discussed benefits of statin  She is declining at this time, preferring to work on weight loss, dietary interventions  Smoking cessation advised (per below)  --Will give slip for repeat lipids at next visit      Hypothyroidism  --Borderline low with recent TFT's, but she had stopped (low dose) replacement therapy 5 months ago on her own-->agreeable to restart at this time  Given slip for repeat TSH in 2 months      Anxiety  --Well controlled  Takes prn Xanax sparingly  Benign essential hypertension  --At goal on losartan  Continue, along with dietary, weight loss interventions      Obesity + fatty liver + prediabetes  --Normal LFT's, A1C improved--almost in normal range  Continue dietary, weight loss interventions  Give slip for repeat labs at next visit  Lung nodules + nicotine dependence  --Smoking cessation advised, quit options discussed  Not yet ready to make repeat attempt at quitting currently, but will consider    Relevant Orders    CT chest high resolution      Other Visit Diagnoses     Acute cystitis with hematuria     --Started Camella Goltz yesterday  --Frequent fluids  Call for no resolution/worsening symptoms over the next 48-72 hours  --Slip given for repeat UA in 1 month to ensure resolution of hematuria  Wait at least a week after period ended  Screening for breast cancer        Relevant Orders    Mammo screening bilateral w 3d & cad          RTO 6 months        Subjective:      Patient ID: Archie Severino is a 56 y o  female  Here for f/u to recent (6/5) blood work  Results reviewed with patient  Notable for elevated TSH (7 24), with low normal free T4 (1 1)  States she stopped her thyroid medication in January because she didn't feel she needed it anymore  Denies recent fatigue, constipation    Other labs notable to for sub-optimal lipids (, , , HDL 35), improved A1C (down to 5 8 from 6 2)  States she has been working on her diet, eating healthier, walking regularly  Wants to get her weight down  Compliant with BP medication  Tolerating fine  Home readings 120's/70's  Continues to smoke 1 PPD  Quit for 30 days while in hospital back in January for ruptured diverticulitis with sepsis  Temporary colostomy reversed in April  Doing fine since  Incisions well healed  No abdominal pain, recent bowel issues including melena/hematochezia  Recently saw surgeon for follow-up  No recent cough, wheezing, CP, shortness of breath  Notes urinary frequency, urgency, bladder discomfort x 1 week  Urine dip showing bacteria, hematuria  Currently having period also  No fever/chills, but sweats at times  No N/V  Rx for antibiotic (Ene Banuelos) sent to pharmacy which she started yesterday  No recent mammogram  [facility]  The following portions of the patient's history were reviewed and updated as appropriate: allergies, current medications, past family history, past medical history, past social history, past surgical history and problem list     Review of Systems   Constitutional: Negative for fatigue  HENT: Negative for sore throat  Respiratory: Negative for cough and shortness of breath  Cardiovascular: Negative for chest pain and palpitations  Gastrointestinal: Negative for abdominal pain, blood in stool, nausea and vomiting  Genitourinary: Positive for frequency  Musculoskeletal: Negative for arthralgias  Neurological: Negative for headaches  Psychiatric/Behavioral:        Per HPI         Objective:      /76   Pulse 88   Temp 97 9 °F (36 6 °C)   Ht 5' 6 5" (1 689 m)   Wt 108 kg (237 lb 4 8 oz)   SpO2 98%   BMI 37 73 kg/m²          Physical Exam   Constitutional: She is oriented to person, place, and time  She appears well-developed and well-nourished  HENT:   Head: Normocephalic     Right Ear: External ear normal    Left Ear: External ear normal    Nose: Nose normal    Mouth/Throat: Oropharynx is clear and moist    Eyes: Conjunctivae are normal  Pupils are equal, round, and reactive to light  Neck: Normal range of motion  Neck supple  No thyromegaly present  Cardiovascular: Normal rate, regular rhythm and normal heart sounds  Pulmonary/Chest: Effort normal and breath sounds normal    Abdominal: Soft  Bowel sounds are normal  There is no tenderness  Benign appearing lower abdominal scars  Neurological: She is alert and oriented to person, place, and time  She has normal reflexes  Skin: Skin is warm and dry  Psychiatric: She has a normal mood and affect

## 2018-06-14 ENCOUNTER — TELEPHONE (OUTPATIENT)
Dept: FAMILY MEDICINE CLINIC | Facility: OTHER | Age: 48
End: 2018-06-14

## 2018-06-14 DIAGNOSIS — N39.0 URINARY TRACT INFECTION WITH HEMATURIA, SITE UNSPECIFIED: Primary | ICD-10-CM

## 2018-06-14 DIAGNOSIS — R31.9 URINARY TRACT INFECTION WITH HEMATURIA, SITE UNSPECIFIED: Primary | ICD-10-CM

## 2018-06-14 RX ORDER — SULFAMETHOXAZOLE AND TRIMETHOPRIM 800; 160 MG/1; MG/1
1 TABLET ORAL EVERY 12 HOURS SCHEDULED
Qty: 14 TABLET | Refills: 0 | Status: SHIPPED | OUTPATIENT
Start: 2018-06-14 | End: 2018-06-21

## 2018-06-14 NOTE — TELEPHONE ENCOUNTER
I am assuming by her "not feeling well" this refers to her UTI symptoms which she mentioned last week when I saw her  Will switch her to a different antibiotic (Macrobid-->Bactrim)  New Rx sent to pharmacy  At the same time, she should get the urine test done (slip given previously), to make sure she doesn't have a resistant organism  Continue drinking plenty of fluids  Call if symptoms still no better over the next 48-72 hours    Thanks

## 2018-06-26 ENCOUNTER — TELEPHONE (OUTPATIENT)
Dept: FAMILY MEDICINE CLINIC | Facility: OTHER | Age: 48
End: 2018-06-26

## 2018-06-26 LAB
APPEARANCE UR: ABNORMAL
BACTERIA UR QL AUTO: ABNORMAL /HPF
BILIRUB UR QL STRIP: NEGATIVE
COLOR UR: YELLOW
GLUCOSE UR QL STRIP: NEGATIVE
HGB UR QL STRIP: NEGATIVE
HYALINE CASTS #/AREA URNS LPF: ABNORMAL /LPF
KETONES UR QL STRIP: NEGATIVE
LEUKOCYTE ESTERASE UR QL STRIP: ABNORMAL
NITRITE UR QL STRIP: NEGATIVE
PH UR STRIP: 6 [PH] (ref 5–8)
PROT UR QL STRIP: NEGATIVE
RBC #/AREA URNS HPF: ABNORMAL /HPF
SP GR UR STRIP: 1.03 (ref 1–1.03)
SQUAMOUS #/AREA URNS HPF: ABNORMAL /HPF
WBC #/AREA URNS HPF: ABNORMAL /HPF

## 2018-06-26 NOTE — TELEPHONE ENCOUNTER
I left message for pt to call back     ----- Message from [id number] Oseguera sent at 6/26/2018  1:51 PM EDT -----  Can we notify Norm Banda that her urine is now back to normal--no further blood    Thanks

## 2018-07-06 DIAGNOSIS — F41.9 ANXIETY: ICD-10-CM

## 2018-07-06 DIAGNOSIS — I10 ESSENTIAL HYPERTENSION: Primary | ICD-10-CM

## 2018-07-06 RX ORDER — ALPRAZOLAM 0.25 MG/1
0.25 TABLET ORAL EVERY 6 HOURS PRN
Qty: 30 TABLET | Refills: 0 | Status: SHIPPED | OUTPATIENT
Start: 2018-07-06 | End: 2018-09-12 | Stop reason: SDUPTHER

## 2018-07-06 RX ORDER — LOSARTAN POTASSIUM 100 MG/1
100 TABLET ORAL DAILY
Qty: 30 TABLET | Refills: 0 | Status: SHIPPED | OUTPATIENT
Start: 2018-07-06 | End: 2018-07-18 | Stop reason: SDUPTHER

## 2018-07-18 DIAGNOSIS — I10 ESSENTIAL HYPERTENSION: ICD-10-CM

## 2018-07-19 RX ORDER — LOSARTAN POTASSIUM 100 MG/1
TABLET ORAL
Qty: 30 TABLET | Refills: 5 | Status: SHIPPED | OUTPATIENT
Start: 2018-07-19 | End: 2019-03-15 | Stop reason: SDUPTHER

## 2018-08-06 DIAGNOSIS — E03.9 HYPOTHYROIDISM, UNSPECIFIED TYPE: ICD-10-CM

## 2018-08-08 RX ORDER — LEVOTHYROXINE SODIUM 0.03 MG/1
25 TABLET ORAL DAILY
Qty: 90 TABLET | Refills: 1 | Status: SHIPPED | OUTPATIENT
Start: 2018-08-08 | End: 2019-02-13 | Stop reason: SDUPTHER

## 2018-08-13 DIAGNOSIS — I10 ESSENTIAL HYPERTENSION: ICD-10-CM

## 2018-08-13 RX ORDER — LOSARTAN POTASSIUM 100 MG/1
TABLET ORAL
Qty: 30 TABLET | Refills: 0 | Status: SHIPPED | OUTPATIENT
Start: 2018-08-13 | End: 2018-11-13

## 2018-09-12 DIAGNOSIS — F41.9 ANXIETY: ICD-10-CM

## 2018-09-14 RX ORDER — ALPRAZOLAM 0.25 MG/1
TABLET ORAL
Qty: 30 TABLET | Refills: 0 | Status: SHIPPED | OUTPATIENT
Start: 2018-09-14 | End: 2019-02-13 | Stop reason: SDUPTHER

## 2018-11-13 NOTE — PRE-PROCEDURE INSTRUCTIONS
Pre-Surgery Instructions:   Medication Instructions    albuterol (PROAIR HFA) 90 mcg/act inhaler Instructed patient per Anesthesia Guidelines   ALPRAZolam (XANAX) 0 25 mg tablet Instructed patient per Anesthesia Guidelines   levothyroxine 25 mcg tablet Instructed patient per Anesthesia Guidelines   losartan (COZAAR) 100 MG tablet Instructed patient per Anesthesia Guidelines      Pre op and showering instructions reviewed-Patient has hibiclens

## 2018-11-14 ENCOUNTER — ANESTHESIA EVENT (OUTPATIENT)
Dept: PERIOP | Facility: HOSPITAL | Age: 48
End: 2018-11-14
Payer: COMMERCIAL

## 2018-11-14 NOTE — H&P
History and Physical -General Surgical Care   Pamela Cochran 53 y o  female MRN: [id number]  Unit/Bed#:  Encounter: [id number]       Principal Problem:  Incisional hernia    HPI: Pamela Cochran is a 53y o  year old female who presents with incisional hernia at her umbilicus  Patient is status post Blayne's procedure with colostomy with subsequent reversal for diverticular disease  She noticed a small bulge near her incision  Physical exam was consistent with a incisional hernia as seen by my partner      Review of Systems    Historical Information   Past Medical History:   Diagnosis Date    Anxiety     Calculus of distal right ureter     last assessed 08/06/2014    Closed fracture of head of first metacarpal bone of right hand     Depression     Disease of thyroid gland     Diverticulitis     Fatty liver     last assessed 01/09/2017    HLD (hyperlipidemia)     treating with diet    Hypertension     Kidney stone     Lung nodules     Migraine     last assessed 08/03/2012    Pneumonia     Pre-diabetes     last assessed 06/27/2017     Past Surgical History:   Procedure Laterality Date    APPENDECTOMY      CHOLECYSTECTOMY  1999    laparoscopic    COLON SURGERY  01/09/2018    colostomy    COLONOSCOPY  02/2012    polyp, Dr Anna Paige N/A 4/5/2018    Procedure: REVERSAL COLOSTOMY;  Surgeon: Rambo Crystal DO;  [facility];  Service: General    FLEXIBLE SIGMOIDOSCOPY N/A 4/5/2018    Procedure: Ozie Randall;  Surgeon: Rambo Crystal DO;  [facility];  Service: General   [facility]    umbilical    LAPAROTOMY N/A 1/7/2018    Procedure: LAPAROTOMY EXPLORATORY, sigmoid colon resection, colostomy, appendectomy;  Surgeon: Rambo Crystal DO;  [facility];  Service: General    W[place] COLONOSCOPY FLX DX W/COLLJ SPEC WHEN PFRMD N/A 4/2/2018    Procedure: COLONOSCOPY;  Surgeon: Rambo Crystal DO;  [facility];   Service: Claudine Ramirez [place] EXPLORATORY OF ABDOMEN N/A 4/5/2018    Procedure: EXPLORATORY LAPAROTOMY;  Surgeon: Vanda Swanson DO;  [facility];  Service: General    REDUCTION MAMMAPLASTY  1999    TUBAL LIGATION       Social History   History   Alcohol Use No     Comment: quit 5 years ago     History   Drug Use No     History   Smoking Status    Current Every Day Smoker    Packs/day: 1 00    Years: 28 00   Smokeless Tobacco    Never Used     Family History   Problem Relation Age of Onset    Alcohol abuse Mother     Hepatitis Mother         chronic hepatitis C virus    Heart disease Father     Heart attack Father         MI    Hypertension Father     Diverticulitis Sister     Diabetes Sister     Hypertension Sister     Cancer Brother     No Known Problems Sister        Meds/Allergies     No prescriptions prior to admission  No current facility-administered medications for this encounter  Allergies   Allergen Reactions    Penicillins Angioedema    Amoxicillin Edema    Doxycycline Vomiting    Vicodin [Hydrocodone-Acetaminophen] GI Intolerance and Vomiting           Height 5' 6 5" (1 689 m), weight 114 kg (252 lb)  No intake or output data in the 24 hours ending 11/14/18 0936    PHYSICAL EXAM  General appearance: alert and oriented, in no acute distress  Lungs: clear to auscultation bilaterally  Heart: regular rate and rhythm, S1, S2 normal, no murmur, click, rub or gallop  Abdomen: soft, non-tender; bowel sounds normal; no masses,  no organomegaly  Incisional hernias noted  This is soft and reducible  Rectal: deferred  Skin: Skin color, texture, turgor normal  No rashes or lesions    Lab Results:   No visits with results within 1 Day(s) from this visit     Latest known visit with results is:   Orders Only on 06/25/2018   Component Date Value    Color UA 06/25/2018 YELLOW     Urine Appearance 06/25/2018 TURBID*    Specific Gravity 06/25/2018 1 027     Ph 06/25/2018 6 0     Glucose, Urine 06/25/2018 NEGATIVE     Bilirubin, Urine 06/25/2018 NEGATIVE     Ketone, Urine 06/25/2018 NEGATIVE     Blood, Urine 06/25/2018 NEGATIVE     Protein, Urine 06/25/2018 NEGATIVE     SL AMB NITRITES URINE, Q* 06/25/2018 NEGATIVE     Leukocyte Esterase 06/25/2018 TRACE*    SL AMB WBC, URINE 06/25/2018 0-5     RBC, Urine 06/25/2018 0-2     Squamous Epithelial Cells 06/25/2018 6-10*    Bacteria, UA 06/25/2018 FEW*    Hyaline Casts 06/25/2018 NONE SEEN     Urine Culture Result 06/25/2018       Imaging Studies:     ASSESSMENT:  Incisional hernia  History of perforated diverticulitis with colostomy and subsequent    PLAN:  Risks and benefits for incisional hernia repair including the potential for bowel injury for hernia recurrence and she agrees to proceed  Counseling / Coordination of Care  Total time spent today  30 minutes  Greater than 50% of total time was spent with the patient and / or family counseling and / or coordination of care

## 2018-11-15 ENCOUNTER — ANESTHESIA (OUTPATIENT)
Dept: PERIOP | Facility: HOSPITAL | Age: 48
End: 2018-11-15
Payer: COMMERCIAL

## 2018-11-15 ENCOUNTER — HOSPITAL ENCOUNTER (OUTPATIENT)
Facility: HOSPITAL | Age: 48
Setting detail: OUTPATIENT SURGERY
Discharge: HOME | End: 2018-11-15
Attending: SURGERY | Admitting: SURGERY
Payer: COMMERCIAL

## 2018-11-15 VITALS
WEIGHT: 252 LBS | SYSTOLIC BLOOD PRESSURE: 124 MMHG | BODY MASS INDEX: 40.5 KG/M2 | RESPIRATION RATE: 18 BRPM | HEIGHT: 66 IN | HEART RATE: 82 BPM | OXYGEN SATURATION: 96 % | TEMPERATURE: 97 F | DIASTOLIC BLOOD PRESSURE: 60 MMHG

## 2018-11-15 DIAGNOSIS — K43.2 INCISIONAL HERNIA, WITHOUT OBSTRUCTION OR GANGRENE: Primary | ICD-10-CM

## 2018-11-15 LAB — EXT PREGNANCY TEST URINE: NEGATIVE

## 2018-11-15 PROCEDURE — 81025 URINE PREGNANCY TEST: CPT | Performed by: STUDENT IN AN ORGANIZED HEALTH CARE EDUCATION/TRAINING PROGRAM

## 2018-11-15 PROCEDURE — C1781 MESH (IMPLANTABLE): HCPCS | Performed by: SURGERY

## 2018-11-15 DEVICE — VENTRALEX HERNIA PATCH, 6.4 CM (2.5"), MEDIUM CIRCLE WITH STRAP
Type: IMPLANTABLE DEVICE | Site: ABDOMEN | Status: FUNCTIONAL
Brand: VENTRALEX

## 2018-11-15 RX ORDER — FENTANYL CITRATE/PF 50 MCG/ML
25 SYRINGE (ML) INJECTION
Status: DISCONTINUED | OUTPATIENT
Start: 2018-11-15 | End: 2018-11-15 | Stop reason: HOSPADM

## 2018-11-15 RX ORDER — HYDROCODONE BITARTRATE AND ACETAMINOPHEN 5; 325 MG/1; MG/1
1 TABLET ORAL EVERY 6 HOURS PRN
Status: DISCONTINUED | OUTPATIENT
Start: 2018-11-15 | End: 2018-11-15 | Stop reason: HOSPADM

## 2018-11-15 RX ORDER — BUPIVACAINE HYDROCHLORIDE AND EPINEPHRINE 2.5; 5 MG/ML; UG/ML
INJECTION, SOLUTION EPIDURAL; INFILTRATION; INTRACAUDAL; PERINEURAL AS NEEDED
Status: DISCONTINUED | OUTPATIENT
Start: 2018-11-15 | End: 2018-11-15 | Stop reason: HOSPADM

## 2018-11-15 RX ORDER — FENTANYL CITRATE 50 UG/ML
INJECTION, SOLUTION INTRAMUSCULAR; INTRAVENOUS AS NEEDED
Status: DISCONTINUED | OUTPATIENT
Start: 2018-11-15 | End: 2018-11-15 | Stop reason: SURG

## 2018-11-15 RX ORDER — ONDANSETRON 2 MG/ML
4 INJECTION INTRAMUSCULAR; INTRAVENOUS ONCE
Status: DISCONTINUED | OUTPATIENT
Start: 2018-11-15 | End: 2018-11-15 | Stop reason: HOSPADM

## 2018-11-15 RX ORDER — HYDROCODONE BITARTRATE AND ACETAMINOPHEN 5; 325 MG/1; MG/1
1 TABLET ORAL EVERY 6 HOURS PRN
Qty: 28 TABLET | Refills: 0 | Status: SHIPPED | OUTPATIENT
Start: 2018-11-15 | End: 2018-11-25

## 2018-11-15 RX ORDER — CLINDAMYCIN PHOSPHATE 900 MG/50ML
900 INJECTION INTRAVENOUS ONCE
Status: COMPLETED | OUTPATIENT
Start: 2018-11-15 | End: 2018-11-15

## 2018-11-15 RX ORDER — SUCCINYLCHOLINE CHLORIDE 20 MG/ML
INJECTION INTRAMUSCULAR; INTRAVENOUS AS NEEDED
Status: DISCONTINUED | OUTPATIENT
Start: 2018-11-15 | End: 2018-11-15 | Stop reason: SURG

## 2018-11-15 RX ORDER — MIDAZOLAM HYDROCHLORIDE 1 MG/ML
INJECTION INTRAMUSCULAR; INTRAVENOUS AS NEEDED
Status: DISCONTINUED | OUTPATIENT
Start: 2018-11-15 | End: 2018-11-15 | Stop reason: SURG

## 2018-11-15 RX ORDER — ONDANSETRON 2 MG/ML
4 INJECTION INTRAMUSCULAR; INTRAVENOUS ONCE AS NEEDED
Status: DISCONTINUED | OUTPATIENT
Start: 2018-11-15 | End: 2018-11-15 | Stop reason: HOSPADM

## 2018-11-15 RX ORDER — ONDANSETRON 2 MG/ML
INJECTION INTRAMUSCULAR; INTRAVENOUS AS NEEDED
Status: DISCONTINUED | OUTPATIENT
Start: 2018-11-15 | End: 2018-11-15 | Stop reason: SURG

## 2018-11-15 RX ORDER — SODIUM CHLORIDE 9 MG/ML
125 INJECTION, SOLUTION INTRAVENOUS CONTINUOUS
Status: DISCONTINUED | OUTPATIENT
Start: 2018-11-15 | End: 2018-11-15 | Stop reason: HOSPADM

## 2018-11-15 RX ORDER — LIDOCAINE HYDROCHLORIDE 10 MG/ML
INJECTION, SOLUTION INFILTRATION; PERINEURAL AS NEEDED
Status: DISCONTINUED | OUTPATIENT
Start: 2018-11-15 | End: 2018-11-15 | Stop reason: SURG

## 2018-11-15 RX ORDER — ROCURONIUM BROMIDE 10 MG/ML
INJECTION, SOLUTION INTRAVENOUS AS NEEDED
Status: DISCONTINUED | OUTPATIENT
Start: 2018-11-15 | End: 2018-11-15 | Stop reason: SURG

## 2018-11-15 RX ORDER — PROPOFOL 10 MG/ML
INJECTION, EMULSION INTRAVENOUS AS NEEDED
Status: DISCONTINUED | OUTPATIENT
Start: 2018-11-15 | End: 2018-11-15 | Stop reason: SURG

## 2018-11-15 RX ADMIN — ONDANSETRON 4 MG: 2 INJECTION INTRAMUSCULAR; INTRAVENOUS at 09:21

## 2018-11-15 RX ADMIN — SODIUM CHLORIDE: 0.9 INJECTION, SOLUTION INTRAVENOUS at 07:31

## 2018-11-15 RX ADMIN — LIDOCAINE HYDROCHLORIDE 50 MG: 10 INJECTION, SOLUTION INFILTRATION; PERINEURAL at 09:21

## 2018-11-15 RX ADMIN — FENTANYL CITRATE 50 MCG: 50 INJECTION INTRAMUSCULAR; INTRAVENOUS at 09:28

## 2018-11-15 RX ADMIN — SUCCINYLCHOLINE CHLORIDE 100 MG: 20 INJECTION, SOLUTION INTRAMUSCULAR; INTRAVENOUS at 09:21

## 2018-11-15 RX ADMIN — CLINDAMYCIN PHOSPHATE 900 MG: 18 INJECTION, SOLUTION INTRAMUSCULAR; INTRAVENOUS at 09:27

## 2018-11-15 RX ADMIN — FENTANYL CITRATE 50 MCG: 50 INJECTION INTRAMUSCULAR; INTRAVENOUS at 09:21

## 2018-11-15 RX ADMIN — ROCURONIUM BROMIDE 10 MG: 10 INJECTION INTRAVENOUS at 09:21

## 2018-11-15 RX ADMIN — MIDAZOLAM HYDROCHLORIDE 2 MG: 1 INJECTION, SOLUTION INTRAMUSCULAR; INTRAVENOUS at 09:15

## 2018-11-15 RX ADMIN — HYDROCODONE BITARTRATE AND ACETAMINOPHEN 1 TABLET: 5; 325 TABLET ORAL at 11:10

## 2018-11-15 RX ADMIN — DEXAMETHASONE SODIUM PHOSPHATE 4 MG: 10 INJECTION INTRAMUSCULAR; INTRAVENOUS at 09:23

## 2018-11-15 RX ADMIN — PROPOFOL 200 MG: 10 INJECTION, EMULSION INTRAVENOUS at 09:21

## 2018-11-15 NOTE — ANESTHESIA PREPROCEDURE EVALUATION
Review of Systems/Medical History  Patient summary reviewed  Chart reviewed  History of anesthetic complications (claustrophobic with mask; mild PONV related to post op narcotics)     Cardiovascular  Hyperlipidemia, Hypertension ,    Pulmonary  Smoker cigarette smoker  , Tobacco cessation counseling given ,        GI/Hepatic       Kidney stones,        Endo/Other  History of thyroid disease , hypothyroidism,   Obesity (BMI 40)  morbid obesity   GYN  Negative gynecology ROS Not currently pregnant ,          Hematology   Musculoskeletal       Neurology    Headaches,    Psychology   Anxiety, Depression ,            Physical Exam    Airway    Mallampati score: I  TM Distance: >3 FB  Neck ROM: full     Dental   Comment: Some lower teeth - none loose, upper dentures,     Cardiovascular      Pulmonary      Other Findings      Lab Results   Component Value Date    WBC 8 4 06/05/2018    HGB 14 0 06/05/2018     06/05/2018     Lab Results   Component Value Date     06/24/2017    K 4 6 06/05/2018    BUN 10 06/05/2018    CREATININE 0 62 04/09/2018    GLUCOSE 111 (H) 01/05/2016     Lab Results   Component Value Date    HGBA1C 5 8 (H) 06/05/2018     Anesthesia Plan  ASA Score- 2     Anesthesia Type- general with ASA Monitors  Additional Monitors:   Airway Plan: ETT  Plan Factors- Patient instructed to abstain from smoking on day of procedure  Patient did not smoke on day of surgery  Induction- intravenous  Postoperative Plan-     Informed Consent- Anesthetic plan and risks discussed with patient and daughter  I personally reviewed this patient with the CRNA  Discussed and agreed on the Anesthesia Plan with the ZAKI Richardson

## 2018-11-15 NOTE — DISCHARGE INSTRUCTIONS
Please call the office when you leave to schedule an appointment to be seen in 2-3 weeks  Anesthesia Precautions:  1 ) Have a responsible person drive you home and someone to stay with you at home (in case of dizziness)  2 ) Rest and relax for 24 hours  3 ) Drink Clear liquids until there is no nausea or vomiting, then resume diet as normal   4 ) Diet as tolerated  5 ) Do not drink alcohol, drive any vehicle, operate mechanical equipment (e g  Sewing machine, kitchen stove, etc ) or make any critical decisions for 24 hours  Activity:    Do not lift more than 10 pounds (a gallon of milk) for 1-2 weeks post-operatively    Walking is encouraged  Normal daily activities including climbing steps are okay  Do not engage in strenuous activity or contact sports for 4-6 weeks post-operatively  Return to work:    Return to work to be discussed at first post-operative visit  Diet:    You may return to your normal heart healthy diet  Wound Care: Your wound is closed with Histoacryl  It is okay to shower  Wash incision gently with soap and water and pat dry  Do not soak incisions in bath water or swim for two weeks  Do not apply any creams or ointments  Ice as needed  Pain Medication:    Please take as directed  No driving while taking narcotic pain medications    Other:  If you have questions after discharge please call the office    If you have increased pain, fever >101 5, increased drainage, redness or a bad smell at your surgery site, please call us immediately or come directly to the Emergency Room

## 2018-11-15 NOTE — ANESTHESIA POSTPROCEDURE EVALUATION
Post-Op Assessment Note      CV Status:  Stable    Mental Status:  Alert and awake    Hydration Status:  Euvolemic    PONV Controlled:  Controlled    Airway Patency:  Patent    Post Op Vitals Reviewed: Yes          Staff: ZAKI           BP (!) 178/83 (11/15/18 1013)    Temp 98 2 °F (36 8 °C) (11/15/18 1013)    Pulse 102 (11/15/18 1013)   Resp 15 (11/15/18 1013)    SpO2 98 % (11/15/18 1013)

## 2018-11-15 NOTE — OP NOTE
OPERATIVE REPORT  PATIENT NAME: Veronika Daniel    :  1970  MRN: [id number]  Pt Location: [facility]    SURGERY DATE: 11/15/2018    Surgeon(s) and Role:     * Dann Neville DO - Primary     * Jr Thomas MD - Assisting    Preop Diagnosis:  Incisional hernia, without obstruction or gangrene [K43 2]    Post-Op Diagnosis Codes:     * Incisional hernia, without obstruction or gangrene [K43 2]    Procedure(s) (LRB):  INCISIONAL HERNIA REPAIR AT UMBILICUS (N/A) with 2 5 in Ventralex patch    Specimen(s):  * No specimens in log *    Estimated Blood Loss:   Minimal    Drains:  Colostomy Descending/sigmoid LUQ (Active)   Number of days: 312       Anesthesia Type:   General    Operative Indications:  Incisional hernia, without obstruction or gangrene [K43 2]      Operative Findings:  Incisional hernia at the superior aspect of her prior laparotomy scar    Review of Systems/Medical History  Patient summary reviewed  Chart reviewed  History of anesthetic complications (claustrophobic with mask; mild PONV related to post op narcotics)     Cardiovascular  Hyperlipidemia, Hypertension ,  Pulmonary  Smoker cigarette smoker  , Tobacco cessation counseling given ,       GI/Hepatic      Kidney stones,       Endo/Other  History of thyroid disease , hypothyroidism,   Obesity (BMI 40)  morbid obesity    GYN  Negative gynecology ROS Not currently pregnant ,          Hematology Musculoskeletal      Neurology  Headaches,  Psychology   Anxiety, Depression ,          Height 66 in weight 114 kg/203 lb BMI 41  ASA 2  Wound class 2    Complications:   None    Procedure and Technique:  Patient was brought the operative suite and identified by visualization, conversation, by armband  Sequential compression pumps were placed  She was given perioperative antibiotics  Once under anesthesia abdomen is then prepped and draped in a sterile fashion  Time-out was performed and was assured that the prep was dry    Local was instilled at the superior aspect of her old laparotomy scar skin incision was made in the subcutaneous tissue was divided with hot cautery down the hernia sac hernia sac was noted and freed of the surrounding tissues as well as the underlying fascial defect  A 2 5 in Ventralex patch was chosen and placed under the fascia through the defect  It was anchored at 12, 3, 6, 9 o'clock positions with 1  PDS suture  Tails were trimmed irrigation is carried out local was instilled 1  PDS was used in a figure-of-eight fashion x2 to close the fascia on top of the mesh  Irrigation was carried out yet again  Two 0 Vicryl was used to close subcutaneous tissues and 4 Monocryl was used to close skin in a subcuticular fashion  Wounds washed and dried  Sterile histoacryl was applied  She was awakened in the operating room and returned to the recovery area in stable condition having tolerated procedure well     I was present for the entire procedure    Patient Disposition:  PACU     SIGNATURE: Keyla Dyer DO  DATE: November 15, 2018  TIME: 10:13 AM

## 2018-11-15 NOTE — INTERVAL H&P NOTE
H&P reviewed  After examining the patient I find no changes in the patients condition since the H&P had been written      Upper incisional hernia on exam

## 2019-01-18 ENCOUNTER — OFFICE VISIT (OUTPATIENT)
Dept: FAMILY MEDICINE CLINIC | Facility: OTHER | Age: 49
End: 2019-01-18
Payer: COMMERCIAL

## 2019-01-18 VITALS
DIASTOLIC BLOOD PRESSURE: 80 MMHG | HEART RATE: 103 BPM | HEIGHT: 68 IN | WEIGHT: 255.25 LBS | SYSTOLIC BLOOD PRESSURE: 140 MMHG | TEMPERATURE: 98 F | BODY MASS INDEX: 38.68 KG/M2 | OXYGEN SATURATION: 96 %

## 2019-01-18 DIAGNOSIS — J01.00 ACUTE NON-RECURRENT MAXILLARY SINUSITIS: ICD-10-CM

## 2019-01-18 DIAGNOSIS — R05.9 COUGH: Primary | ICD-10-CM

## 2019-01-18 LAB — S PYO AG THROAT QL: NEGATIVE

## 2019-01-18 PROCEDURE — 99214 OFFICE O/P EST MOD 30 MIN: CPT | Performed by: FAMILY MEDICINE

## 2019-01-18 PROCEDURE — 87631 RESP VIRUS 3-5 TARGETS: CPT | Performed by: FAMILY MEDICINE

## 2019-01-18 PROCEDURE — 87880 STREP A ASSAY W/OPTIC: CPT | Performed by: FAMILY MEDICINE

## 2019-01-18 RX ORDER — ALBUTEROL SULFATE 90 UG/1
2 AEROSOL, METERED RESPIRATORY (INHALATION) EVERY 4 HOURS PRN
Qty: 1 INHALER | Refills: 0 | Status: SHIPPED | OUTPATIENT
Start: 2019-01-18 | End: 2021-11-26 | Stop reason: SDUPTHER

## 2019-01-18 RX ORDER — SULFAMETHOXAZOLE AND TRIMETHOPRIM 800; 160 MG/1; MG/1
1 TABLET ORAL EVERY 12 HOURS SCHEDULED
Qty: 20 TABLET | Refills: 0 | Status: SHIPPED | OUTPATIENT
Start: 2019-01-18 | End: 2019-01-28

## 2019-01-18 NOTE — PATIENT INSTRUCTIONS
Rhinosinusitis   AMBULATORY CARE:   Rhinosinusitis (RS)  is inflammation of your nose and sinuses  It commonly begins as a virus, often as a common cold  Viruses usually last 7 to 10 days and do not need treatment  When the virus does not get better on its own, you may have bacterial RS  This means that bacteria have begun to grow inside your sinuses  Acute RS lasts less than 4 weeks  Chronic RS lasts 12 weeks or more  Recurrent RS is when you have 4 or more episodes of RS in one year  Your signs and symptoms  may be worse when you lie on your back or try to sleep  You may have any of the following:  · Stuffy nose and reduced sense of smell     · Runny nose with thick yellow or green mucus     · Pressure or pain on your face or a headache     · Pain in your teeth or bad breath     · Ear pain or pressure     · Fever or cough     · Tiredness  Seek care immediately if:   · You have double vision or you cannot see  · You have a stiff neck, a fever, or a bad headache  · Your eyeball bulges out or you cannot move your eye  · Your eye and eyelid are red, swollen, and painful  · You cannot open your eye  · You are more sleepy than normal, or you notice changes in your ability to think, move, or talk  · You have swelling of your forehead or scalp  Contact your healthcare provider if:   · Your symptoms are worse or do not improve after 3 to 5 days of treatment  · You have questions or concerns about your condition or care  Treatment for rhinosinusitis  may include any of the following:  · Acetaminophen  decreases pain and fever  It is available without a doctor's order  Ask how much to take and how often to take it  Follow directions  Acetaminophen can cause liver damage if not taken correctly  · NSAIDs , such as ibuprofen, help decrease swelling, pain, and fever  This medicine is available with or without a doctor's order   NSAIDs can cause stomach bleeding or kidney problems in certain people  If you take blood thinner medicine, always ask your healthcare provider if NSAIDs are safe for you  Always read the medicine label and follow directions  · Nasal steroid sprays  decrease inflammation in your nose and sinuses  · Decongestants  reduce swelling and drain mucus in the nose and sinuses  They may help you breathe easier  · Antihistamines  dry mucus in the nose and relieve sneezing  · Antibiotics  treat a bacterial infection and may be needed if your symptoms do not improve or they get worse  · Take your medicine as directed  Contact your healthcare provider if you think your medicine is not helping or if you have side effects  Tell him or her if you are allergic to any medicine  Keep a list of the medicines, vitamins, and herbs you take  Include the amounts, and when and why you take them  Bring the list or the pill bottles to follow-up visits  Carry your medicine list with you in case of an emergency  Self-care:   · Rinse your sinuses  Use a sinus rinse device to rinse your nasal passages with a saline (salt water) solution  This will help thin the mucus in your nose and rinse away pollen and dirt  It will also help reduce swelling so you can breathe normally  Ask your healthcare provider how often to do this  · Breathe in steam   Heat a bowl of water until you see steam  Lean over the bowl and make a tent over your head with a large towel  Breathe deeply for about 20 minutes  Be careful not to get too close to the steam or burn yourself  Do this 3 times a day  You can also breathe deeply when you take a hot shower  · Sleep with your head elevated  Place an extra pillow under your head before you go to sleep to help your sinuses drain  · Drink liquids as directed  Ask your healthcare provider how much liquid to drink each day and which liquids are best for you  Liquids will thin the mucus in your nose and help it drain   Avoid drinks that contain alcohol or caffeine  · Do not smoke, and avoid secondhand smoke  Nicotine and other chemicals in cigarettes and cigars can make your symptoms worse  Ask your healthcare provider for information if you currently smoke and need help to quit  E-cigarettes or smokeless tobacco still contain nicotine  Talk to your healthcare provider before you use these products  Follow up with your healthcare provider as directed: Follow up if your symptoms are worse or not better after 3 to 5 days of treatment  Write down your questions so you remember to ask them during your visits  © 2017 [employer] Information is for End User's use only and may not be sold, redistributed or otherwise used for commercial purposes  All illustrations and images included in CareNotes® are the copyrighted property of [employer]  or [employer]  The above information is an  only  It is not intended as medical advice for individual conditions or treatments  Talk to your doctor, nurse or pharmacist before following any medical regimen to see if it is safe and effective for you

## 2019-01-18 NOTE — PROGRESS NOTES
Assessment/Plan:    Advised to take the following medication  Take OTC antihistamine   Keep hydrated and ensure adequate po intake  FU PRN        Problem List Items Addressed This Visit     None      Visit Diagnoses     Cough    -  Primary    Relevant Medications    albuterol (PROAIR HFA) 90 mcg/act inhaler    Other Relevant Orders    INFLUENZA A/B AND RSV, PCR  Pending but for patient info purpose she is past therapy window  POCT rapid strepA (Completed) NEG     Acute non-recurrent maxillary sinusitis        Relevant Medications    sulfamethoxazole-trimethoprim (BACTRIM DS) 800-160 mg per tablet            Subjective:      Patient ID: Alyssa Zepeda is a 53 y o  female  Patient is here for eval of cold and nasal congestion and nasal drainage HA and cough and sore throat for 3+ days  She feels appetite is down but no vomiting or nausea noted  She has been around sick person at home  Her daughter had sinus infection and  did have the flu 2 weeks ago  She wants to have all tests done and get checked for the flu as well  Although the period to treat is over she just wants to know  She did have the flu shot  Sinusitis   This is a new problem  Episode onset: the symptoms are ongoing for 3 5 days  There has been no fever  The pain is moderate  Associated symptoms include congestion, coughing, headaches, a hoarse voice, sinus pressure, sneezing and a sore throat  Pertinent negatives include no chills, diaphoresis, ear pain or swollen glands  Past treatments include acetaminophen  The treatment provided no relief  The following portions of the patient's history were reviewed and updated as appropriate: allergies, current medications, past family history, past medical history, past social history, past surgical history and problem list     Review of Systems   Constitutional: Negative for chills and diaphoresis     HENT: Positive for congestion, hoarse voice, sinus pressure, sneezing and sore throat  Negative for ear pain  Respiratory: Positive for cough  Gastrointestinal: Negative for abdominal pain and nausea  Genitourinary: Negative for dysuria  Musculoskeletal: Negative for back pain and myalgias  Neurological: Positive for headaches  Objective:      /80 (BP Location: Right arm, Patient Position: Sitting, Cuff Size: Adult)   Pulse 103   Temp 98 °F (36 7 °C)   Ht 5' 7 72" (1 72 m)   Wt 116 kg (255 lb 4 oz)   SpO2 96%   BMI 39 14 kg/m²          Physical Exam   Constitutional: She is oriented to person, place, and time  She appears well-developed and well-nourished  No distress  HENT:   Head: Normocephalic and atraumatic  Right Ear: External ear normal    Left Ear: External ear normal    Mouth/Throat: Oropharynx is clear and moist  No oropharyngeal exudate  Nasal mucosa is boggy with enlarged turbinates and erythema   + sinus tenderness with percussion  TM's with clear effusion bilaterally  OP with mild erythema but no edema or exudate  Eyes: Right eye exhibits no discharge  Left eye exhibits no discharge  No scleral icterus  Neck: Normal range of motion  Neck supple  Cardiovascular: Normal rate, regular rhythm and normal heart sounds  No murmur heard  Pulmonary/Chest: Effort normal and breath sounds normal  No respiratory distress  She has no wheezes  Abdominal: Soft  Bowel sounds are normal  She exhibits no distension  There is no tenderness  Lymphadenopathy:     She has no cervical adenopathy  Neurological: She is alert and oriented to person, place, and time  No cranial nerve deficit  Skin: Skin is warm and dry  No rash noted  She is not diaphoretic  Psychiatric: She has a normal mood and affect  Her behavior is normal    Nursing note and vitals reviewed

## 2019-01-19 LAB
FLUAV AG SPEC QL: NORMAL
FLUBV AG SPEC QL: NORMAL
RSV B RNA SPEC QL NAA+PROBE: NORMAL

## 2019-01-21 ENCOUNTER — TELEPHONE (OUTPATIENT)
Dept: FAMILY MEDICINE CLINIC | Facility: OTHER | Age: 49
End: 2019-01-21

## 2019-01-21 NOTE — TELEPHONE ENCOUNTER
Left message for pt to return call    ----- Message from Elana Castorena MD sent at 1/21/2019 10:57 AM EST -----  The flu test came back normal and negative for the flu infection

## 2019-01-23 DIAGNOSIS — E03.9 HYPOTHYROIDISM, UNSPECIFIED TYPE: ICD-10-CM

## 2019-01-23 RX ORDER — LEVOTHYROXINE SODIUM 0.03 MG/1
TABLET ORAL
Qty: 90 TABLET | Refills: 1 | OUTPATIENT
Start: 2019-01-23

## 2019-02-09 DIAGNOSIS — F41.9 ANXIETY: ICD-10-CM

## 2019-02-09 DIAGNOSIS — E03.9 HYPOTHYROIDISM, UNSPECIFIED TYPE: ICD-10-CM

## 2019-02-11 RX ORDER — LEVOTHYROXINE SODIUM 0.03 MG/1
TABLET ORAL
Qty: 90 TABLET | Refills: 1 | OUTPATIENT
Start: 2019-02-11

## 2019-02-11 RX ORDER — ALPRAZOLAM 0.25 MG/1
TABLET ORAL
Qty: 30 TABLET | Refills: 0 | OUTPATIENT
Start: 2019-02-11

## 2019-02-13 DIAGNOSIS — F41.9 ANXIETY: ICD-10-CM

## 2019-02-13 DIAGNOSIS — E03.9 HYPOTHYROIDISM, UNSPECIFIED TYPE: ICD-10-CM

## 2019-02-14 RX ORDER — LEVOTHYROXINE SODIUM 0.03 MG/1
25 TABLET ORAL DAILY
Qty: 90 TABLET | Refills: 1 | Status: SHIPPED | OUTPATIENT
Start: 2019-02-14 | End: 2019-03-15 | Stop reason: SDUPTHER

## 2019-02-14 RX ORDER — ALPRAZOLAM 0.25 MG/1
0.25 TABLET ORAL 3 TIMES DAILY PRN
Qty: 30 TABLET | Refills: 0 | Status: SHIPPED | OUTPATIENT
Start: 2019-02-14 | End: 2019-03-15 | Stop reason: SDUPTHER

## 2019-02-27 ENCOUNTER — TELEPHONE (OUTPATIENT)
Dept: FAMILY MEDICINE CLINIC | Facility: OTHER | Age: 49
End: 2019-02-27

## 2019-02-27 NOTE — TELEPHONE ENCOUNTER
Just scheduled appointment for pt on 3/15/19 for routine follow up   She would like blood work slips mailed to her so she can get it done before appointment

## 2019-03-01 DIAGNOSIS — R73.03 PREDIABETES: ICD-10-CM

## 2019-03-01 DIAGNOSIS — I10 BENIGN ESSENTIAL HYPERTENSION: ICD-10-CM

## 2019-03-01 DIAGNOSIS — K76.0 FATTY LIVER: ICD-10-CM

## 2019-03-01 DIAGNOSIS — E78.2 MIXED HYPERLIPIDEMIA: ICD-10-CM

## 2019-03-01 DIAGNOSIS — E66.9 CLASS 2 OBESITY WITHOUT SERIOUS COMORBIDITY WITH BODY MASS INDEX (BMI) OF 35.0 TO 35.9 IN ADULT, UNSPECIFIED OBESITY TYPE: ICD-10-CM

## 2019-03-01 DIAGNOSIS — E03.9 HYPOTHYROIDISM, UNSPECIFIED TYPE: Primary | ICD-10-CM

## 2019-03-08 DIAGNOSIS — I10 ESSENTIAL HYPERTENSION: ICD-10-CM

## 2019-03-11 RX ORDER — LOSARTAN POTASSIUM 100 MG/1
TABLET ORAL
Qty: 30 TABLET | Refills: 0 | OUTPATIENT
Start: 2019-03-11

## 2019-03-14 LAB
ALBUMIN SERPL-MCNC: 4.5 G/DL (ref 3.5–5.5)
ALBUMIN/GLOB SERPL: 1.6 {RATIO} (ref 1.2–2.2)
ALP SERPL-CCNC: 95 IU/L (ref 39–117)
ALT SERPL-CCNC: 23 IU/L (ref 0–32)
APPEARANCE UR: CLEAR
AST SERPL-CCNC: 23 IU/L (ref 0–40)
BACTERIA URNS QL MICRO: ABNORMAL
BASOPHILS # BLD AUTO: 0.1 X10E3/UL (ref 0–0.2)
BASOPHILS NFR BLD AUTO: 1 %
BILIRUB SERPL-MCNC: 0.7 MG/DL (ref 0–1.2)
BILIRUB UR QL STRIP: NEGATIVE
BUN SERPL-MCNC: 9 MG/DL (ref 6–24)
BUN/CREAT SERPL: 10 (ref 9–23)
CALCIUM SERPL-MCNC: 9.5 MG/DL (ref 8.7–10.2)
CHLORIDE SERPL-SCNC: 97 MMOL/L (ref 96–106)
CHOLEST SERPL-MCNC: 216 MG/DL (ref 100–199)
CO2 SERPL-SCNC: 22 MMOL/L (ref 20–29)
COLOR UR: YELLOW
CREAT SERPL-MCNC: 0.88 MG/DL (ref 0.57–1)
EOSINOPHIL # BLD AUTO: 0.3 X10E3/UL (ref 0–0.4)
EOSINOPHIL NFR BLD AUTO: 3 %
EPI CELLS #/AREA URNS HPF: ABNORMAL /HPF (ref 0–10)
ERYTHROCYTE [DISTWIDTH] IN BLOOD BY AUTOMATED COUNT: 13.8 % (ref 12.3–15.4)
GLOBULIN SER-MCNC: 2.9 G/DL (ref 1.5–4.5)
GLUCOSE SERPL-MCNC: 159 MG/DL (ref 65–99)
GLUCOSE UR QL: NEGATIVE
HBA1C MFR BLD: 7.9 % (ref 4.8–5.6)
HCT VFR BLD AUTO: 43.2 % (ref 34–46.6)
HDLC SERPL-MCNC: 37 MG/DL
HGB BLD-MCNC: 14.1 G/DL (ref 11.1–15.9)
HGB UR QL STRIP: ABNORMAL
IMM GRANULOCYTES # BLD: 0 X10E3/UL (ref 0–0.1)
IMM GRANULOCYTES NFR BLD: 0 %
KETONES UR QL STRIP: NEGATIVE
LABCORP COMMENT: NORMAL
LDLC SERPL CALC-MCNC: 142 MG/DL (ref 0–99)
LEUKOCYTE ESTERASE UR QL STRIP: NEGATIVE
LYMPHOCYTES # BLD AUTO: 2.4 X10E3/UL (ref 0.7–3.1)
LYMPHOCYTES NFR BLD AUTO: 25 %
MCH RBC QN AUTO: 28.7 PG (ref 26.6–33)
MCHC RBC AUTO-ENTMCNC: 32.6 G/DL (ref 31.5–35.7)
MCV RBC AUTO: 88 FL (ref 79–97)
MICRO URNS: ABNORMAL
MONOCYTES # BLD AUTO: 0.6 X10E3/UL (ref 0.1–0.9)
MONOCYTES NFR BLD AUTO: 6 %
MUCOUS THREADS URNS QL MICRO: PRESENT
NEUTROPHILS # BLD AUTO: 6.2 X10E3/UL (ref 1.4–7)
NEUTROPHILS NFR BLD AUTO: 65 %
NITRITE UR QL STRIP: NEGATIVE
PH UR STRIP: 6 [PH] (ref 5–7.5)
PLATELET # BLD AUTO: 390 X10E3/UL (ref 150–379)
POTASSIUM SERPL-SCNC: 4.4 MMOL/L (ref 3.5–5.2)
PROT SERPL-MCNC: 7.4 G/DL (ref 6–8.5)
PROT UR QL STRIP: NEGATIVE
RBC # BLD AUTO: 4.91 X10E6/UL (ref 3.77–5.28)
RBC #/AREA URNS HPF: ABNORMAL /HPF (ref 0–2)
SL AMB EGFR AFRICAN AMERICAN: 90 ML/MIN/1.73
SL AMB EGFR NON AFRICAN AMERICAN: 78 ML/MIN/1.73
SL AMB T4, FREE (DIRECT): 1.22 NG/DL (ref 0.82–1.77)
SODIUM SERPL-SCNC: 134 MMOL/L (ref 134–144)
SP GR UR: 1.01 (ref 1–1.03)
TRIGL SERPL-MCNC: 186 MG/DL (ref 0–149)
TSH SERPL DL<=0.005 MIU/L-ACNC: 5.06 UIU/ML (ref 0.45–4.5)
UROBILINOGEN UR STRIP-ACNC: 0.2 EU/DL (ref 0.2–1)
WBC # BLD AUTO: 9.5 X10E3/UL (ref 3.4–10.8)
WBC #/AREA URNS HPF: ABNORMAL /HPF (ref 0–5)

## 2019-03-15 ENCOUNTER — OFFICE VISIT (OUTPATIENT)
Dept: FAMILY MEDICINE CLINIC | Facility: OTHER | Age: 49
End: 2019-03-15
Payer: COMMERCIAL

## 2019-03-15 VITALS
HEART RATE: 109 BPM | DIASTOLIC BLOOD PRESSURE: 74 MMHG | OXYGEN SATURATION: 98 % | TEMPERATURE: 98.5 F | SYSTOLIC BLOOD PRESSURE: 118 MMHG | BODY MASS INDEX: 38.51 KG/M2 | WEIGHT: 254.13 LBS | HEIGHT: 68 IN

## 2019-03-15 DIAGNOSIS — E78.2 MIXED HYPERLIPIDEMIA: ICD-10-CM

## 2019-03-15 DIAGNOSIS — E66.9 CLASS 2 OBESITY WITHOUT SERIOUS COMORBIDITY WITH BODY MASS INDEX (BMI) OF 35.0 TO 35.9 IN ADULT, UNSPECIFIED OBESITY TYPE: ICD-10-CM

## 2019-03-15 DIAGNOSIS — Z23 NEED FOR TDAP VACCINATION: Primary | ICD-10-CM

## 2019-03-15 DIAGNOSIS — E66.9 OBESITY (BMI 35.0-39.9 WITHOUT COMORBIDITY): ICD-10-CM

## 2019-03-15 DIAGNOSIS — R91.8 LUNG NODULES: ICD-10-CM

## 2019-03-15 DIAGNOSIS — E03.9 HYPOTHYROIDISM, UNSPECIFIED TYPE: ICD-10-CM

## 2019-03-15 DIAGNOSIS — F41.9 ANXIETY: ICD-10-CM

## 2019-03-15 DIAGNOSIS — R31.29 MICROSCOPIC HEMATURIA: ICD-10-CM

## 2019-03-15 DIAGNOSIS — I10 ESSENTIAL HYPERTENSION: ICD-10-CM

## 2019-03-15 DIAGNOSIS — E11.9 TYPE 2 DIABETES MELLITUS WITHOUT COMPLICATION, WITHOUT LONG-TERM CURRENT USE OF INSULIN (HCC): ICD-10-CM

## 2019-03-15 DIAGNOSIS — F17.210 CIGARETTE NICOTINE DEPENDENCE WITHOUT COMPLICATION: ICD-10-CM

## 2019-03-15 DIAGNOSIS — I10 BENIGN ESSENTIAL HYPERTENSION: ICD-10-CM

## 2019-03-15 DIAGNOSIS — J01.01 ACUTE RECURRENT MAXILLARY SINUSITIS: ICD-10-CM

## 2019-03-15 PROBLEM — R73.03 PRE-DIABETES: Status: RESOLVED | Noted: 2019-03-15 | Resolved: 2019-03-15

## 2019-03-15 PROCEDURE — 90715 TDAP VACCINE 7 YRS/> IM: CPT

## 2019-03-15 PROCEDURE — 4010F ACE/ARB THERAPY RXD/TAKEN: CPT | Performed by: FAMILY MEDICINE

## 2019-03-15 PROCEDURE — 99215 OFFICE O/P EST HI 40 MIN: CPT | Performed by: NURSE PRACTITIONER

## 2019-03-15 PROCEDURE — 90471 IMMUNIZATION ADMIN: CPT

## 2019-03-15 RX ORDER — SULFAMETHOXAZOLE AND TRIMETHOPRIM 800; 160 MG/1; MG/1
1 TABLET ORAL EVERY 12 HOURS SCHEDULED
Qty: 20 TABLET | Refills: 0 | Status: SHIPPED | OUTPATIENT
Start: 2019-03-15 | End: 2019-03-25

## 2019-03-15 RX ORDER — ALPRAZOLAM 0.25 MG/1
0.25 TABLET ORAL 3 TIMES DAILY PRN
Qty: 30 TABLET | Refills: 1 | Status: SHIPPED | OUTPATIENT
Start: 2019-03-15 | End: 2019-06-20 | Stop reason: SDUPTHER

## 2019-03-15 RX ORDER — LEVOTHYROXINE SODIUM 0.03 MG/1
25 TABLET ORAL DAILY
Qty: 90 TABLET | Refills: 3 | Status: SHIPPED | OUTPATIENT
Start: 2019-03-15 | End: 2019-06-20 | Stop reason: SDUPTHER

## 2019-03-15 RX ORDER — BUPROPION HYDROCHLORIDE 300 MG/1
300 TABLET ORAL DAILY
Qty: 30 TABLET | Refills: 3 | Status: SHIPPED | OUTPATIENT
Start: 2019-03-15 | End: 2019-06-20

## 2019-03-15 RX ORDER — LOSARTAN POTASSIUM 100 MG/1
100 TABLET ORAL DAILY
Qty: 90 TABLET | Refills: 3 | Status: SHIPPED | OUTPATIENT
Start: 2019-03-15 | End: 2020-03-04

## 2019-03-15 RX ORDER — ATORVASTATIN CALCIUM 20 MG/1
20 TABLET, FILM COATED ORAL DAILY
Qty: 30 TABLET | Refills: 5 | Status: SHIPPED | OUTPATIENT
Start: 2019-03-15 | End: 2019-08-27 | Stop reason: SDUPTHER

## 2019-03-15 RX ORDER — BUPROPION HYDROCHLORIDE 150 MG/1
150 TABLET ORAL DAILY
Qty: 4 TABLET | Refills: 0 | Status: SHIPPED | OUTPATIENT
Start: 2019-03-15 | End: 2019-06-20

## 2019-03-15 NOTE — PROGRESS NOTES
Assessment/Plan:       Problem List Items Addressed This Visit     Type 2 diabetes mellitus without complication, without long-term current use of insulin ([facility] [address] )  --New diagnosis  Spent fair amount of time educating patient on diet, weight loss, foot care, eye care, glucose control  She is declining glucometer at this time  Offer diabetes educator at next visit  --Start metformin  Potential AE's discussed  She is declining second/additional agent at this time, preferring instead to work on dietary, weight loss measures  --Foot exam today  Advised to schedule diabetic eye exam  --Repeat A1C in 3 months    Relevant Medications    metFORMIN (GLUCOPHAGE) 1000 mg tablet: 1/2 tab BID x 2 weeks followed by 1 tab BID    Other Relevant Orders    Hemoglobin A1C    Microalbumin / creatinine urine ratio    Diabetic foot exam    Benign essential hypertension  --Controlled on ARB  --Dietary, weight loss measures, smoking cessation    Relevant Medications    losartan (COZAAR) 100 MG tablet    Other Relevant Orders    Comprehensive metabolic panel    Hyperlipemia  --Start statin  --Dietary, weight loss measures  Smoking cessation   Relevant Medications   atorvastatin (LIPITOR) 20 mg tablet   Other Relevant Orders   Lipid Panel with Direct LDL reflex  CMP       Nicotine dependence + pulmonary nodules  --Applauded for her efforts to attempt quitting  Wishes to try Wellbutrin  Instructions given for proper use  Potential AE's discussed  --Get chest CT done (slip given previously)   Relevant Medications   buPROPion (WELLBUTRIN XL) 150 mg 24 hr tablet x 4 days, then   buPROPion (WELLBUTRIN XL) 300 mg 24 hr tablet daily   Hypothyroidism   --TSH borderline high, but asymptomatic, so will keep same dose for now, rechecking level in 3 months   Relevant Medications   levothyroxine 25 mcg tablet   Other Relevant Orders   TSH, 3rd generation with Free T4 reflex   Microscopic hematuria  --Asymptomatic  Likely secondary to menses  Repeat with next labs, waiting at least a week after completion of period    Relevant Orders    Urinalysis with reflex to microscopic    Urine culture    Anxiety + mild depression (seasonal)  --Controlled on current regimen  --Regular walking, other stress relieving measures  Daily vitamin D supplement  --Start Wellbutrin per below  --Delines need for counsellor/additional interventions    Relevant Medications    ALPRAZolam (XANAX) 0 25 mg tablet TID prn      Other Visit Diagnoses     Obesity (BMI 35 0-39 9 without comorbidity)        Relevant Orders    Ambulatory referral to Nutrition Services    Acute recurrent maxillary sinusitis      --Saline nasal spray, OTC nasal steroid, steam, warm compresses  --Call for no improvement/worsening    Relevant Medications    sulfamethoxazole-trimethoprim (BACTRIM DS) 800-160 mg per tablet BID x 10 days (allergic to penicillin, doxycycline)          Get mammogram done (slip given previously)    Tdap booster given  Declining flu shot and pneumovax at this time  RTO 3 months with labs a week prior    Time oriented visit: Greater than 50% total time of 40 minutes spent face-to-face          Subjective:      Patient ID: Erwin Anders is a 53 y o  female  Here for routine follow-up  Sore throat, productive cough, nasal/sinus congestion, yellow rhinorrhea x 4 days  No fever, but feels she is getting a sinus infection which she is prone to  Most recent was back in January  No wheezing, dyspnea, headaches, body aches  Appetite OK  No N/V/D, abdominal pain  Recent (3/11/19) blood work results reviewed with patient  Notable for new onset type 2 diabetes (A1C up to 7 9 from 5 8 previously), sub-optimal lipids (, , , HDL 37--previously declined statin), mildly elevated TSH (5 0) with normal free T4  Also 2+ hematuria, but she notes end of menstrual cycle at the time she got blood work done        Admits to getting off track with her diet over the past few months  More sugar, carbs than she could  Also hasn't been able to exercise as much  Wants to do better  Plans on walking more, making healthy food choices  No vision changes, headaches, dizziness  No recent eye exam   No foot issues including pain, numbness/tingling, swelling  Continues to smoke 1 PPD  Wants to try and quit using Wellbutrin  Previously made attempt with Chantix-->bad dreams  Hasn't gotten chest CT done yet  Compliant with medication  No recent home BP readings  Some increased anxiety lately  Work stress  Takes Xanax on occasional basis which helps  Mild seasonal depression  Gets better with warmer weather  Hasn't gotten mammogram done yet  The following portions of the patient's history were reviewed and updated as appropriate: current medications, past family history, past medical history, past social history, past surgical history and problem list     Review of Systems   Constitutional: Negative for fever  HENT: Positive for rhinorrhea and sore throat  Negative for ear pain  Eyes: Negative for visual disturbance  Respiratory: Positive for cough  Negative for shortness of breath and wheezing  Cardiovascular: Negative for chest pain and palpitations  Gastrointestinal: Negative for abdominal pain, blood in stool, constipation, diarrhea, nausea and vomiting  Genitourinary: Negative for difficulty urinating  Musculoskeletal: Negative for arthralgias and myalgias  Skin: Negative  Neurological: Negative for dizziness and headaches  Psychiatric/Behavioral:        Per HPI         Objective:      /74   Pulse (!) 109   Temp 98 5 °F (36 9 °C) (Tympanic)   Ht 5' 7 72" (1 72 m)   Wt 115 kg (254 lb 2 oz)   SpO2 98%   BMI 38 96 kg/m²          Physical Exam   Constitutional: She is oriented to person, place, and time  She appears well-developed and well-nourished  HENT:   Head: Normocephalic     Right Ear: External ear normal    Left Ear: External ear normal    Mouth/Throat: Oropharynx is clear and moist    Turbinates swollen, erythematous  Bilateral maxillary sinus tenderness  Cheeks mildly flushed  Posterior pharynx mildly erythematous without tonsillar enlargement or exudate  Eyes: Pupils are equal, round, and reactive to light  Conjunctivae are normal    Neck: Normal range of motion  Neck supple  No thyromegaly present  Cardiovascular: Normal rate, regular rhythm, normal heart sounds and intact distal pulses  Pulses are no weak pulses  Pulses:       Dorsalis pedis pulses are 2+ on the right side, and 2+ on the left side  Posterior tibial pulses are 2+ on the right side, and 2+ on the left side  Pulmonary/Chest: Effort normal and breath sounds normal    Abdominal: Soft  Bowel sounds are normal  There is no tenderness  Musculoskeletal: She exhibits no edema  Feet:   Right Foot:   Skin Integrity: Negative for ulcer, skin breakdown, erythema, warmth, callus or dry skin  Left Foot:   Skin Integrity: Negative for ulcer, skin breakdown, erythema, warmth, callus or dry skin  Lymphadenopathy:     She has no cervical adenopathy  Neurological: She is alert and oriented to person, place, and time  She has normal reflexes  Skin: Skin is warm and dry  Psychiatric: She has a normal mood and affect  BMI Counseling: Body mass index is 38 96 kg/m²  Discussed the patient's BMI with her  The BMI is above average  BMI counseling and education was provided to the patient  Referral to a nutritionist was provided to the patient  Patient's shoes and socks removed  Right Foot/Ankle   Right Foot Inspection  Skin Exam: skin normal and skin intact no dry skin, no warmth, no callus, no erythema, no maceration, no abnormal color, no pre-ulcer, no ulcer and no callus                          Toe Exam: ROM and strength within normal limits  Sensory   Vibration: intact  Proprioception: intact   Monofilament testing: intact  Vascular  Capillary refills: < 3 seconds  The right DP pulse is 2+  The right PT pulse is 2+  Left Foot/Ankle  Left Foot Inspection  Skin Exam: skin normal and skin intactno dry skin, no warmth, no erythema, no maceration, normal color, no pre-ulcer, no ulcer and no callus                         Toe Exam: ROM and strength within normal limits                   Sensory   Vibration: intact  Proprioception: intact  Monofilament: intact  Vascular  Capillary refills: < 3 seconds  The left DP pulse is 2+  The left PT pulse is 2+  Assign Risk Category:  No deformity present; No loss of protective sensation;  No weak pulses       Risk: 0

## 2019-03-15 NOTE — PATIENT INSTRUCTIONS
Weight Management   AMBULATORY CARE:   Why it is important to manage your weight:  Being overweight increases your risk of health conditions such as heart disease, high blood pressure, type 2 diabetes, and certain types of cancer  It can also increase your risk for osteoarthritis, sleep apnea, and other respiratory problems  Aim for a slow, steady weight loss  Even a small amount of weight loss can lower your risk of health problems  How to lose weight safely:  A safe and healthy way to lose weight is to eat fewer calories and get regular exercise  You can lose up about 1 pound a week by decreasing the number of calories you eat by 500 calories each day  You can decrease calories by eating smaller portion sizes or by cutting out high-calorie foods  Read labels to find out how many calories are in the foods you eat  You can also burn calories with exercise such as walking, swimming, or biking  You will be more likely to keep weight off if you make these changes part of your lifestyle  Healthy meal plan for weight management:  A healthy meal plan includes a variety of foods, contains fewer calories, and helps you stay healthy  A healthy meal plan includes the following:  · Eat whole-grain foods more often  A healthy meal plan should contain fiber  Fiber is the part of grains, fruits, and vegetables that is not broken down by your body  Whole-grain foods are healthy and provide extra fiber in your diet  Some examples of whole-grain foods are whole-wheat breads and pastas, oatmeal, brown rice, and bulgur  · Eat a variety of vegetables every day  Include dark, leafy greens such as spinach, kale, adrianna greens, and mustard greens  Eat yellow and orange vegetables such as carrots, sweet potatoes, and winter squash  · Eat a variety of fruits every day  Choose fresh or canned fruit (canned in its own juice or light syrup) instead of juice  Fruit juice has very little or no fiber  · Eat low-fat dairy foods  Drink fat-free (skim) milk or 1% milk  Eat fat-free yogurt and low-fat cottage cheese  Try low-fat cheeses such as mozzarella and other reduced-fat cheeses  · Choose meat and other protein foods that are low in fat  Choose beans or other legumes such as split peas or lentils  Choose fish, skinless poultry (chicken or turkey), or lean cuts of red meat (beef or pork)  Before you cook meat or poultry, cut off any visible fat  · Use less fat and oil  Try baking foods instead of frying them  Add less fat, such as margarine, sour cream, regular salad dressing and mayonnaise to foods  Eat fewer high-fat foods  Some examples of high-fat foods include french fries, doughnuts, ice cream, and cakes  · Eat fewer sweets  Limit foods and drinks that are high in sugar  This includes candy, cookies, regular soda, and sweetened drinks  Ways to decrease calories:   · Eat smaller portions  ¨ Use a small plate with smaller servings  ¨ Do not eat second helpings  ¨ When you eat at a restaurant, ask for a box and place half of your meal in the box before you eat  ¨ Share an entrée with someone else  · Replace high-calorie snacks with healthy, low-calorie snacks  ¨ Choose fresh fruit, vegetables, fat-free rice cakes, or air-popped popcorn instead of potato chips, nuts, or chocolate  ¨ Choose water or calorie-free drinks instead of soda or sweetened drinks  · Eat regular meals  Skipping meals can lead to overeating later in the day  Eat a healthy snack in place of a meal if you do not have time to eat a regular meal      · Do not shop for groceries when you are hungry  You may be more likely to make unhealthy food choices  Take a grocery list of healthy foods and shop after you have eaten  Exercise:  Exercise at least 30 minutes per day on most days of the week  Some examples of exercise include walking, biking, dancing, and swimming   You can also fit in more physical activity by taking the stairs instead of the elevator or parking farther away from stores  Ask your healthcare provider about the best exercise plan for you  Other things to consider as you try to lose weight:   · Be aware of situations that may give you the urge to overeat, such as eating while watching television  Find ways to avoid these situations  For example, read a book, go for a walk, or do crafts  · Meet with a weight loss support group or friends who are also trying to lose weight  This may help you stay motivated to continue working on your weight loss goals  © 2017 [address] [facility] Information is for End User's use only and may not be sold, redistributed or otherwise used for commercial purposes  All illustrations and images included in CareNotes® are the copyrighted property of [employer]  or Eduardo Staley  The above information is an  only  It is not intended as medical advice for individual conditions or treatments  Talk to your doctor, nurse or pharmacist before following any medical regimen to see if it is safe and effective for you  Type 2 Diabetes in Adults   AMBULATORY CARE:   Type 2 diabetes  is a disease that affects how your body uses glucose (sugar)  Normally, when the blood sugar level increases, the pancreas makes more insulin  Insulin helps move sugar out of the blood so it can be used for energy  Type 2 diabetes develops because either the body cannot make enough insulin, or it cannot use the insulin correctly  After many years, your pancreas may stop making insulin     Common symptoms include the following:   · More hunger or thirst than usual     · Frequent urination     · Weight loss without trying     · Blurred vision  Call 911 if you have any of the following:   · You have any of the following signs of a stroke:      ¨ Numbness or drooping on one side of your face     ¨ Weakness in an arm or leg    ¨ Confusion or difficulty speaking    ¨ Dizziness, a severe headache, or vision loss    · You have any of the following signs of a heart attack:      ¨ Squeezing, pressure, or pain in your chest that lasts longer than 5 minutes or returns    ¨ Discomfort or pain in your back, neck, jaw, stomach, or arm     ¨ Trouble breathing    ¨ Nausea or vomiting    ¨ Lightheadedness or a sudden cold sweat, especially with chest pain or trouble breathing  Seek care immediately if:   · You have severe abdominal pain, or the pain spreads to your back  You may also be vomiting  · You have trouble staying awake or focusing  · You are shaking or sweating  · You have blurred or double vision  · Your breath has a fruity, sweet smell  · Your breathing is deep and labored, or rapid and shallow  · Your heartbeat is fast and weak  Contact your healthcare provider if:   · You are vomiting or have diarrhea  · You have an upset stomach and cannot eat the foods on your meal plan  · You feel weak or more tired than usual      · You feel dizzy, have headaches, or are easily irritated  · Your skin is red, warm, dry, or swollen  · You have a wound that does not heal      · You have numbness in your arms or legs  · You have trouble coping with your illness, or you feel anxious or depressed  · You have questions or concerns about your condition or care  Treatment for type 2 diabetes  includes keeping your blood sugar at a normal level  You must eat the right foods, and exercise regularly  You may need medicine if you cannot control your blood sugar level with nutrition and exercise  You may also need medicine to prevent heart disease, a complication of type 2 diabetes  You may  need any of the following:  · Hypoglycemic medicines or insulin  may be given to decrease the amount of sugar in your blood  · Blood pressure medicine  may be given to lower your blood pressure  Your blood pressure should be less than 140/90       · Cholesterol lowering medicine  may be given to prevent heart disease  · Antiplatelets , such as aspirin, help prevent blood clots  Take your antiplatelet medicine exactly as directed  These medicines make it more likely for you to bleed or bruise  If you are told to take aspirin, do not take acetaminophen or ibuprofen instead  · Take your medicine as directed  Contact your healthcare provider if you think your medicine is not helping or if you have side effects  Tell him or her if you are allergic to any medicine  Keep a list of the medicines, vitamins, and herbs you take  Include the amounts, and when and why you take them  Bring the list or the pill bottles to follow-up visits  Carry your medicine list with you in case of an emergency  Check your blood sugar level: You will be taught how to check a small drop of blood in a glucose monitor  You will need to check your blood sugar level at least 3 times each day if you are on insulin  Ask your healthcare provider when and how often to check during the day  If you check your blood sugar level before a meal , it should be between 80 and 130 mg/dL  If you check your blood sugar level 1 to 2 hours after a meal , it should be less than 180 mg/dL  Ask your healthcare provider if these are good goals for you  Write down your results, and show them to your healthcare provider  He may use the results to make changes to your medicine, food, and exercise schedules  If your blood sugar level is too low: Your blood sugar level is too low if it goes below 70 mg/dL  If the level is too low, eat or drink 15 grams of fast-acting carbohydrate  These are found naturally in fruits  Fast-acting carbohydrates will raise your blood sugar level quickly  Examples of 15 grams of fast-acting carbohydrate are 4 ounces (½ cup) of fruit juice or 4 ounces of regular soda  Other examples are 2 tablespoons of raisins or 3 to 4 glucose tablets  Check your blood sugar level 15 minutes later   If the level is still low (less than 100 mg/dL), eat another 15 grams of carbohydrate  When the level returns to 100 mg/dL, eat a snack or meal that contains carbohydrates  This will help prevent another drop in blood sugar  Always carefully follow your healthcare provider's instructions on how to treat low blood sugar levels  Check your feet each day for sores:  Wear shoes and socks that fit correctly  Do not trim your toenails  Ask your healthcare provider for more information about foot care  Follow your meal plan:  A dietitian will help you make a meal plan to keep your blood sugar level steady  Do not skip meals  Your blood sugar level may drop too low if you have taken diabetes medicine and do not eat  · Keep track of carbohydrates (sugar and starchy foods)  Your blood sugar level can get too high if you eat too many carbohydrates  Your dietitian will help you plan meals and snacks that have the right amount of carbohydrates  · Eat low-fat foods , such as skinless chicken and low-fat milk  · Eat less sodium (salt)  Limit high-sodium foods, such as soy sauce, potato chips, and soup  Do not add salt to food you cook  Limit your use of table salt  You should have less than 2,300 mg of sodium per day  · Eat high-fiber foods , such as vegetables, whole grain breads, and beans  · Limit alcohol  Alcohol affects your blood sugar level and can make it harder to manage your diabetes  Limit alcohol to 1 drink a day if you are a woman  Limit alcohol to 2 drinks a day if you are a man  A drink of alcohol is 12 ounces of beer, 5 ounces of wine, or 1½ ounces of liquor  Maintain a healthy weight:  Ask your healthcare provider how much you should weigh  A healthy weight can help you control your diabetes  Ask your provider to help you create a weight loss plan if you are overweight  Together you can set manageable weight loss goals    Exercise as directed:  Exercise can help keep your blood sugar level steady, decrease your risk of heart disease, and help you lose weight  Stretch before and after you exercise  Exercise for at least 150 minutes every week  Spread this amount of exercise over at least 3 days a week  Do not skip exercise more than 2 days in a row  Include muscle strengthening activities 2 to 3 days each week  Older adults should include balance training 2 to 3 times each week  Activities that help increase balance include yoga and marguerite chi  Work with your healthcare provider to create an exercise plan  · Check your blood sugar level before and after exercise  Healthcare providers may tell you to change the amount of insulin you take or food you eat  If your blood sugar level is high, check your blood or urine for ketones before you exercise  Do not exercise if your blood sugar level is high and you have ketones  · If your blood sugar level is less than 100 mg/dL, have a carbohydrate snack before you exercise  Examples are 4 to 6 crackers, ½ banana, 8 ounces (1 cup) of milk, or 4 ounces (½ cup) of juice  Drink water or liquids that do not contain sugar before, during, and after exercise  Ask your dietitian or healthcare provider which liquids you should drink when you exercise  · Do not sit for longer than 30 minutes  If you cannot walk around, at least stand up  This will help you stay active and keep your blood circulating  Do not smoke:  Nicotine and other chemicals in cigarettes and cigars can cause lung damage and make it more difficult to manage your diabetes  Ask your healthcare provider for information if you currently smoke and need help to quit  Do not use e-cigarettes or smokeless tobacco in place of cigarettes or to help you quit  They still contain nicotine  Check your blood pressure as directed:  Ask your healthcare provider what your blood pressure should be  Most adults with diabetes and high blood pressure should have a systolic blood pressure (first number) less than 140   Your diastolic blood pressure (second number) should be less than 90  Wear medical alert identification:  Wear medical alert jewelry or carry a card that says you have diabetes  Ask your healthcare provider where to get these items  Ask about vaccines: You have a higher risk for serious illness if you get the flu, pneumonia, or hepatitis  Ask your healthcare provider if you should get a flu, pneumonia, or hepatitis B vaccine, and when to get the vaccine  Follow up with your healthcare provider as directed: You may need to return to have your A1c checked every 3 months  You will need to return at least once each year to have your feet checked  You will need an eye exam once a year to check for retinopathy  You will also need urine tests every year to check for kidney problems  You may need tests to monitor for heart disease such as an EKG, stress test, blood pressure monitoring, and blood tests  Write down your questions so you remember to ask them during your visits  © 2017 [address] [employer] [employer] Information is for End User's use only and may not be sold, redistributed or otherwise used for commercial purposes  All illustrations and images included in CareNotes® are the copyrighted property of [employer] A M [employer] Inc  or Eduardo Staley  The above information is an  only  It is not intended as medical advice for individual conditions or treatments  Talk to your doctor, nurse or pharmacist before following any medical regimen to see if it is safe and effective for you

## 2019-04-04 ENCOUNTER — TELEPHONE (OUTPATIENT)
Dept: FAMILY MEDICINE CLINIC | Facility: OTHER | Age: 49
End: 2019-04-04

## 2019-04-04 PROBLEM — Z90.49 H/O PARTIAL RESECTION OF COLON: Status: ACTIVE | Noted: 2019-04-04

## 2019-04-05 ENCOUNTER — ANESTHESIA EVENT (OUTPATIENT)
Dept: PERIOP | Facility: AMBULARY SURGERY CENTER | Age: 49
End: 2019-04-05
Payer: COMMERCIAL

## 2019-04-16 ENCOUNTER — HOSPITAL ENCOUNTER (OUTPATIENT)
Facility: AMBULARY SURGERY CENTER | Age: 49
Setting detail: OUTPATIENT SURGERY
Discharge: HOME | End: 2019-04-16
Attending: SURGERY | Admitting: SURGERY
Payer: COMMERCIAL

## 2019-04-16 ENCOUNTER — ANESTHESIA (OUTPATIENT)
Dept: PERIOP | Facility: AMBULARY SURGERY CENTER | Age: 49
End: 2019-04-16
Payer: COMMERCIAL

## 2019-04-16 VITALS
SYSTOLIC BLOOD PRESSURE: 117 MMHG | OXYGEN SATURATION: 98 % | RESPIRATION RATE: 18 BRPM | TEMPERATURE: 97.7 F | HEART RATE: 80 BPM | WEIGHT: 244 LBS | BODY MASS INDEX: 39.21 KG/M2 | HEIGHT: 66 IN | DIASTOLIC BLOOD PRESSURE: 72 MMHG

## 2019-04-16 LAB — GLUCOSE SERPL-MCNC: 111 MG/DL (ref 65–140)

## 2019-04-16 PROCEDURE — 45378 DIAGNOSTIC COLONOSCOPY: CPT | Performed by: SURGERY

## 2019-04-16 PROCEDURE — 82948 REAGENT STRIP/BLOOD GLUCOSE: CPT

## 2019-04-16 RX ORDER — SODIUM CHLORIDE 9 MG/ML
INJECTION, SOLUTION INTRAVENOUS CONTINUOUS PRN
Status: DISCONTINUED | OUTPATIENT
Start: 2019-04-16 | End: 2019-04-16 | Stop reason: SURG

## 2019-04-16 RX ORDER — SODIUM CHLORIDE 9 MG/ML
125 INJECTION, SOLUTION INTRAVENOUS CONTINUOUS
Status: CANCELLED | OUTPATIENT
Start: 2019-04-16

## 2019-04-16 RX ORDER — PROPOFOL 10 MG/ML
INJECTION, EMULSION INTRAVENOUS AS NEEDED
Status: DISCONTINUED | OUTPATIENT
Start: 2019-04-16 | End: 2019-04-16 | Stop reason: SURG

## 2019-04-16 RX ORDER — LIDOCAINE HYDROCHLORIDE 10 MG/ML
INJECTION, SOLUTION INFILTRATION; PERINEURAL AS NEEDED
Status: DISCONTINUED | OUTPATIENT
Start: 2019-04-16 | End: 2019-04-16 | Stop reason: SURG

## 2019-04-16 RX ADMIN — PROPOFOL 50 MG: 10 INJECTION, EMULSION INTRAVENOUS at 13:29

## 2019-04-16 RX ADMIN — PROPOFOL 50 MG: 10 INJECTION, EMULSION INTRAVENOUS at 13:26

## 2019-04-16 RX ADMIN — SODIUM CHLORIDE: 0.9 INJECTION, SOLUTION INTRAVENOUS at 13:02

## 2019-04-16 RX ADMIN — PROPOFOL 50 MG: 10 INJECTION, EMULSION INTRAVENOUS at 13:25

## 2019-04-16 RX ADMIN — LIDOCAINE HYDROCHLORIDE ANHYDROUS 20 MG: 10 INJECTION, SOLUTION INFILTRATION at 13:25

## 2019-04-16 RX ADMIN — PROPOFOL 50 MG: 10 INJECTION, EMULSION INTRAVENOUS at 13:32

## 2019-04-16 RX ADMIN — PROPOFOL 50 MG: 10 INJECTION, EMULSION INTRAVENOUS at 13:33

## 2019-04-16 RX ADMIN — PROPOFOL 50 MG: 10 INJECTION, EMULSION INTRAVENOUS at 13:35

## 2019-06-17 LAB
ALBUMIN SERPL-MCNC: 4.3 G/DL (ref 3.5–5.5)
ALBUMIN/CREAT UR: <3 MG/G CREAT (ref 0–30)
ALBUMIN/GLOB SERPL: 1.7 {RATIO} (ref 1.2–2.2)
ALP SERPL-CCNC: 84 IU/L (ref 39–117)
ALT SERPL-CCNC: 21 IU/L (ref 0–32)
APPEARANCE UR: CLEAR
AST SERPL-CCNC: 17 IU/L (ref 0–40)
BACTERIA UR CULT: NORMAL
BACTERIA URNS QL MICRO: NORMAL
BILIRUB SERPL-MCNC: 0.3 MG/DL (ref 0–1.2)
BILIRUB UR QL STRIP: NEGATIVE
BUN SERPL-MCNC: 9 MG/DL (ref 6–24)
BUN/CREAT SERPL: 12 (ref 9–23)
CALCIUM SERPL-MCNC: 9.8 MG/DL (ref 8.7–10.2)
CHLORIDE SERPL-SCNC: 101 MMOL/L (ref 96–106)
CHOLEST SERPL-MCNC: 139 MG/DL (ref 100–199)
CO2 SERPL-SCNC: 21 MMOL/L (ref 20–29)
COLOR UR: YELLOW
CREAT SERPL-MCNC: 0.75 MG/DL (ref 0.57–1)
CREAT UR-MCNC: 99.6 MG/DL
EPI CELLS #/AREA URNS HPF: NORMAL /HPF (ref 0–10)
GLOBULIN SER-MCNC: 2.6 G/DL (ref 1.5–4.5)
GLUCOSE SERPL-MCNC: 112 MG/DL (ref 65–99)
GLUCOSE UR QL: NEGATIVE
HBA1C MFR BLD: 6.7 % (ref 4.8–5.6)
HDLC SERPL-MCNC: 37 MG/DL
HGB UR QL STRIP: NEGATIVE
KETONES UR QL STRIP: NEGATIVE
LABCORP COMMENT: NORMAL
LDLC SERPL CALC-MCNC: 74 MG/DL (ref 0–99)
LEUKOCYTE ESTERASE UR QL STRIP: ABNORMAL
Lab: NORMAL
MICRO URNS: ABNORMAL
MICROALBUMIN UR-MCNC: <3 UG/ML
MUCOUS THREADS URNS QL MICRO: PRESENT
NITRITE UR QL STRIP: NEGATIVE
PH UR STRIP: 6 [PH] (ref 5–7.5)
POTASSIUM SERPL-SCNC: 4.6 MMOL/L (ref 3.5–5.2)
PROT SERPL-MCNC: 6.9 G/DL (ref 6–8.5)
PROT UR QL STRIP: NEGATIVE
RBC #/AREA URNS HPF: NORMAL /HPF (ref 0–2)
SL AMB EGFR AFRICAN AMERICAN: 110 ML/MIN/1.73
SL AMB EGFR NON AFRICAN AMERICAN: 95 ML/MIN/1.73
SL AMB T4, FREE (DIRECT): 1.04 NG/DL (ref 0.82–1.77)
SODIUM SERPL-SCNC: 138 MMOL/L (ref 134–144)
SP GR UR: 1.02 (ref 1–1.03)
TRIGL SERPL-MCNC: 138 MG/DL (ref 0–149)
TSH SERPL DL<=0.005 MIU/L-ACNC: 5.59 UIU/ML (ref 0.45–4.5)
UROBILINOGEN UR STRIP-ACNC: 0.2 EU/DL (ref 0.2–1)
WBC #/AREA URNS HPF: NORMAL /HPF (ref 0–5)

## 2019-06-20 ENCOUNTER — OFFICE VISIT (OUTPATIENT)
Dept: FAMILY MEDICINE CLINIC | Facility: OTHER | Age: 49
End: 2019-06-20
Payer: COMMERCIAL

## 2019-06-20 VITALS
OXYGEN SATURATION: 98 % | HEART RATE: 99 BPM | BODY MASS INDEX: 38.61 KG/M2 | TEMPERATURE: 98.4 F | DIASTOLIC BLOOD PRESSURE: 78 MMHG | HEIGHT: 67 IN | WEIGHT: 246 LBS | SYSTOLIC BLOOD PRESSURE: 118 MMHG

## 2019-06-20 DIAGNOSIS — R91.8 LUNG NODULES: ICD-10-CM

## 2019-06-20 DIAGNOSIS — F17.210 CIGARETTE NICOTINE DEPENDENCE WITHOUT COMPLICATION: ICD-10-CM

## 2019-06-20 DIAGNOSIS — F41.9 ANXIETY: ICD-10-CM

## 2019-06-20 DIAGNOSIS — E66.01 CLASS 2 SEVERE OBESITY WITH SERIOUS COMORBIDITY AND BODY MASS INDEX (BMI) OF 38.0 TO 38.9 IN ADULT, UNSPECIFIED OBESITY TYPE (HCC): ICD-10-CM

## 2019-06-20 DIAGNOSIS — E11.9 TYPE 2 DIABETES MELLITUS WITHOUT COMPLICATION, WITHOUT LONG-TERM CURRENT USE OF INSULIN (HCC): ICD-10-CM

## 2019-06-20 DIAGNOSIS — E78.2 MIXED HYPERLIPIDEMIA: ICD-10-CM

## 2019-06-20 DIAGNOSIS — E03.9 HYPOTHYROIDISM, UNSPECIFIED TYPE: ICD-10-CM

## 2019-06-20 DIAGNOSIS — I10 BENIGN ESSENTIAL HYPERTENSION: ICD-10-CM

## 2019-06-20 DIAGNOSIS — Z12.31 ENCOUNTER FOR SCREENING MAMMOGRAM FOR BREAST CANCER: Primary | ICD-10-CM

## 2019-06-20 PROCEDURE — 99214 OFFICE O/P EST MOD 30 MIN: CPT | Performed by: NURSE PRACTITIONER

## 2019-06-20 PROCEDURE — 3074F SYST BP LT 130 MM HG: CPT | Performed by: NURSE PRACTITIONER

## 2019-06-20 PROCEDURE — 4004F PT TOBACCO SCREEN RCVD TLK: CPT | Performed by: NURSE PRACTITIONER

## 2019-06-20 PROCEDURE — 3008F BODY MASS INDEX DOCD: CPT | Performed by: NURSE PRACTITIONER

## 2019-06-20 PROCEDURE — 3078F DIAST BP <80 MM HG: CPT | Performed by: NURSE PRACTITIONER

## 2019-06-20 RX ORDER — LEVOTHYROXINE SODIUM 0.03 MG/1
TABLET ORAL
Qty: 135 TABLET | Refills: 3 | Status: SHIPPED | OUTPATIENT
Start: 2019-06-20 | End: 2019-07-08 | Stop reason: SDUPTHER

## 2019-06-20 RX ORDER — ALPRAZOLAM 0.25 MG/1
0.25 TABLET ORAL 2 TIMES DAILY PRN
Qty: 30 TABLET | Refills: 2 | Status: SHIPPED | OUTPATIENT
Start: 2019-06-20 | End: 2020-03-10 | Stop reason: SDUPTHER

## 2019-06-20 RX ORDER — NICOTINE 21 MG/24HR
1 PATCH, TRANSDERMAL 24 HOURS TRANSDERMAL EVERY 24 HOURS
Qty: 28 PATCH | Refills: 0 | Status: SHIPPED | OUTPATIENT
Start: 2019-06-20 | End: 2019-07-16 | Stop reason: SDUPTHER

## 2019-06-20 RX ORDER — NICOTINE 21 MG/24HR
1 PATCH, TRANSDERMAL 24 HOURS TRANSDERMAL EVERY 24 HOURS
Qty: 28 PATCH | Refills: 0 | Status: SHIPPED | OUTPATIENT
Start: 2019-06-20

## 2019-06-28 DIAGNOSIS — E11.9 TYPE 2 DIABETES MELLITUS WITHOUT COMPLICATION, WITHOUT LONG-TERM CURRENT USE OF INSULIN (HCC): Primary | ICD-10-CM

## 2019-07-08 DIAGNOSIS — E03.9 HYPOTHYROIDISM, UNSPECIFIED TYPE: ICD-10-CM

## 2019-07-08 RX ORDER — LEVOTHYROXINE SODIUM 0.03 MG/1
TABLET ORAL
Qty: 135 TABLET | Refills: 3 | Status: SHIPPED | OUTPATIENT
Start: 2019-07-08 | End: 2019-10-24 | Stop reason: SDUPTHER

## 2019-07-16 DIAGNOSIS — F17.210 CIGARETTE NICOTINE DEPENDENCE WITHOUT COMPLICATION: ICD-10-CM

## 2019-07-16 RX ORDER — NICOTINE 21 MG/24HR
PATCH, TRANSDERMAL 24 HOURS TRANSDERMAL
Qty: 28 PATCH | Refills: 0 | Status: SHIPPED | OUTPATIENT
Start: 2019-07-16

## 2019-08-27 DIAGNOSIS — E78.2 MIXED HYPERLIPIDEMIA: ICD-10-CM

## 2019-08-27 RX ORDER — ATORVASTATIN CALCIUM 20 MG/1
TABLET, FILM COATED ORAL
Qty: 30 TABLET | Refills: 5 | Status: SHIPPED | OUTPATIENT
Start: 2019-08-27 | End: 2020-01-30 | Stop reason: SDUPTHER

## 2019-10-11 LAB
LEFT EYE DIABETIC RETINOPATHY: NORMAL
RIGHT EYE DIABETIC RETINOPATHY: NORMAL

## 2019-10-24 ENCOUNTER — OFFICE VISIT (OUTPATIENT)
Dept: FAMILY MEDICINE CLINIC | Facility: OTHER | Age: 49
End: 2019-10-24
Payer: COMMERCIAL

## 2019-10-24 VITALS
HEART RATE: 92 BPM | SYSTOLIC BLOOD PRESSURE: 110 MMHG | BODY MASS INDEX: 38.3 KG/M2 | OXYGEN SATURATION: 98 % | TEMPERATURE: 98.1 F | WEIGHT: 244 LBS | HEIGHT: 67 IN | DIASTOLIC BLOOD PRESSURE: 76 MMHG

## 2019-10-24 DIAGNOSIS — I10 BENIGN ESSENTIAL HYPERTENSION: Primary | ICD-10-CM

## 2019-10-24 DIAGNOSIS — F41.9 ANXIETY: ICD-10-CM

## 2019-10-24 DIAGNOSIS — E78.5 DYSLIPIDEMIA: ICD-10-CM

## 2019-10-24 DIAGNOSIS — F32.A DEPRESSION, UNSPECIFIED DEPRESSION TYPE: ICD-10-CM

## 2019-10-24 DIAGNOSIS — E11.9 TYPE 2 DIABETES MELLITUS WITHOUT COMPLICATION, WITHOUT LONG-TERM CURRENT USE OF INSULIN (HCC): ICD-10-CM

## 2019-10-24 DIAGNOSIS — R21 RASH OF FACE: ICD-10-CM

## 2019-10-24 DIAGNOSIS — E78.2 MIXED HYPERLIPIDEMIA: ICD-10-CM

## 2019-10-24 DIAGNOSIS — E03.9 HYPOTHYROIDISM, UNSPECIFIED TYPE: ICD-10-CM

## 2019-10-24 LAB
ALBUMIN SERPL-MCNC: 4.7 G/DL (ref 3.5–5.5)
ALBUMIN/GLOB SERPL: 1.7 {RATIO} (ref 1.2–2.2)
ALP SERPL-CCNC: 88 IU/L (ref 39–117)
ALT SERPL-CCNC: 26 IU/L (ref 0–32)
AST SERPL-CCNC: 24 IU/L (ref 0–40)
BASOPHILS # BLD AUTO: 0.1 X10E3/UL (ref 0–0.2)
BASOPHILS NFR BLD AUTO: 1 %
BILIRUB SERPL-MCNC: 0.4 MG/DL (ref 0–1.2)
BUN SERPL-MCNC: 9 MG/DL (ref 6–24)
BUN/CREAT SERPL: 11 (ref 9–23)
CALCIUM SERPL-MCNC: 9.8 MG/DL (ref 8.7–10.2)
CHLORIDE SERPL-SCNC: 100 MMOL/L (ref 96–106)
CO2 SERPL-SCNC: 20 MMOL/L (ref 20–29)
CREAT SERPL-MCNC: 0.85 MG/DL (ref 0.57–1)
EOSINOPHIL # BLD AUTO: 0.3 X10E3/UL (ref 0–0.4)
EOSINOPHIL NFR BLD AUTO: 3 %
ERYTHROCYTE [DISTWIDTH] IN BLOOD BY AUTOMATED COUNT: 14.2 % (ref 12.3–15.4)
GLOBULIN SER-MCNC: 2.8 G/DL (ref 1.5–4.5)
GLUCOSE SERPL-MCNC: 145 MG/DL (ref 65–99)
HBA1C MFR BLD: 6.9 % (ref 4.8–5.6)
HCT VFR BLD AUTO: 41.4 % (ref 34–46.6)
HGB BLD-MCNC: 13.8 G/DL (ref 11.1–15.9)
IMM GRANULOCYTES # BLD: 0 X10E3/UL (ref 0–0.1)
IMM GRANULOCYTES NFR BLD: 0 %
LYMPHOCYTES # BLD AUTO: 2.8 X10E3/UL (ref 0.7–3.1)
LYMPHOCYTES NFR BLD AUTO: 27 %
MCH RBC QN AUTO: 27.7 PG (ref 26.6–33)
MCHC RBC AUTO-ENTMCNC: 33.3 G/DL (ref 31.5–35.7)
MCV RBC AUTO: 83 FL (ref 79–97)
MONOCYTES # BLD AUTO: 0.7 X10E3/UL (ref 0.1–0.9)
MONOCYTES NFR BLD AUTO: 7 %
NEUTROPHILS # BLD AUTO: 6.8 X10E3/UL (ref 1.4–7)
NEUTROPHILS NFR BLD AUTO: 62 %
PLATELET # BLD AUTO: 373 X10E3/UL (ref 150–450)
POTASSIUM SERPL-SCNC: 4.3 MMOL/L (ref 3.5–5.2)
PROT SERPL-MCNC: 7.5 G/DL (ref 6–8.5)
RBC # BLD AUTO: 4.98 X10E6/UL (ref 3.77–5.28)
SL AMB EGFR AFRICAN AMERICAN: 94 ML/MIN/1.73
SL AMB EGFR NON AFRICAN AMERICAN: 81 ML/MIN/1.73
SODIUM SERPL-SCNC: 139 MMOL/L (ref 134–144)
TSH SERPL DL<=0.005 MIU/L-ACNC: 4.36 UIU/ML (ref 0.45–4.5)
WBC # BLD AUTO: 10.6 X10E3/UL (ref 3.4–10.8)

## 2019-10-24 PROCEDURE — 99214 OFFICE O/P EST MOD 30 MIN: CPT | Performed by: NURSE PRACTITIONER

## 2019-10-24 PROCEDURE — 3078F DIAST BP <80 MM HG: CPT | Performed by: NURSE PRACTITIONER

## 2019-10-24 PROCEDURE — 3074F SYST BP LT 130 MM HG: CPT | Performed by: NURSE PRACTITIONER

## 2019-10-24 PROCEDURE — 3008F BODY MASS INDEX DOCD: CPT | Performed by: NURSE PRACTITIONER

## 2019-10-24 RX ORDER — SERTRALINE HYDROCHLORIDE 25 MG/1
25 TABLET, FILM COATED ORAL DAILY
Qty: 30 TABLET | Refills: 3 | Status: SHIPPED | OUTPATIENT
Start: 2019-10-24 | End: 2020-01-30 | Stop reason: SDUPTHER

## 2019-10-24 RX ORDER — LEVOTHYROXINE SODIUM 0.03 MG/1
TABLET ORAL
Qty: 135 TABLET | Refills: 3 | Status: SHIPPED | OUTPATIENT
Start: 2019-10-24 | End: 2020-01-30 | Stop reason: SDUPTHER

## 2019-10-24 NOTE — PROGRESS NOTES
Assessment/Plan:         Problem List Items Addressed This Visit     Type 2 diabetes mellitus without complication, without long-term current use of insulin ([facility] [address] )  Obesity  --A1C remains at goal on metformin  --Continued dietary, weight loss efforts encouraged  Handouts given  Discussed low carb, higher protein diet  Declining nutrition/weight management at this time  --Foot exam today, UTD with  eye exam  --Repeat labs prior to next visit   Relevant Medications   metFORMIN (GLUCOPHAGE) 1000 MG tablet   Other Relevant Orders   CBC and differential   Comprehensive metabolic panel   Hemoglobin A1C   TSH, 3rd generation with Free T4 reflex   Diabetic foot exam   Benign essential hypertension   --Well controlled on ARB-->continue, along with dietary, weight loss measures  Hypothyroidism  --Euthyroid on current regimen  Repeat labs prior to next visit  Relevant Medications    levothyroxine 25 mcg tablet: 1 1/2 tablets daily    Other Relevant Orders    TSH, 3rd generation with Free T4 reflex    Hyperlipemia  --Well controlled on statin  Repeat lipid panel prior to next visit    Relevant Orders    Lipid Panel with Direct LDL reflex    Depression + anxiety  --She is willing to start low dose SSRI at this time  Will call in the next 1-3 weeks to let us know how this is working out  Option for dose increase at that time  --Declining counsellor  --Uses benzo sparingly  --Regular exercise, other stress relieving measures  Relevant Medications    sertraline (ZOLOFT) 25 mg tablet      Other Visit Diagnoses     Rash of face     --Appearance consistent with early/mild rosacea  Derm referral for confirmation of diagnosis, discussion of treatment options     Relevant Orders    Ambulatory referral to Dermatology          Reminded to schedule mammogram (slip given previously)    Had flu shot    RTO 3 months          Subjective:      Patient ID: Jamel Latham is a 51 y o  female  Here for routine follow-up  Tolerating meds without AE's, including metformin  Loose stools improved  No recent home glucose readings  Admits to getting a bit off track with her diet lately due to work stresses  Wants to start low carb, higher protein diet which was successful in helping her lose weight in the past    No foot issues including pain, numbness/tingling  Had recent diabetic eye exam--"everything OK"    Recent (10/22) lab results reviewed with patient  Notable for acceptable A1C (6 9), TSH now normalized (4 3)  Favorable lipids 6/2019  Admits to ongoing anxiety, some depression also  Work stresses are a factor  Audit on Monday  She is training two new employees  "Everything falls on me"  Adequate support system  Denies need for counsellor  No SI  Xanax helpful when she takes it  Tries to use sparingly  Was able to quit smoking for a couple weeks using nicotine patch  Unfortunately she has recently restarted, in part due to work stresses  Hopes to try quitting again in the future  Hasn't gotten chest CT done yet  Hasn't scheduled mammogram yet  The following portions of the patient's history were reviewed and updated as appropriate: current medications, past family history, past medical history, past social history, past surgical history and problem list     Review of Systems   Constitutional: Positive for fatigue  Negative for unexpected weight change  HENT: Negative for sore throat  Respiratory: Negative for shortness of breath  Cardiovascular: Negative for chest pain and palpitations  Gastrointestinal: Negative for abdominal pain, blood in stool and vomiting  Genitourinary: Negative for difficulty urinating  Musculoskeletal: Negative for arthralgias  Neurological: Negative for headaches  Psychiatric/Behavioral: Positive for dysphoric mood  The patient is nervous/anxious            Objective:      /76 (BP Location: Left arm, Patient Position: Sitting, Cuff Size: Large) Pulse 92   Temp 98 1 °F (36 7 °C) (Tympanic)   Ht 5' 7" (1 702 m)   Wt 111 kg (244 lb)   SpO2 98%   BMI 38 22 kg/m²          Physical Exam   Constitutional: She is oriented to person, place, and time  She appears well-developed and well-nourished  HENT:   Head: Normocephalic  Right Ear: External ear normal    Left Ear: External ear normal    Nose: Nose normal    Mouth/Throat: Oropharynx is clear and moist    Eyes: Pupils are equal, round, and reactive to light  Conjunctivae are normal    Neck: Normal range of motion  Neck supple  Cardiovascular: Normal rate, regular rhythm, normal heart sounds and intact distal pulses  Pulses are no weak pulses  Pulses:       Dorsalis pedis pulses are 2+ on the right side, and 2+ on the left side  Posterior tibial pulses are 2+ on the right side, and 2+ on the left side  Pulmonary/Chest: Effort normal and breath sounds normal    Abdominal: Soft  Bowel sounds are normal  There is no tenderness  Musculoskeletal: She exhibits no edema  Feet:   Right Foot:   Skin Integrity: Negative for ulcer, skin breakdown, erythema, warmth, callus or dry skin  Left Foot:   Skin Integrity: Negative for ulcer, skin breakdown, erythema, warmth, callus or dry skin  Lymphadenopathy:     She has no cervical adenopathy  Neurological: She is alert and oriented to person, place, and time  She has normal reflexes  Skin: Skin is warm and dry  Rash noted  Malar areas with few faintly erythematous maculopapular lesions with telangiectasias   Psychiatric: She has a normal mood and affect  Patient's shoes and socks removed  Right Foot/Ankle   Right Foot Inspection  Skin Exam: skin normal and skin intact no dry skin, no warmth, no callus, no erythema, no maceration, no abnormal color, no pre-ulcer, no ulcer and no callus                          Toe Exam: ROM and strength within normal limits  Sensory   Vibration: intact  Proprioception: intact   Monofilament testing: intact  Vascular  Capillary refills: < 3 seconds  The right DP pulse is 2+  The right PT pulse is 2+  Left Foot/Ankle  Left Foot Inspection  Skin Exam: skin normal and skin intactno dry skin, no warmth, no erythema, no maceration, normal color, no pre-ulcer, no ulcer and no callus                         Toe Exam: ROM and strength within normal limits                   Sensory   Vibration: intact  Proprioception: intact  Monofilament: intact  Vascular  Capillary refills: < 3 seconds  The left DP pulse is 2+  The left PT pulse is 2+  Assign Risk Category:  No deformity present; No loss of protective sensation;  No weak pulses       Risk: 0

## 2019-10-24 NOTE — PATIENT INSTRUCTIONS
Type 2 Diabetes in Adults   AMBULATORY CARE:   Type 2 diabetes  is a disease that affects how your body uses glucose (sugar)  Normally, when the blood sugar level increases, the pancreas makes more insulin  Insulin helps move sugar out of the blood so it can be used for energy  Type 2 diabetes develops because either the body cannot make enough insulin, or it cannot use the insulin correctly  After many years, your pancreas may stop making insulin  Common symptoms include the following:   · More hunger or thirst than usual     · Frequent urination     · Weight loss without trying     · Blurred vision  Call 911 if you have any of the following:   · You have any of the following signs of a stroke:      ¨ Numbness or drooping on one side of your face     ¨ Weakness in an arm or leg    ¨ Confusion or difficulty speaking    ¨ Dizziness, a severe headache, or vision loss    · You have any of the following signs of a heart attack:      ¨ Squeezing, pressure, or pain in your chest that lasts longer than 5 minutes or returns    ¨ Discomfort or pain in your back, neck, jaw, stomach, or arm     ¨ Trouble breathing    ¨ Nausea or vomiting    ¨ Lightheadedness or a sudden cold sweat, especially with chest pain or trouble breathing  Seek care immediately if:   · You have severe abdominal pain, or the pain spreads to your back  You may also be vomiting  · You have trouble staying awake or focusing  · You are shaking or sweating  · You have blurred or double vision  · Your breath has a fruity, sweet smell  · Your breathing is deep and labored, or rapid and shallow  · Your heartbeat is fast and weak  Contact your healthcare provider if:   · You are vomiting or have diarrhea  · You have an upset stomach and cannot eat the foods on your meal plan  · You feel weak or more tired than usual      · You feel dizzy, have headaches, or are easily irritated  · Your skin is red, warm, dry, or swollen  · You have a wound that does not heal      · You have numbness in your arms or legs  · You have trouble coping with your illness, or you feel anxious or depressed  · You have questions or concerns about your condition or care  Treatment for type 2 diabetes  includes keeping your blood sugar at a normal level  You must eat the right foods, and exercise regularly  You may need medicine if you cannot control your blood sugar level with nutrition and exercise  You may also need medicine to prevent heart disease, a complication of type 2 diabetes  You may  need any of the following:  · Hypoglycemic medicines or insulin  may be given to decrease the amount of sugar in your blood  · Blood pressure medicine  may be given to lower your blood pressure  Your blood pressure should be less than 140/90  · Cholesterol lowering medicine  may be given to prevent heart disease  · Antiplatelets , such as aspirin, help prevent blood clots  Take your antiplatelet medicine exactly as directed  These medicines make it more likely for you to bleed or bruise  If you are told to take aspirin, do not take acetaminophen or ibuprofen instead  · Take your medicine as directed  Contact your healthcare provider if you think your medicine is not helping or if you have side effects  Tell him or her if you are allergic to any medicine  Keep a list of the medicines, vitamins, and herbs you take  Include the amounts, and when and why you take them  Bring the list or the pill bottles to follow-up visits  Carry your medicine list with you in case of an emergency  Check your blood sugar level: You will be taught how to check a small drop of blood in a glucose monitor  You will need to check your blood sugar level at least 3 times each day if you are on insulin  Ask your healthcare provider when and how often to check during the day  If you check your blood sugar level before a meal , it should be between 80 and 130 mg/dL   If you check your blood sugar level 1 to 2 hours after a meal , it should be less than 180 mg/dL  Ask your healthcare provider if these are good goals for you  Write down your results, and show them to your healthcare provider  He may use the results to make changes to your medicine, food, and exercise schedules  If your blood sugar level is too low: Your blood sugar level is too low if it goes below 70 mg/dL  If the level is too low, eat or drink 15 grams of fast-acting carbohydrate  These are found naturally in fruits  Fast-acting carbohydrates will raise your blood sugar level quickly  Examples of 15 grams of fast-acting carbohydrate are 4 ounces (½ cup) of fruit juice or 4 ounces of regular soda  Other examples are 2 tablespoons of raisins or 3 to 4 glucose tablets  Check your blood sugar level 15 minutes later  If the level is still low (less than 100 mg/dL), eat another 15 grams of carbohydrate  When the level returns to 100 mg/dL, eat a snack or meal that contains carbohydrates  This will help prevent another drop in blood sugar  Always carefully follow your healthcare provider's instructions on how to treat low blood sugar levels  Check your feet each day for sores:  Wear shoes and socks that fit correctly  Do not trim your toenails  Ask your healthcare provider for more information about foot care  Follow your meal plan:  A dietitian will help you make a meal plan to keep your blood sugar level steady  Do not skip meals  Your blood sugar level may drop too low if you have taken diabetes medicine and do not eat  · Keep track of carbohydrates (sugar and starchy foods)  Your blood sugar level can get too high if you eat too many carbohydrates  Your dietitian will help you plan meals and snacks that have the right amount of carbohydrates  · Eat low-fat foods , such as skinless chicken and low-fat milk  · Eat less sodium (salt)    Limit high-sodium foods, such as soy sauce, potato chips, and soup  Do not add salt to food you cook  Limit your use of table salt  You should have less than 2,300 mg of sodium per day  · Eat high-fiber foods , such as vegetables, whole grain breads, and beans  · Limit alcohol  Alcohol affects your blood sugar level and can make it harder to manage your diabetes  Limit alcohol to 1 drink a day if you are a woman  Limit alcohol to 2 drinks a day if you are a man  A drink of alcohol is 12 ounces of beer, 5 ounces of wine, or 1½ ounces of liquor  Maintain a healthy weight:  Ask your healthcare provider how much you should weigh  A healthy weight can help you control your diabetes  Ask your provider to help you create a weight loss plan if you are overweight  Together you can set manageable weight loss goals  Exercise as directed:  Exercise can help keep your blood sugar level steady, decrease your risk of heart disease, and help you lose weight  Stretch before and after you exercise  Exercise for at least 150 minutes every week  Spread this amount of exercise over at least 3 days a week  Do not skip exercise more than 2 days in a row  Include muscle strengthening activities 2 to 3 days each week  Older adults should include balance training 2 to 3 times each week  Activities that help increase balance include yoga and marguerite chi  Work with your healthcare provider to create an exercise plan  · Check your blood sugar level before and after exercise  Healthcare providers may tell you to change the amount of insulin you take or food you eat  If your blood sugar level is high, check your blood or urine for ketones before you exercise  Do not exercise if your blood sugar level is high and you have ketones  · If your blood sugar level is less than 100 mg/dL, have a carbohydrate snack before you exercise  Examples are 4 to 6 crackers, ½ banana, 8 ounces (1 cup) of milk, or 4 ounces (½ cup) of juice   Drink water or liquids that do not contain sugar before, during, and after exercise  Ask your dietitian or healthcare provider which liquids you should drink when you exercise  · Do not sit for longer than 30 minutes  If you cannot walk around, at least stand up  This will help you stay active and keep your blood circulating  Do not smoke:  Nicotine and other chemicals in cigarettes and cigars can cause lung damage and make it more difficult to manage your diabetes  Ask your healthcare provider for information if you currently smoke and need help to quit  Do not use e-cigarettes or smokeless tobacco in place of cigarettes or to help you quit  They still contain nicotine  Check your blood pressure as directed:  Ask your healthcare provider what your blood pressure should be  Most adults with diabetes and high blood pressure should have a systolic blood pressure (first number) less than 140  Your diastolic blood pressure (second number) should be less than 90  Wear medical alert identification:  Wear medical alert jewelry or carry a card that says you have diabetes  Ask your healthcare provider where to get these items  Ask about vaccines: You have a higher risk for serious illness if you get the flu, pneumonia, or hepatitis  Ask your healthcare provider if you should get a flu, pneumonia, or hepatitis B vaccine, and when to get the vaccine  Follow up with your healthcare provider as directed: You may need to return to have your A1c checked every 3 months  You will need to return at least once each year to have your feet checked  You will need an eye exam once a year to check for retinopathy  You will also need urine tests every year to check for kidney problems  You may need tests to monitor for heart disease such as an EKG, stress test, blood pressure monitoring, and blood tests  Write down your questions so you remember to ask them during your visits     © 2017 [address] [employer] Information is for End User's use only and may not be sold, redistributed or otherwise used for commercial purposes  All illustrations and images included in CareNotes® are the copyrighted property of [employer]  or Eduardo [employer]  The above information is an  only  It is not intended as medical advice for individual conditions or treatments  Talk to your doctor, nurse or pharmacist before following any medical regimen to see if it is safe and effective for you

## 2019-12-13 ENCOUNTER — OFFICE VISIT (OUTPATIENT)
Dept: FAMILY MEDICINE CLINIC | Facility: OTHER | Age: 49
End: 2019-12-13
Payer: COMMERCIAL

## 2019-12-13 VITALS
HEART RATE: 100 BPM | BODY MASS INDEX: 38.61 KG/M2 | SYSTOLIC BLOOD PRESSURE: 144 MMHG | OXYGEN SATURATION: 98 % | WEIGHT: 246 LBS | DIASTOLIC BLOOD PRESSURE: 90 MMHG | TEMPERATURE: 97.3 F | HEIGHT: 67 IN

## 2019-12-13 DIAGNOSIS — L03.032 PARONYCHIA OF GREAT TOE, LEFT: Primary | ICD-10-CM

## 2019-12-13 DIAGNOSIS — F32.A DEPRESSION, UNSPECIFIED DEPRESSION TYPE: ICD-10-CM

## 2019-12-13 PROCEDURE — 99214 OFFICE O/P EST MOD 30 MIN: CPT | Performed by: NURSE PRACTITIONER

## 2019-12-13 PROCEDURE — 3008F BODY MASS INDEX DOCD: CPT | Performed by: NURSE PRACTITIONER

## 2019-12-13 PROCEDURE — 4004F PT TOBACCO SCREEN RCVD TLK: CPT | Performed by: NURSE PRACTITIONER

## 2019-12-13 RX ORDER — CLINDAMYCIN HYDROCHLORIDE 300 MG/1
300 CAPSULE ORAL 3 TIMES DAILY
Qty: 21 CAPSULE | Refills: 0 | Status: SHIPPED | OUTPATIENT
Start: 2019-12-13 | End: 2020-02-06 | Stop reason: SDUPTHER

## 2019-12-13 NOTE — PROGRESS NOTES
Assessment/Plan:         Problem List Items Addressed This Visit     Paronychia of great toe, left  --Warm soaks, antibiotic  --Call/podiatrist if no improvement next 48-72 hours  Advised podiatry f/u non-emergently for treatment of onychomycosis of great toenail  --ER for worsening    Relevant Medications    clindamycin (CLEOCIN) 300 MG capsule TID x 7 days (allergic to both PCN and doxycycline; states that she has had clindamycin previously without issues)    Other Relevant Orders    Ambulatory referral to Podiatry    Depression  --Improved on Zoloft which she wishes to leave unchanged for now  Subjective:      Patient ID: Oma Leone is a 51 y o  female  Here with complaints of mildly tender red distal portion of left great toe x two days  Portion around nail primarily, which has been crumbling, yellowed for a few years  No swelling or drainage noted  No fever  No known trauma or injury  Well controlled [id number] with previous A1C 6 9  No neuropathy  No previous foot/toe infections  Soaking toe  No other home treatments  Zoloft helpful for moods, no AE's  Wishes to leave unchanged  The following portions of the patient's history were reviewed and updated as appropriate: current medications, past family history, past medical history, past social history, past surgical history and problem list     Review of Systems   Constitutional: Negative for fever  Musculoskeletal:        Per HPI   Psychiatric/Behavioral:        Per HPI         Objective:      /90 (BP Location: Left arm, Patient Position: Sitting, Cuff Size: Large)   Pulse 100   Temp (!) 97 3 °F (36 3 °C) (Tympanic)   Ht 5' 7" (1 702 m)   Wt 112 kg (246 lb)   SpO2 98%   BMI 38 53 kg/m²          Physical Exam   Constitutional: She appears well-developed  Musculoskeletal: Normal range of motion  She exhibits tenderness     Left great toe with mild erythema, swelling of of proximal and lateral nail-folds  Minimally tender  Mild tenderness of portion of toe pad, but non-tender, no erythema or swelling elsewhere including nailbed, distal phalanx, PIP joint  Normal, painless toe flexion  Great toenail yellowed, flaking, dystrophic, partially missing

## 2020-01-25 LAB
ALBUMIN SERPL-MCNC: 4.4 G/DL (ref 3.8–4.8)
ALBUMIN/GLOB SERPL: 1.6 {RATIO} (ref 1.2–2.2)
ALP SERPL-CCNC: 86 IU/L (ref 39–117)
ALT SERPL-CCNC: 24 IU/L (ref 0–32)
AST SERPL-CCNC: 22 IU/L (ref 0–40)
BASOPHILS # BLD AUTO: 0.1 X10E3/UL (ref 0–0.2)
BASOPHILS NFR BLD AUTO: 1 %
BILIRUB SERPL-MCNC: 0.2 MG/DL (ref 0–1.2)
BUN SERPL-MCNC: 9 MG/DL (ref 6–24)
BUN/CREAT SERPL: 10 (ref 9–23)
CALCIUM SERPL-MCNC: 9.5 MG/DL (ref 8.7–10.2)
CHLORIDE SERPL-SCNC: 99 MMOL/L (ref 96–106)
CHOLEST SERPL-MCNC: 140 MG/DL (ref 100–199)
CO2 SERPL-SCNC: 17 MMOL/L (ref 20–29)
CREAT SERPL-MCNC: 0.88 MG/DL (ref 0.57–1)
EOSINOPHIL # BLD AUTO: 0.2 X10E3/UL (ref 0–0.4)
EOSINOPHIL NFR BLD AUTO: 3 %
ERYTHROCYTE [DISTWIDTH] IN BLOOD BY AUTOMATED COUNT: 14.6 % (ref 11.7–15.4)
GLOBULIN SER-MCNC: 2.8 G/DL (ref 1.5–4.5)
GLUCOSE SERPL-MCNC: 121 MG/DL (ref 65–99)
HBA1C MFR BLD: 6.5 % (ref 4.8–5.6)
HCT VFR BLD AUTO: 42.4 % (ref 34–46.6)
HDLC SERPL-MCNC: 43 MG/DL
HGB BLD-MCNC: 13.6 G/DL (ref 11.1–15.9)
IMM GRANULOCYTES # BLD: 0 X10E3/UL (ref 0–0.1)
IMM GRANULOCYTES NFR BLD: 0 %
LDLC SERPL CALC-MCNC: 66 MG/DL (ref 0–99)
LYMPHOCYTES # BLD AUTO: 2.6 X10E3/UL (ref 0.7–3.1)
LYMPHOCYTES NFR BLD AUTO: 27 %
MCH RBC QN AUTO: 27.9 PG (ref 26.6–33)
MCHC RBC AUTO-ENTMCNC: 32.1 G/DL (ref 31.5–35.7)
MCV RBC AUTO: 87 FL (ref 79–97)
MONOCYTES # BLD AUTO: 0.6 X10E3/UL (ref 0.1–0.9)
MONOCYTES NFR BLD AUTO: 6 %
NEUTROPHILS # BLD AUTO: 6.3 X10E3/UL (ref 1.4–7)
NEUTROPHILS NFR BLD AUTO: 63 %
PLATELET # BLD AUTO: 338 X10E3/UL (ref 150–450)
POTASSIUM SERPL-SCNC: 4.2 MMOL/L (ref 3.5–5.2)
PROT SERPL-MCNC: 7.2 G/DL (ref 6–8.5)
RBC # BLD AUTO: 4.88 X10E6/UL (ref 3.77–5.28)
SL AMB EGFR AFRICAN AMERICAN: 90 ML/MIN/1.73
SL AMB EGFR NON AFRICAN AMERICAN: 78 ML/MIN/1.73
SL AMB T4, FREE (DIRECT): 1.08 NG/DL (ref 0.82–1.77)
SODIUM SERPL-SCNC: 137 MMOL/L (ref 134–144)
TRIGL SERPL-MCNC: 157 MG/DL (ref 0–149)
TSH SERPL DL<=0.005 MIU/L-ACNC: 5.79 UIU/ML (ref 0.45–4.5)
WBC # BLD AUTO: 9.8 X10E3/UL (ref 3.4–10.8)

## 2020-01-30 ENCOUNTER — OFFICE VISIT (OUTPATIENT)
Dept: FAMILY MEDICINE CLINIC | Facility: OTHER | Age: 50
End: 2020-01-30
Payer: COMMERCIAL

## 2020-01-30 VITALS
SYSTOLIC BLOOD PRESSURE: 122 MMHG | OXYGEN SATURATION: 98 % | TEMPERATURE: 97.6 F | DIASTOLIC BLOOD PRESSURE: 82 MMHG | HEART RATE: 94 BPM | HEIGHT: 67 IN | WEIGHT: 244.4 LBS | BODY MASS INDEX: 38.36 KG/M2

## 2020-01-30 DIAGNOSIS — E78.2 MIXED HYPERLIPIDEMIA: ICD-10-CM

## 2020-01-30 DIAGNOSIS — Z11.4 SCREENING FOR HIV (HUMAN IMMUNODEFICIENCY VIRUS): Primary | ICD-10-CM

## 2020-01-30 DIAGNOSIS — F41.9 ANXIETY: ICD-10-CM

## 2020-01-30 DIAGNOSIS — F32.A DEPRESSION, UNSPECIFIED DEPRESSION TYPE: ICD-10-CM

## 2020-01-30 DIAGNOSIS — I10 BENIGN ESSENTIAL HYPERTENSION: ICD-10-CM

## 2020-01-30 DIAGNOSIS — E66.01 CLASS 2 SEVERE OBESITY WITH SERIOUS COMORBIDITY AND BODY MASS INDEX (BMI) OF 38.0 TO 38.9 IN ADULT, UNSPECIFIED OBESITY TYPE (HCC): ICD-10-CM

## 2020-01-30 DIAGNOSIS — E11.9 TYPE 2 DIABETES MELLITUS WITHOUT COMPLICATION, WITHOUT LONG-TERM CURRENT USE OF INSULIN (HCC): ICD-10-CM

## 2020-01-30 DIAGNOSIS — E03.9 HYPOTHYROIDISM, UNSPECIFIED TYPE: ICD-10-CM

## 2020-01-30 DIAGNOSIS — F17.210 CIGARETTE NICOTINE DEPENDENCE WITHOUT COMPLICATION: ICD-10-CM

## 2020-01-30 PROBLEM — L03.032 PARONYCHIA OF GREAT TOE, LEFT: Status: RESOLVED | Noted: 2019-12-13 | Resolved: 2020-01-30

## 2020-01-30 PROBLEM — K58.9 IBS (IRRITABLE BOWEL SYNDROME): Status: ACTIVE | Noted: 2020-01-30

## 2020-01-30 PROCEDURE — 4004F PT TOBACCO SCREEN RCVD TLK: CPT | Performed by: NURSE PRACTITIONER

## 2020-01-30 PROCEDURE — 3079F DIAST BP 80-89 MM HG: CPT | Performed by: NURSE PRACTITIONER

## 2020-01-30 PROCEDURE — 3074F SYST BP LT 130 MM HG: CPT | Performed by: NURSE PRACTITIONER

## 2020-01-30 PROCEDURE — 99214 OFFICE O/P EST MOD 30 MIN: CPT | Performed by: NURSE PRACTITIONER

## 2020-01-30 PROCEDURE — 3008F BODY MASS INDEX DOCD: CPT | Performed by: NURSE PRACTITIONER

## 2020-01-30 RX ORDER — SERTRALINE HYDROCHLORIDE 25 MG/1
25 TABLET, FILM COATED ORAL DAILY
Qty: 30 TABLET | Refills: 5 | Status: SHIPPED | OUTPATIENT
Start: 2020-01-30 | End: 2021-04-27 | Stop reason: SDUPTHER

## 2020-01-30 RX ORDER — LEVOTHYROXINE SODIUM 0.05 MG/1
TABLET ORAL
Qty: 30 TABLET | Refills: 5 | Status: SHIPPED | OUTPATIENT
Start: 2020-01-30 | End: 2020-03-10 | Stop reason: SDUPTHER

## 2020-01-30 RX ORDER — ATORVASTATIN CALCIUM 20 MG/1
20 TABLET, FILM COATED ORAL DAILY
Qty: 30 TABLET | Refills: 5 | Status: SHIPPED | OUTPATIENT
Start: 2020-01-30 | End: 2020-09-02

## 2020-01-30 NOTE — PROGRESS NOTES
Assessment/Plan:         Problem List Items Addressed This Visit     Type 2 diabetes mellitus without complication, without long-term current use of insulin ([facility] [address] )  --A1C remains at goal on metformin  --Continued dietary, weight loss efforts encouraged  Handouts given  Discussed low carb, higher protein diet  Declining nutrition/weight management at this time  --Foot exam today, UTD with  eye exam  --Repeat labs prior to next visit   Relevant Medications   metFORMIN (GLUCOPHAGE) 1000 MG tablet   Other Relevant Orders   Hemoglobin A1C   Comprehensive metabolic panel   Diabetic foot exam   Hypothyroidism  --TSH elevated  Given this and how she is feeling, will increase dose of levothyroxine from 37 5 currently to 50 mcg daily  --Repeat TSH in 2 months    Relevant Medications    levothyroxine 50 mcg tablet QD    Other Relevant Orders    TSH, 3rd generation with Free T4 reflex       Benign essential hypertension  --Well controlled on ARB-->continue, along with dietary, weight loss measures  HLD (hyperlipidemia)  --Well controlled on statin    Relevant Medications    atorvastatin (LIPITOR) 20 mg tablet    Class 2 severe obesity with serious comorbidity and body mass index (BMI) of 38 0 to 38 9 in adult [facility])  --Dietary, weight loss measures encouraged  --Handouts give previously    Nicotine dependence  --Again counseled to quit smoking  Cessation aids discussed  No ready/willing to make repeat quit attempt at this time  --Slip at home for baseline chest CT which she was reminded to schedule      Anxiety  Depression  --Improved on low dose SSRI  Reminded to take consistently  Uses PRN benzo sparingly  --Continue regular walking     --Declines  need for additional measures at this time    Relevant Medications    sertraline (ZOLOFT) 25 mg tablet      Other Visit Diagnoses     Screening for HIV (human immunodeficiency virus)    -  Primary    Relevant Orders    [facility] HIV 1/2 AG-AB combo          Has slip for mammogram which she was reminded to get done  RTO 6 months      Subjective:      Patient ID: John Montez is a 51 y o  female  Here for routine follow-up       Seen her last month for paronychia of left great toe which has resolved  No acute complaints or issues  Tolerating meds without AE's  No recent home glucose readings  Trying to be careful with her diet avoid most sugar and unhealthy carbs  Walks her dog regularly  No foot issues including pain, numbness/tingling  Had recent diabetic eye exam--"everything OK"     Recent (1/22) lab results reviewed with patient  Notable for improved A1C (down to 6 5 from 6 9), favorable lipids (, , HDL 43, LDL 66), mildly elevated TSH (5 7) with low normal free T4  She notes being somewhat tired lately and hasn't been able to lose the weight that she wants  Moods improved lately  Less anxiety, depression  Zoloft helps, but admits not taking it every day  Continues to smoke < 1 PPD most days  No cough or dyspnea  Hasn't gotten chest CT done yet  Had flu shot  IBS not too bad lately  No melena, N/V  Requesting note so that she can get away from her desk and use rest room when needed       Hasn't scheduled mammogram yet  The following portions of the patient's history were reviewed and updated as appropriate: current medications, past family history, past medical history, past social history, past surgical history and problem list     Review of Systems   Constitutional: Negative for fever  Eyes: Negative for visual disturbance  Respiratory: Negative for shortness of breath  Cardiovascular: Negative for chest pain and palpitations  Gastrointestinal: Negative for abdominal pain, blood in stool, nausea and vomiting  Genitourinary: Negative for difficulty urinating  Musculoskeletal: Negative for arthralgias  Neurological: Negative for dizziness, numbness and headaches  Psychiatric/Behavioral:        Per HPI         Objective:      /82 (BP Location: Left arm, Patient Position: Sitting, Cuff Size: Large)   Pulse 94   Temp 97 6 °F (36 4 °C) (Tympanic)   Ht 5' 7" (1 702 m)   Wt 111 kg (244 lb 6 4 oz)   SpO2 98%   BMI 38 28 kg/m²          Physical Exam   Constitutional: She is oriented to person, place, and time  She appears well-developed and well-nourished  HENT:   Head: Normocephalic  Right Ear: External ear normal    Left Ear: External ear normal    Nose: Nose normal    Mouth/Throat: Oropharynx is clear and moist    Eyes: Pupils are equal, round, and reactive to light  Conjunctivae are normal    Neck: Normal range of motion  Neck supple  Cardiovascular: Normal rate, regular rhythm, normal heart sounds and intact distal pulses  Pulses are no weak pulses  Pulses:       Dorsalis pedis pulses are 2+ on the right side, and 2+ on the left side  Posterior tibial pulses are 2+ on the right side, and 2+ on the left side  Pulmonary/Chest: Effort normal and breath sounds normal    Abdominal: Soft  Bowel sounds are normal  There is no tenderness  Feet:   Right Foot:   Skin Integrity: Negative for ulcer, skin breakdown, erythema, warmth, callus or dry skin  Left Foot:   Skin Integrity: Negative for ulcer, skin breakdown, erythema, warmth, callus or dry skin  Lymphadenopathy:     She has no cervical adenopathy  Neurological: She is alert and oriented to person, place, and time  She has normal reflexes  Skin: Skin is warm and dry  Psychiatric: She has a normal mood and affect  Patient's shoes and socks removed  Right Foot/Ankle   Right Foot Inspection  Skin Exam: skin normal and skin intact no dry skin, no warmth, no callus, no erythema, no maceration, no abnormal color, no pre-ulcer, no ulcer and no callus                          Toe Exam: ROM and strength within normal limits  Sensory   Vibration: intact  Proprioception: intact   Monofilament testing: intact  Vascular  Capillary refills: < 3 seconds  The right DP pulse is 2+  The right PT pulse is 2+  Left Foot/Ankle  Left Foot Inspection  Skin Exam: skin normal and skin intactno dry skin, no warmth, no erythema, no maceration, normal color, no pre-ulcer, no ulcer and no callus                         Toe Exam: ROM and strength within normal limits                   Sensory   Vibration: intact  Proprioception: intact  Monofilament: intact  Vascular  Capillary refills: < 3 seconds  The left DP pulse is 2+  The left PT pulse is 2+  Assign Risk Category:  No deformity present; No loss of protective sensation;  No weak pulses       Risk: 0

## 2020-01-30 NOTE — LETTER
January 30, 2020     Patient: Hanna Robles   YOB: 1970   Date of Visit: 1/30/2020       To Whom it May Concern:    Hanna Robles is under my professional care  Please allow her to be away from her desk multiple times throughout the day, as needed, in order to use the rest room  She has a medical condition that may necessitate doing so  If you have any questions or concerns, please don't hesitate to call           Sincerely,          GABBY Hernandez        CC: No Recipients

## 2020-01-30 NOTE — PATIENT INSTRUCTIONS
Chronic Hypertension   WHAT YOU NEED TO KNOW:   Hypertension is high blood pressure (BP)  Your BP is the force of your blood moving against the walls of your arteries  Normal BP is less than 120/80  Prehypertension is between 120/80 and 139/89  Hypertension is 140/90 or higher  Hypertension causes your BP to get so high that your heart has to work much harder than normal  This can damage your heart  Chronic hypertension is a long-term condition that you can control with a healthy lifestyle or medicines  A controlled blood pressure helps protect your organs, such as your heart, lungs, brain, and kidneys  DISCHARGE INSTRUCTIONS:   Call 911 for any of the following:   · You have discomfort in your chest that feels like squeezing, pressure, fullness, or pain  · You become confused or have difficulty speaking  · You suddenly feel lightheaded or have trouble breathing  · You have pain or discomfort in your back, neck, jaw, stomach, or arm  Return to the emergency department if:   · You have a severe headache or vision loss  · You have weakness in an arm or leg  Contact your healthcare provider if:   · You feel faint, dizzy, confused, or drowsy  · You have been taking your BP medicine and your BP is still higher than your healthcare provider says it should be  · You have questions or concerns about your condition or care  Medicines: You may need any of the following:  · Medicine  may be used to help lower your BP  You may need more than one type of medicine  Take the medicine exactly as directed  · Diuretics  help decrease extra fluid that collects in your body  This will help lower your BP  You may urinate more often while you take this medicine  · Cholesterol medicine  helps lower your cholesterol level  A low cholesterol level helps prevent heart disease and makes it easier to control your blood pressure  · Take your medicine as directed    Contact your healthcare provider if you think your medicine is not helping or if you have side effects  Tell him or her if you are allergic to any medicine  Keep a list of the medicines, vitamins, and herbs you take  Include the amounts, and when and why you take them  Bring the list or the pill bottles to follow-up visits  Carry your medicine list with you in case of an emergency  Follow up with your healthcare provider as directed: You will need to return to have your blood pressure checked and to have other lab tests done  Write down your questions so you remember to ask them during your visits  Manage chronic hypertension:  Talk with your healthcare provider about these and other ways to manage hypertension:  · Take your BP at home  Sit and rest for 5 minutes before you take your BP  Extend your arm and support it on a flat surface  Your arm should be at the same level as your heart  Follow the directions that came with your BP monitor  If possible, take at least 2 BP readings each time  Take your BP at least twice a day at the same times each day, such as morning and evening  Keep a record of your BP readings and bring it to your follow-up visits  Ask your healthcare provider what your blood pressure should be  · Limit sodium (salt) as directed  Too much sodium can affect your fluid balance  Check labels to find low-sodium or no-salt-added foods  Some low-sodium foods use potassium salts for flavor  Too much potassium can also cause health problems  Your healthcare provider will tell you how much sodium and potassium are safe for you to have in a day  He or she may recommend that you limit sodium to 2,300 mg a day  · Follow the meal plan recommended by your healthcare provider  A dietitian or your provider can give you more information on low-sodium plans or the DASH (Dietary Approaches to Stop Hypertension) eating plan  The DASH plan is low in sodium, unhealthy fats, and total fat  It is high in potassium, calcium, and fiber  · Exercise to maintain a healthy weight  Exercise at least 30 minutes per day, on most days of the week  This will help decrease your blood pressure  Ask about the best exercise plan for you  · Decrease stress  This may help lower your BP  Learn ways to relax, such as deep breathing or listening to music  · Limit alcohol  Women should limit alcohol to 1 drink a day  Men should limit alcohol to 2 drinks a day  A drink of alcohol is 12 ounces of beer, 5 ounces of wine, or 1½ ounces of liquor  · Do not smoke  Nicotine and other chemicals in cigarettes and cigars can increase your BP and also cause lung damage  Ask your healthcare provider for information if you currently smoke and need help to quit  E-cigarettes or smokeless tobacco still contain nicotine  Talk to your healthcare provider before you use these products  © 2017 [facility] is for End User's use only and may not be sold, redistributed or otherwise used for commercial purposes  All illustrations and images included in CareNotes® are the copyrighted property of A D A M , Inc  or Eduardo Staley  The above information is an  only  It is not intended as medical advice for individual conditions or treatments  Talk to your doctor, nurse or pharmacist before following any medical regimen to see if it is safe and effective for you  Weight Management   WHAT YOU NEED TO KNOW:   Being overweight increases your risk of health conditions such as heart disease, high blood pressure, type 2 diabetes, and certain types of cancer  It can also increase your risk for osteoarthritis, sleep apnea, and other respiratory problems  Aim for a slow, steady weight loss  Even a small amount of weight loss can lower your risk of health problems  DISCHARGE INSTRUCTIONS:   How to lose weight safely:  A safe and healthy way to lose weight is to eat fewer calories and get regular exercise   You can lose up about 1 pound a week by decreasing the number of calories you eat by 500 calories each day  You can decrease calories by eating smaller portion sizes or by cutting out high-calorie foods  Read labels to find out how many calories are in the foods you eat  You can also burn calories with exercise such as walking, swimming, or biking  You will be more likely to keep weight off if you make these changes part of your lifestyle  Healthy meal plan for weight management:  A healthy meal plan includes a variety of foods, contains fewer calories, and helps you stay healthy  A healthy meal plan includes the following:  · Eat whole-grain foods more often  A healthy meal plan should contain fiber  Fiber is the part of grains, fruits, and vegetables that is not broken down by your body  Whole-grain foods are healthy and provide extra fiber in your diet  Some examples of whole-grain foods are whole-wheat breads and pastas, oatmeal, brown rice, and bulgur  · Eat a variety of vegetables every day  Include dark, leafy greens such as spinach, kale, adrianna greens, and mustard greens  Eat yellow and orange vegetables such as carrots, sweet potatoes, and winter squash  · Eat a variety of fruits every day  Choose fresh or canned fruit (canned in its own juice or light syrup) instead of juice  Fruit juice has very little or no fiber  · Eat low-fat dairy foods  Drink fat-free (skim) milk or 1% milk  Eat fat-free yogurt and low-fat cottage cheese  Try low-fat cheeses such as mozzarella and other reduced-fat cheeses  · Choose meat and other protein foods that are low in fat  Choose beans or other legumes such as split peas or lentils  Choose fish, skinless poultry (chicken or turkey), or lean cuts of red meat (beef or pork)  Before you cook meat or poultry, cut off any visible fat  · Use less fat and oil  Try baking foods instead of frying them   Add less fat, such as margarine, sour cream, regular salad dressing, and mayonnaise to foods  Eat fewer high-fat foods  Some examples of high-fat foods include french fries, doughnuts, ice cream, and cakes  · Eat fewer sweets  Limit foods and drinks that are high in sugar  This includes candy, cookies, regular soda, and sweetened drinks  Ways to decrease calories:   · Eat smaller portions  ¨ Use a small plate with smaller servings  ¨ Do not eat second helpings  ¨ When you eat at a restaurant, ask for a box and place half of your meal in the box before you eat  ¨ Share an entrée with someone else  · Replace high-calorie snacks with healthy, low-calorie snacks  ¨ Choose fresh fruit, vegetables, fat-free rice cakes, or air-popped popcorn instead of potato chips, nuts, or chocolate  ¨ Choose water or calorie-free drinks instead of soda or sweetened drinks  · Eat regular meals  Skipping meals can lead to overeating later in the day  Eat a healthy snack in place of a meal if you do not have time to eat a regular meal      · Do not shop for groceries when you are hungry  You may be more likely to make unhealthy food choices  Take a grocery list of healthy foods and shop after you have eaten  Exercise:  Exercise at least 30 minutes per day on most days of the week  Some examples of exercise include walking, biking, dancing, and swimming  You can also fit in more physical activity by taking the stairs instead of the elevator or parking farther away from stores  Ask your healthcare provider about the best exercise plan for you  Other things to consider as you try to lose weight:   · Be aware of situations that may give you the urge to overeat, such as eating while watching television  Find ways to avoid these situations  For example, read a book, go for a walk, or do crafts  · Meet with a weight loss support group or friends who are also trying to lose weight  This may help you stay motivated to continue working on your weight loss goals    © 2017 [facility] is for End User's use only and may not be sold, redistributed or otherwise used for commercial purposes  All illustrations and images included in CareNotes® are the copyrighted property of A D A M , Inc  or Eduardo Staley  The above information is an  only  It is not intended as medical advice for individual conditions or treatments  Talk to your doctor, nurse or pharmacist before following any medical regimen to see if it is safe and effective for you

## 2020-02-06 ENCOUNTER — TELEPHONE (OUTPATIENT)
Dept: FAMILY MEDICINE CLINIC | Facility: OTHER | Age: 50
End: 2020-02-06

## 2020-02-06 DIAGNOSIS — L03.032 PARONYCHIA OF GREAT TOE, LEFT: ICD-10-CM

## 2020-02-06 RX ORDER — CLINDAMYCIN HYDROCHLORIDE 300 MG/1
300 CAPSULE ORAL 3 TIMES DAILY
Qty: 21 CAPSULE | Refills: 0 | Status: SHIPPED | OUTPATIENT
Start: 2020-02-06 | End: 2020-02-13

## 2020-02-06 NOTE — TELEPHONE ENCOUNTER
Patient called and she hurt the same toe the other day and the new nail is falling off she would like a refill of the Clindamycin to prevent another infection  She cant afford to come in for a visit   Please advise   Thank you

## 2020-03-04 DIAGNOSIS — I10 ESSENTIAL HYPERTENSION: ICD-10-CM

## 2020-03-04 PROCEDURE — 4010F ACE/ARB THERAPY RXD/TAKEN: CPT | Performed by: NURSE PRACTITIONER

## 2020-03-04 RX ORDER — LOSARTAN POTASSIUM 100 MG/1
TABLET ORAL
Qty: 30 TABLET | Refills: 5 | Status: SHIPPED | OUTPATIENT
Start: 2020-03-04 | End: 2020-09-02 | Stop reason: SDUPTHER

## 2020-03-10 DIAGNOSIS — F41.9 ANXIETY: ICD-10-CM

## 2020-03-10 DIAGNOSIS — E03.9 HYPOTHYROIDISM, UNSPECIFIED TYPE: ICD-10-CM

## 2020-03-10 RX ORDER — LEVOTHYROXINE SODIUM 0.05 MG/1
TABLET ORAL
Qty: 30 TABLET | Refills: 5 | Status: SHIPPED | OUTPATIENT
Start: 2020-03-10 | End: 2021-03-01 | Stop reason: SDUPTHER

## 2020-03-10 RX ORDER — ALPRAZOLAM 0.25 MG/1
0.25 TABLET ORAL 2 TIMES DAILY PRN
Qty: 30 TABLET | Refills: 2 | Status: SHIPPED | OUTPATIENT
Start: 2020-03-10 | End: 2020-10-19 | Stop reason: SDUPTHER

## 2020-04-21 ENCOUNTER — TELEMEDICINE (OUTPATIENT)
Dept: FAMILY MEDICINE CLINIC | Facility: OTHER | Age: 50
End: 2020-04-21
Payer: COMMERCIAL

## 2020-04-21 DIAGNOSIS — L23.7 CONTACT DERMATITIS DUE TO POISON OAK: Primary | ICD-10-CM

## 2020-04-21 PROCEDURE — 99213 OFFICE O/P EST LOW 20 MIN: CPT | Performed by: FAMILY MEDICINE

## 2020-04-21 RX ORDER — METHYLPREDNISOLONE 4 MG/1
TABLET ORAL
Qty: 21 EACH | Refills: 0 | Status: SHIPPED | OUTPATIENT
Start: 2020-04-21 | End: 2022-06-14

## 2020-05-22 ENCOUNTER — OFFICE VISIT (OUTPATIENT)
Dept: FAMILY MEDICINE CLINIC | Facility: OTHER | Age: 50
End: 2020-05-22
Payer: COMMERCIAL

## 2020-05-22 ENCOUNTER — TELEPHONE (OUTPATIENT)
Dept: FAMILY MEDICINE CLINIC | Facility: OTHER | Age: 50
End: 2020-05-22

## 2020-05-22 VITALS
DIASTOLIC BLOOD PRESSURE: 74 MMHG | HEART RATE: 94 BPM | OXYGEN SATURATION: 98 % | TEMPERATURE: 97.8 F | WEIGHT: 228 LBS | BODY MASS INDEX: 35.71 KG/M2 | SYSTOLIC BLOOD PRESSURE: 136 MMHG

## 2020-05-22 DIAGNOSIS — L02.214 CUTANEOUS ABSCESS OF GROIN: Primary | ICD-10-CM

## 2020-05-22 PROCEDURE — 2022F DILAT RTA XM EVC RTNOPTHY: CPT | Performed by: FAMILY MEDICINE

## 2020-05-22 PROCEDURE — 3075F SYST BP GE 130 - 139MM HG: CPT | Performed by: FAMILY MEDICINE

## 2020-05-22 PROCEDURE — 4004F PT TOBACCO SCREEN RCVD TLK: CPT | Performed by: FAMILY MEDICINE

## 2020-05-22 PROCEDURE — 99214 OFFICE O/P EST MOD 30 MIN: CPT | Performed by: FAMILY MEDICINE

## 2020-05-22 PROCEDURE — 3078F DIAST BP <80 MM HG: CPT | Performed by: FAMILY MEDICINE

## 2020-05-22 RX ORDER — SULFAMETHOXAZOLE AND TRIMETHOPRIM 800; 160 MG/1; MG/1
1 TABLET ORAL EVERY 12 HOURS SCHEDULED
Qty: 20 TABLET | Refills: 0 | Status: SHIPPED | OUTPATIENT
Start: 2020-05-22 | End: 2020-06-01

## 2020-05-22 RX ORDER — ALPRAZOLAM 0.25 MG/1
0.25 TABLET ORAL 2 TIMES DAILY PRN
Qty: 30 TABLET | Refills: 2 | Status: CANCELLED | OUTPATIENT
Start: 2020-05-22

## 2020-06-05 ENCOUNTER — TELEPHONE (OUTPATIENT)
Dept: FAMILY MEDICINE CLINIC | Facility: OTHER | Age: 50
End: 2020-06-05

## 2020-06-08 ENCOUNTER — OFFICE VISIT (OUTPATIENT)
Dept: FAMILY MEDICINE CLINIC | Facility: OTHER | Age: 50
End: 2020-06-08
Payer: COMMERCIAL

## 2020-06-08 VITALS
SYSTOLIC BLOOD PRESSURE: 132 MMHG | DIASTOLIC BLOOD PRESSURE: 76 MMHG | WEIGHT: 244.5 LBS | HEART RATE: 92 BPM | OXYGEN SATURATION: 97 % | HEIGHT: 67 IN | BODY MASS INDEX: 38.37 KG/M2 | TEMPERATURE: 98.6 F

## 2020-06-08 DIAGNOSIS — E11.9 TYPE 2 DIABETES MELLITUS WITHOUT COMPLICATION, WITHOUT LONG-TERM CURRENT USE OF INSULIN (HCC): ICD-10-CM

## 2020-06-08 DIAGNOSIS — E78.2 MIXED HYPERLIPIDEMIA: ICD-10-CM

## 2020-06-08 DIAGNOSIS — Z00.00 WELL ADULT EXAM: Primary | ICD-10-CM

## 2020-06-08 DIAGNOSIS — F41.9 ANXIETY: ICD-10-CM

## 2020-06-08 DIAGNOSIS — R91.8 LUNG NODULES: ICD-10-CM

## 2020-06-08 DIAGNOSIS — I10 BENIGN ESSENTIAL HYPERTENSION: ICD-10-CM

## 2020-06-08 DIAGNOSIS — F17.210 CIGARETTE NICOTINE DEPENDENCE WITHOUT COMPLICATION: ICD-10-CM

## 2020-06-08 DIAGNOSIS — Z12.39 SCREENING FOR BREAST CANCER: ICD-10-CM

## 2020-06-08 DIAGNOSIS — F32.A DEPRESSION, UNSPECIFIED DEPRESSION TYPE: ICD-10-CM

## 2020-06-08 DIAGNOSIS — K58.9 IRRITABLE BOWEL SYNDROME, UNSPECIFIED TYPE: ICD-10-CM

## 2020-06-08 DIAGNOSIS — E66.01 CLASS 2 SEVERE OBESITY WITH SERIOUS COMORBIDITY AND BODY MASS INDEX (BMI) OF 38.0 TO 38.9 IN ADULT, UNSPECIFIED OBESITY TYPE (HCC): ICD-10-CM

## 2020-06-08 DIAGNOSIS — E03.9 HYPOTHYROIDISM, UNSPECIFIED TYPE: ICD-10-CM

## 2020-06-08 PROCEDURE — 99396 PREV VISIT EST AGE 40-64: CPT | Performed by: NURSE PRACTITIONER

## 2020-06-08 PROCEDURE — 3008F BODY MASS INDEX DOCD: CPT | Performed by: NURSE PRACTITIONER

## 2020-06-27 ENCOUNTER — NURSE TRIAGE (OUTPATIENT)
Dept: OTHER | Facility: OTHER | Age: 50
End: 2020-06-27

## 2020-06-27 ENCOUNTER — OFFICE VISIT (OUTPATIENT)
Dept: URGENT CARE | Facility: CLINIC | Age: 50
End: 2020-06-27
Payer: COMMERCIAL

## 2020-06-27 VITALS
DIASTOLIC BLOOD PRESSURE: 96 MMHG | HEIGHT: 67 IN | RESPIRATION RATE: 18 BRPM | SYSTOLIC BLOOD PRESSURE: 150 MMHG | BODY MASS INDEX: 42.38 KG/M2 | OXYGEN SATURATION: 97 % | TEMPERATURE: 97.8 F | HEART RATE: 92 BPM | WEIGHT: 270 LBS

## 2020-06-27 DIAGNOSIS — L23.7 POISON IVY DERMATITIS: Primary | ICD-10-CM

## 2020-06-27 PROCEDURE — 99213 OFFICE O/P EST LOW 20 MIN: CPT | Performed by: PHYSICIAN ASSISTANT

## 2020-06-27 RX ORDER — PREDNISONE 50 MG/1
50 TABLET ORAL DAILY
Qty: 5 TABLET | Refills: 0 | Status: SHIPPED | OUTPATIENT
Start: 2020-06-27 | End: 2020-07-02

## 2020-07-07 LAB
ALBUMIN SERPL-MCNC: 4.3 G/DL (ref 3.8–4.8)
ALBUMIN/GLOB SERPL: 1.7 {RATIO} (ref 1.2–2.2)
ALP SERPL-CCNC: 86 IU/L (ref 39–117)
ALT SERPL-CCNC: 26 IU/L (ref 0–32)
AST SERPL-CCNC: 16 IU/L (ref 0–40)
BASOPHILS # BLD AUTO: 0.1 X10E3/UL (ref 0–0.2)
BASOPHILS NFR BLD AUTO: 1 %
BILIRUB SERPL-MCNC: 0.3 MG/DL (ref 0–1.2)
BUN SERPL-MCNC: 15 MG/DL (ref 6–24)
BUN/CREAT SERPL: 17 (ref 9–23)
CALCIUM SERPL-MCNC: 9.7 MG/DL (ref 8.7–10.2)
CHLORIDE SERPL-SCNC: 103 MMOL/L (ref 96–106)
CHOLEST SERPL-MCNC: 135 MG/DL (ref 100–199)
CO2 SERPL-SCNC: 20 MMOL/L (ref 20–29)
CREAT SERPL-MCNC: 0.87 MG/DL (ref 0.57–1)
EOSINOPHIL # BLD AUTO: 0.3 X10E3/UL (ref 0–0.4)
EOSINOPHIL NFR BLD AUTO: 3 %
ERYTHROCYTE [DISTWIDTH] IN BLOOD BY AUTOMATED COUNT: 13.3 % (ref 11.7–15.4)
GLOBULIN SER-MCNC: 2.5 G/DL (ref 1.5–4.5)
GLUCOSE SERPL-MCNC: 130 MG/DL (ref 65–99)
HBA1C MFR BLD: 7.2 % (ref 4.8–5.6)
HCT VFR BLD AUTO: 44.8 % (ref 34–46.6)
HDLC SERPL-MCNC: 48 MG/DL
HGB BLD-MCNC: 14.1 G/DL (ref 11.1–15.9)
IMM GRANULOCYTES # BLD: 0 X10E3/UL (ref 0–0.1)
IMM GRANULOCYTES NFR BLD: 0 %
LDLC SERPL CALC-MCNC: 52 MG/DL (ref 0–99)
LYMPHOCYTES # BLD AUTO: 2.6 X10E3/UL (ref 0.7–3.1)
LYMPHOCYTES NFR BLD AUTO: 22 %
MCH RBC QN AUTO: 27.7 PG (ref 26.6–33)
MCHC RBC AUTO-ENTMCNC: 31.5 G/DL (ref 31.5–35.7)
MCV RBC AUTO: 88 FL (ref 79–97)
MONOCYTES # BLD AUTO: 0.8 X10E3/UL (ref 0.1–0.9)
MONOCYTES NFR BLD AUTO: 7 %
NEUTROPHILS # BLD AUTO: 8.2 X10E3/UL (ref 1.4–7)
NEUTROPHILS NFR BLD AUTO: 67 %
PLATELET # BLD AUTO: 398 X10E3/UL (ref 150–450)
POTASSIUM SERPL-SCNC: 4.1 MMOL/L (ref 3.5–5.2)
PROT SERPL-MCNC: 6.8 G/DL (ref 6–8.5)
RBC # BLD AUTO: 5.09 X10E6/UL (ref 3.77–5.28)
SL AMB EGFR AFRICAN AMERICAN: 91 ML/MIN/1.73
SL AMB EGFR NON AFRICAN AMERICAN: 79 ML/MIN/1.73
SL AMB T4, FREE (DIRECT): 1.24 NG/DL (ref 0.82–1.77)
SODIUM SERPL-SCNC: 141 MMOL/L (ref 134–144)
TRIGL SERPL-MCNC: 176 MG/DL (ref 0–149)
TSH SERPL DL<=0.005 MIU/L-ACNC: 7.49 UIU/ML (ref 0.45–4.5)
WBC # BLD AUTO: 12.1 X10E3/UL (ref 3.4–10.8)

## 2020-07-07 PROCEDURE — 3051F HG A1C>EQUAL 7.0%<8.0%: CPT | Performed by: NURSE PRACTITIONER

## 2020-07-14 ENCOUNTER — HOSPITAL ENCOUNTER (EMERGENCY)
Facility: HOSPITAL | Age: 50
Discharge: HOME | End: 2020-07-14
Attending: EMERGENCY MEDICINE | Admitting: EMERGENCY MEDICINE
Payer: COMMERCIAL

## 2020-07-14 ENCOUNTER — TELEPHONE (OUTPATIENT)
Dept: FAMILY MEDICINE CLINIC | Facility: OTHER | Age: 50
End: 2020-07-14

## 2020-07-14 VITALS
HEART RATE: 88 BPM | DIASTOLIC BLOOD PRESSURE: 83 MMHG | BODY MASS INDEX: 38.22 KG/M2 | WEIGHT: 244 LBS | SYSTOLIC BLOOD PRESSURE: 185 MMHG | TEMPERATURE: 97.9 F | OXYGEN SATURATION: 100 % | RESPIRATION RATE: 18 BRPM

## 2020-07-14 DIAGNOSIS — L50.9 URTICARIA: Primary | ICD-10-CM

## 2020-07-14 PROCEDURE — 99282 EMERGENCY DEPT VISIT SF MDM: CPT

## 2020-07-14 PROCEDURE — 99284 EMERGENCY DEPT VISIT MOD MDM: CPT | Performed by: EMERGENCY MEDICINE

## 2020-07-14 RX ORDER — DEXAMETHASONE 4 MG/1
10 TABLET ORAL ONCE
Status: COMPLETED | OUTPATIENT
Start: 2020-07-14 | End: 2020-07-14

## 2020-07-14 RX ORDER — DIPHENHYDRAMINE HCL 25 MG
50 TABLET ORAL ONCE
Status: COMPLETED | OUTPATIENT
Start: 2020-07-14 | End: 2020-07-14

## 2020-07-14 RX ADMIN — DEXAMETHASONE 10 MG: 4 TABLET ORAL at 07:27

## 2020-07-14 RX ADMIN — DIPHENHYDRAMINE HCL 50 MG: 25 TABLET ORAL at 07:28

## 2020-07-14 NOTE — ED PROVIDER NOTES
History  Chief Complaint   Patient presents with    Rash     recently on steroid for poison oak  itchy, painful rash       History provided by:  Patient   used: No    Rash   Associated symptoms: no fatigue, no fever and not wheezing     46-year-old female diabetic presented for evaluation of acute pruritic rash developing this morning after she showered  Rashes on bilateral thighs and lower legs  She also has faint amount in biceps areas of the arms  Denies any difficulty breathing or swallowing, tongue swelling, difficulty speaking  She was recently treated with prednisone for poison oak/ivy in still has a few healing lesions but this is different  She does note that she used dollar store soap this morning that she had used once before and not had a reaction  Suspect since her symptoms began after the shower the so busy etiology  On exam the rash appears urticarial   It is bilateral   No wheezing or stridor  No meds prior to arrival   She denies any new foods, medications or any changes otherwise  Will give Benadryl and single dose of Decadron  Prior to Admission Medications   Prescriptions Last Dose Informant Patient Reported? Taking?    ALPRAZolam (XANAX) 0 25 mg tablet  Self No Yes   Sig: Take 1 tablet (0 25 mg total) by mouth 2 (two) times a day as needed for anxiety   albuterol (PROAIR HFA) 90 mcg/act inhaler  Self No Yes   Sig: Inhale 2 puffs every 4 (four) hours as needed for wheezing or shortness of breath (cough)   atorvastatin (LIPITOR) 20 mg tablet  Self No Yes   Sig: Take 1 tablet (20 mg total) by mouth daily   hydrocortisone 2 5 % ointment   No No   Sig: Apply topically 2 (two) times a day   levothyroxine 50 mcg tablet  Self No Yes   Sig: Take one tablet by mouth daily   losartan (COZAAR) 100 MG tablet  Self No Yes   Sig: TAKE 1 TABLET BY MOUTH EVERY DAY   metFORMIN (GLUCOPHAGE) 1000 MG tablet  Self No Yes   Si tablet twice a day   methylPREDNISolone 4 MG tablet therapy pack  Self No No   Sig: Use as directed on package   nicotine (NICODERM CQ) 14 mg/24hr TD 24 hr patch  Self No Yes   Sig: Place 1 patch on the skin every 24 hours   nicotine (NICODERM CQ) 21 mg/24 hr TD 24 hr patch  Self No Yes   Sig: APPLY 1 PATCH EVERY DAY   sertraline (ZOLOFT) 25 mg tablet  Self No Yes   Sig: Take 1 tablet (25 mg total) by mouth daily      Facility-Administered Medications: None       Past Medical History:   Diagnosis Date    Anxiety     Calculus of distal right ureter     last assessed 08/06/2014    Closed fracture of head of first metacarpal bone of right hand     Depression     Disease of thyroid gland     Diverticulitis     Fatty liver     last assessed 01/09/2017    HLD (hyperlipidemia)     treating with diet    Hypertension     Kidney stone     Lung nodules     Migraine     last assessed 08/03/2012    Pneumonia     Pre-diabetes     last assessed 06/27/2017       Past Surgical History:   Procedure Laterality Date    APPENDECTOMY      CHOLECYSTECTOMY  1999    laparoscopic    COLON SURGERY  01/09/2018    colostomy    COLONOSCOPY  02/2012    polyp, Dr Ashley Martin N/A 4/5/2018    Procedure: REVERSAL COLOSTOMY;  Surgeon: Marilu Frazier DO;  [facility];  Service: General    FLEXIBLE SIGMOIDOSCOPY N/A 4/5/2018    Procedure: Vandana Bogus;  Surgeon: Marilu Frazier DO;  [facility];  Service: General   [facility]    umbilical    LAPAROTOMY N/A 1/7/2018    Procedure: LAPAROTOMY EXPLORATORY, sigmoid colon resection, colostomy, appendectomy;  Surgeon: Marilu Frazier DO;  [facility];  Service: General    AZ COLONOSCOPY FLX DX W/COLLJ SPEC WHEN PFRMD N/A 4/2/2018    Procedure: COLONOSCOPY;  Surgeon: Marilu Frazier DO;  [facility];   Service: General    AZ COLONOSCOPY FLX DX W/COLLJ SPEC WHEN PFRMD N/A 4/16/2019    Procedure: COLONOSCOPY;  Surgeon: Marilu Frazier DO;  [facility]; Service: General    MS EXPLORATORY OF ABDOMEN N/A 4/5/2018    Procedure: EXPLORATORY LAPAROTOMY;  Surgeon: Bryan Yeager DO;  [facility];  Service: General    MS REPAIR INCISIONAL HERNIA,REDUCIBLE N/A 11/15/2018    Procedure: INCISIONAL HERNIA REPAIR AT UMBILICUS;  Surgeon: Bryan Yeager DO;  [facility];  Service: General    REDUCTION MAMMAPLASTY  1999    TUBAL LIGATION         Family History   Problem Relation Age of Onset    Alcohol abuse Mother     Hepatitis Mother         chronic hepatitis C virus    Heart disease Father     Heart attack Father         MI    Hypertension Father     Diverticulitis Sister     Diabetes Sister     Hypertension Sister     Cancer Brother     No Known Problems Sister      I have reviewed and agree with the history as documented  E-Cigarette/Vaping     E-Cigarette/Vaping Substances     Social History     Tobacco Use    Smoking status: Current Every Day Smoker     Packs/day: 1 00     Years: 28 00     Pack years: 28 00     Types: Cigarettes    Smokeless tobacco: Never Used   Substance Use Topics    Alcohol use: No     Comment: quit 5 years ago    Drug use: No       Review of Systems   Constitutional: Negative for activity change, appetite change, diaphoresis, fatigue and fever  Respiratory: Negative for cough, choking, chest tightness, wheezing and stridor  Cardiovascular: Negative for chest pain  Skin: Positive for rash  Negative for color change  All other systems reviewed and are negative  Physical Exam  Physical Exam   Constitutional: She is oriented to person, place, and time  She appears well-developed and well-nourished  Uncomfortable due to itching  Neck: Normal range of motion  Neck supple  Cardiovascular: Normal rate and regular rhythm  Pulmonary/Chest: Effort normal  No respiratory distress  Abdominal: Soft  She exhibits no distension  Musculoskeletal: Normal range of motion  She exhibits no edema  Neurological: She is alert and oriented to person, place, and time  Skin: Skin is warm and dry  Bilateral urticaria of legs, upper arms  Psychiatric: She has a normal mood and affect  Her behavior is normal    Nursing note and vitals reviewed  Vital Signs  ED Triage Vitals [07/14/20 0711]   Temperature Pulse Respirations Blood Pressure SpO2   97 9 °F (36 6 °C) 88 18 (!) 185/83 100 %      Temp Source Heart Rate Source Patient Position - Orthostatic VS BP Location FiO2 (%)   Oral Monitor -- Right arm --      Pain Score       --           Vitals:    07/14/20 0711   BP: (!) 185/83   Pulse: 88         Visual Acuity      ED Medications  Medications   diphenhydrAMINE (BENADRYL) tablet 50 mg (has no administration in time range)   dexamethasone (DECADRON) tablet 10 mg (has no administration in time range)       Diagnostic Studies  Results Reviewed     None                 No orders to display              Procedures  Procedures         ED Course                                             MDM  Number of Diagnoses or Management Options  Urticaria: new and requires workup  Diagnosis management comments: 60-year-old female presents with acute pruritic urticarial rash on her extremities after showering this morning  Suspect using a different soap may be the etiology  She was counseled to avoid this open the future  Will give Benadryl and Decadron here  Advised to return to the ED with any worsening symptoms  Patient Progress  Patient progress: stable        Disposition  Final diagnoses:   Urticaria     Time reflects when diagnosis was documented in both MDM as applicable and the Disposition within this note     Time User Action Codes Description Comment    7/14/2020  7:18 AM Aleida November Add [L50 9] Urticaria       ED Disposition     ED Disposition Condition Date/Time Comment    Discharge Stable Tue Jul 14, 2020  7:20 AM Veronika [place] discharge to home/self care              Follow-up Information Follow up With Specialties Details Why Contact Info Additional [address], [address] Rory Tracy, Nurse Practitioner   Tamara  Robert [address] [phone]       [address] Emergency Department Emergency Medicine  If symptoms worsen [address] [address]  [phone]  ED,  [address], [place], [place], [address]          Patient's Medications   Discharge Prescriptions    No medications on file     No discharge procedures on file      PDMP Review       Value Time User    PDMP Reviewed  Yes 3/10/2020  4:41 PM Arturo Mcfarlane, [facility]          ED Provider  Electronically Signed by           Lisa Isbell MD  07/14/20 2548

## 2020-07-14 NOTE — TELEPHONE ENCOUNTER
Patient called regarding results of bloodwork  She stated she is concerned and would like to know what is going on  She has jury duty tomorrow and can be reached between 12:00 -12:30

## 2020-07-14 NOTE — DISCHARGE INSTRUCTIONS
Avoid soap you used this morning  Continue benadryl as needed for itching 1-2 tabs every 6 hours  May cause drowsiness  Return to Emergency Dept for any worsening, trouble breathing/swallowing

## 2020-07-15 ENCOUNTER — TELEPHONE (OUTPATIENT)
Dept: FAMILY MEDICINE CLINIC | Facility: OTHER | Age: 50
End: 2020-07-15

## 2020-07-15 DIAGNOSIS — D72.829 LEUKOCYTOSIS, UNSPECIFIED TYPE: ICD-10-CM

## 2020-07-15 DIAGNOSIS — E03.9 HYPOTHYROIDISM, UNSPECIFIED TYPE: Primary | ICD-10-CM

## 2020-07-15 NOTE — TELEPHONE ENCOUNTER
----- Message from Bridger Thomason Nhung  sent at 7/15/2020  1:00 PM EDT -----  Can we call patient to inform of blood work results which were notable for the following:    --Average blood sugar level (A1C) went up a bit from previous, but still acceptable for diabetic  Continue metformin, as well as watching weight and diet  --Pretty good cholesterol numbers  Mildly elevated triglycerides only  Watching weight and diet will help with this  --Thyroid level remains in normal range, but tending a bit towards under-replacement  Would keep same dose of thyroid medication for now, rechecking level in 2 months  --Mildly elevated white blood count  Not a big concern at this time, but should recheck in 2 months as a precaution  Repeat labs ordered  Slip should print for mailing to patient     Thanks

## 2020-07-17 ENCOUNTER — TELEPHONE (OUTPATIENT)
Dept: FAMILY MEDICINE CLINIC | Facility: OTHER | Age: 50
End: 2020-07-17

## 2020-07-17 NOTE — TELEPHONE ENCOUNTER
Looks like they diagnosed it as a hive reaction, in which case taking Benadryl every 6 hours or so should help  If it gets any worse or she has any trouble breathing, she should go back to the ER

## 2020-07-17 NOTE — TELEPHONE ENCOUNTER
Patient called and stated she was in the ER on Tuesday for a rash that broke out all over  They gave her a dose of prednisone shes says it did nothing for her  But it comes and goes and is very itchy and drives her crazy  Please advise if there is anything that she can get for this  She is not able to come in

## 2020-07-20 NOTE — TELEPHONE ENCOUNTER
Lab results with a couple of minor issues, but no significant concerns  See separately sent results note for details  Please review referral. Please route back to writer.     Thank you,    Alana Osman    [facility]

## 2020-08-28 DIAGNOSIS — E78.2 MIXED HYPERLIPIDEMIA: ICD-10-CM

## 2020-08-28 RX ORDER — ATORVASTATIN CALCIUM 20 MG/1
TABLET, FILM COATED ORAL
Qty: 30 TABLET | Refills: 5 | OUTPATIENT
Start: 2020-08-28

## 2020-09-01 DIAGNOSIS — I10 ESSENTIAL HYPERTENSION: ICD-10-CM

## 2020-09-01 DIAGNOSIS — E78.2 MIXED HYPERLIPIDEMIA: ICD-10-CM

## 2020-09-02 DIAGNOSIS — E78.2 MIXED HYPERLIPIDEMIA: ICD-10-CM

## 2020-09-02 DIAGNOSIS — I10 ESSENTIAL HYPERTENSION: ICD-10-CM

## 2020-09-02 RX ORDER — ATORVASTATIN CALCIUM 20 MG/1
TABLET, FILM COATED ORAL
Qty: 30 TABLET | Refills: 5 | Status: SHIPPED | OUTPATIENT
Start: 2020-09-02 | End: 2021-03-02

## 2020-09-02 RX ORDER — ATORVASTATIN CALCIUM 20 MG/1
TABLET, FILM COATED ORAL
Qty: 30 TABLET | Refills: 5 | OUTPATIENT
Start: 2020-09-02

## 2020-09-02 RX ORDER — LOSARTAN POTASSIUM 100 MG/1
100 TABLET ORAL DAILY
Qty: 30 TABLET | Refills: 1 | Status: SHIPPED | OUTPATIENT
Start: 2020-09-02 | End: 2020-11-03 | Stop reason: SDUPTHER

## 2020-09-02 RX ORDER — LOSARTAN POTASSIUM 100 MG/1
TABLET ORAL
Qty: 30 TABLET | Refills: 5 | OUTPATIENT
Start: 2020-09-02

## 2020-09-09 DIAGNOSIS — E03.9 HYPOTHYROIDISM, UNSPECIFIED TYPE: ICD-10-CM

## 2020-09-09 RX ORDER — LEVOTHYROXINE SODIUM 0.03 MG/1
TABLET ORAL
Qty: 45 TABLET | Refills: 11 | OUTPATIENT
Start: 2020-09-09

## 2020-09-10 LAB
BASOPHILS # BLD AUTO: 0.1 X10E3/UL (ref 0–0.2)
BASOPHILS NFR BLD AUTO: 1 %
EOSINOPHIL # BLD AUTO: 0.3 X10E3/UL (ref 0–0.4)
EOSINOPHIL NFR BLD AUTO: 3 %
ERYTHROCYTE [DISTWIDTH] IN BLOOD BY AUTOMATED COUNT: 12.9 % (ref 11.7–15.4)
HCT VFR BLD AUTO: 41.8 % (ref 34–46.6)
HGB BLD-MCNC: 13.6 G/DL (ref 11.1–15.9)
IMM GRANULOCYTES # BLD: 0 X10E3/UL (ref 0–0.1)
IMM GRANULOCYTES NFR BLD: 0 %
LYMPHOCYTES # BLD AUTO: 2.3 X10E3/UL (ref 0.7–3.1)
LYMPHOCYTES NFR BLD AUTO: 25 %
MCH RBC QN AUTO: 28 PG (ref 26.6–33)
MCHC RBC AUTO-ENTMCNC: 32.5 G/DL (ref 31.5–35.7)
MCV RBC AUTO: 86 FL (ref 79–97)
MONOCYTES # BLD AUTO: 0.7 X10E3/UL (ref 0.1–0.9)
MONOCYTES NFR BLD AUTO: 8 %
NEUTROPHILS # BLD AUTO: 5.7 X10E3/UL (ref 1.4–7)
NEUTROPHILS NFR BLD AUTO: 63 %
PLATELET # BLD AUTO: 348 X10E3/UL (ref 150–450)
RBC # BLD AUTO: 4.85 X10E6/UL (ref 3.77–5.28)
SL AMB T4, FREE (DIRECT): 1.1 NG/DL (ref 0.82–1.77)
TSH SERPL DL<=0.005 MIU/L-ACNC: 5.57 UIU/ML (ref 0.45–4.5)
WBC # BLD AUTO: 9 X10E3/UL (ref 3.4–10.8)

## 2020-09-11 ENCOUNTER — TELEPHONE (OUTPATIENT)
Dept: FAMILY MEDICINE CLINIC | Facility: OTHER | Age: 50
End: 2020-09-11

## 2020-09-11 NOTE — TELEPHONE ENCOUNTER
Patient called for her lab results ~ OK to leave message on her voicemail   Please advise   Thank you

## 2020-09-15 ENCOUNTER — TELEPHONE (OUTPATIENT)
Dept: FAMILY MEDICINE CLINIC | Facility: OTHER | Age: 50
End: 2020-09-15

## 2020-09-15 NOTE — TELEPHONE ENCOUNTER
Left message for patient    ----- Message from hRoda Farley sent at 9/15/2020  1:25 PM EDT -----    ----- Message -----  From: Nidia Hurtado DO  Sent: 9/15/2020  10:39 AM EDT  To: [address]    Please inform pt that labs are stable  TSH is noted to be slightly elevated  If she is feeling well, we can continue current dose of levothyroxine (50 mcg daily)  If symptomatic, we can increase levothyroxine and recheck labs in 6 wks  Please let me know how she would like to proceed  Thanks!   Nidia Hurtado DO

## 2020-09-15 NOTE — RESULT ENCOUNTER NOTE
Please inform pt that labs are stable  TSH is noted to be slightly elevated  If she is feeling well, we can continue current dose of levothyroxine (50 mcg daily)  If symptomatic, we can increase levothyroxine and recheck labs in 6 wks  Please let me know how she would like to proceed  Thanks!   Khari Cabrera, DO

## 2020-10-12 ENCOUNTER — TELEPHONE (OUTPATIENT)
Dept: FAMILY MEDICINE CLINIC | Facility: OTHER | Age: 50
End: 2020-10-12

## 2020-10-19 ENCOUNTER — TELEPHONE (OUTPATIENT)
Dept: FAMILY MEDICINE CLINIC | Facility: OTHER | Age: 50
End: 2020-10-19

## 2020-10-19 DIAGNOSIS — F41.9 ANXIETY: ICD-10-CM

## 2020-10-19 RX ORDER — ALPRAZOLAM 0.25 MG/1
0.25 TABLET ORAL 2 TIMES DAILY PRN
Qty: 30 TABLET | Refills: 0 | Status: SHIPPED | OUTPATIENT
Start: 2020-10-19 | End: 2020-12-01 | Stop reason: SDUPTHER

## 2020-10-22 DIAGNOSIS — E11.9 TYPE 2 DIABETES MELLITUS WITHOUT COMPLICATION, WITHOUT LONG-TERM CURRENT USE OF INSULIN (HCC): Primary | ICD-10-CM

## 2020-10-22 DIAGNOSIS — E78.2 MIXED HYPERLIPIDEMIA: ICD-10-CM

## 2020-10-22 DIAGNOSIS — I10 BENIGN ESSENTIAL HYPERTENSION: ICD-10-CM

## 2020-10-30 DIAGNOSIS — I10 ESSENTIAL HYPERTENSION: ICD-10-CM

## 2020-10-30 RX ORDER — LOSARTAN POTASSIUM 100 MG/1
TABLET ORAL
Qty: 30 TABLET | Refills: 1 | OUTPATIENT
Start: 2020-10-30

## 2020-11-03 DIAGNOSIS — I10 ESSENTIAL HYPERTENSION: ICD-10-CM

## 2020-11-03 RX ORDER — LOSARTAN POTASSIUM 100 MG/1
100 TABLET ORAL DAILY
Qty: 30 TABLET | Refills: 1 | Status: SHIPPED | OUTPATIENT
Start: 2020-11-03 | End: 2021-01-04

## 2020-11-06 DIAGNOSIS — I10 ESSENTIAL HYPERTENSION: ICD-10-CM

## 2020-11-06 RX ORDER — LOSARTAN POTASSIUM 100 MG/1
TABLET ORAL
Qty: 30 TABLET | Refills: 1 | OUTPATIENT
Start: 2020-11-06

## 2020-11-26 LAB
ALBUMIN SERPL-MCNC: 4.4 G/DL (ref 3.8–4.8)
ALBUMIN/CREAT UR: 4 MG/G CREAT (ref 0–29)
ALBUMIN/GLOB SERPL: 1.6 {RATIO} (ref 1.2–2.2)
ALP SERPL-CCNC: 92 IU/L (ref 39–117)
ALT SERPL-CCNC: 22 IU/L (ref 0–32)
AST SERPL-CCNC: 22 IU/L (ref 0–40)
BILIRUB SERPL-MCNC: 0.4 MG/DL (ref 0–1.2)
BUN SERPL-MCNC: 11 MG/DL (ref 6–24)
BUN/CREAT SERPL: 14 (ref 9–23)
CALCIUM SERPL-MCNC: 9.2 MG/DL (ref 8.7–10.2)
CHLORIDE SERPL-SCNC: 102 MMOL/L (ref 96–106)
CHOLEST SERPL-MCNC: 158 MG/DL (ref 100–199)
CO2 SERPL-SCNC: 23 MMOL/L (ref 20–29)
CREAT SERPL-MCNC: 0.78 MG/DL (ref 0.57–1)
CREAT UR-MCNC: 159.6 MG/DL
GLOBULIN SER-MCNC: 2.7 G/DL (ref 1.5–4.5)
GLUCOSE SERPL-MCNC: 171 MG/DL (ref 65–99)
HBA1C MFR BLD: 8 % (ref 4.8–5.6)
HDLC SERPL-MCNC: 45 MG/DL
LDLC SERPL CALC-MCNC: 91 MG/DL (ref 0–99)
MICROALBUMIN UR-MCNC: 5.8 UG/ML
POTASSIUM SERPL-SCNC: 4.7 MMOL/L (ref 3.5–5.2)
PROT SERPL-MCNC: 7.1 G/DL (ref 6–8.5)
SL AMB EGFR AFRICAN AMERICAN: 103 ML/MIN/1.73
SL AMB EGFR NON AFRICAN AMERICAN: 90 ML/MIN/1.73
SL AMB VLDL CHOLESTEROL CALC: 22 MG/DL (ref 5–40)
SODIUM SERPL-SCNC: 138 MMOL/L (ref 134–144)
TRIGL SERPL-MCNC: 123 MG/DL (ref 0–149)

## 2020-12-01 ENCOUNTER — TELEMEDICINE (OUTPATIENT)
Dept: FAMILY MEDICINE CLINIC | Facility: OTHER | Age: 50
End: 2020-12-01
Payer: COMMERCIAL

## 2020-12-01 VITALS
BODY MASS INDEX: 38.3 KG/M2 | SYSTOLIC BLOOD PRESSURE: 122 MMHG | DIASTOLIC BLOOD PRESSURE: 66 MMHG | HEIGHT: 67 IN | WEIGHT: 244 LBS

## 2020-12-01 DIAGNOSIS — Z11.4 SCREENING FOR HIV (HUMAN IMMUNODEFICIENCY VIRUS): ICD-10-CM

## 2020-12-01 DIAGNOSIS — E78.2 MIXED HYPERLIPIDEMIA: ICD-10-CM

## 2020-12-01 DIAGNOSIS — Z12.31 ENCOUNTER FOR SCREENING MAMMOGRAM FOR MALIGNANT NEOPLASM OF BREAST: ICD-10-CM

## 2020-12-01 DIAGNOSIS — E11.9 TYPE 2 DIABETES MELLITUS WITHOUT COMPLICATION, WITHOUT LONG-TERM CURRENT USE OF INSULIN (HCC): Primary | ICD-10-CM

## 2020-12-01 DIAGNOSIS — F41.9 ANXIETY: ICD-10-CM

## 2020-12-01 DIAGNOSIS — E66.01 CLASS 2 SEVERE OBESITY WITH SERIOUS COMORBIDITY AND BODY MASS INDEX (BMI) OF 38.0 TO 38.9 IN ADULT, UNSPECIFIED OBESITY TYPE (HCC): ICD-10-CM

## 2020-12-01 DIAGNOSIS — I10 BENIGN ESSENTIAL HYPERTENSION: ICD-10-CM

## 2020-12-01 PROCEDURE — 3078F DIAST BP <80 MM HG: CPT | Performed by: FAMILY MEDICINE

## 2020-12-01 PROCEDURE — 4004F PT TOBACCO SCREEN RCVD TLK: CPT | Performed by: FAMILY MEDICINE

## 2020-12-01 PROCEDURE — 3074F SYST BP LT 130 MM HG: CPT | Performed by: FAMILY MEDICINE

## 2020-12-01 PROCEDURE — 99214 OFFICE O/P EST MOD 30 MIN: CPT | Performed by: FAMILY MEDICINE

## 2020-12-01 PROCEDURE — 3008F BODY MASS INDEX DOCD: CPT | Performed by: FAMILY MEDICINE

## 2020-12-01 RX ORDER — ALPRAZOLAM 0.25 MG/1
0.25 TABLET ORAL 2 TIMES DAILY PRN
Qty: 30 TABLET | Refills: 0 | Status: SHIPPED | OUTPATIENT
Start: 2020-12-01 | End: 2021-01-25 | Stop reason: SDUPTHER

## 2020-12-07 DIAGNOSIS — E11.9 TYPE 2 DIABETES MELLITUS WITHOUT COMPLICATION, WITHOUT LONG-TERM CURRENT USE OF INSULIN (HCC): Primary | ICD-10-CM

## 2020-12-09 ENCOUNTER — TELEPHONE (OUTPATIENT)
Dept: FAMILY MEDICINE CLINIC | Facility: OTHER | Age: 50
End: 2020-12-09

## 2020-12-18 ENCOUNTER — TELEMEDICINE (OUTPATIENT)
Dept: DIABETES SERVICES | Facility: CLINIC | Age: 50
End: 2020-12-18
Payer: COMMERCIAL

## 2020-12-18 DIAGNOSIS — E11.9 TYPE 2 DIABETES MELLITUS WITHOUT COMPLICATION, WITHOUT LONG-TERM CURRENT USE OF INSULIN (HCC): ICD-10-CM

## 2020-12-18 PROCEDURE — 97802 MEDICAL NUTRITION INDIV IN: CPT | Performed by: DIETITIAN, REGISTERED

## 2020-12-23 DIAGNOSIS — L23.7 POISON IVY DERMATITIS: ICD-10-CM

## 2021-01-04 DIAGNOSIS — E11.9 TYPE 2 DIABETES MELLITUS WITHOUT COMPLICATION, WITHOUT LONG-TERM CURRENT USE OF INSULIN (HCC): ICD-10-CM

## 2021-01-04 DIAGNOSIS — I10 ESSENTIAL HYPERTENSION: ICD-10-CM

## 2021-01-04 PROCEDURE — 4010F ACE/ARB THERAPY RXD/TAKEN: CPT | Performed by: FAMILY MEDICINE

## 2021-01-04 RX ORDER — LOSARTAN POTASSIUM 100 MG/1
TABLET ORAL
Qty: 30 TABLET | Refills: 3 | Status: SHIPPED | OUTPATIENT
Start: 2021-01-04 | End: 2021-03-01 | Stop reason: SDUPTHER

## 2021-01-23 DIAGNOSIS — F41.9 ANXIETY: ICD-10-CM

## 2021-01-24 RX ORDER — ALPRAZOLAM 0.25 MG/1
TABLET ORAL
Qty: 30 TABLET | Refills: 0 | OUTPATIENT
Start: 2021-01-24

## 2021-01-25 DIAGNOSIS — F41.9 ANXIETY: ICD-10-CM

## 2021-01-26 RX ORDER — ALPRAZOLAM 0.25 MG/1
0.25 TABLET ORAL 2 TIMES DAILY PRN
Qty: 30 TABLET | Refills: 0 | Status: SHIPPED | OUTPATIENT
Start: 2021-01-26 | End: 2021-03-02 | Stop reason: SDUPTHER

## 2021-01-30 DIAGNOSIS — E03.9 HYPOTHYROIDISM, UNSPECIFIED TYPE: ICD-10-CM

## 2021-02-03 RX ORDER — LEVOTHYROXINE SODIUM 0.05 MG/1
TABLET ORAL
Qty: 30 TABLET | Refills: 5 | OUTPATIENT
Start: 2021-02-03

## 2021-02-03 NOTE — TELEPHONE ENCOUNTER
Please remind pt of office refill policy   No longer accepting auto-refill requests from pharmacies due to medication reconciliation errors

## 2021-02-09 ENCOUNTER — OFFICE VISIT (OUTPATIENT)
Dept: FAMILY MEDICINE CLINIC | Facility: OTHER | Age: 51
End: 2021-02-09
Payer: COMMERCIAL

## 2021-02-09 VITALS
BODY MASS INDEX: 37.61 KG/M2 | SYSTOLIC BLOOD PRESSURE: 138 MMHG | TEMPERATURE: 98.4 F | OXYGEN SATURATION: 98 % | DIASTOLIC BLOOD PRESSURE: 90 MMHG | HEART RATE: 116 BPM | RESPIRATION RATE: 18 BRPM | HEIGHT: 67 IN | WEIGHT: 239.6 LBS

## 2021-02-09 DIAGNOSIS — R10.13 EPIGASTRIC PAIN: Primary | ICD-10-CM

## 2021-02-09 DIAGNOSIS — R10.9 ACUTE RIGHT FLANK PAIN: ICD-10-CM

## 2021-02-09 LAB
SL AMB  POCT GLUCOSE, UA: ABNORMAL
SL AMB LEUKOCYTE ESTERASE,UA: ABNORMAL
SL AMB POCT BILIRUBIN,UA: ABNORMAL
SL AMB POCT BLOOD,UA: ABNORMAL
SL AMB POCT CLARITY,UA: ABNORMAL
SL AMB POCT COLOR,UA: ABNORMAL
SL AMB POCT KETONES,UA: ABNORMAL
SL AMB POCT NITRITE,UA: ABNORMAL
SL AMB POCT PH,UA: 6
SL AMB POCT SPECIFIC GRAVITY,UA: 1.03
SL AMB POCT URINE PROTEIN: ABNORMAL
SL AMB POCT UROBILINOGEN: ABNORMAL

## 2021-02-09 PROCEDURE — 3080F DIAST BP >= 90 MM HG: CPT | Performed by: FAMILY MEDICINE

## 2021-02-09 PROCEDURE — 3725F SCREEN DEPRESSION PERFORMED: CPT | Performed by: FAMILY MEDICINE

## 2021-02-09 PROCEDURE — 3061F NEG MICROALBUMINURIA REV: CPT | Performed by: FAMILY MEDICINE

## 2021-02-09 PROCEDURE — 99214 OFFICE O/P EST MOD 30 MIN: CPT | Performed by: FAMILY MEDICINE

## 2021-02-09 PROCEDURE — 3008F BODY MASS INDEX DOCD: CPT | Performed by: FAMILY MEDICINE

## 2021-02-09 PROCEDURE — 81003 URINALYSIS AUTO W/O SCOPE: CPT | Performed by: FAMILY MEDICINE

## 2021-02-09 PROCEDURE — 87086 URINE CULTURE/COLONY COUNT: CPT | Performed by: FAMILY MEDICINE

## 2021-02-09 PROCEDURE — 3075F SYST BP GE 130 - 139MM HG: CPT | Performed by: FAMILY MEDICINE

## 2021-02-09 PROCEDURE — 4004F PT TOBACCO SCREEN RCVD TLK: CPT | Performed by: FAMILY MEDICINE

## 2021-02-09 NOTE — PROGRESS NOTES
Assessment/Plan:    No problem-specific Assessment & Plan notes found for this encounter  Problem List Items Addressed This Visit     None      Visit Diagnoses     Epigastric pain    -  Primary    Relevant Orders    POCT urine dip auto non-scope (Completed)    CBC and differential    Basic metabolic panel    C-reactive protein    Lipase    CT abdomen pelvis wo contrast    Acute right flank pain        Relevant Orders    POCT urine dip auto non-scope (Completed)    Urine culture    CBC and differential    Basic metabolic panel    C-reactive protein    Lipase    CT abdomen pelvis wo contrast            Urine dip negative for leukocytes esterase or nitrite or presence of blood  Urine culture is pending  Location of pain is not typical for diverticulitis  Patient's concern is in epigastric area mainly as high as well as right flank  Patient is status post cholecystectomy as well as appendectomy  For now I do advise her to stay hydrated and ensure adequate p o  intake while checking some labs as well as a CT scan of abdomen and pelvis for definitive diagnoses  ER precautions were advised today  Subjective:      Patient ID: Horacio Spring is a 53 y o  female  Abdominal Pain  This is a new problem  The current episode started yesterday  The onset quality is sudden  The problem occurs constantly  The pain is located in the epigastric region and right flank  The pain is at a severity of 8/10  The quality of the pain is colicky and sharp  The abdominal pain radiates to the back  Associated symptoms include diarrhea  Pertinent negatives include no anorexia, arthralgias, constipation, dysuria, fever, headaches, melena or myalgias  She has tried nothing for the symptoms  The treatment provided no relief  Her past medical history is significant for abdominal surgery         The following portions of the patient's history were reviewed and updated as appropriate:   She  has a past medical history of Anxiety, Calculus of distal right ureter, Closed fracture of head of first metacarpal bone of right hand, Depression, Disease of thyroid gland, Diverticulitis, Fatty liver, HLD (hyperlipidemia), Hypertension, Kidney stone, Lung nodules, Migraine, Pneumonia, and Pre-diabetes  She   Patient Active Problem List    Diagnosis Date Noted    Elevated WBC count 07/15/2020    IBS (irritable bowel syndrome) 01/30/2020    Depression 10/24/2019    H/O partial resection of colon 04/04/2019    Microscopic hematuria 06/07/2018    [facility] 04/05/2018    HLD (hyperlipidemia) 01/03/2018    Colon, diverticulosis 01/03/2018    Hypothyroidism 01/03/2018    Hypertension 01/03/2018    Dyslipidemia 06/27/2017    Type 2 diabetes mellitus without complication, without long-term current use of insulin ([facility] ) 06/27/2017    Fatty liver 01/09/2017    Lung nodules 01/09/2017    Hyperlipemia 01/12/2016    Anxiety 08/04/2012    Nicotine dependence 08/04/2012    Benign essential hypertension 08/03/2012    Class 2 severe obesity with serious comorbidity and body mass index (BMI) of 38 0 to 38 9 in adult [facility]) 08/03/2012     She  has a past surgical history that includes Cholecystectomy (1999); Hernia repair (1999); LAPAROTOMY (N/A, 1/7/2018); Reduction mammaplasty (1999); Tubal ligation; Colon surgery (01/09/2018); Appendectomy; pr colonoscopy flx dx w/collj spec when pfrmd (N/A, 4/2/2018); pr exploratory of abdomen (N/A, 4/5/2018); Colostomy closure (N/A, 4/5/2018); Flexible sigmoidoscopy (N/A, 4/5/2018); Colonoscopy (02/2012); pr repair incisional hernia,reducible (N/A, 11/15/2018); and pr colonoscopy flx dx w/collj spec when pfrmd (N/A, 4/16/2019)  Her family history includes Alcohol abuse in her mother; Cancer in her brother; Diabetes in her sister; Diverticulitis in her sister; Heart attack in her father; Heart disease in her father; Hepatitis in her mother; Hypertension in her father and sister;  No Known Problems in her sister  She  reports that she has been smoking cigarettes  She has a 28 00 pack-year smoking history  She has never used smokeless tobacco  She reports previous alcohol use  She reports that she does not use drugs  Current Outpatient Medications   Medication Sig Dispense Refill    albuterol (PROAIR HFA) 90 mcg/act inhaler Inhale 2 puffs every 4 (four) hours as needed for wheezing or shortness of breath (cough) 1 Inhaler 0    ALPRAZolam (XANAX) 0 25 mg tablet Take 1 tablet (0 25 mg total) by mouth 2 (two) times a day as needed for anxiety 30 tablet 0    atorvastatin (LIPITOR) 20 mg tablet TAKE 1 TABLET BY MOUTH EVERY DAY 30 tablet 5    Blood Glucose Monitoring Suppl (In Touch) JESSICA Use 2 (two) times a day 1 Device 0    glucose blood test strip Use 1 each 2 (two) times a day 100 each 11    hydrocortisone 2 5 % ointment Apply topically 2 (two) times a day 30 g 0    levothyroxine 50 mcg tablet Take one tablet by mouth daily 30 tablet 5    losartan (COZAAR) 100 MG tablet TAKE 1 TABLET BY MOUTH EVERY DAY 30 tablet 3    metFORMIN (GLUCOPHAGE) 1000 MG tablet 1 tablet twice a day 180 tablet 1    methylPREDNISolone 4 MG tablet therapy pack Use as directed on package 21 each 0    nicotine (NICODERM CQ) 14 mg/24hr TD 24 hr patch Place 1 patch on the skin every 24 hours 28 patch 0    nicotine (NICODERM CQ) 21 mg/24 hr TD 24 hr patch APPLY 1 PATCH EVERY DAY 28 patch 0    sertraline (ZOLOFT) 25 mg tablet Take 1 tablet (25 mg total) by mouth daily 30 tablet 5     No current facility-administered medications for this visit        Current Outpatient Medications on File Prior to Visit   Medication Sig    albuterol (PROAIR HFA) 90 mcg/act inhaler Inhale 2 puffs every 4 (four) hours as needed for wheezing or shortness of breath (cough)    ALPRAZolam (XANAX) 0 25 mg tablet Take 1 tablet (0 25 mg total) by mouth 2 (two) times a day as needed for anxiety    atorvastatin (LIPITOR) 20 mg tablet TAKE 1 TABLET BY MOUTH EVERY DAY    Blood Glucose Monitoring Suppl (In Touch) JESSICA Use 2 (two) times a day    glucose blood test strip Use 1 each 2 (two) times a day    hydrocortisone 2 5 % ointment Apply topically 2 (two) times a day    levothyroxine 50 mcg tablet Take one tablet by mouth daily    losartan (COZAAR) 100 MG tablet TAKE 1 TABLET BY MOUTH EVERY DAY    metFORMIN (GLUCOPHAGE) 1000 MG tablet 1 tablet twice a day    methylPREDNISolone 4 MG tablet therapy pack Use as directed on package    nicotine (NICODERM CQ) 14 mg/24hr TD 24 hr patch Place 1 patch on the skin every 24 hours    nicotine (NICODERM CQ) 21 mg/24 hr TD 24 hr patch APPLY 1 PATCH EVERY DAY    sertraline (ZOLOFT) 25 mg tablet Take 1 tablet (25 mg total) by mouth daily     No current facility-administered medications on file prior to visit  She is allergic to penicillins; amoxicillin; doxycycline; and vicodin [hydrocodone-acetaminophen]       Review of Systems   Constitutional: Positive for appetite change  Negative for chills and fever  HENT: Negative for congestion and sore throat  Respiratory: Negative for cough and shortness of breath  Gastrointestinal: Positive for abdominal pain and diarrhea  Negative for anorexia, constipation and melena  Genitourinary: Positive for flank pain  Negative for dysuria and vaginal bleeding  Musculoskeletal: Negative for arthralgias and myalgias  Neurological: Negative for light-headedness and headaches  Objective:      /90   Pulse (!) 116   Temp 98 4 °F (36 9 °C)   Resp 18   Ht 5' 7" (1 702 m)   Wt 109 kg (239 lb 9 6 oz)   SpO2 98%   BMI 37 53 kg/m²          Physical Exam  Vitals signs and nursing note reviewed  Constitutional:       General: She is not in acute distress  Appearance: She is well-developed  She is not diaphoretic  HENT:      Head: Normocephalic and atraumatic  Eyes:      General: No scleral icterus  Right eye: No discharge           Left eye: No discharge  Neck:      Musculoskeletal: Normal range of motion and neck supple  Cardiovascular:      Rate and Rhythm: Normal rate and regular rhythm  Heart sounds: Normal heart sounds  No murmur  Pulmonary:      Effort: Pulmonary effort is normal  No respiratory distress  Breath sounds: Normal breath sounds  No wheezing  Abdominal:      General: Bowel sounds are normal  There is no distension  Palpations: Abdomen is soft  Tenderness: There is abdominal tenderness  Comments: Tenderness at the epigastric area and generalized as well  No rebound tenderness noted  No flank tenderness upon exam noted  Skin:     General: Skin is warm and dry  Findings: No rash  Neurological:      General: No focal deficit present  Mental Status: She is alert and oriented to person, place, and time     Psychiatric:         Behavior: Behavior normal

## 2021-02-10 LAB — BACTERIA UR CULT: NORMAL

## 2021-02-12 ENCOUNTER — TELEPHONE (OUTPATIENT)
Dept: FAMILY MEDICINE CLINIC | Facility: OTHER | Age: 51
End: 2021-02-12

## 2021-02-12 LAB
BASOPHILS # BLD AUTO: 0.1 X10E3/UL (ref 0–0.2)
BASOPHILS NFR BLD AUTO: 1 %
BUN SERPL-MCNC: 9 MG/DL (ref 6–24)
BUN/CREAT SERPL: 12 (ref 9–23)
CALCIUM SERPL-MCNC: 10 MG/DL (ref 8.7–10.2)
CHLORIDE SERPL-SCNC: 100 MMOL/L (ref 96–106)
CO2 SERPL-SCNC: 21 MMOL/L (ref 20–29)
CREAT SERPL-MCNC: 0.76 MG/DL (ref 0.57–1)
CRP SERPL-MCNC: 6 MG/L (ref 0–10)
EOSINOPHIL # BLD AUTO: 0.3 X10E3/UL (ref 0–0.4)
EOSINOPHIL NFR BLD AUTO: 3 %
ERYTHROCYTE [DISTWIDTH] IN BLOOD BY AUTOMATED COUNT: 12.9 % (ref 11.7–15.4)
GLUCOSE SERPL-MCNC: 120 MG/DL (ref 65–99)
HCT VFR BLD AUTO: 43.4 % (ref 34–46.6)
HGB BLD-MCNC: 14.5 G/DL (ref 11.1–15.9)
IMM GRANULOCYTES # BLD: 0 X10E3/UL (ref 0–0.1)
IMM GRANULOCYTES NFR BLD: 0 %
LIPASE SERPL-CCNC: 29 U/L (ref 14–72)
LYMPHOCYTES # BLD AUTO: 2.3 X10E3/UL (ref 0.7–3.1)
LYMPHOCYTES NFR BLD AUTO: 21 %
MCH RBC QN AUTO: 28.3 PG (ref 26.6–33)
MCHC RBC AUTO-ENTMCNC: 33.4 G/DL (ref 31.5–35.7)
MCV RBC AUTO: 85 FL (ref 79–97)
MONOCYTES # BLD AUTO: 0.8 X10E3/UL (ref 0.1–0.9)
MONOCYTES NFR BLD AUTO: 8 %
NEUTROPHILS # BLD AUTO: 7.2 X10E3/UL (ref 1.4–7)
NEUTROPHILS NFR BLD AUTO: 67 %
PLATELET # BLD AUTO: 393 X10E3/UL (ref 150–450)
POTASSIUM SERPL-SCNC: 4.5 MMOL/L (ref 3.5–5.2)
RBC # BLD AUTO: 5.13 X10E6/UL (ref 3.77–5.28)
SL AMB EGFR AFRICAN AMERICAN: 107 ML/MIN/1.73
SL AMB EGFR NON AFRICAN AMERICAN: 92 ML/MIN/1.73
SODIUM SERPL-SCNC: 136 MMOL/L (ref 134–144)
WBC # BLD AUTO: 10.7 X10E3/UL (ref 3.4–10.8)

## 2021-02-12 NOTE — TELEPHONE ENCOUNTER
Patient notified, patient stated that she has the CT in the next week however is still in a lot of pain

## 2021-02-12 NOTE — TELEPHONE ENCOUNTER
8/10 at the worst for pain     Better on movement however once the patient sits back down the pain is back with 5-10 minutes      Patient was notified of your referral to the ER if pain persists however stated that she is going to try an hold out for her testes on Tuesday because of her co-pay at the ER being high

## 2021-02-12 NOTE — TELEPHONE ENCOUNTER
----- Message from Aryan Knight MD sent at 2/12/2021 11:44 AM EST -----  The blood work doesn't show any signs of infection or inflammation  For now follow recommendation per last office visit  Thanks

## 2021-02-16 ENCOUNTER — HOSPITAL ENCOUNTER (OUTPATIENT)
Dept: CT IMAGING | Facility: HOSPITAL | Age: 51
Discharge: HOME | End: 2021-02-16
Payer: COMMERCIAL

## 2021-02-16 DIAGNOSIS — R10.9 ACUTE RIGHT FLANK PAIN: ICD-10-CM

## 2021-02-16 DIAGNOSIS — R10.13 EPIGASTRIC PAIN: ICD-10-CM

## 2021-02-16 PROCEDURE — 74176 CT ABD & PELVIS W/O CONTRAST: CPT

## 2021-02-16 PROCEDURE — G1004 CDSM NDSC: HCPCS

## 2021-02-20 DIAGNOSIS — E03.9 HYPOTHYROIDISM, UNSPECIFIED TYPE: ICD-10-CM

## 2021-02-22 ENCOUNTER — TELEPHONE (OUTPATIENT)
Dept: FAMILY MEDICINE CLINIC | Facility: OTHER | Age: 51
End: 2021-02-22

## 2021-02-22 RX ORDER — LEVOTHYROXINE SODIUM 0.05 MG/1
TABLET ORAL
Qty: 30 TABLET | Refills: 5 | OUTPATIENT
Start: 2021-02-22

## 2021-02-22 NOTE — TELEPHONE ENCOUNTER
----- Message from Samantha Hollins MD sent at 2/22/2021 10:31 AM EST -----  The CT of abdomen shows no acute conditions like diverticulitis  There is hernia repair and small hernia containing fat tissue

## 2021-02-22 NOTE — TELEPHONE ENCOUNTER
Patient notified  Patient would like to know about the small hernia containing fat tissue and what it means  Any concerns?

## 2021-02-22 NOTE — TELEPHONE ENCOUNTER
Imaging reviewed  She was noted to have two small hernias in her anterior abdominal wall  This is a common finding and does not need follow up unless she is experiencing discomfort from the hernias    Silvia Torres, DO

## 2021-02-26 DIAGNOSIS — E11.9 TYPE 2 DIABETES MELLITUS WITHOUT COMPLICATION, WITHOUT LONG-TERM CURRENT USE OF INSULIN (HCC): ICD-10-CM

## 2021-03-01 DIAGNOSIS — E11.9 TYPE 2 DIABETES MELLITUS WITHOUT COMPLICATION, WITHOUT LONG-TERM CURRENT USE OF INSULIN (HCC): ICD-10-CM

## 2021-03-01 DIAGNOSIS — E03.9 HYPOTHYROIDISM, UNSPECIFIED TYPE: ICD-10-CM

## 2021-03-01 DIAGNOSIS — I10 ESSENTIAL HYPERTENSION: ICD-10-CM

## 2021-03-01 PROCEDURE — 4010F ACE/ARB THERAPY RXD/TAKEN: CPT | Performed by: FAMILY MEDICINE

## 2021-03-01 RX ORDER — LOSARTAN POTASSIUM 100 MG/1
100 TABLET ORAL DAILY
Qty: 30 TABLET | Refills: 3 | Status: SHIPPED | OUTPATIENT
Start: 2021-03-01 | End: 2021-04-26 | Stop reason: SDUPTHER

## 2021-03-01 RX ORDER — LEVOTHYROXINE SODIUM 0.05 MG/1
TABLET ORAL
Qty: 30 TABLET | Refills: 3 | Status: SHIPPED | OUTPATIENT
Start: 2021-03-01 | End: 2021-04-26 | Stop reason: SDUPTHER

## 2021-03-02 DIAGNOSIS — F41.9 ANXIETY: ICD-10-CM

## 2021-03-02 DIAGNOSIS — E78.2 MIXED HYPERLIPIDEMIA: ICD-10-CM

## 2021-03-02 RX ORDER — ALPRAZOLAM 0.25 MG/1
0.25 TABLET ORAL 2 TIMES DAILY PRN
Qty: 30 TABLET | Refills: 0 | Status: SHIPPED | OUTPATIENT
Start: 2021-03-02 | End: 2021-05-04 | Stop reason: SDUPTHER

## 2021-03-02 RX ORDER — ATORVASTATIN CALCIUM 20 MG/1
TABLET, FILM COATED ORAL
Qty: 30 TABLET | Refills: 5 | Status: SHIPPED | OUTPATIENT
Start: 2021-03-02 | End: 2021-09-23 | Stop reason: SDUPTHER

## 2021-03-02 NOTE — TELEPHONE ENCOUNTER
PDMP consulted and appropriate -- no red flags  Please remind pt of office refill policy   (No longer accepting auto-refill requests from pharmacies due to medication reconciliation errors )

## 2021-03-04 ENCOUNTER — TELEMEDICINE (OUTPATIENT)
Dept: DIABETES SERVICES | Facility: CLINIC | Age: 51
End: 2021-03-04
Payer: COMMERCIAL

## 2021-03-04 DIAGNOSIS — E11.9 TYPE 2 DIABETES MELLITUS WITHOUT COMPLICATION, WITHOUT LONG-TERM CURRENT USE OF INSULIN (HCC): Primary | ICD-10-CM

## 2021-03-04 PROCEDURE — 97803 MED NUTRITION INDIV SUBSEQ: CPT | Performed by: DIETITIAN, REGISTERED

## 2021-03-04 NOTE — PATIENT INSTRUCTIONS
1  Follow "Pick-a-Food" meal planner for choices at each meal  2  Keep a 3 day food diary before next visit  3   Start increasing physical activity

## 2021-03-04 NOTE — Clinical Note
Virtual MNT f/u complete  Pt is interested in a medical referral to exercise at a [facility]  Do you have access to this?

## 2021-03-04 NOTE — PROGRESS NOTES
Virtual Regular Visit      Assessment/Plan:    Problem List Items Addressed This Visit     None               Reason for visit is   Chief Complaint   Patient presents with    Diabetes Type 2    Virtual Regular Visit        Encounter provider Kelle Wise    Provider located at [address] [facility]  [address] [address]      Recent Visits  No visits were found meeting these conditions  Showing recent visits within past 7 days and meeting all other requirements     Today's Visits  Date Type Provider Dept   03/04/21 Telemedicine Beena Avila [facility] [address] today's visits and meeting all other requirements     Future Appointments  No visits were found meeting these conditions  Showing future appointments within next 150 days and meeting all other requirements        The patient was identified by name and date of birth  Hanna Robles was informed that this is a telemedicine visit and that the visit is being conducted through [employer] and patient was informed that this is a secure, HIPAA-compliant platform  She agrees to proceed     My office door was closed  No one else was in the room  She acknowledged consent and understanding of privacy and security of the video platform  The patient has agreed to participate and understands they can discontinue the visit at any time  Patient is aware this is a billable service  Subjective  Hanna Robles is a 53 y o  female         HPI     Past Medical History:   Diagnosis Date    Anxiety     Calculus of distal right ureter     last assessed 08/06/2014    Closed fracture of head of first metacarpal bone of right hand     Depression     Disease of thyroid gland     Diverticulitis     Fatty liver     last assessed 01/09/2017    HLD (hyperlipidemia)     treating with diet    Hypertension     Kidney stone     Lung nodules     Migraine     last assessed 08/03/2012    Pneumonia     Pre-diabetes     last assessed 06/27/2017       Past Surgical History:   Procedure Laterality Date    APPENDECTOMY      CHOLECYSTECTOMY  1999    laparoscopic    COLON SURGERY  01/09/2018    colostomy    COLONOSCOPY  02/2012    polyp, Dr Denny Rawls N/A 4/5/2018    Procedure: REVERSAL COLOSTOMY;  Surgeon: Brenda Giles DO;  [facility];  Service: General    FLEXIBLE SIGMOIDOSCOPY N/A 4/5/2018    Procedure: Nga Smolder;  Surgeon: Brenda Giles DO;  [facility];  Service: Radha Nolan 13    umbilical    LAPAROTOMY N/A 1/7/2018    Procedure: LAPAROTOMY EXPLORATORY, sigmoid colon resection, colostomy, appendectomy;  Surgeon: Brenda Giles DO;  [facility];  Service: General    IL COLONOSCOPY FLX DX W/COLLJ SPEC WHEN PFRMD N/A 4/2/2018    Procedure: COLONOSCOPY;  Surgeon: Brenda Giles DO;  Location: [facility]; Service: General    IL COLONOSCOPY FLX DX W/COLLJ SPEC WHEN PFRMD N/A 4/16/2019    Procedure: COLONOSCOPY;  Surgeon: Brenda Giles DO;  [facility];   Service: General    IL EXPLORATORY OF ABDOMEN N/A 4/5/2018    Procedure: EXPLORATORY LAPAROTOMY;  Surgeon: Brenda Giles DO;  [facility];  Service: General    IL REPAIR INCISIONAL HERNIA,REDUCIBLE N/A 11/15/2018    Procedure: INCISIONAL HERNIA REPAIR AT UMBILICUS;  Surgeon: Brenda Giles DO;  [facility];  Service: General    REDUCTION MAMMAPLASTY  1999    TUBAL LIGATION         Current Outpatient Medications   Medication Sig Dispense Refill    Blood Glucose Monitoring Suppl (In Touch) JESSICA Use 2 (two) times a day 1 Device 0    metFORMIN (GLUCOPHAGE) 1000 MG tablet 1 tablet twice a day 60 tablet 3    albuterol (PROAIR HFA) 90 mcg/act inhaler Inhale 2 puffs every 4 (four) hours as needed for wheezing or shortness of breath (cough) 1 Inhaler 0    ALPRAZolam (XANAX) 0 25 mg tablet Take 1 tablet (0 25 mg total) by mouth 2 (two) times a day as needed for anxiety 30 tablet 0    atorvastatin (LIPITOR) 20 mg tablet TAKE 1 TABLET BY MOUTH EVERY DAY 30 tablet 5    glucose blood test strip Use 1 each 2 (two) times a day 100 each 11    hydrocortisone 2 5 % ointment Apply topically 2 (two) times a day 30 g 0    levothyroxine 50 mcg tablet Take one tablet by mouth daily 30 tablet 3    losartan (COZAAR) 100 MG tablet Take 1 tablet (100 mg total) by mouth daily 30 tablet 3    methylPREDNISolone 4 MG tablet therapy pack Use as directed on package 21 each 0    nicotine (NICODERM CQ) 14 mg/24hr TD 24 hr patch Place 1 patch on the skin every 24 hours 28 patch 0    nicotine (NICODERM CQ) 21 mg/24 hr TD 24 hr patch APPLY 1 PATCH EVERY DAY 28 patch 0    sertraline (ZOLOFT) 25 mg tablet Take 1 tablet (25 mg total) by mouth daily 30 tablet 5     No current facility-administered medications for this visit  Allergies   Allergen Reactions    Penicillins Angioedema    Amoxicillin Edema    Doxycycline Vomiting    Vicodin [Hydrocodone-Acetaminophen] GI Intolerance and Vomiting       Review of Systems    Video Exam    There were no vitals filed for this visit  Physical Exam     I spent 15 minutes directly with the patient during this visit      VIRTUAL VISIT DISCLAIMER    Mitra Eng acknowledges that she has consented to an online visit or consultation  She understands that the online visit is based solely on information provided by her, and that, in the absence of a face-to-face physical evaluation by the physician, the diagnosis she receives is both limited and provisional in terms of accuracy and completeness  This is not intended to replace a full medical face-to-face evaluation by the physician  Mitra Eng understands and accepts these terms      Medical Nutrition Therapy      Assessment    Chief complaint Vasyl Galicia reported that she has really been having a hard time these last few months dealing with both medical and mental health issues  She is seeing a therapist and taking a new antidepressant as well as anxiety medications when needed, per patient "a lot"  Visit Type: Follow-up visit    HPI: Ramona Molina returned for follow-up today  Joanns food recall reveals she is trying to choose some whole grains and include vegetables in her diet  She includes some lean meat as well as higher fat meat choices  Overall, Albumatic meals provide 0-60 grams carbohydrate  Based on meal plan review and management of behavioral health provided education about being present and taking it one meal at a time, choosing carbohydrates at each meal, getting regular exercise, and reading labels for total carbohydrate  Ht Readings from Last 1 Encounters:   02/09/21 5' 7" (1 702 m)     Wt Readings from Last 2 Encounters:   02/09/21 109 kg (239 lb 9 6 oz)   12/01/20 111 kg (244 lb)     Weight Change: Yes reports that she weighs 136# at home, down 8# since December    Medical Diagnosis/reason for visit E11 9    Food Log: Completed via the method of food recall     Breakfast:2 wheat bread w/ butter, coffee w/ creamer or having raisin bran or corn flakes  Morning Snack:did not discuss  Lunch:can of tuna on spinach  Afternoon Snack: did not discuss  Dinner:roast beef over rice w/ gravy and broccoli and cauliflower Tonight having meatballs and green beans  Evening Snack:did not discuss    Beverages: did not discuss today    Exercise Walking some  Wants to join a gym    Calorie needs 1400 kcals/day Carbs: 30 g/meal, 15-20 g/snack     Nutrition Diagnosis:  Not ready for diet/lifestyle change  related to Lack of self-efficacy for making change or demoralizing from previous failures at change as evidenced by verbalized history of multiple medical and mental health complications in the past 3 months      Intervention: behavior modification strategies, carbohydrate counting and exercise guidelines Treatment Goals: Patient will monitor portion control, Patient will count carbohydrates and Patient will exercise    Monitoring and evaluation:    Term code indicator  FH 1 3 2 Food Intake Criteria: Focus on one meal at a time  Term code indicator  FH 1 6 3 Carbohydrate Intake Criteria: 30 grams per meal  Term code indicator  CH 2 2 Treatments/Therapy/Alternative Medicine Criteria: Get back into exercise, call insurance about reimbursement at a gym    Patients Response to Instruction:  Comprehensiongood  Motivationgood  Expected Compliancegood    Thank you for coming to the [facility] for education today  Please feel free to call with any questions or concerns      Jessica Avila  [address] [place] [id number] [facility] [id number]

## 2021-03-10 ENCOUNTER — TELEPHONE (OUTPATIENT)
Dept: ENDOCRINOLOGY | Facility: CLINIC | Age: 51
End: 2021-03-10

## 2021-03-10 NOTE — TELEPHONE ENCOUNTER
----- Message from Jose sent at 3/5/2021 10:43 AM EST -----  Regarding: f/u  Please call to schedule 4 week follow-up MNT 30 minutes

## 2021-03-22 ENCOUNTER — HOSPITAL ENCOUNTER (OUTPATIENT)
Dept: MAMMOGRAPHY | Facility: HOSPITAL | Age: 51
Discharge: HOME | End: 2021-03-22
Attending: FAMILY MEDICINE
Payer: COMMERCIAL

## 2021-03-22 VITALS — HEIGHT: 67 IN | BODY MASS INDEX: 37.51 KG/M2 | WEIGHT: 239 LBS

## 2021-03-22 DIAGNOSIS — Z12.31 ENCOUNTER FOR SCREENING MAMMOGRAM FOR MALIGNANT NEOPLASM OF BREAST: ICD-10-CM

## 2021-03-22 PROCEDURE — 77067 SCR MAMMO BI INCL CAD: CPT

## 2021-03-22 PROCEDURE — 77063 BREAST TOMOSYNTHESIS BI: CPT

## 2021-03-23 ENCOUNTER — TELEPHONE (OUTPATIENT)
Dept: FAMILY MEDICINE CLINIC | Facility: OTHER | Age: 51
End: 2021-03-23

## 2021-03-23 NOTE — TELEPHONE ENCOUNTER
----- Message from David Yanes DO sent at 3/23/2021  4:14 PM EDT -----  Please inform pt that mammogram looks normal   Repeat screening in 1 year      David Yanes DO

## 2021-04-21 LAB
ALBUMIN SERPL-MCNC: 4.4 G/DL (ref 3.8–4.8)
ALBUMIN/GLOB SERPL: 1.7 {RATIO} (ref 1.2–2.2)
ALP SERPL-CCNC: 84 IU/L (ref 39–117)
ALT SERPL-CCNC: 17 IU/L (ref 0–32)
AST SERPL-CCNC: 15 IU/L (ref 0–40)
BILIRUB SERPL-MCNC: <0.2 MG/DL (ref 0–1.2)
BUN SERPL-MCNC: 11 MG/DL (ref 6–24)
BUN/CREAT SERPL: 14 (ref 9–23)
CALCIUM SERPL-MCNC: 9.6 MG/DL (ref 8.7–10.2)
CHLORIDE SERPL-SCNC: 101 MMOL/L (ref 96–106)
CHOLEST SERPL-MCNC: 145 MG/DL (ref 100–199)
CO2 SERPL-SCNC: 24 MMOL/L (ref 20–29)
CREAT SERPL-MCNC: 0.8 MG/DL (ref 0.57–1)
GLOBULIN SER-MCNC: 2.6 G/DL (ref 1.5–4.5)
GLUCOSE SERPL-MCNC: 118 MG/DL (ref 65–99)
HBA1C MFR BLD: 6.4 % (ref 4.8–5.6)
HDLC SERPL-MCNC: 46 MG/DL
HIV 1+2 AB+HIV1 P24 AG SERPL QL IA: NON REACTIVE
LDLC SERPL CALC-MCNC: 80 MG/DL (ref 0–99)
POTASSIUM SERPL-SCNC: 4.6 MMOL/L (ref 3.5–5.2)
PROT SERPL-MCNC: 7 G/DL (ref 6–8.5)
SL AMB EGFR AFRICAN AMERICAN: 100 ML/MIN/1.73
SL AMB EGFR NON AFRICAN AMERICAN: 87 ML/MIN/1.73
SL AMB VLDL CHOLESTEROL CALC: 19 MG/DL (ref 5–40)
SODIUM SERPL-SCNC: 141 MMOL/L (ref 134–144)
TRIGL SERPL-MCNC: 102 MG/DL (ref 0–149)

## 2021-04-21 PROCEDURE — 3044F HG A1C LEVEL LT 7.0%: CPT | Performed by: FAMILY MEDICINE

## 2021-04-22 ENCOUNTER — TELEPHONE (OUTPATIENT)
Dept: FAMILY MEDICINE CLINIC | Facility: OTHER | Age: 51
End: 2021-04-22

## 2021-04-22 NOTE — TELEPHONE ENCOUNTER
----- Message from Benitez Marti DO sent at 4/21/2021  3:58 PM EDT -----  Appointment needed to review labs (DM f/u)    Benitez Marti DO

## 2021-04-26 DIAGNOSIS — I10 ESSENTIAL HYPERTENSION: ICD-10-CM

## 2021-04-26 DIAGNOSIS — E03.9 HYPOTHYROIDISM, UNSPECIFIED TYPE: ICD-10-CM

## 2021-04-26 PROCEDURE — 4010F ACE/ARB THERAPY RXD/TAKEN: CPT | Performed by: FAMILY MEDICINE

## 2021-04-26 RX ORDER — LEVOTHYROXINE SODIUM 0.05 MG/1
TABLET ORAL
Qty: 30 TABLET | Refills: 3 | Status: SHIPPED | OUTPATIENT
Start: 2021-04-26 | End: 2021-09-23 | Stop reason: SDUPTHER

## 2021-04-26 RX ORDER — LOSARTAN POTASSIUM 100 MG/1
100 TABLET ORAL DAILY
Qty: 30 TABLET | Refills: 3 | Status: SHIPPED | OUTPATIENT
Start: 2021-04-26 | End: 2021-09-23 | Stop reason: SDUPTHER

## 2021-04-27 ENCOUNTER — TELEMEDICINE (OUTPATIENT)
Dept: FAMILY MEDICINE CLINIC | Facility: OTHER | Age: 51
End: 2021-04-27
Payer: COMMERCIAL

## 2021-04-27 DIAGNOSIS — F41.9 ANXIETY: ICD-10-CM

## 2021-04-27 DIAGNOSIS — F32.A DEPRESSION, UNSPECIFIED DEPRESSION TYPE: ICD-10-CM

## 2021-04-27 DIAGNOSIS — E11.9 TYPE 2 DIABETES MELLITUS WITHOUT COMPLICATION, WITHOUT LONG-TERM CURRENT USE OF INSULIN (HCC): Primary | ICD-10-CM

## 2021-04-27 PROCEDURE — 99213 OFFICE O/P EST LOW 20 MIN: CPT | Performed by: FAMILY MEDICINE

## 2021-04-27 RX ORDER — SERTRALINE HYDROCHLORIDE 25 MG/1
25 TABLET, FILM COATED ORAL DAILY
Qty: 90 TABLET | Refills: 1 | Status: SHIPPED | OUTPATIENT
Start: 2021-04-27 | End: 2021-08-09 | Stop reason: SDUPTHER

## 2021-04-27 NOTE — PROGRESS NOTES
Virtual Regular Visit      Assessment/Plan:    Problem List Items Addressed This Visit        Endocrine    Type 2 diabetes mellitus without complication, without long-term current use of insulin ([facility] [address] ) - Primary    Relevant Orders:    Patient with significant improvement in A1c still in the 160 range with no signs of hypoglycemia  Labs reviewed with patient in detail also recommended to continue with low carb diet and regular exercise  follow-up routine in office  Basic metabolic panel    TSH, 3rd generation with Free T4 reflex    HEMOGLOBIN A1C W/ EAG ESTIMATION       Other    Anxiety:    Is stable on medication she is out of her medicine and would like to refill sent to her pharmacy today  Relevant Medications    sertraline (ZOLOFT) 25 mg tablet    Depression    Relevant Medications    sertraline (ZOLOFT) 25 mg tablet               Reason for visit is   Chief Complaint   Patient presents with    review labs    Virtual Regular Visit        Encounter provider Luis Manuel Mac MD    Provider located at [address] [id number]      Recent Visits  Date Type Provider Dept   04/27/21 Telemedicine MD Jimmy Iqbal   04/22/21 Telephone ОЛЬГА Ballard Pg   Showing recent visits within past 7 days and meeting all other requirements     Future Appointments  No visits were found meeting these conditions  Showing future appointments within next 150 days and meeting all other requirements        The patient was identified by name and date of birth  Gera Ocampo was informed that this is a telemedicine visit and that the visit is being conducted through [address] [place] [address] Now and patient was informed that this is a secure, HIPAA-compliant platform  She agrees to proceed     My office door was closed  No one else was in the room  She acknowledged consent and understanding of privacy and security of the video platform   The patient has agreed to participate and understands they can discontinue the visit at any time  Patient is aware this is a billable service  Subjective  Wendy Gupta is a 53 y o  female  Who presents for routine follow-up of diabetes         Diabetes Mellitus  Patient presents for follow up of diabetes  Current symptoms include: none  Symptoms have been well-controlled  Patient denies hypoglycemia , nausea, paresthesia of the feet and visual disturbances  Evaluation to date has included: fasting blood sugar, fasting lipid panel, hemoglobin A1C and microalbuminuria  Home sugars: BGs consistently in an acceptable range  Fasting runs in the 160's range  Current treatment: more intensive attention to diet which has been effective, weight loss of 10 lbs which has been effective and Continued metformin which has been effective  Last dilated eye exam: UTD  Patient went to [facility] recently  Patient's A1c has improved significantly from 8 to 6 4 with current regimen  Patient is on a statin patient also takes a blood pressure lowering medication as well  Will reassess her blood pressure when she is in the office for next visit      will continue with current management of diabetes  Hypoglycemia:  No symptoms noted per patient  Discussed the signs in detail  Patient has hypothyroidism however the levels have been stable with a TSH that is slightly above normal patient is taking supplement regularly and at this point the plan was to continue with current dosage and recheck labs as long as patient's symptoms are stable and does not account for any symptom of hypothyroidism  Patient would like to get refill of her anxiety medication sent to her pharmacy today she has been taking it regularly           Past Medical History:   Diagnosis Date    Anxiety     Calculus of distal right ureter     last assessed 08/06/2014    Closed fracture of head of first metacarpal bone of right hand     Depression     Disease of thyroid gland     Diverticulitis     Fatty liver     last assessed 01/09/2017    HLD (hyperlipidemia)     treating with diet    Hypertension     Kidney stone     Lung nodules     Migraine     last assessed 08/03/2012    Pneumonia     Pre-diabetes     last assessed 06/27/2017       Past Surgical History:   Procedure Laterality Date    APPENDECTOMY      CHOLECYSTECTOMY  1999    laparoscopic    COLON SURGERY  01/09/2018    colostomy    COLONOSCOPY  02/2012    polyp, Dr Dany Van N/A 4/5/2018    Procedure: REVERSAL COLOSTOMY;  Surgeon: Irene Batista DO;  [facility];  Service: General    FLEXIBLE SIGMOIDOSCOPY N/A 4/5/2018    Procedure: Georgiana Muck;  Surgeon: Irene Batista DO;  [facility];  Service: Radha Nolan 13    umbilical    LAPAROTOMY N/A 1/7/2018    Procedure: LAPAROTOMY EXPLORATORY, sigmoid colon resection, colostomy, appendectomy;  Surgeon: Irene Batista DO;  [facility];  Service: General    ID COLONOSCOPY FLX DX W/COLLJ SPEC WHEN PFRMD N/A 4/2/2018    Procedure: COLONOSCOPY;  Surgeon: Irene Batista DO;  [facility]; Service: General    ID COLONOSCOPY FLX DX W/COLLJ SPEC WHEN PFRMD N/A 4/16/2019    Procedure: COLONOSCOPY;  Surgeon: Irene Batista DO;  [facility];   Service: General    ID EXPLORATORY OF ABDOMEN N/A 4/5/2018    Procedure: EXPLORATORY LAPAROTOMY;  Surgeon: Irene Batista DO;  [facility];  Service: General    ID REPAIR INCISIONAL HERNIA,REDUCIBLE N/A 11/15/2018    Procedure: INCISIONAL HERNIA REPAIR AT UMBILICUS;  Surgeon: Irene Batista DO;  [facility];  Service: General    REDUCTION MAMMAPLASTY Bilateral 1999    TUBAL LIGATION         Current Outpatient Medications   Medication Sig Dispense Refill    albuterol (PROAIR HFA) 90 mcg/act inhaler Inhale 2 puffs every 4 (four) hours as needed for wheezing or shortness of breath (cough) 1 Inhaler 0    ALPRAZolam (XANAX) 0 25 mg tablet Take 1 tablet (0 25 mg total) by mouth 2 (two) times a day as needed for anxiety 30 tablet 0    atorvastatin (LIPITOR) 20 mg tablet TAKE 1 TABLET BY MOUTH EVERY DAY 30 tablet 5    Blood Glucose Monitoring Suppl (In Touch) JESSICA Use 2 (two) times a day 1 Device 0    glucose blood test strip Use 1 each 2 (two) times a day 100 each 11    hydrocortisone 2 5 % ointment Apply topically 2 (two) times a day 30 g 0    levothyroxine 50 mcg tablet Take one tablet by mouth daily 30 tablet 3    losartan (COZAAR) 100 MG tablet Take 1 tablet (100 mg total) by mouth daily 30 tablet 3    metFORMIN (GLUCOPHAGE) 1000 MG tablet 1 tablet twice a day 60 tablet 3    methylPREDNISolone 4 MG tablet therapy pack Use as directed on package 21 each 0    nicotine (NICODERM CQ) 14 mg/24hr TD 24 hr patch Place 1 patch on the skin every 24 hours 28 patch 0    nicotine (NICODERM CQ) 21 mg/24 hr TD 24 hr patch APPLY 1 PATCH EVERY DAY 28 patch 0    sertraline (ZOLOFT) 25 mg tablet Take 1 tablet (25 mg total) by mouth daily 90 tablet 1     No current facility-administered medications for this visit  Allergies   Allergen Reactions    Penicillins Angioedema    Amoxicillin Edema    Doxycycline Vomiting    Vicodin [Hydrocodone-Acetaminophen] GI Intolerance and Vomiting       Review of Systems   Constitutional: Negative for fatigue, fever and unexpected weight change  HENT: Negative for congestion  Eyes: Negative for visual disturbance  Respiratory: Negative for cough and shortness of breath  Gastrointestinal: Negative for abdominal pain, nausea and vomiting  Neurological: Negative for weakness and light-headedness  Video Exam    There were no vitals filed for this visit  Physical Exam  Constitutional:       General: She is not in acute distress  Appearance: She is well-developed  She is not diaphoretic  HENT:      Head: Normocephalic and atraumatic  Pulmonary:      Effort: Pulmonary effort is normal  No respiratory distress  Skin:     Coloration: Skin is not pale  Neurological:      General: No focal deficit present  Mental Status: She is alert and oriented to person, place, and time  Psychiatric:         Behavior: Behavior normal         Lab Results   Component Value Date    WBC 10 7 02/10/2021    HGB 14 5 02/10/2021    HCT 43 4 02/10/2021     02/10/2021    CHOL 202 (H) 06/24/2017    TRIG 102 04/19/2021    HDL 46 04/19/2021    LDLDIRECT 168 (H) 01/05/2016    ALT 17 04/19/2021    AST 15 04/19/2021     06/24/2017    K 4 6 04/19/2021     04/19/2021    CREATININE 0 80 04/19/2021    BUN 11 04/19/2021    CO2 24 04/19/2021    TSH 5 570 (H) 09/08/2020    INR 1 03 01/03/2018    GLUF 151 (H) 04/04/2018    HGBA1C 6 4 (H) 04/19/2021           I spent 10 minutes directly with the patient during this visit      VIRTUAL VISIT DISCLAIMER    Hailee Jaime acknowledges that she has consented to an online visit or consultation  She understands that the online visit is based solely on information provided by her, and that, in the absence of a face-to-face physical evaluation by the physician, the diagnosis she receives is both limited and provisional in terms of accuracy and completeness  This is not intended to replace a full medical face-to-face evaluation by the physician  Hailee Yeni understands and accepts these terms

## 2021-05-04 DIAGNOSIS — F41.9 ANXIETY: ICD-10-CM

## 2021-05-04 RX ORDER — ALPRAZOLAM 0.25 MG/1
0.25 TABLET ORAL 2 TIMES DAILY PRN
Qty: 30 TABLET | Refills: 0 | Status: SHIPPED | OUTPATIENT
Start: 2021-05-04 | End: 2021-05-28 | Stop reason: SDUPTHER

## 2021-05-28 DIAGNOSIS — F41.9 ANXIETY: ICD-10-CM

## 2021-06-01 RX ORDER — ALPRAZOLAM 0.25 MG/1
0.25 TABLET ORAL 2 TIMES DAILY PRN
Qty: 30 TABLET | Refills: 0 | Status: SHIPPED | OUTPATIENT
Start: 2021-06-02 | End: 2021-07-27 | Stop reason: SDUPTHER

## 2021-07-27 DIAGNOSIS — F41.9 ANXIETY: ICD-10-CM

## 2021-07-27 RX ORDER — ALPRAZOLAM 0.25 MG/1
0.25 TABLET ORAL 2 TIMES DAILY PRN
Qty: 30 TABLET | Refills: 0 | Status: SHIPPED | OUTPATIENT
Start: 2021-07-27 | End: 2021-08-23 | Stop reason: SDUPTHER

## 2021-08-07 LAB
BUN SERPL-MCNC: 9 MG/DL (ref 6–24)
BUN/CREAT SERPL: 11 (ref 9–23)
CALCIUM SERPL-MCNC: 9.3 MG/DL (ref 8.7–10.2)
CHLORIDE SERPL-SCNC: 103 MMOL/L (ref 96–106)
CO2 SERPL-SCNC: 21 MMOL/L (ref 20–29)
CREAT SERPL-MCNC: 0.85 MG/DL (ref 0.57–1)
EST. AVERAGE GLUCOSE BLD GHB EST-MCNC: 157 MG/DL
GLUCOSE SERPL-MCNC: 121 MG/DL (ref 65–99)
HBA1C MFR BLD: 7.1 % (ref 4.8–5.6)
POTASSIUM SERPL-SCNC: 4.4 MMOL/L (ref 3.5–5.2)
SL AMB EGFR AFRICAN AMERICAN: 93 ML/MIN/1.73
SL AMB EGFR NON AFRICAN AMERICAN: 81 ML/MIN/1.73
SL AMB T4, FREE (DIRECT): 1.29 NG/DL (ref 0.82–1.77)
SODIUM SERPL-SCNC: 137 MMOL/L (ref 134–144)
TSH SERPL DL<=0.005 MIU/L-ACNC: 6.11 UIU/ML (ref 0.45–4.5)

## 2021-08-09 ENCOUNTER — TELEPHONE (OUTPATIENT)
Dept: FAMILY MEDICINE CLINIC | Facility: OTHER | Age: 51
End: 2021-08-09

## 2021-08-09 ENCOUNTER — OFFICE VISIT (OUTPATIENT)
Dept: FAMILY MEDICINE CLINIC | Facility: OTHER | Age: 51
End: 2021-08-09
Payer: COMMERCIAL

## 2021-08-09 VITALS
OXYGEN SATURATION: 98 % | DIASTOLIC BLOOD PRESSURE: 82 MMHG | BODY MASS INDEX: 38.3 KG/M2 | HEIGHT: 67 IN | SYSTOLIC BLOOD PRESSURE: 128 MMHG | TEMPERATURE: 97.5 F | WEIGHT: 244 LBS | HEART RATE: 94 BPM

## 2021-08-09 DIAGNOSIS — Z23 ENCOUNTER FOR IMMUNIZATION: ICD-10-CM

## 2021-08-09 DIAGNOSIS — F32.A DEPRESSION, UNSPECIFIED DEPRESSION TYPE: ICD-10-CM

## 2021-08-09 DIAGNOSIS — Z00.00 ANNUAL PHYSICAL EXAM: Primary | ICD-10-CM

## 2021-08-09 DIAGNOSIS — F41.9 ANXIETY: ICD-10-CM

## 2021-08-09 DIAGNOSIS — E11.9 TYPE 2 DIABETES MELLITUS WITHOUT COMPLICATION, WITHOUT LONG-TERM CURRENT USE OF INSULIN (HCC): ICD-10-CM

## 2021-08-09 DIAGNOSIS — Z11.59 NEED FOR HEPATITIS C SCREENING TEST: ICD-10-CM

## 2021-08-09 PROCEDURE — 99396 PREV VISIT EST AGE 40-64: CPT | Performed by: FAMILY MEDICINE

## 2021-08-09 RX ORDER — SERTRALINE HYDROCHLORIDE 25 MG/1
50 TABLET, FILM COATED ORAL DAILY
Qty: 30 TABLET | Refills: 1 | Status: SHIPPED | OUTPATIENT
Start: 2021-08-09 | End: 2021-11-26

## 2021-08-09 NOTE — PROGRESS NOTES
ADULT ANNUAL [facility] PRACTICE [place]    NAME: Pastora Carrasco  AGE: 53 y o  SEX: female  : 1970     DATE: 2021     Assessment and Plan:     Problem List Items Addressed This Visit        Endocrine    Type 2 diabetes mellitus without complication, without long-term current use of insulin ([facility] [address] )    Relevant Orders    Ambulatory referral to Ophthalmology    HEMOGLOBIN A1C W/ EAG ESTIMATION    Comprehensive metabolic panel    Microalbumin / creatinine urine ratio       Other    Anxiety    Relevant Medications    sertraline (ZOLOFT) 25 mg tablet    Depression    Relevant Medications    sertraline (ZOLOFT) 25 mg tablet      Other Visit Diagnoses     Annual physical exam    -  Primary    Need for hepatitis C screening test        Relevant Orders    Hepatitis C Antibody ([facility])    Encounter for immunization        Relevant Orders    PNEUMOCOCCAL POLYSACCHARIDE VACCINE 23-VALENT =>3YO SQ IM          Immunizations and preventive care screenings were discussed with patient today  Appropriate education was printed on patient's after visit summary  Counseling:  Alcohol/drug use: discussed moderation in alcohol intake, the recommendations for healthy alcohol use, and avoidance of illicit drug use  Dental Health: discussed importance of regular tooth brushing, flossing, and dental visits  Injury prevention: discussed safety/seat belts, safety helmets, smoke detectors, carbon dioxide detectors, and smoking near bedding or upholstery  Sexual health: discussed sexually transmitted diseases, partner selection, use of condoms, avoidance of unintended pregnancy, and contraceptive alternatives  · Exercise: the importance of regular exercise/physical activity was discussed  Recommend exercise 3-5 times per week for at least 30 minutes  BMI Counseling: Body mass index is 38 22 kg/m²   The BMI is above normal  Nutrition recommendations include decreasing portion sizes, encouraging healthy choices of fruits and vegetables, decreasing fast food intake, consuming healthier snacks, limiting drinks that contain sugar and moderation in carbohydrate intake  Exercise recommendations include moderate physical activity 150 minutes/week and strength training exercises  Depression Screening Follow-up Plan: Patient's depression screening was positive with a PHQ-2 score of 3  Their PHQ-9 score was 10  Patient assessed for underlying major depression  They have no active suicidal ideations  Brief counseling provided and recommend additional follow-up/re-evaluation next office visit  Zoloft increased from 25mg once daily to 50mg once daily  Will continue to monitor  Return in about 6 weeks (around 9/20/2021) for [address] [place] [address]  The patient indicates understanding of these issues and agrees with the plan  Chief Complaint:     Chief Complaint   Patient presents with    Physical Exam      History of Present Illness:     Adult Annual Physical   Patient here for a comprehensive physical exam  The patient reports current prescription glasses no longer working for her as she has not seen eye doctor in over 2 years  She would like to see an Ophthalmologist for diabetic eye exam  Patient also reports she is perimenopausal and has frequent hot flashes- would like to discuss beginning HRT  Diet and Physical Activity  · Diet/Nutrition: poor diet, frequent junk food and consuming 3-5 servings of fruits/vegetables daily  · Exercise: walking, 5-7 times a week on average and less than 30 minutes on average        Depression Screening  PHQ-9 Depression Screening    PHQ-9:   Frequency of the following problems over the past two weeks:      Little interest or pleasure in doing things: 0 - not at all  Feeling down, depressed, or hopeless: 3 - nearly every day  Trouble falling or staying asleep, or sleeping too much: 3 - nearly every day  Feeling tired or having little energy: 1 - several days  Poor appetite or overeatin - more than half the days  Feeling bad about yourself - or that you are a failure or have let yourself or your family down: 0 - not at all  Trouble concentrating on things, such as reading the newspaper or watching television: 0 - not at all  Moving or speaking so slowly that other people could have noticed  Or the opposite - being so fidgety or restless that you have been moving around a lot more than usual: 1 - several days  Thoughts that you would be better off dead, or of hurting yourself in some way: 0 - not at all  PHQ-2 Score: 3  PHQ-9 Score: 10       General Health  · Sleep: sleeps poorly, gets 4-6 hours of sleep on average and reports frequent night sweats as she is going through menopause      · Hearing: normal - bilateral   · Vision: vision problems: reports she is not seeing clearly with her prescripiton glasses      · Dental: regular dental visits and brushes teeth twice daily  /GYN Health  · Patient is: perimenopausal  · Last menstrual period: 2 months ago- was lighter than normal   · Contraceptive method: abstinent   · Patient OBGYN and states she will have pap smear done there  · Mammogram in 3/21- was normal  Patient states she would like to do mammogram every other year and not yearly  Review of Systems:     Review of Systems   Constitutional: Negative for appetite change, chills, fever and unexpected weight change  Hot flashes     HENT: Negative for ear pain and sore throat  Eyes: Positive for visual disturbance  Negative for photophobia, pain, discharge, redness and itching  Respiratory: Negative for cough and shortness of breath  Cardiovascular: Negative for chest pain and palpitations  Gastrointestinal: Negative for abdominal pain and vomiting  Genitourinary: Negative for dysuria and hematuria  Musculoskeletal: Negative for arthralgias and back pain  Skin: Negative for color change and rash  Neurological: Negative for dizziness, seizures, syncope and light-headedness  Psychiatric/Behavioral: Positive for sleep disturbance  All other systems reviewed and are negative  Past Medical History:     Past Medical History:   Diagnosis Date    Anxiety     Calculus of distal right ureter     last assessed 08/06/2014    Closed fracture of head of first metacarpal bone of right hand     Depression     Disease of thyroid gland     Diverticulitis     Fatty liver     last assessed 01/09/2017    HLD (hyperlipidemia)     treating with diet    Hypertension     Kidney stone     Lung nodules     Migraine     last assessed 08/03/2012    Pneumonia     Pre-diabetes     last assessed 06/27/2017      Past Surgical History:     Past Surgical History:   Procedure Laterality Date    APPENDECTOMY      CHOLECYSTECTOMY  1999    laparoscopic    COLON SURGERY  01/09/2018    colostomy    COLONOSCOPY  02/2012    polyp, Dr Riddhi Lopez N/A 4/5/2018    Procedure: REVERSAL COLOSTOMY;  Surgeon: Maritza Tucker DO;  [facility];  Service: General    FLEXIBLE SIGMOIDOSCOPY N/A 4/5/2018    Procedure: Mani Lopez;  Surgeon: Maritza Tucker DO;  [facility];  Service: Radha Galeano    umbilical    LAPAROTOMY N/A 1/7/2018    Procedure: LAPAROTOMY EXPLORATORY, sigmoid colon resection, colostomy, appendectomy;  Surgeon: Maritza Tucker DO;  [facility];  Service: General    MT COLONOSCOPY FLX DX W/COLLJ SPEC WHEN PFRMD N/A 4/2/2018    Procedure: COLONOSCOPY;  Surgeon: Maritza Tucker DO;  [facility]; Service: General    MT COLONOSCOPY FLX DX W/COLLJ SPEC WHEN PFRMD N/A 4/16/2019    Procedure: COLONOSCOPY;  Surgeon: Maritza Tucker DO;  [facility];   Service: General    MT EXPLORATORY OF ABDOMEN N/A 4/5/2018    Procedure: EXPLORATORY LAPAROTOMY;  Surgeon: Maritza Tucker DO;  [facility];  Service: General    MT REPAIR INCISIONAL HERNIA,REDUCIBLE N/A 11/15/2018    Procedure: INCISIONAL HERNIA REPAIR AT UMBILICUS;  Surgeon: Julito Castillo DO;  [facility];  Service: General    REDUCTION MAMMAPLASTY Bilateral 1999    TUBAL LIGATION        Social History:     Social History     Socioeconomic History    Marital status: /Civil Union     Spouse name: Not on file    Number of children: Not on file    Years of education: 15    Highest education level: Not on file   Occupational History     Employer: [employer] [place]   Tobacco Use    Smoking status: Current Every Day Smoker     Packs/day: 1 00     Years: 28 00     Pack years: 28 00     Types: Cigarettes    Smokeless tobacco: Never Used   Substance and Sexual Activity    Alcohol use: Not Currently     Comment: quit 5 years ago    Drug use: No    Sexual activity: Not on file   Other Topics Concern    Not on file   Social History Narrative    Not on file     Social Determinants of Health     Financial Resource Strain:     Difficulty of Paying Living Expenses:    Food Insecurity:     Worried About Running Out of Food in the Last Year:     Ran Out of Food in the Last Year:    Transportation Needs:     Lack of Transportation (Medical):      Lack of Transportation (Non-Medical):    Physical Activity:     Days of Exercise per Week:     Minutes of Exercise per Session:    Stress:     Feeling of Stress :    Social Connections:     Frequency of Communication with Friends and Family:     Frequency of Social Gatherings with Friends and Family:     Attends Worship Services:     Active Member of Clubs or Organizations:     Attends Club or Organization Meetings:     Marital Status:    Intimate Partner Violence:     Fear of Current or Ex-Partner:     Emotionally Abused:     Physically Abused:     Sexually Abused:       Family History:     Family History   Problem Relation Age of Onset    Alcohol abuse Mother     Hepatitis Mother chronic hepatitis C virus    Heart disease Father     Heart attack Father         MI    Hypertension Father     Diverticulitis Sister     Diabetes Sister     Hypertension Sister     Cancer Brother 76        liver cancer    Breast cancer Sister 48    No Known Problems Daughter     No Known Problems Daughter       Current Medications:     Current Outpatient Medications   Medication Sig Dispense Refill    albuterol (PROAIR HFA) 90 mcg/act inhaler Inhale 2 puffs every 4 (four) hours as needed for wheezing or shortness of breath (cough) 1 Inhaler 0    ALPRAZolam (XANAX) 0 25 mg tablet Take 1 tablet (0 25 mg total) by mouth 2 (two) times a day as needed for anxiety 30 tablet 0    atorvastatin (LIPITOR) 20 mg tablet TAKE 1 TABLET BY MOUTH EVERY DAY 30 tablet 5    Blood Glucose Monitoring Suppl (In Touch) JESSICA Use 2 (two) times a day 1 Device 0    glucose blood test strip Use 1 each 2 (two) times a day 100 each 11    hydrocortisone 2 5 % ointment Apply topically 2 (two) times a day 30 g 0    levothyroxine 50 mcg tablet Take one tablet by mouth daily 30 tablet 3    losartan (COZAAR) 100 MG tablet Take 1 tablet (100 mg total) by mouth daily 30 tablet 3    metFORMIN (GLUCOPHAGE) 1000 MG tablet 1 tablet twice a day 60 tablet 3    methylPREDNISolone 4 MG tablet therapy pack Use as directed on package 21 each 0    nicotine (NICODERM CQ) 14 mg/24hr TD 24 hr patch Place 1 patch on the skin every 24 hours 28 patch 0    nicotine (NICODERM CQ) 21 mg/24 hr TD 24 hr patch APPLY 1 PATCH EVERY DAY 28 patch 0    sertraline (ZOLOFT) 25 mg tablet Take 2 tablets (50 mg total) by mouth daily 30 tablet 1     No current facility-administered medications for this visit  Allergies:      Allergies   Allergen Reactions    Penicillins Angioedema    Amoxicillin Edema    Doxycycline Vomiting    Vicodin [Hydrocodone-Acetaminophen] GI Intolerance and Vomiting      Physical Exam:     /82 (BP Location: Left arm, Patient Position: Sitting, Cuff Size: Adult)   Pulse 94   Temp 97 5 °F (36 4 °C) (Temporal)   Ht 5' 7" (1 702 m)   Wt 111 kg (244 lb)   SpO2 98%   BMI 38 22 kg/m²     Physical Exam  Vitals reviewed  Constitutional:       General: She is not in acute distress  Appearance: Normal appearance  She is obese  She is not ill-appearing, toxic-appearing or diaphoretic  HENT:      Right Ear: Tympanic membrane, ear canal and external ear normal       Left Ear: Tympanic membrane, ear canal and external ear normal       Mouth/Throat:      Mouth: Mucous membranes are moist       Pharynx: Oropharynx is clear  No oropharyngeal exudate or posterior oropharyngeal erythema  Eyes:      General: No scleral icterus  Right eye: No discharge  Left eye: No discharge  Extraocular Movements: Extraocular movements intact  Conjunctiva/sclera: Conjunctivae normal    Cardiovascular:      Rate and Rhythm: Normal rate and regular rhythm  Pulses: Normal pulses  no weak pulses          Dorsalis pedis pulses are 2+ on the right side and 2+ on the left side  Posterior tibial pulses are 2+ on the right side and 2+ on the left side  Heart sounds: Normal heart sounds  Pulmonary:      Effort: Pulmonary effort is normal       Breath sounds: Normal breath sounds  Abdominal:      General: Bowel sounds are normal  There is no distension  Palpations: Abdomen is soft  Tenderness: There is no abdominal tenderness  Musculoskeletal:      Right lower leg: No edema  Left lower leg: No edema  Feet:      Right foot:      Skin integrity: No ulcer, skin breakdown, erythema, warmth, callus or dry skin  Left foot:      Skin integrity: No ulcer, skin breakdown, erythema, warmth, callus or dry skin  Skin:     General: Skin is warm and dry  Neurological:      General: No focal deficit present  Mental Status: She is alert and oriented to person, place, and time     Psychiatric: Mood and Affect: Mood normal          Behavior: Behavior normal         Patient's shoes and socks removed  Right Foot/Ankle   Right Foot Inspection  Skin Exam: skin normal and skin intact no dry skin, no warmth, no callus, no erythema, no maceration, no abnormal color, no pre-ulcer, no ulcer and no callus                          Toe Exam: ROM and strength within normal limitsno swelling, no tenderness, erythema and  no right toe deformity  Sensory   Vibration: intact    Monofilament testing: intact  Vascular  Capillary refills: < 3 seconds  The right DP pulse is 2+  The right PT pulse is 2+  Left Foot/Ankle  Left Foot Inspection  Skin Exam: skin normal and skin intactno dry skin, no warmth, no erythema, no maceration, normal color, no pre-ulcer, no ulcer and no callus                         Toe Exam: ROM and strength within normal limitsno swelling, no tenderness, no erythema and no left toe deformity                   Sensory   Vibration: intact    Monofilament: intact  Vascular  Capillary refills: < 3 seconds  The left DP pulse is 2+  The left PT pulse is 2+  Assign Risk Category:  No deformity present; No loss of protective sensation;  No weak pulses       Risk: 100 Ramiro Mendez MD  [facility]

## 2021-08-09 NOTE — PATIENT INSTRUCTIONS

## 2021-08-09 NOTE — TELEPHONE ENCOUNTER
Patient said she was to get a referral for Gyn (She would like it for [facility])   Please mail when its ready   Thank you

## 2021-08-23 DIAGNOSIS — F41.9 ANXIETY: ICD-10-CM

## 2021-08-23 DIAGNOSIS — E11.9 TYPE 2 DIABETES MELLITUS WITHOUT COMPLICATION, WITHOUT LONG-TERM CURRENT USE OF INSULIN (HCC): ICD-10-CM

## 2021-08-23 DIAGNOSIS — F32.A DEPRESSION, UNSPECIFIED DEPRESSION TYPE: ICD-10-CM

## 2021-08-23 RX ORDER — SERTRALINE HYDROCHLORIDE 25 MG/1
TABLET, FILM COATED ORAL
Qty: 180 TABLET | Refills: 1 | OUTPATIENT
Start: 2021-08-23

## 2021-08-24 RX ORDER — ALPRAZOLAM 0.25 MG/1
0.25 TABLET ORAL 2 TIMES DAILY PRN
Qty: 30 TABLET | Refills: 0 | Status: SHIPPED | OUTPATIENT
Start: 2021-08-24 | End: 2021-09-23 | Stop reason: SDUPTHER

## 2021-09-23 DIAGNOSIS — E03.9 HYPOTHYROIDISM, UNSPECIFIED TYPE: ICD-10-CM

## 2021-09-23 DIAGNOSIS — E11.9 TYPE 2 DIABETES MELLITUS WITHOUT COMPLICATION, WITHOUT LONG-TERM CURRENT USE OF INSULIN (HCC): ICD-10-CM

## 2021-09-23 DIAGNOSIS — I10 ESSENTIAL HYPERTENSION: ICD-10-CM

## 2021-09-23 DIAGNOSIS — E78.2 MIXED HYPERLIPIDEMIA: ICD-10-CM

## 2021-09-23 DIAGNOSIS — F41.9 ANXIETY: ICD-10-CM

## 2021-09-23 PROCEDURE — 4010F ACE/ARB THERAPY RXD/TAKEN: CPT | Performed by: FAMILY MEDICINE

## 2021-09-23 RX ORDER — ALPRAZOLAM 0.25 MG/1
0.25 TABLET ORAL 2 TIMES DAILY PRN
Qty: 30 TABLET | Refills: 0 | Status: SHIPPED | OUTPATIENT
Start: 2021-09-23 | End: 2021-11-16 | Stop reason: SDUPTHER

## 2021-09-23 RX ORDER — ATORVASTATIN CALCIUM 20 MG/1
20 TABLET, FILM COATED ORAL DAILY
Qty: 30 TABLET | Refills: 5 | Status: SHIPPED | OUTPATIENT
Start: 2021-09-23 | End: 2022-03-15 | Stop reason: SDUPTHER

## 2021-09-23 RX ORDER — LEVOTHYROXINE SODIUM 0.05 MG/1
TABLET ORAL
Qty: 30 TABLET | Refills: 3 | Status: SHIPPED | OUTPATIENT
Start: 2021-09-23 | End: 2022-01-19 | Stop reason: SDUPTHER

## 2021-09-23 RX ORDER — LOSARTAN POTASSIUM 100 MG/1
100 TABLET ORAL DAILY
Qty: 30 TABLET | Refills: 3 | Status: SHIPPED | OUTPATIENT
Start: 2021-09-23 | End: 2021-11-16 | Stop reason: SDUPTHER
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
## 2021-11-16 DIAGNOSIS — F41.9 ANXIETY: ICD-10-CM

## 2021-11-16 DIAGNOSIS — I10 ESSENTIAL HYPERTENSION: ICD-10-CM

## 2021-11-16 PROCEDURE — 4010F ACE/ARB THERAPY RXD/TAKEN: CPT | Performed by: FAMILY MEDICINE

## 2021-11-16 RX ORDER — ALPRAZOLAM 0.25 MG/1
0.25 TABLET ORAL 2 TIMES DAILY PRN
Qty: 30 TABLET | Refills: 0 | Status: SHIPPED | OUTPATIENT
Start: 2021-11-16

## 2021-11-16 RX ORDER — LOSARTAN POTASSIUM 100 MG/1
100 TABLET ORAL DAILY
Qty: 30 TABLET | Refills: 3 | Status: SHIPPED | OUTPATIENT
Start: 2021-11-16 | End: 2022-03-15 | Stop reason: SDUPTHER

## 2021-11-18 LAB
ALBUMIN SERPL-MCNC: 4.4 G/DL (ref 3.8–4.8)
ALBUMIN/CREAT UR: <7 MG/G CREAT (ref 0–29)
ALBUMIN/GLOB SERPL: 1.5 {RATIO} (ref 1.2–2.2)
ALP SERPL-CCNC: 86 IU/L (ref 44–121)
ALT SERPL-CCNC: 22 IU/L (ref 0–32)
AST SERPL-CCNC: 18 IU/L (ref 0–40)
BILIRUB SERPL-MCNC: 0.4 MG/DL (ref 0–1.2)
BUN SERPL-MCNC: 9 MG/DL (ref 6–24)
BUN/CREAT SERPL: 10 (ref 9–23)
CALCIUM SERPL-MCNC: 10 MG/DL (ref 8.7–10.2)
CHLORIDE SERPL-SCNC: 104 MMOL/L (ref 96–106)
CO2 SERPL-SCNC: 24 MMOL/L (ref 20–29)
CREAT SERPL-MCNC: 0.94 MG/DL (ref 0.57–1)
CREAT UR-MCNC: 41.7 MG/DL
EST. AVERAGE GLUCOSE BLD GHB EST-MCNC: 143 MG/DL
GLOBULIN SER-MCNC: 3 G/DL (ref 1.5–4.5)
GLUCOSE SERPL-MCNC: 106 MG/DL (ref 65–99)
HBA1C MFR BLD: 6.6 % (ref 4.8–5.6)
HCV AB S/CO SERPL IA: 0.2 S/CO RATIO (ref 0–0.9)
MICROALBUMIN UR-MCNC: <3 UG/ML
POTASSIUM SERPL-SCNC: 4.3 MMOL/L (ref 3.5–5.2)
PROT SERPL-MCNC: 7.4 G/DL (ref 6–8.5)
SL AMB EGFR AFRICAN AMERICAN: 82 ML/MIN/1.73
SL AMB EGFR NON AFRICAN AMERICAN: 71 ML/MIN/1.73
SODIUM SERPL-SCNC: 142 MMOL/L (ref 134–144)

## 2021-11-18 PROCEDURE — 3044F HG A1C LEVEL LT 7.0%: CPT | Performed by: FAMILY MEDICINE

## 2021-11-26 ENCOUNTER — OFFICE VISIT (OUTPATIENT)
Dept: FAMILY MEDICINE CLINIC | Facility: OTHER | Age: 51
End: 2021-11-26
Payer: COMMERCIAL

## 2021-11-26 VITALS
DIASTOLIC BLOOD PRESSURE: 90 MMHG | HEART RATE: 102 BPM | BODY MASS INDEX: 37.35 KG/M2 | HEIGHT: 67 IN | OXYGEN SATURATION: 98 % | WEIGHT: 238 LBS | TEMPERATURE: 98 F | RESPIRATION RATE: 18 BRPM | SYSTOLIC BLOOD PRESSURE: 134 MMHG

## 2021-11-26 DIAGNOSIS — F41.9 ANXIETY: ICD-10-CM

## 2021-11-26 DIAGNOSIS — Z23 ENCOUNTER FOR IMMUNIZATION: Primary | ICD-10-CM

## 2021-11-26 DIAGNOSIS — E78.2 MIXED HYPERLIPIDEMIA: ICD-10-CM

## 2021-11-26 DIAGNOSIS — I10 HYPERTENSION, UNSPECIFIED TYPE: ICD-10-CM

## 2021-11-26 DIAGNOSIS — F17.210 CIGARETTE NICOTINE DEPENDENCE WITHOUT COMPLICATION: ICD-10-CM

## 2021-11-26 DIAGNOSIS — E03.9 HYPOTHYROIDISM, UNSPECIFIED TYPE: ICD-10-CM

## 2021-11-26 DIAGNOSIS — R06.2 WHEEZING: ICD-10-CM

## 2021-11-26 DIAGNOSIS — E11.9 TYPE 2 DIABETES MELLITUS WITHOUT COMPLICATION, WITHOUT LONG-TERM CURRENT USE OF INSULIN (HCC): ICD-10-CM

## 2021-11-26 PROCEDURE — 4004F PT TOBACCO SCREEN RCVD TLK: CPT | Performed by: FAMILY MEDICINE

## 2021-11-26 PROCEDURE — 3075F SYST BP GE 130 - 139MM HG: CPT | Performed by: FAMILY MEDICINE

## 2021-11-26 PROCEDURE — 3008F BODY MASS INDEX DOCD: CPT | Performed by: FAMILY MEDICINE

## 2021-11-26 PROCEDURE — 99214 OFFICE O/P EST MOD 30 MIN: CPT | Performed by: FAMILY MEDICINE

## 2021-11-26 PROCEDURE — 3080F DIAST BP >= 90 MM HG: CPT | Performed by: FAMILY MEDICINE

## 2021-11-26 PROCEDURE — 90471 IMMUNIZATION ADMIN: CPT

## 2021-11-26 PROCEDURE — 90682 RIV4 VACC RECOMBINANT DNA IM: CPT

## 2021-11-26 RX ORDER — ALBUTEROL SULFATE 90 UG/1
2 AEROSOL, METERED RESPIRATORY (INHALATION) EVERY 6 HOURS PRN
Qty: 8 G | Refills: 2 | Status: SHIPPED | OUTPATIENT
Start: 2021-11-26

## 2021-11-26 RX ORDER — ADHESIVE BANDAGE 3/4"
BANDAGE TOPICAL DAILY
Qty: 1 EACH | Refills: 0 | Status: SHIPPED | OUTPATIENT
Start: 2021-11-26 | End: 2022-02-22 | Stop reason: SDUPTHER

## 2021-12-22 LAB
CHOLEST SERPL-MCNC: 151 MG/DL (ref 100–199)
EST. AVERAGE GLUCOSE BLD GHB EST-MCNC: 140 MG/DL
HBA1C MFR BLD: 6.5 % (ref 4.8–5.6)
HDLC SERPL-MCNC: 40 MG/DL
LDLC SERPL CALC-MCNC: 89 MG/DL (ref 0–99)
SL AMB T4, FREE (DIRECT): 1.12 NG/DL (ref 0.82–1.77)
TRIGL SERPL-MCNC: 121 MG/DL (ref 0–149)
TSH SERPL DL<=0.005 MIU/L-ACNC: 5.62 UIU/ML (ref 0.45–4.5)

## 2021-12-22 PROCEDURE — 3044F HG A1C LEVEL LT 7.0%: CPT | Performed by: FAMILY MEDICINE

## 2021-12-30 ENCOUNTER — TELEPHONE (OUTPATIENT)
Dept: FAMILY MEDICINE CLINIC | Facility: OTHER | Age: 51
End: 2021-12-30

## 2021-12-30 DIAGNOSIS — Z20.822 ENCOUNTER FOR SCREENING LABORATORY TESTING FOR COVID-19 VIRUS: Primary | ICD-10-CM

## 2021-12-30 PROCEDURE — U0003 INFECTIOUS AGENT DETECTION BY NUCLEIC ACID (DNA OR RNA); SEVERE ACUTE RESPIRATORY SYNDROME CORONAVIRUS 2 (SARS-COV-2) (CORONAVIRUS DISEASE [COVID-19]), AMPLIFIED PROBE TECHNIQUE, MAKING USE OF HIGH THROUGHPUT TECHNOLOGIES AS DESCRIBED BY CMS-2020-01-R: HCPCS | Performed by: FAMILY MEDICINE

## 2021-12-30 PROCEDURE — U0005 INFEC AGEN DETEC AMPLI PROBE: HCPCS | Performed by: FAMILY MEDICINE

## 2022-01-04 ENCOUNTER — TELEMEDICINE (OUTPATIENT)
Dept: FAMILY MEDICINE CLINIC | Facility: OTHER | Age: 52
End: 2022-01-04
Payer: COMMERCIAL

## 2022-01-04 DIAGNOSIS — E03.9 HYPOTHYROIDISM, UNSPECIFIED TYPE: Primary | ICD-10-CM

## 2022-01-04 DIAGNOSIS — F41.9 ANXIETY: ICD-10-CM

## 2022-01-04 PROCEDURE — 99213 OFFICE O/P EST LOW 20 MIN: CPT | Performed by: FAMILY MEDICINE

## 2022-01-04 PROCEDURE — 4004F PT TOBACCO SCREEN RCVD TLK: CPT | Performed by: FAMILY MEDICINE

## 2022-01-04 NOTE — PROGRESS NOTES
Assessment/Plan:    Hypertension  Encouragement provided, Continue to monitor home bp! Home measurements are currently acceptable, range 120s/70s    Plan:  - continue low salt diet and lifestyle interventions such as walking her dog!  - will f/u in office at next visit      Anxiety  She is doing great and would like to continue this dose  Anxiety is controlled! Plan:  - continue Zoloft 50mg , 0 25mg prn xanax (she has only used 1 (0 25mg) dose at most 3x per week)  - Anticipatory guidance given  Pt instructed to call back should any new acute or worsening s/sx occur   Pt expressed understanding, all questions answered           Diagnoses and all orders for this visit:    Hypothyroidism, unspecified type  -     TSH, 3rd generation with Free T4 reflex; Future    Anxiety           Subjective:   Telemedicine consent    Patient: Dorette Romberg  Provider: Corazon Keating MD  Provider located at [address] [address]    The patient was identified by name and date of birth  Dorette Romberg was informed that this is a telemedicine visit and that the visit is being conducted through [facility] and patient was informed this is a secure, HIPAA-complaint platform  She agrees to proceed     My office door was closed  No one else was in the room  She acknowledged consent and understanding of privacy and security of the video platform  The patient has agreed to participate and understands they can discontinue the visit at any time  Patient is aware this is a billable service  I spent 15 minutes with the patient during this visit  Patient ID: Dorette Romberg is a 49 y o  female  Ms Roddy Serrano presents today via virtual visit to f/u on anxiety  She is seen via virtual visit today as she was found to be COVID positive on 12/30/2021  Her symptoms are mild and she is doing well and is having no complications   She was out walking her dog during our visit    Regarding her recent initiation of zoloft, she reports she feels significant improvement on the medication and wishes to continue! She reports she hasnt needed her PRN xanax as often as before and now only takes one tab 0 25mg about 2 times per week at most!  She has also monitored her bp at home since our last visit and it has consistently been in the 120s/70s range  Great job! Anxiety  Presents for follow-up visit  Symptoms include nervous/anxious behavior (mild compared to before)  Patient reports no chest pain, palpitations, shortness of breath or suicidal ideas  Primary symptoms comment: not as irritable and though she still has moments of anxiety, she is able to self soothe more than ever before  HTN  129/70  The following portions of the patient's history were reviewed and updated as appropriate: allergies, current medications, past family history, past medical history, past social history, past surgical history and problem list     Review of Systems   Constitutional: Negative for fatigue and fever  HENT: Positive for congestion  Respiratory: Negative for cough and shortness of breath  Cardiovascular: Negative for chest pain and palpitations  Gastrointestinal: Negative for vomiting  Psychiatric/Behavioral: Negative for dysphoric mood, sleep disturbance and suicidal ideas  The patient is nervous/anxious (mild compared to before)  All other systems reviewed and are negative  Objective: There were no vitals taken for this visit  Physical Exam  Constitutional:       General: She is not in acute distress  Appearance: She is not ill-appearing  Comments: Exam via virtual platform - she was walking her dog during our visit and wished to proceed with visit  She was smiling and pleasant! HENT:      Head: Normocephalic and atraumatic  Nose: No rhinorrhea  Comments: Sounded slightly congested  Eyes:      General: No scleral icterus          Right eye: No discharge  Left eye: No discharge  Conjunctiva/sclera: Conjunctivae normal    Pulmonary:      Effort: Pulmonary effort is normal  No respiratory distress  Comments: Patient did not appear to be in respiratory distress or with increased work of breathing on video exam Patient is also speaking in full sentences without difficulty  No coughing observed    Skin:     Coloration: Skin is not jaundiced  Neurological:      Mental Status: She is alert and oriented to person, place, and time  Psychiatric:         Attention and Perception: Attention and perception normal          Mood and Affect: Mood and affect normal  Mood is not anxious or depressed  Speech: Speech normal          Behavior: Behavior normal  Behavior is cooperative  Thought Content: Thought content normal  Thought content does not include homicidal or suicidal ideation  Thought content does not include homicidal or suicidal plan           Cognition and Memory: Cognition and memory normal          Judgment: Judgment normal

## 2022-01-06 NOTE — ASSESSMENT & PLAN NOTE
She is doing great and would like to continue this dose  Anxiety is controlled! Plan:  - continue Zoloft 50mg , 0 25mg prn xanax (she has only used 1 (0 25mg) dose at most 3x per week)  - Anticipatory guidance given  Pt instructed to call back should any new acute or worsening s/sx occur    Pt expressed understanding, all questions answered

## 2022-01-06 NOTE — ASSESSMENT & PLAN NOTE
Encouragement provided, Continue to monitor home bp!    Home measurements are currently acceptable, range 120s/70s    Plan:  - continue low salt diet and lifestyle interventions such as walking her dog!  - will f/u in office at next visit

## 2022-01-19 DIAGNOSIS — E03.9 HYPOTHYROIDISM, UNSPECIFIED TYPE: ICD-10-CM

## 2022-01-19 RX ORDER — LEVOTHYROXINE SODIUM 0.05 MG/1
TABLET ORAL
Qty: 30 TABLET | Refills: 3 | Status: SHIPPED | OUTPATIENT
Start: 2022-01-19 | End: 2022-03-15 | Stop reason: SDUPTHER

## 2022-02-22 ENCOUNTER — OFFICE VISIT (OUTPATIENT)
Dept: FAMILY MEDICINE CLINIC | Facility: OTHER | Age: 52
End: 2022-02-22
Payer: COMMERCIAL

## 2022-02-22 VITALS
WEIGHT: 239 LBS | TEMPERATURE: 98.6 F | DIASTOLIC BLOOD PRESSURE: 88 MMHG | RESPIRATION RATE: 16 BRPM | BODY MASS INDEX: 37.51 KG/M2 | OXYGEN SATURATION: 98 % | SYSTOLIC BLOOD PRESSURE: 150 MMHG | HEIGHT: 67 IN | HEART RATE: 90 BPM

## 2022-02-22 DIAGNOSIS — F17.210 CIGARETTE NICOTINE DEPENDENCE WITHOUT COMPLICATION: ICD-10-CM

## 2022-02-22 DIAGNOSIS — E11.9 TYPE 2 DIABETES MELLITUS WITHOUT COMPLICATION, WITHOUT LONG-TERM CURRENT USE OF INSULIN (HCC): Primary | ICD-10-CM

## 2022-02-22 DIAGNOSIS — E78.2 MIXED HYPERLIPIDEMIA: ICD-10-CM

## 2022-02-22 DIAGNOSIS — I10 HYPERTENSION, UNSPECIFIED TYPE: ICD-10-CM

## 2022-02-22 DIAGNOSIS — E11.9 ENCOUNTER FOR DIABETIC FOOT EXAM (HCC): ICD-10-CM

## 2022-02-22 PROCEDURE — 3008F BODY MASS INDEX DOCD: CPT | Performed by: FAMILY MEDICINE

## 2022-02-22 PROCEDURE — 99213 OFFICE O/P EST LOW 20 MIN: CPT | Performed by: FAMILY MEDICINE

## 2022-02-22 RX ORDER — ADHESIVE BANDAGE 3/4"
BANDAGE TOPICAL DAILY
Qty: 1 EACH | Refills: 0 | Status: SHIPPED | OUTPATIENT
Start: 2022-02-22

## 2022-02-22 NOTE — ASSESSMENT & PLAN NOTE
Lab Results   Component Value Date    HGBA1C 6 5 (H) 12/20/2021     Labs completed at [facility], unable to review today as they have nto yet faxed results  Will review when results received

## 2022-02-22 NOTE — PATIENT INSTRUCTIONS
How to Take a Blood Pressure   WHAT YOU NEED TO KNOW:   Blood pressure (BP) is the force of blood pushing on the walls of your arteries  Your BP results are written as 2 numbers  The first, or top, number is called systolic BP  This is the pressure caused by your heart pushing blood out to your body  The second, or bottom, number is called diastolic BP  This is the pressure when your heart relaxes and fills back up with blood  Ask your healthcare provider what your BP should be  For most people, a good BP goal is less than 120/80  DISCHARGE INSTRUCTIONS:   Call your doctor if:   · Your BP is higher or lower than you were told it should be  · You have questions or concerns about your condition or care  Why you may need to take your BP:  You may not have any signs or symptoms of high BP  You may need to take your BP regularly to know how often your BP is high  High BP increases your risk for a stroke, heart attack, or kidney disease  You may need to take medicine to keep your BP at a normal level  Write down and keep a log of your BP  Your healthcare provider can use the BP results in your log to see if your BP medicines are working  How often to take your BP:  Your healthcare provider may recommend that you take your BP 2 times a day  Take your BP at the same times each day, such as the morning and evening  Ask your healthcare provider when and how often you should take your BP  How to take your BP:  You can take your BP at home with a digital BP monitor  Read the instructions that came with your BP monitor  The monitor comes with an adjustable cuff  Ask your healthcare provider if your cuff is the correct size  · Do not eat, drink, smoke, or exercise for 30 minutes before you take your BP  · Rest quietly for 5 minutes before you take your BP  · Sit with your feet flat on the floor and your back against a chair  · Extend your arm and support it on a flat surface   Your arm should be at the same level as your heart  · Make sure all of the air is out of the cuff  Place the BP cuff against your bare skin about 1 inch (2 5 cm) above your elbow  Wrap the cuff snugly around your arm  The BP reading may not be correct if the cuff is too loose  · If you are using a wrist cuff, wrap the cuff snugly around your wrist  Hold your wrist at the same level as your heart  · Turn on the BP monitor and follow the directions  · Write down your BP, the date, the time, and which arm you used to take the BP  Take your BP 2 times and write down both readings  Use the same arm each time  These BP readings can be 1 minute apart  What else you need to know:   · Do not take a BP reading in an arm that is injured or has an IV or shunt  · Take your BP medicines as directed  Do not stop taking your medicines if your BP is at your goal   A BP at your goal means your medicine is working correctly  Follow up with your doctor as directed:  Bring the log of your BP readings  Also bring the BP machine  Healthcare providers can check that you are using the machine correctly  Write down your questions so you remember to ask them during your visits  © Copyright [employer] 2021 Information is for End User's use only and may not be sold, redistributed or otherwise used for commercial purposes  All illustrations and images included in CareNotes® are the copyrighted property of [employer]  or [employer]  The above information is an  only  It is not intended as medical advice for individual conditions or treatments  Talk to your doctor, nurse or pharmacist before following any medical regimen to see if it is safe and effective for you

## 2022-02-22 NOTE — PROGRESS NOTES
Assessment/Plan:    Nicotine dependence  Increased to 2 packs   Moving into new home in 2 weeks     Type 2 diabetes mellitus without complication, without long-term current use of insulin (HCC)    Lab Results   Component Value Date    HGBA1C 6 5 (H) 12/20/2021     Labs completed at [email or web address], unable to review today as they have nto yet faxed results  Will review when results received  Hypertension  Patient's blood pressure elevated 150s over 80s  Reports she had very stressful day at work in her current living situation is also very stressful  Moving into a new house in 2 weeks which will help alleviate some of this stress  Also has increased smoking to 2 packs per day which she attributes to her current living situation  Does report her diet has not been the best an knows that when she does eat better for blood pressure reflect this  - we discussed in detail importance of blood pressure control and adherence to overall healthier diet  Discussed risk of untreated/uncontrolled hypertension  Again discussed dash diet plan in addition to more whole food plant based options that are accessible to her  Lifestyle  - current med is losartan 100 mg  - discussed possible dose increased however patient is very against this at this time  Wishes to continue to attempt diet interventions especially since she reports she knows this will improve when she is out of her current living situation  Though I did recommend dose increase of BP medication, patient declines at this time  Will continue to monitor at home and will follow-up blood pressure at next visit in 4 weeks    Anticipatory guidance given  Pt instructed to call back should any new acute or worsening s/sx occur  Pt expressed understanding, all questions answered          Diagnoses and all orders for this visit:    Type 2 diabetes mellitus without complication, without long-term current use of insulin (HCC)    Mixed hyperlipidemia  -     Blood Pressure Monitoring (Blood Pressure Cuff) MISC; Use daily    Hypertension, unspecified type  -     Blood Pressure Monitoring (Blood Pressure Cuff) MISC; Use daily  -     Durable Medical Equipment    Cigarette nicotine dependence without complication  -     CT lung screening program; Future    Encounter for diabetic foot exam [facility])          Nutrition Assessment and Intervention:     Reviewed food recall journal    New Nutrition Prescription completed with patient      Other interventions: Discussed healthier quick snack options and ways to incorporate this into her life    Physical Activity Assessment and Intervention:    Activity journal reviewed      Emotional and Mental Well-being, Sleep, Connectedness Assessment and Intervention:    Mindfulness program recommended/explained    Stress management local resource list provided      Other interventions: [address] praful discussed     Tobacco and Toxic Substance Assessment and Intervention:     Tobacco use screening performed      Therapeutic Lifestyle Change Visit:     One-on-one comprehensive counseling, coaching, and health behavior change visit completed          Subjective:      Patient ID: Briseyda Duque is a 49 y o  female presenting for laboratory review and check-in regarding diabetes, hypertension, hyperlipidemia, and nicotine dependence  Pt has attempted to continue diabetic diet but feels that her stress recently has contributed to weigh gain and increase in smoking and that hba1c will likely be increased  Has continued to walk her dog for exercise but with the recent bad weather she has not done so within the past week  The following portions of the patient's history were reviewed and updated as appropriate: allergies, current medications, past family history, past medical history, past social history, past surgical history and problem list     Review of Systems   Constitutional: Negative for fever and unexpected weight change     Respiratory:        Smoking more   Cardiovascular: Negative for chest pain and palpitations  Gastrointestinal: Negative for diarrhea  Neurological: Negative for headaches  Psychiatric/Behavioral: The patient is nervous/anxious (stress)  All other systems reviewed and are negative  Objective:      /88   Pulse 90   Temp 98 6 °F (37 °C)   Resp 16   Ht 5' 7" (1 702 m)   Wt 108 kg (239 lb)   SpO2 98%   BMI 37 43 kg/m²          Physical Exam  Vitals and nursing note reviewed  Constitutional:       General: She is not in acute distress  Appearance: She is well-developed  She is obese  She is not ill-appearing, toxic-appearing or diaphoretic  HENT:      Head: Normocephalic and atraumatic  Eyes:      General: No scleral icterus  Right eye: No discharge  Left eye: No discharge  Cardiovascular:      Rate and Rhythm: Normal rate and regular rhythm  Pulses:           Dorsalis pedis pulses are 2+ on the right side and 2+ on the left side  Heart sounds: Normal heart sounds  No murmur heard  Pulmonary:      Effort: Pulmonary effort is normal  No respiratory distress  Breath sounds: Normal breath sounds  No wheezing  Feet:      Right foot:      Skin integrity: Erythema (Slight purple red discoloration Lateral right 1st toe) and dry skin present  Left foot:      Skin integrity: Dry skin present  Skin:     General: Skin is warm and dry  Coloration: Skin is not jaundiced  Neurological:      Mental Status: She is alert and oriented to person, place, and time  Psychiatric:         Attention and Perception: Attention and perception normal          Speech: Speech normal          Behavior: Behavior normal          Thought Content: Thought content does not include homicidal or suicidal ideation  Thought content does not include homicidal or suicidal plan  Comments: Anxious           Patient's shoes and socks removed      Right Foot/Ankle   Right Foot Inspection  Skin Exam: skin intact, dry skin and erythema (Slight purple red discoloration Lateral right 1st toe)  Toe Exam: ROM and strength within normal limits  Sensory   Vibration: intact  Proprioception: intact  Monofilament testing: intact    Vascular  Capillary refills: < 3 seconds  The right DP pulse is 2+  Left Foot/Ankle  Left Foot Inspection  Skin Exam: skin intact and dry skin  Toe Exam: ROM and strength within normal limits  Sensory   Vibration: intact  Proprioception: intact  Monofilament testing: intact    Vascular  Capillary refills: < 3 seconds  The left DP pulse is 2+       Assign Risk Category  No loss of protective sensation

## 2022-02-23 LAB — TSH SERPL DL<=0.005 MIU/L-ACNC: 3.6 UIU/ML (ref 0.45–4.5)

## 2022-02-27 NOTE — ASSESSMENT & PLAN NOTE
Patient's blood pressure elevated 150s over 80s  Reports she had very stressful day at work in her current living situation is also very stressful  Moving into a new house in 2 weeks which will help alleviate some of this stress  Also has increased smoking to 2 packs per day which she attributes to her current living situation  Does report her diet has not been the best an knows that when she does eat better for blood pressure reflect this  - we discussed in detail importance of blood pressure control and adherence to overall healthier diet  Discussed risk of untreated/uncontrolled hypertension  Again discussed dash diet plan in addition to more whole food plant based options that are accessible to her  Lifestyle  - current med is losartan 100 mg  - discussed possible dose increased however patient is very against this at this time  Wishes to continue to attempt diet interventions especially since she reports she knows this will improve when she is out of her current living situation  Though I did recommend dose increase of BP medication, patient declines at this time  Will continue to monitor at home and will follow-up blood pressure at next visit in 4 weeks    Anticipatory guidance given  Pt instructed to call back should any new acute or worsening s/sx occur  Pt expressed understanding, all questions answered

## 2022-03-01 ENCOUNTER — TELEPHONE (OUTPATIENT)
Dept: FAMILY MEDICINE CLINIC | Facility: OTHER | Age: 52
End: 2022-03-01

## 2022-03-01 NOTE — TELEPHONE ENCOUNTER
LM for pt  To call back   Most pharmacies will not cover scripts or accept for BP cuff/monitor  [facility]  Self pay only nad must go I to store to pick a devise and self pay  Other option is for pt to call [facility] or [facility] to see if her insurance will cover        [place], [place]

## 2022-03-01 NOTE — TELEPHONE ENCOUNTER
Patient called and would like someone to call the pharmacy in regards to her blood pressure cuff that was sent in they are telling patient they never received it       Patient would also like a call back when its done    Thank you

## 2022-03-10 ENCOUNTER — TELEMEDICINE (OUTPATIENT)
Dept: FAMILY MEDICINE CLINIC | Facility: OTHER | Age: 52
End: 2022-03-10
Payer: COMMERCIAL

## 2022-03-10 DIAGNOSIS — H10.9 BACTERIAL CONJUNCTIVITIS OF RIGHT EYE: Primary | ICD-10-CM

## 2022-03-10 PROCEDURE — 4004F PT TOBACCO SCREEN RCVD TLK: CPT | Performed by: FAMILY MEDICINE

## 2022-03-10 PROCEDURE — 99213 OFFICE O/P EST LOW 20 MIN: CPT | Performed by: FAMILY MEDICINE

## 2022-03-10 RX ORDER — POLYMYXIN B SULFATE AND TRIMETHOPRIM 1; 10000 MG/ML; [USP'U]/ML
1 SOLUTION OPHTHALMIC EVERY 6 HOURS
Qty: 10 ML | Refills: 0 | Status: SHIPPED | OUTPATIENT
Start: 2022-03-10 | End: 2022-03-15

## 2022-03-10 NOTE — PROGRESS NOTES
Virtual Regular Visit    Verification of [place] location: [place]    Patient is located in the following state in which I hold an active license [place]    Assessment/Plan:  56-year-old patient with concerns of viral versus bacterial right eye conjunctivitis  Will treat with antibiotic ophthalmic drops q i d  x5 days  Also discussed using home remedies such as a warm compress, over-the-counter allergy eyedrops for itching irritation, avoiding irritants to the eye as supplemental treatment as well  Handout provided in AVS  Follow-up p r n  Problem List Items Addressed This Visit     None      Visit Diagnoses     Bacterial conjunctivitis of right eye    -  Primary    Relevant Medications    polymyxin b-trimethoprim (POLYTRIM) ophthalmic solution        Reason for visit is   Chief Complaint   Patient presents with    Virtual Regular Visit        Encounter provider Desirae Dickens DO    Provider located at [address] [address]      Recent Visits  No visits were found meeting these conditions  Showing recent visits within past 7 days and meeting all other requirements  Today's Visits  Date Type Provider Dept   03/10/22 Telemedicine DO Jimmy Snowden   Showing today's visits and meeting all other requirements  Future Appointments  No visits were found meeting these conditions  Showing future appointments within next 150 days and meeting all other requirements       The patient was identified by name and date of birth  Gera [place] was informed that this is a telemedicine visit and that the visit is being conducted through [facility] and patient was informed this is a secure, HIPAA-complaint platform  She agrees to proceed     My office door was closed  No one else was in the room  She acknowledged consent and understanding of privacy and security of the video platform   The patient has agreed to participate and understands they can discontinue the visit at any time  Patient is aware this is a billable service  Subjective  Zonia Pain is a 49 y o  female     Right eye  Itching, yellow puss, drainage, painful, red sclera  Started this morning  Eye was crusted shut  Using warm compresses with some relief  Has not yet tried any eye drops  This has happened in the past, many years ago when her daughters were younger  -fever, chills, congestion, RN, sore throat, headaches, ear pain       Past Medical History:   Diagnosis Date    Anxiety     Calculus of distal right ureter     last assessed 08/06/2014    Closed fracture of head of first metacarpal bone of right hand     Depression     Disease of thyroid gland     Diverticulitis     Fatty liver     last assessed 01/09/2017    HLD (hyperlipidemia)     treating with diet    Hypertension     Kidney stone     Lung nodules     Migraine     last assessed 08/03/2012    Pneumonia     Pre-diabetes     last assessed 06/27/2017       Past Surgical History:   Procedure Laterality Date    APPENDECTOMY      CHOLECYSTECTOMY  1999    laparoscopic    COLON SURGERY  01/09/2018    colostomy    COLONOSCOPY  02/2012    polyp, Dr Anat Cohen N/A 4/5/2018    Procedure: REVERSAL COLOSTOMY;  Surgeon: Adilene Lewis DO;  [facility];  Service: General    FLEXIBLE SIGMOIDOSCOPY N/A 4/5/2018    Procedure: Toney Arce;  Surgeon: Adilene Lewis DO;  [facility];  Service: General   [address]    umbilical    LAPAROTOMY N/A 1/7/2018    Procedure: LAPAROTOMY EXPLORATORY, sigmoid colon resection, colostomy, appendectomy;  Surgeon: Adilene Lewis DO;  [facility];  Service: General    NV COLONOSCOPY FLX DX W/COLLJ SPEC WHEN PFRMD N/A 4/2/2018    Procedure: COLONOSCOPY;  Surgeon: Adilene Lewis DO;  [facility];   Service: General    NV COLONOSCOPY FLX DX W/COLLJ SPEC WHEN PFRMD N/A 4/16/2019    Procedure: COLONOSCOPY; Surgeon: Lori Wasserman DO;  [facility];   Service: General    AR EXPLORATORY OF ABDOMEN N/A 4/5/2018    Procedure: EXPLORATORY LAPAROTOMY;  Surgeon: Lori Wasserman DO;  [facility];  Service: General    AR REPAIR INCISIONAL HERNIA,REDUCIBLE N/A 11/15/2018    Procedure: INCISIONAL HERNIA REPAIR AT UMBILICUS;  Surgeon: Lori Wasserman DO;  [facility];  Service: General    REDUCTION MAMMAPLASTY Bilateral 1999    TUBAL LIGATION         Current Outpatient Medications   Medication Sig Dispense Refill    albuterol (ProAir HFA) 90 mcg/act inhaler Inhale 2 puffs every 6 (six) hours as needed for wheezing or shortness of breath (cough) 8 g 2    ALPRAZolam (XANAX) 0 25 mg tablet Take 1 tablet (0 25 mg total) by mouth 2 (two) times a day as needed for anxiety 30 tablet 0    atorvastatin (LIPITOR) 20 mg tablet Take 1 tablet (20 mg total) by mouth daily 30 tablet 5    Blood Glucose Monitoring Suppl (In Touch) JESSICA Use 2 (two) times a day 1 Device 0    Blood Pressure Monitoring (Blood Pressure Cuff) MISC Use daily 1 each 0    glucose blood test strip Use 1 each 2 (two) times a day 100 each 11    hydrocortisone 2 5 % ointment Apply topically 2 (two) times a day 30 g 0    levothyroxine 50 mcg tablet Take one tablet by mouth daily 30 tablet 3    losartan (COZAAR) 100 MG tablet Take 1 tablet (100 mg total) by mouth daily 30 tablet 3    metFORMIN (GLUCOPHAGE) 1000 MG tablet 1 tablet twice a day 60 tablet 3    methylPREDNISolone 4 MG tablet therapy pack Use as directed on package (Patient not taking: Reported on 2/22/2022 ) 21 each 0    nicotine (NICODERM CQ) 14 mg/24hr TD 24 hr patch Place 1 patch on the skin every 24 hours 28 patch 0    nicotine (NICODERM CQ) 21 mg/24 hr TD 24 hr patch APPLY 1 PATCH EVERY DAY 28 patch 0    polymyxin b-trimethoprim (POLYTRIM) ophthalmic solution Administer 1 drop to the right eye every 6 (six) hours for 5 days 10 mL 0    sertraline (Zoloft) 50 mg tablet Take 1 tablet (50 mg total) by mouth daily 30 tablet 2     No current facility-administered medications for this visit  Allergies   Allergen Reactions    Penicillins Angioedema    Amoxicillin Edema    Doxycycline Vomiting    Vicodin [Hydrocodone-Acetaminophen] GI Intolerance and Vomiting       Review of Systems   Constitutional: Negative for chills and fever  HENT: Negative for congestion, rhinorrhea, sinus pressure, sinus pain and tinnitus  Skin: Negative for rash  Neurological: Negative for headaches  Video Exam    There were no vitals filed for this visit  Physical Exam  HENT:      Head: Normocephalic and atraumatic  Right Ear: External ear normal       Left Ear: External ear normal       Nose: Nose normal    Eyes:      General: No scleral icterus  Right eye: Discharge present  Left eye: No discharge  Extraocular Movements: Extraocular movements intact  Comments: Red sclera on the right, with visible drainage and irritation   Musculoskeletal:      Cervical back: Normal range of motion  Neurological:      Mental Status: She is alert  Psychiatric:         Mood and Affect: Mood normal          Behavior: Behavior normal          Thought Content: Thought content normal           I spent 10 minutes directly with the patient during this visit    VIRTUAL VISIT DISCLAIMER      Jennifer Jiménez verbally agrees to participate in [employer]  Pt is aware that [employer] could be limited without vital signs or the ability to perform a full hands-on physical Harry Jacques understands she or the provider may request at any time to terminate the video visit and request the patient to seek care or treatment in person

## 2022-03-10 NOTE — PATIENT INSTRUCTIONS
Conjunctivitis   WHAT YOU NEED TO KNOW:   Conjunctivitis, or pink eye, is inflammation of your conjunctiva  The conjunctiva is a thin tissue that covers the front of your eye and the back of your eyelids  The conjunctiva helps protect your eye and keep it moist  Conjunctivitis may be caused by bacteria, allergies, or a virus  If your conjunctivitis is caused by bacteria, it may get better on its own in about 7 days  Viral conjunctivitis can last up to 3 weeks  DISCHARGE INSTRUCTIONS:   Return to the emergency department if:   · You have worsening eye pain  · The swelling in your eye gets worse, even after treatment  · Your vision suddenly becomes worse or you cannot see at all  Contact your healthcare provider if:   · You develop a fever and ear pain  · You have tiny bumps or spots of blood on your eye  · You have questions or concerns about your condition or care  Manage your symptoms:   · Apply a cool compress  Wet a washcloth with cold water and place it on your eye  This will help decrease itching and irritation  · Do not wear contact lenses  They can irritate your eye  Throw away the pair you are using and ask when you can wear them again  Use a new pair of lenses when your healthcare provider says it is okay  · Avoid irritants  Stay away from smoke filled areas  Shield your eyes from wind and sun  · Flush your eye  You may need to flush your eye with saline to help decrease your symptoms  Ask for more information on how to flush your eye  Medicines:  Treatment depends on what is causing your conjunctivitis  You may be given any of the following:  · Allergy medicine  helps decrease itchy, red, swollen eyes caused by allergies  It may be given as a pill, eye drops, or nasal spray  · Antibiotics  may be needed if your conjunctivitis is caused by bacteria  This medicine may be given as a pill, eye drops, or eye ointment  · Take your medicine as directed    Contact your healthcare provider if you think your medicine is not helping or if you have side effects  Tell him or her if you are allergic to any medicine  Keep a list of the medicines, vitamins, and herbs you take  Include the amounts, and when and why you take them  Bring the list or the pill bottles to follow-up visits  Carry your medicine list with you in case of an emergency  Prevent the spread of conjunctivitis:   · Wash your hands with soap and water often  Wash your hands before and after you touch your eyes  Also wash your hands before you prepare or eat food and after you use the bathroom or change a diaper  · Avoid allergens  Try to avoid the things that cause your allergies, such as pets, dust, or grass  · Avoid contact with others  Do not share towels or washcloths  Try to stay away from others as much as possible  Ask when you can return to work or school  · Throw away eye makeup  The bacteria that caused your conjunctivitis can stay in eye makeup  Throw away mascara and other eye makeup  © Copyright "[employer]" 2022 Information is for End User's use only and may not be sold, redistributed or otherwise used for commercial purposes  All illustrations and images included in CareNotes® are the copyrighted property of A D A M , Inc  or [facility]   The above information is an  only  It is not intended as medical advice for individual conditions or treatments  Talk to your doctor, nurse or pharmacist before following any medical regimen to see if it is safe and effective for you

## 2022-03-15 DIAGNOSIS — E78.2 MIXED HYPERLIPIDEMIA: ICD-10-CM

## 2022-03-15 DIAGNOSIS — E03.9 HYPOTHYROIDISM, UNSPECIFIED TYPE: ICD-10-CM

## 2022-03-15 DIAGNOSIS — I10 ESSENTIAL HYPERTENSION: ICD-10-CM

## 2022-03-15 PROCEDURE — 4010F ACE/ARB THERAPY RXD/TAKEN: CPT | Performed by: FAMILY MEDICINE

## 2022-03-15 RX ORDER — ATORVASTATIN CALCIUM 20 MG/1
20 TABLET, FILM COATED ORAL DAILY
Qty: 30 TABLET | Refills: 5 | Status: SHIPPED | OUTPATIENT
Start: 2022-03-15

## 2022-03-15 RX ORDER — LEVOTHYROXINE SODIUM 0.05 MG/1
TABLET ORAL
Qty: 30 TABLET | Refills: 3 | Status: SHIPPED | OUTPATIENT
Start: 2022-03-15 | End: 2022-07-25

## 2022-03-15 RX ORDER — LOSARTAN POTASSIUM 100 MG/1
100 TABLET ORAL DAILY
Qty: 30 TABLET | Refills: 3 | Status: SHIPPED | OUTPATIENT
Start: 2022-03-15 | End: 2022-07-25

## 2022-05-25 LAB
CHOLEST SERPL-MCNC: 164 MG/DL (ref 100–199)
EST. AVERAGE GLUCOSE BLD GHB EST-MCNC: 148 MG/DL
HBA1C MFR BLD: 6.8 % (ref 4.8–5.6)
HDLC SERPL-MCNC: 42 MG/DL
LDLC SERPL CALC-MCNC: 98 MG/DL (ref 0–99)
SL AMB T4, FREE (DIRECT): 1.13 NG/DL (ref 0.82–1.77)
TRIGL SERPL-MCNC: 135 MG/DL (ref 0–149)
TSH SERPL DL<=0.005 MIU/L-ACNC: 5.54 UIU/ML (ref 0.45–4.5)

## 2022-06-14 ENCOUNTER — TELEPHONE (OUTPATIENT)
Dept: FAMILY MEDICINE CLINIC | Facility: OTHER | Age: 52
End: 2022-06-14

## 2022-06-14 ENCOUNTER — TELEMEDICINE (OUTPATIENT)
Dept: FAMILY MEDICINE CLINIC | Facility: OTHER | Age: 52
End: 2022-06-14
Payer: COMMERCIAL

## 2022-06-14 DIAGNOSIS — L23.7 POISON IVY DERMATITIS: Primary | ICD-10-CM

## 2022-06-14 PROCEDURE — 4004F PT TOBACCO SCREEN RCVD TLK: CPT | Performed by: FAMILY MEDICINE

## 2022-06-14 PROCEDURE — 99213 OFFICE O/P EST LOW 20 MIN: CPT | Performed by: FAMILY MEDICINE

## 2022-06-14 RX ORDER — METHYLPREDNISOLONE 4 MG/1
TABLET ORAL
Qty: 21 EACH | Refills: 0 | Status: SHIPPED | OUTPATIENT
Start: 2022-06-14

## 2022-06-14 NOTE — PROGRESS NOTES
Virtual Regular Visit    Verification of patient location: [place]    Patient is located in the following state in which I hold an active license [place]      Assessment/Plan:    Problem List Items Addressed This Visit    None     Visit Diagnoses     Poison ivy dermatitis    -  Primary    diffuse  prescribe medrol pack along with steroid ointment to use in sensitive areas for relief of inflammation and itching  do not use steroid cream >10days  Continue with calamine lotion prn for itch relief and recommend daily oatmeal bath  handout for management and prevention of poison ivy provided in AVS  F/u prn    Relevant Medications    methylPREDNISolone 4 MG tablet therapy pack    triamcinolone (KENALOG) 0 1 % ointment        Return if symptoms worsen or fail to improve  Reason for visit is   Chief Complaint   Patient presents with    Virtual Regular Visit        Encounter provider Nancy Mc DO    Provider located at [address] [address]      Recent Visits  Date Type Provider Dept   06/14/22 Telemedicine DO Jimmy Pineda   06/14/22 Telephone Catherine Gustafson   Showing recent visits within past 7 days and meeting all other requirements  Future Appointments  No visits were found meeting these conditions  Showing future appointments within next 150 days and meeting all other requirements       The patient was identified by name and date of birth  Jenny Vance was informed that this is a telemedicine visit and that the [facility] is being conducted through [facility] and patient was informed this is a secure, HIPAA-complaint platform  She agrees to proceed     My office door was closed  No one else was in the room  She acknowledged consent and understanding of privacy and security of the video platform  The patient has agreed to participate and understands they can discontinue the visit at any time      Patient is aware this is a billable service  Subjective  Porsche Camarena is a 49 y o  female     HPI:  Patient prevent the patient presents with concerns of poison ivy rash  On Saturday she was [place]  picking weeds  On Sunday she started with rash and itching on her ankles consistent with poison ivy  This quickly spread on Monday to bilateral thighs, face, neck, lips, ears, bowl though, hands and fingers  Currently the rash is still oozing and very painful and itchy  He she has been putting bleach, calamine lotion, and over-the-counter poison ivy relief cream without any significant relief in her symptoms  She normally reacts similar in the past to poison ivy  Last year she required steroids for treatment of a similar rash      Past Medical History:   Diagnosis Date    Anxiety     Calculus of distal right ureter     last assessed 08/06/2014    Closed fracture of head of first metacarpal bone of right hand     Depression     Disease of thyroid gland     Diverticulitis     Fatty liver     last assessed 01/09/2017    HLD (hyperlipidemia)     treating with diet    Hypertension     Kidney stone     Lung nodules     Migraine     last assessed 08/03/2012    Pneumonia     Pre-diabetes     last assessed 06/27/2017       Past Surgical History:   Procedure Laterality Date    APPENDECTOMY      CHOLECYSTECTOMY  1999    laparoscopic    COLON SURGERY  01/09/2018    colostomy    COLONOSCOPY  02/2012    polyp, Dr Prciilla Hayes N/A 4/5/2018    Procedure: REVERSAL COLOSTOMY;  Surgeon: Giovana De La Cruz DO;  [facility];  Service: General    FLEXIBLE SIGMOIDOSCOPY N/A 4/5/2018    Procedure: Jae Landry;  Surgeon: Giovana De La Cruz DO;  [facility];  Service: Radha Nolan 13    umbilical    LAPAROTOMY N/A 1/7/2018    Procedure: LAPAROTOMY EXPLORATORY, sigmoid colon resection, colostomy, appendectomy;  Surgeon: Giovana De La Cruz DO; [facility];  Service: General    MA COLONOSCOPY FLX DX W/COLLJ SPEC WHEN PFRMD N/A 4/2/2018    Procedure: COLONOSCOPY;  Surgeon: Dar Kemp DO;  [facility]; Service: General    MA COLONOSCOPY FLX DX W/COLLJ SPEC WHEN PFRMD N/A 4/16/2019    Procedure: COLONOSCOPY;  Surgeon: Dar Kemp DO;  [facility];   Service: General    MA EXPLORATORY OF ABDOMEN N/A 4/5/2018    Procedure: EXPLORATORY LAPAROTOMY;  Surgeon: Dar Kemp DO;  [facility];  Service: General    MA REPAIR INCISIONAL HERNIA,REDUCIBLE N/A 11/15/2018    Procedure: INCISIONAL HERNIA REPAIR AT UMBILICUS;  Surgeon: Dar Kemp DO;  [facility];  Service: General    REDUCTION MAMMAPLASTY Bilateral 1999    TUBAL LIGATION         Current Outpatient Medications   Medication Sig Dispense Refill    methylPREDNISolone 4 MG tablet therapy pack Use as directed on package 21 each 0    triamcinolone (KENALOG) 0 1 % ointment Apply topically 2 (two) times a day for 10 days 15 g 0    albuterol (ProAir HFA) 90 mcg/act inhaler Inhale 2 puffs every 6 (six) hours as needed for wheezing or shortness of breath (cough) 8 g 2    ALPRAZolam (XANAX) 0 25 mg tablet Take 1 tablet (0 25 mg total) by mouth 2 (two) times a day as needed for anxiety 30 tablet 0    atorvastatin (LIPITOR) 20 mg tablet Take 1 tablet (20 mg total) by mouth daily 30 tablet 5    Blood Glucose Monitoring Suppl (In Touch) JESSICA Use 2 (two) times a day 1 Device 0    Blood Pressure Monitoring (Blood Pressure Cuff) MISC Use daily 1 each 0    glucose blood test strip Use 1 each 2 (two) times a day 100 each 11    hydrocortisone 2 5 % ointment Apply topically 2 (two) times a day 30 g 0    levothyroxine 50 mcg tablet Take one tablet by mouth daily 30 tablet 3    losartan (COZAAR) 100 MG tablet Take 1 tablet (100 mg total) by mouth daily 30 tablet 3    metFORMIN (GLUCOPHAGE) 1000 MG tablet 1 tablet twice a day 60 tablet 3    nicotine (NICODERM CQ) 14 mg/24hr TD 24 hr patch Place 1 patch on the skin every 24 hours 28 patch 0    nicotine (NICODERM CQ) 21 mg/24 hr TD 24 hr patch APPLY 1 PATCH EVERY DAY 28 patch 0    sertraline (Zoloft) 50 mg tablet Take 1 tablet (50 mg total) by mouth daily 30 tablet 2     No current facility-administered medications for this visit  Allergies   Allergen Reactions    Penicillins Angioedema    Amoxicillin Edema    Doxycycline Vomiting    Vicodin [Hydrocodone-Acetaminophen] GI Intolerance and Vomiting       Review of Systems   Constitutional: Negative for chills and fever  HENT: Negative for congestion, facial swelling and rhinorrhea  Skin: Positive for rash  Video Exam    There were no vitals filed for this visit  Physical Exam  Constitutional:       General: She is not in acute distress  Appearance: She is not ill-appearing  HENT:      Head: Normocephalic and atraumatic  Skin:     Findings: Rash present  Comments: Erythematous, oozing rash on right cheek and neck visible on video encounter, erythematous rash visible on hands  Unable to visualize well the other areas of rash reported by patient due to poor video quality  Neurological:      Mental Status: She is alert  Psychiatric:         Mood and Affect: Mood normal          Behavior: Behavior normal           I spent 12 minutes directly with the patient during this visit    VIRTUAL VISIT DISCLAIMER      Maksimkimberly Carey verbally agrees to participate in [employer]  Pt is aware that [employer] could be limited without vital signs or the ability to perform a full hands-on physical Alicia Whiteside understands she or the provider may request at any time to terminate the video visit and request the patient to seek care or treatment in person

## 2022-06-14 NOTE — TELEPHONE ENCOUNTER
Patient called and has poison all over her and its is driving her crazy (per patient) and she was wondering if she could get something to take for it     Please advise   Thank you

## 2022-06-28 DIAGNOSIS — E11.9 TYPE 2 DIABETES MELLITUS WITHOUT COMPLICATION, WITHOUT LONG-TERM CURRENT USE OF INSULIN (HCC): ICD-10-CM

## 2022-06-28 RX ORDER — BLOOD SUGAR DIAGNOSTIC
STRIP MISCELLANEOUS
Qty: 50 STRIP | Refills: 23 | Status: SHIPPED | OUTPATIENT
Start: 2022-06-28

## 2022-07-21 ENCOUNTER — TELEPHONE (OUTPATIENT)
Dept: FAMILY MEDICINE CLINIC | Facility: OTHER | Age: 52
End: 2022-07-21

## 2022-07-21 NOTE — TELEPHONE ENCOUNTER
Patient would like to know if she will need any DM lab work to be completed? If so, she would like me to mail the lab work to her home and she will get the labs done at [facility]  She can see the labs in [address] but she will be a physical copy

## 2022-07-24 DIAGNOSIS — E03.9 HYPOTHYROIDISM, UNSPECIFIED TYPE: ICD-10-CM

## 2022-07-24 DIAGNOSIS — I10 ESSENTIAL HYPERTENSION: ICD-10-CM

## 2022-07-25 RX ORDER — LEVOTHYROXINE SODIUM 0.05 MG/1
TABLET ORAL
Qty: 30 TABLET | Refills: 3 | Status: SHIPPED | OUTPATIENT
Start: 2022-07-25

## 2022-07-25 RX ORDER — LOSARTAN POTASSIUM 100 MG/1
TABLET ORAL
Qty: 30 TABLET | Refills: 3 | Status: SHIPPED | OUTPATIENT
Start: 2022-07-25

## 2022-08-16 ENCOUNTER — OFFICE VISIT (OUTPATIENT)
Dept: FAMILY MEDICINE CLINIC | Facility: OTHER | Age: 52
End: 2022-08-16
Payer: COMMERCIAL

## 2022-08-16 VITALS
HEART RATE: 99 BPM | HEIGHT: 67 IN | DIASTOLIC BLOOD PRESSURE: 78 MMHG | OXYGEN SATURATION: 98 % | TEMPERATURE: 97.8 F | RESPIRATION RATE: 18 BRPM | SYSTOLIC BLOOD PRESSURE: 112 MMHG | WEIGHT: 248.6 LBS | BODY MASS INDEX: 39.02 KG/M2

## 2022-08-16 DIAGNOSIS — F17.210 CIGARETTE NICOTINE DEPENDENCE WITHOUT COMPLICATION: ICD-10-CM

## 2022-08-16 DIAGNOSIS — Z12.31 ENCOUNTER FOR SCREENING MAMMOGRAM FOR BREAST CANCER: ICD-10-CM

## 2022-08-16 DIAGNOSIS — E11.9 TYPE 2 DIABETES MELLITUS WITHOUT COMPLICATION, WITHOUT LONG-TERM CURRENT USE OF INSULIN (HCC): Primary | ICD-10-CM

## 2022-08-16 DIAGNOSIS — F41.9 ANXIETY: ICD-10-CM

## 2022-08-16 PROCEDURE — 3074F SYST BP LT 130 MM HG: CPT

## 2022-08-16 PROCEDURE — 99213 OFFICE O/P EST LOW 20 MIN: CPT

## 2022-08-16 PROCEDURE — 3078F DIAST BP <80 MM HG: CPT

## 2022-08-16 NOTE — PROGRESS NOTES
Assessment/Plan:     Problem List Items Addressed This Visit        Endocrine    Type 2 diabetes mellitus without complication, without long-term current use of insulin ([facility] [address] ) - Primary  - last HbA1c: 6 8%  - well controlled on metformin 1,000 mg BID  - foot exam risk category: 0    Relevant Orders    Hemoglobin A1C       Other    Anxiety  - well controlled with Zoloft    Relevant Medications    sertraline (Zoloft) 50 mg tablet    Nicotine dependence  - patient is attempting to quit smoking  - not interested in smoking cessation support group    Relevant Medications    sertraline (Zoloft) 50 mg tablet    nicotine (NICODERM CQ) 7 mg/24hr TD 24 hr patch      Other Visit Diagnoses     Encounter for screening mammogram for breast cancer        Relevant Orders    Mammo screening bilateral w 3d & cad       RTO in 1 month for annual physical         Subjective:      Patient ID: Antionette Ochoa is a 49 y o  female  Diabetes  She presents for her follow-up diabetic visit  She has type 2 diabetes mellitus  The initial diagnosis of diabetes was made 4 years ago  Her disease course has been stable  Hypoglycemia symptoms include tremors  Pertinent negatives for hypoglycemia include no dizziness or headaches  (In the morning, was taking metformin before meals and BG drops to 60s, she feels shaky) Associated symptoms include blurred vision  Pertinent negatives for diabetes include no chest pain, no foot paresthesias, no polydipsia, no polyuria and no weight loss  Pertinent negatives for hypoglycemia complications include no hospitalization and no required glucagon injection  Symptoms are stable  Her weight is increasing steadily  She is following a diabetic and generally unhealthy diet  She rarely participates in exercise  Her breakfast blood glucose is taken between 6-7 am  Her breakfast blood glucose range is generally  mg/dl   Her dinner blood glucose is taken between 7-8 pm  Her dinner blood glucose range is generally 130-140 mg/dl  An ACE inhibitor/angiotensin II receptor blocker is being taken  She does not see a podiatrist Eye exam is not current (overdue)  Gained 7 lbs in 6 months  States doesn't like to go out in the heat  The following portions of the patient's history were reviewed and updated as appropriate:   She  has a past medical history of Anxiety, Calculus of distal right ureter, Closed fracture of head of first metacarpal bone of right hand, Depression, Disease of thyroid gland, Diverticulitis, Fatty liver, HLD (hyperlipidemia), Hypertension, Kidney stone, Lung nodules, Migraine, Pneumonia, and Pre-diabetes    Current Outpatient Medications   Medication Sig Dispense Refill    albuterol (ProAir HFA) 90 mcg/act inhaler Inhale 2 puffs every 6 (six) hours as needed for wheezing or shortness of breath (cough) 8 g 2    ALPRAZolam (XANAX) 0 25 mg tablet Take 1 tablet (0 25 mg total) by mouth 2 (two) times a day as needed for anxiety 30 tablet 0    atorvastatin (LIPITOR) 20 mg tablet Take 1 tablet (20 mg total) by mouth daily 30 tablet 5    Blood Glucose Monitoring Suppl (In Touch) JESSICA Use 2 (two) times a day 1 Device 0    Blood Pressure Monitoring (Blood Pressure Cuff) MISC Use daily 1 each 0    levothyroxine 50 mcg tablet TAKE 1 TABLET BY MOUTH DAILY 30 tablet 3    losartan (COZAAR) 100 MG tablet TAKE 1 TABLET BY MOUTH EVERY DAY 30 tablet 3    metFORMIN (GLUCOPHAGE) 1000 MG tablet 1 tablet twice a day 60 tablet 3    methylPREDNISolone 4 MG tablet therapy pack Use as directed on package 21 each 0    nicotine (NICODERM CQ) 7 mg/24hr TD 24 hr patch Place 1 patch on the skin every 24 hours 28 patch 0    OneTouch Ultra test strip TEST BLOOD SUGAR 2 TIMES A DAY 50 strip 23    sertraline (Zoloft) 50 mg tablet Take 1 tablet (50 mg total) by mouth daily 30 tablet 2    triamcinolone (KENALOG) 0 1 % ointment Apply topically 2 (two) times a day for 10 days 15 g 0     No current facility-administered medications for this visit  She is allergic to penicillins, amoxicillin, doxycycline, and vicodin [hydrocodone-acetaminophen]       Review of Systems   Constitutional: Negative for chills, fever and weight loss  HENT: Negative for congestion and rhinorrhea  Eyes: Positive for blurred vision  Negative for visual disturbance  Respiratory: Negative for shortness of breath  Cardiovascular: Negative for chest pain  Gastrointestinal: Negative for abdominal pain  Endocrine: Negative for polydipsia and polyuria  Genitourinary: Negative for dysuria  Musculoskeletal: Negative for arthralgias  Skin: Negative for rash  Neurological: Positive for tremors  Negative for dizziness and headaches  Psychiatric/Behavioral: Negative for sleep disturbance  Objective:    /78   Pulse 99   Temp 97 8 °F (36 6 °C)   Resp 18   Ht 5' 7" (1 702 m)   Wt 113 kg (248 lb 9 6 oz)   SpO2 98%   BMI 38 94 kg/m²        Physical Exam  Vitals and nursing note reviewed  Constitutional:       General: She is not in acute distress  Appearance: Normal appearance  HENT:      Head: Normocephalic and atraumatic  Nose: Nose normal  No congestion  Mouth/Throat:      Mouth: Mucous membranes are moist       Pharynx: Oropharynx is clear  Eyes:      Extraocular Movements: Extraocular movements intact  Conjunctiva/sclera: Conjunctivae normal    Cardiovascular:      Pulses: no weak pulses          Dorsalis pedis pulses are 2+ on the right side and 2+ on the left side  Posterior tibial pulses are 2+ on the right side and 2+ on the left side  Musculoskeletal:         General: Normal range of motion  Right lower leg: No edema  Left lower leg: No edema  Feet:      Right foot:      Skin integrity: No ulcer, skin breakdown, erythema, warmth, callus or dry skin  Left foot:      Skin integrity: No ulcer, skin breakdown, erythema, warmth, callus or dry skin     Skin:     General: Skin is warm and dry    Neurological:      General: No focal deficit present  Mental Status: She is alert and oriented to person, place, and time  Cranial Nerves: No cranial nerve deficit  Sensory: No sensory deficit  Motor: No weakness  Psychiatric:         Mood and Affect: Mood normal          Patient's shoes and socks removed  Right Foot/Ankle   Right Foot Inspection  Skin Exam: skin normal and skin intact  No dry skin, no warmth, no callus, no erythema, no maceration, no abnormal color, no pre-ulcer, no ulcer and no callus  Toe Exam: ROM and strength within normal limits  Sensory   Vibration: intact  Proprioception: intact  Monofilament testing: intact    Vascular  Capillary refills: < 3 seconds  The right DP pulse is 2+  The right PT pulse is 2+  Right Toe  - Comprehensive Exam  Ecchymosis: none  Arch: normal  Hammertoes: absent  Claw Toes: absent  Swelling: none   Tenderness: none         Left Foot/Ankle  Left Foot Inspection  Skin Exam: skin normal and skin intact  No dry skin, no warmth, no erythema, no maceration, normal color, no pre-ulcer, no ulcer and no callus  Toe Exam: ROM and strength within normal limits  Sensory   Vibration: intact  Proprioception: intact  Monofilament testing: intact    Vascular  Capillary refills: < 3 seconds  The left DP pulse is 2+  The left PT pulse is 2+       Left Toe  - Comprehensive Exam  Ecchymosis: none  Arch: normal  Hammertoes: absent  Claw toes: absent  Swelling: none   Tenderness: none           Assign Risk Category  No deformity present  No loss of protective sensation  No weak pulses  Risk: 0

## 2022-08-16 NOTE — PATIENT INSTRUCTIONS
Basic Carbohydrate Counting   AMBULATORY CARE:   Carbohydrate counting  is a way to plan your meals by counting the amount of carbohydrate in foods  Carbohydrates are the sugars, starches, and fiber found in fruit, grains, vegetables, and milk products  Carbohydrates increase your blood sugar levels  Carbohydrate counting can help you eat the right amount of carbohydrate to keep your blood sugar levels under control  What you need to know about planning meals using carbohydrate counting:  A dietitian or healthcare provider will help you develop a healthy meal plan that works best for you  You will be taught how much carbohydrate to eat or drink for each meal and snack  Your meal plan will be based on your age, weight, usual food intake, and physical activity level  If you have diabetes, it will also include your blood sugar levels and diabetes medicine  Once you know how much carbohydrate you should eat, you can decide what type of food you want to eat  You will need to know what foods contain carbohydrate and how much they contain  Keep track of the amount of carbohydrate in meals and snacks in order to follow your meal plan  Do not avoid carbohydrates or skip meals  Your blood sugar may fall too low if you do not eat enough carbohydrate or you skip meals  Foods that contain carbohydrate:   Breads:  Each serving of food listed below contains about 15 g of carbohydrate   1 slice of bread (1 ounce) or 1 flour or corn tortilla (6 inch)    ½ of a hamburger bun or ¼ of a large bagel (about 1 ounce)    1 pancake (about 4 inches across and ¼ inch thick)    Cereals and grains:  Serving sizes of ready-to-eat cereals vary  Look at the serving size and the total carbohydrate amount listed on the food label  Each serving of food listed below contains about 15 g of carbohydrate       ¾ cup of dry, unsweetened, ready-to-eat cereal or ¼ cup of low-fat granola     ½ cup of oatmeal or other cooked cereal     ? cup of cooked rice or pasta    Starchy vegetables and beans:  Each serving of food listed below contains about 15 g of carbohydrate   ½ cup of corn, green peas, sweet potatoes, or mashed potatoes    ¼ of a large baked potato    ½ cup of beans, lentils, and peas (garbanzo, hunter, kidney, white, split, black-eyed)    Crackers and snacks:  Each serving of food listed below contains about 15 g of carbohydrate   3 arlene cracker squares or 8 animal crackers     6 saltine-type crackers    3 cups of popcorn or ¾ ounce of pretzels, potato chips, or tortilla chips    Fruit:  Each serving of food listed below contains about 15 g of carbohydrate   1 small (4 ounce) piece of fresh fruit or ¾ to 1 cup of fresh fruit    ½ cup of canned or frozen fruit, packed in natural juice    ½ cup (4 ounces) of unsweetened fruit juice    2 tablespoons of dried fruit    Desserts or sugary foods:  Each serving of food listed below contains about 15 g of carbohydrate   2-inch square unfrosted cake or brownie     2 small cookies    ½ cup of ice cream, frozen yogurt, or nondairy frozen yogurt    ¼ cup of sherbet or sorbet    1 tablespoon of regular syrup, jam, or jelly    2 tablespoons of light syrup    Milk and yogurt:  Foods from the milk group contain about 12 g of carbohydrate per serving  1 cup of fat-free or low-fat milk    1 cup of soy milk    ? cup of fat-free, yogurt sweetened with artificial sweetener    Non-starchy vegetables:  Each serving contains about 5 g of carbohydrate   Three servings of non-starch vegetables count as 1 carbohydrate serving  ½ cup of cooked vegetables or 1 cup of raw vegetables  This includes beets, broccoli, cabbage, cauliflower, cucumber, mushrooms, tomatoes, and zucchini    ½ cup of vegetable juice    How to use carbohydrate counting to plan meals:   Count carbohydrate amounts using serving sizes:      Pasta dinner example: You plan to have pasta, tossed salad, and an 8-ounce glass of milk   Your healthcare provider tells you that you may have 4 carbohydrate servings for dinner  One carbohydrate serving of pasta is ? cup  One cup of pasta will equal 3 carbohydrate servings  An 8-ounce glass of milk will count as 1 carbohydrate serving  These amounts of food would equal 4 carbohydrate servings  One cup of tossed salad does not count toward your carbohydrate servings  Count carbohydrate amounts using food labels:  Find the total amount of carbohydrate in a packaged food by reading the food label  Food labels tell you the serving size of the food and the total carbohydrate amount in each serving  Find the serving size on the food label and then decide how many servings you will eat  Multiply the number of servings you plan to eat by the carbohydrate amount per serving  Granola bar snack example: Your meal plan allows you to have 2 carbohydrate servings (30 grams) of carbohydrate for a snack  You plan to eat 1 package of granola bars, which contains 2 bars  According to the food label, the serving size of food in this package is 1 bar  Each serving (1 bar) contains 25 grams of carbohydrate  The total amount of carbohydrate in this package of granola bars would be 50 g  Based on your meal plan, you should eat only 1 bar  Follow up with your doctor as directed:  Write down your questions so you remember to ask them during your visits  © Copyright [employer] 2022 Information is for End User's use only and may not be sold, redistributed or otherwise used for commercial purposes  All illustrations and images included in CareNotes® are the copyrighted property of [employer]  or Melissa Shah   The above information is an  only  It is not intended as medical advice for individual conditions or treatments  Talk to your doctor, nurse or pharmacist before following any medical regimen to see if it is safe and effective for you

## 2022-09-11 ENCOUNTER — HOSPITAL ENCOUNTER (EMERGENCY)
Facility: HOSPITAL | Age: 52
Discharge: HOME | End: 2022-09-11
Attending: EMERGENCY MEDICINE
Payer: COMMERCIAL

## 2022-09-11 VITALS
RESPIRATION RATE: 16 BRPM | HEART RATE: 94 BPM | OXYGEN SATURATION: 96 % | TEMPERATURE: 98.4 F | BODY MASS INDEX: 39.02 KG/M2 | SYSTOLIC BLOOD PRESSURE: 173 MMHG | WEIGHT: 249.12 LBS | DIASTOLIC BLOOD PRESSURE: 88 MMHG

## 2022-09-11 DIAGNOSIS — S61.219A FINGER LACERATION: Primary | ICD-10-CM

## 2022-09-11 PROCEDURE — 12001 RPR S/N/AX/GEN/TRNK 2.5CM/<: CPT | Performed by: PHYSICIAN ASSISTANT

## 2022-09-11 PROCEDURE — 99282 EMERGENCY DEPT VISIT SF MDM: CPT | Performed by: PHYSICIAN ASSISTANT

## 2022-09-11 PROCEDURE — 99283 EMERGENCY DEPT VISIT LOW MDM: CPT

## 2022-09-11 NOTE — ED PROVIDER NOTES
History  Chief Complaint   Patient presents with    Finger Injury     Laceration  ring finger l hand   History provided by:  Patient  Finger Laceration  Location:  Finger  Finger laceration location:  L ring finger  Length:  0 5  Depth: Through underlying tissue (flap)  Time since incident:  2 hours  Laceration mechanism:  Knife  Pain details:     Quality:  Aching and throbbing    Progression:  Improving  Foreign body present:  No foreign bodies  Worsened by:  Pressure  Tetanus status:  Up to date (3/2019)  Associated symptoms: no fever, no focal weakness, no numbness, no rash, no redness, no swelling and no streaking        Prior to Admission Medications   Prescriptions Last Dose Informant Patient Reported? Taking?    ALPRAZolam (XANAX) 0 25 mg tablet  Self No No   Sig: Take 1 tablet (0 25 mg total) by mouth 2 (two) times a day as needed for anxiety   Blood Glucose Monitoring Suppl (In Touch) JESSICA  Self No No   Sig: Use 2 (two) times a day   Blood Pressure Monitoring (Blood Pressure Cuff) MISC  Self No No   Sig: Use daily   OneTouch Ultra test strip  Self No No   Sig: TEST BLOOD SUGAR 2 TIMES A DAY   albuterol (ProAir HFA) 90 mcg/act inhaler  Self No No   Sig: Inhale 2 puffs every 6 (six) hours as needed for wheezing or shortness of breath (cough)   atorvastatin (LIPITOR) 20 mg tablet  Self No No   Sig: Take 1 tablet (20 mg total) by mouth daily   levothyroxine 50 mcg tablet  Self No No   Sig: TAKE 1 TABLET BY MOUTH DAILY   losartan (COZAAR) 100 MG tablet  Self No No   Sig: TAKE 1 TABLET BY MOUTH EVERY DAY   metFORMIN (GLUCOPHAGE) 1000 MG tablet  Self No No   Si tablet twice a day   methylPREDNISolone 4 MG tablet therapy pack  Self No No   Sig: Use as directed on package   nicotine (NICODERM CQ) 7 mg/24hr TD 24 hr patch   No No   Sig: Place 1 patch on the skin every 24 hours   sertraline (Zoloft) 50 mg tablet   No No   Sig: Take 1 tablet (50 mg total) by mouth daily   triamcinolone (KENALOG) 0 1 % ointment   No No   Sig: Apply topically 2 (two) times a day for 10 days      Facility-Administered Medications: None       Past Medical History:   Diagnosis Date    Anxiety     Calculus of distal right ureter     last assessed 2014    Closed fracture of head of first metacarpal bone of right hand     Depression     Disease of thyroid gland     Diverticulitis     Fatty liver     last assessed 2017    HLD (hyperlipidemia)     treating with diet    Hypertension     Kidney stone     Lung nodules     Migraine     last assessed 2012    Pneumonia     Pre-diabetes     last assessed 2017       Past Surgical History:   Procedure Laterality Date    APPENDECTOMY      CHOLECYSTECTOMY      laparoscopic    COLON SURGERY  2018    colostomy    COLONOSCOPY  2012    polyp, Dr Aneudy Langford N/A 2018    Procedure: REVERSAL COLOSTOMY;  Surgeon: Haven Ocampo DO;  [facility];  Service: General    FLEXIBLE SIGMOIDOSCOPY N/A 2018    Procedure: Astrid Salinas;  Surgeon: Haven Ocampo DO;  [facility];  Service: Trwilda Revolucshar [address]    umbilical    LAPAROTOMY N/A 2018    Procedure: LAPAROTOMY EXPLORATORY, sigmoid colon resection, colostomy, appendectomy;  Surgeon: Haven Ocampo DO;  [facility];  Service: General    AK COLONOSCOPY FLX DX W/COLLJ SPEC WHEN PFRMD N/A 2018    Procedure: COLONOSCOPY;  Surgeon: Haven Ocampo DO;  [facility]; Service: General    AK COLONOSCOPY FLX DX W/COLLJ SPEC WHEN PFRMD N/A 2019    Procedure: COLONOSCOPY;  Surgeon: Haven Ocampo DO;  [facility];   Service: General    AK EXPLORATORY OF ABDOMEN N/A 2018    Procedure: EXPLORATORY LAPAROTOMY;  Surgeon: Haven Ocampo DO;  [facility];  Service: General    AK REPAIR INCISIONAL HERNIA,REDUCIBLE N/A 11/15/2018    Procedure: INCISIONAL HERNIA REPAIR AT UMBILICUS;  Surgeon: Angella Sánchez DO Abdifatah;  [facility];  Service: General    REDUCTION MAMMAPLASTY Bilateral 1999    TUBAL LIGATION         Family History   Problem Relation Age of Onset    Alcohol abuse Mother     Hepatitis Mother         chronic hepatitis C virus    Heart disease Father     Heart attack Father         MI    Hypertension Father     Diverticulitis Sister     Diabetes Sister     Hypertension Sister     Cancer Brother 76        liver cancer    Breast cancer Sister 48    No Known Problems Daughter     No Known Problems Daughter      I have reviewed and agree with the history as documented  E-Cigarette/Vaping     E-Cigarette/Vaping Substances     Social History     Tobacco Use    Smoking status: Current Every Day Smoker     Packs/day: 1 00     Years: 28 00     Pack years: 28 00     Types: Cigarettes    Smokeless tobacco: Never Used   Substance Use Topics    Alcohol use: Not Currently     Comment: quit 5 years ago    Drug use: No       Review of Systems   Constitutional: Negative for activity change, appetite change, chills, fatigue and fever  Skin: Positive for wound  Negative for color change and rash  Neurological: Negative for focal weakness, weakness and numbness  All other systems reviewed and are negative  Physical Exam  Physical Exam  Vitals and nursing note reviewed  Constitutional:       General: She is not in acute distress  Appearance: Normal appearance  She is not ill-appearing, toxic-appearing or diaphoretic  HENT:      Head: Normocephalic  Right Ear: External ear normal       Left Ear: External ear normal    Eyes:      Conjunctiva/sclera: Conjunctivae normal    Pulmonary:      Effort: Pulmonary effort is normal    Musculoskeletal:         General: Tenderness present  Normal range of motion  Comments: Tuft of the ring finger  Skin:     General: Skin is warm  Capillary Refill: Capillary refill takes less than 2 seconds        Comments: Left ring finger-there is a 0 5 cm laceration that is a flap laceration over the distal tuft  The flap is viable  Neurological:      Mental Status: She is alert and oriented to person, place, and time  Gait: Gait normal    Psychiatric:         Mood and Affect: Mood normal          Behavior: Behavior normal          Thought Content: Thought content normal          Judgment: Judgment normal          Vital Signs  ED Triage Vitals [09/11/22 1022]   Temperature Pulse Respirations Blood Pressure SpO2   98 4 °F (36 9 °C) 94 16 (!) 173/88 96 %      Temp Source Heart Rate Source Patient Position - Orthostatic VS BP Location FiO2 (%)   Oral Monitor Sitting Right arm --      Pain Score       --           Vitals:    09/11/22 1022   BP: (!) 173/88   Pulse: 94   Patient Position - Orthostatic VS: Sitting         Visual Acuity      ED Medications  Medications - No data to display    Diagnostic Studies  Results Reviewed     None                 No orders to display              Procedures  Laceration repair    Date/Time: 9/11/2022 11:02 AM  Performed by: Pato Diego PA-C  Authorized by: Pato Diego PA-C   Consent: Verbal consent obtained  Consent given by: patient  Patient understanding: patient states understanding of the procedure being performed  Patient consent: the patient's understanding of the procedure matches consent given  Patient identity confirmed: verbally with patient and arm band  Location: Left ring finger  Foreign bodies: no foreign bodies    Sedation:  Patient sedated: no      Wound Dehiscence:  Superficial Wound Dehiscence: simple closure      Procedure Details:  Irrigation solution: saline  Irrigation method: syringe  Amount of cleaning: standard  Debridement: none  Degree of undermining: none  Wound skin closure material used: 2 steri strips and surgical glue  Approximation difficulty: simple  Dressing: bandaid                 ED Course                               SBIRT 22yo+ Flowsheet Row Most Recent Value   SBIRT (23 yo +)    In order to provide better care to our patients, we are screening all of our patients for alcohol and drug use  Would it be okay to ask you these screening questions? Unable to answer at this time Filed at: 09/11/2022 1042                    [facility]  Number of Diagnoses or Management Options  Finger laceration: new and does not require workup  Risk of Complications, Morbidity, and/or Mortality  Presenting problems: low  Diagnostic procedures: low  Management options: low    Patient Progress  Patient progress: stable      Disposition  Final diagnoses:   Finger laceration     Time reflects when diagnosis was documented in both MDM as applicable and the Disposition within this note     Time User Action Codes Description Comment    9/11/2022 11:09 AM Mo Hoffman Add [S06 2X9A] Laceration and contusion of cerebral cortex ([facility] [address] )     9/11/2022 11:10 AM Gutzweiler, Kathlyn Mesa Remove [S06 2X9A] Laceration and contusion of cerebral cortex ([facility] [address] )     9/11/2022 11:10 AM Mo Hoffman Add [A77 [id number]] Finger laceration       ED Disposition     ED Disposition   Discharge    Condition   Stable    Date/Time   Sun Sep 11, 2022 11:09 AM    Comment   Dorette Romberg discharge to home/self care                 Follow-up Information     Follow up With Specialties Details Why Contact Info Additional Information    [facility] Emergency Medicine  As needed, If symptoms worsen [address] Emergency Department, [address], [place], [place], [address]          Discharge Medication List as of 9/11/2022 11:11 AM      CONTINUE these medications which have NOT CHANGED    Details   albuterol (ProAir HFA) 90 mcg/act inhaler Inhale 2 puffs every 6 (six) hours as needed for wheezing or shortness of breath (cough), Starting Fri 11/26/2021, Normal      ALPRAZolam Diamond Adan) 0 25 mg tablet Take 1 tablet (0 25 mg total) by mouth 2 (two) times a day as needed for anxiety, Starting Tue 11/16/2021, Normal      atorvastatin (LIPITOR) 20 mg tablet Take 1 tablet (20 mg total) by mouth daily, Starting Tue 3/15/2022, Normal      Blood Glucose Monitoring Suppl (In Touch) JESSICA Use 2 (two) times a day, Starting Tue 12/8/2020, Normal      Blood Pressure Monitoring (Blood Pressure Cuff) MISC Use daily, Starting Tue 2/22/2022, Normal      levothyroxine 50 mcg tablet TAKE 1 TABLET BY MOUTH DAILY, Normal      losartan (COZAAR) 100 MG tablet TAKE 1 TABLET BY MOUTH EVERY DAY, Normal      metFORMIN (GLUCOPHAGE) 1000 MG tablet 1 tablet twice a day, Normal      methylPREDNISolone 4 MG tablet therapy pack Use as directed on package, Normal      nicotine (NICODERM CQ) 7 mg/24hr TD 24 hr patch Place 1 patch on the skin every 24 hours, Starting Tue 8/16/2022, Normal      OneTouch Ultra test strip TEST BLOOD SUGAR 2 TIMES A DAY, Normal      sertraline (Zoloft) 50 mg tablet Take 1 tablet (50 mg total) by mouth daily, Starting Tue 8/16/2022, Until Mon 11/14/2022, Normal      triamcinolone (KENALOG) 0 1 % ointment Apply topically 2 (two) times a day for 10 days, Starting Tue 6/14/2022, Until Fri 6/24/2022, Normal             No discharge procedures on file      PDMP Review       Value Time User    PDMP Reviewed  Yes 11/16/2021 12:20 PM Myron Nair DO          ED Provider  Electronically Signed by           Jing Membreno PA-C  09/11/22 2028

## 2022-09-20 LAB — HBA1C MFR BLD: 9.6 % (ref 4.8–5.6)

## 2022-09-20 PROCEDURE — 3046F HEMOGLOBIN A1C LEVEL >9.0%: CPT

## 2022-10-14 ENCOUNTER — TELEMEDICINE (OUTPATIENT)
Dept: FAMILY MEDICINE CLINIC | Facility: OTHER | Age: 52
End: 2022-10-14
Payer: COMMERCIAL

## 2022-10-14 DIAGNOSIS — E11.9 TYPE 2 DIABETES MELLITUS WITHOUT COMPLICATION, WITHOUT LONG-TERM CURRENT USE OF INSULIN (HCC): Primary | ICD-10-CM

## 2022-10-14 PROCEDURE — 99213 OFFICE O/P EST LOW 20 MIN: CPT

## 2022-10-14 NOTE — PATIENT INSTRUCTIONS
Diabetes and Nutrition   AMBULATORY CARE:   Nutrition plans  help with healthy eating patterns that improve health  Nutrition plans and regular exercise help keep your blood sugar levels steady  They also help delay or prevent complications of diabetes, such as diabetic kidney disease  Call your local emergency number (911 in the [address]) if:   You have any of the following signs of a heart attack:      Squeezing, pressure, or pain in your chest    You may  also have any of the following:     Discomfort or pain in your back, neck, jaw, stomach, or arm    Shortness of breath    Nausea or vomiting    Lightheadedness or a sudden cold sweat      Seek care immediately if:   You have a low blood sugar level and it does not improve with treatment  Symptoms are trouble thinking, a pounding heartbeat, and sweating  Your blood sugar level is above 240 mg/dL and does not come down within 15 minutes of treatment  You have ketones in your blood or urine  You have nausea or are vomiting and cannot keep any food or liquid down  You have blurred or double vision  Your breath has a fruity, sweet smell, or your breathing is shallow  Call your doctor or diabetes care team if:   Your blood sugar levels are higher than your target goals  You often have low blood sugar levels  You have trouble coping with diabetes, or you feel anxious or depressed  You have questions or concerns about your condition or care  A dietitian will help you create a nutrition plan  to meet your needs and your family's needs  The goal is for you to reach or maintain healthy weight, blood sugar, blood pressure, and lipid levels  You should meet with the dietitian at least 1 time each year  You will learn the following:   How food affects your blood sugar levels    How to create healthy eating habits    How to make food choices based on your activity level, weight, and glucose levels    How your favorite foods may fit into your plan    How to keep track of carbohydrates    Correct portion sizes for each food    Changes you can make to your plan if you get pregnant or are breastfeeding    What you can do before you meet with the dietitian:   Do not skip meals  The goal is to keep your blood sugar level steady  Blood sugar levels may drop too low if you have received insulin and do not eat  Eat more high-fiber foods, such as fresh or frozen fruits and vegetables, whole-grain breads, and beans  Fiber helps control or lower blood sugar and cholesterol levels  Choose whole fruits instead of fruit juice as much as possible  Sugar may be added to juice, and fiber may be removed  Choose heart-healthy fats  Foods high in heart-healthy fats include olive oil, nuts, avocados, and fatty fish, such as salmon and tuna  Foods high in unhealthy fats include red meat, full-fat dairy products, and soft margarine  Unhealthy fats can increase your risk for heart disease, increase bad cholesterol, and lower good cholesterol  Choose complex carbohydrates  Foods with complex carbohydrates include brown rice, whole-grain breads and cereals, and cooked beans  Foods with simple carbohydrates include white bread, white rice, most cold cereals, and snack foods  Your plan will include the amount of carbohydrate to have at one time or in a day  Your blood sugar level can get too high if you eat too much carbohydrate at one time  Blood sugar levels do not spike as high or drop as quickly with complex carbohydrates as with simple carbohydrates  Choose complex carbohydrates whenever possible  Have less sodium (salt)  The risk for high blood pressure (BP) increases with high-sodium foods  Limit high-sodium foods, such as soy sauce, potato chips, and canned soup  Do not add salt to food you cook  Limit your use of table salt  Read labels to have no more than 2,300 milligrams of sodium in one day  Limit artificial sweeteners    These may be found in food or drinks, such as diet soft drinks or other low-calorie beverages  Artificial sweeteners are low in calories  They may help you lower your overall calories and carbohydrates  It is important not to have more calories from other foods to make up for the calories saved  Artificial sweeteners do not have any nutrition  Eat whole foods and drink water as much as possible  Your plan may include beverages with artificial sweeteners for a short time  These can help you transition from high-sugar beverages to water  Use the plate method for each meal   This method can help you eat the right amount of carbohydrates and keep your blood sugar levels under control  Draw an imaginary line down the middle of a 9-inch dinner plate  On one side, draw another line to divide that section in half  Your plate will have one large section and 2 small sections  Fill the largest section with non-starchy vegetables  These include broccoli, spinach, cucumbers, peppers, cauliflower, and tomatoes  Add a starch to one of the small sections  Starches include pasta, rice, whole-grain bread, tortillas, corn, potatoes, and beans  Add meat or another source of protein to the other small section  Examples include chicken or turkey without skin, fish, lean beef or pork, low-fat cheese, tofu, and eggs  Add dairy products or fruit next to your plate if your meal plan allows  Examples of dairy include skim or 1% milk and low-fat yogurt  If you do not drink milk or eat dairy products, you may be able to add another serving of starchy food instead  Have a low-calorie or calorie-free drink with your meal  Examples include water or unsweetened tea or coffee  Know the risks if you choose to drink alcohol:  Alcohol can cause hypoglycemia (low blood sugar level), especially if you use insulin  Alcohol can cause high blood sugar and BP levels, and weight gain if you drink too much   Women 21 years or older and men 72 years or older should limit alcohol to 1 drink a day  Men aged 24 to 59 years should limit alcohol to 2 drinks a day  A drink of alcohol is 12 ounces of beer, 5 ounces of wine, or 1½ ounces of liquor  Hypoglycemia can happen hours after you drink alcohol  Check your blood sugar level for several hours after you drink alcohol  Have a source of fast-acting carbohydrates with you in case your level goes too low  You need immediate care if you have signs or symptoms of hypoglycemia, such as sweating, confusion, or fainting  Maintain a healthy weight:  A healthy weight can help you control your diabetes  You can maintain a healthy weight with a nutrition plan and exercise  Ask your healthcare provider how much you should weigh  Ask him or her to help you create a weight loss plan if you are overweight  Together you can set weight loss and maintenance goals  Follow up with your diabetes team as directed:  Write down your questions so you remember to ask them during your visits  © Copyright [employer] 2022 Information is for End User's use only and may not be sold, redistributed or otherwise used for commercial purposes  All illustrations and images included in CareNotes® are the copyrighted property of [employer]  or [address] [place], [place] [address]pat   The above information is an  only  It is not intended as medical advice for individual conditions or treatments  Talk to your doctor, nurse or pharmacist before following any medical regimen to see if it is safe and effective for you

## 2022-10-20 DIAGNOSIS — L23.7 POISON IVY DERMATITIS: ICD-10-CM

## 2022-11-17 DIAGNOSIS — E03.9 HYPOTHYROIDISM, UNSPECIFIED TYPE: ICD-10-CM

## 2022-11-17 DIAGNOSIS — I10 ESSENTIAL HYPERTENSION: ICD-10-CM

## 2022-11-17 RX ORDER — LEVOTHYROXINE SODIUM 0.05 MG/1
TABLET ORAL
Qty: 30 TABLET | Refills: 3 | Status: SHIPPED | OUTPATIENT
Start: 2022-11-17

## 2022-11-17 RX ORDER — LOSARTAN POTASSIUM 100 MG/1
TABLET ORAL
Qty: 30 TABLET | Refills: 3 | Status: SHIPPED | OUTPATIENT
Start: 2022-11-17

## 2022-12-06 LAB — HBA1C MFR BLD: 8.8 % (ref 4.8–5.6)

## 2022-12-09 ENCOUNTER — OFFICE VISIT (OUTPATIENT)
Dept: FAMILY MEDICINE CLINIC | Facility: OTHER | Age: 52
End: 2022-12-09

## 2022-12-09 VITALS
HEIGHT: 67 IN | HEART RATE: 105 BPM | TEMPERATURE: 97.3 F | WEIGHT: 248.2 LBS | OXYGEN SATURATION: 99 % | BODY MASS INDEX: 38.96 KG/M2 | DIASTOLIC BLOOD PRESSURE: 100 MMHG | SYSTOLIC BLOOD PRESSURE: 172 MMHG | RESPIRATION RATE: 16 BRPM

## 2022-12-09 DIAGNOSIS — Z12.31 ENCOUNTER FOR SCREENING MAMMOGRAM FOR BREAST CANCER: ICD-10-CM

## 2022-12-09 DIAGNOSIS — Z00.00 ANNUAL PHYSICAL EXAM: Primary | ICD-10-CM

## 2022-12-09 DIAGNOSIS — R05.2 SUBACUTE COUGH: ICD-10-CM

## 2022-12-09 DIAGNOSIS — Z12.11 SCREENING FOR COLORECTAL CANCER: ICD-10-CM

## 2022-12-09 DIAGNOSIS — F17.210 CIGARETTE NICOTINE DEPENDENCE WITHOUT COMPLICATION: ICD-10-CM

## 2022-12-09 DIAGNOSIS — E11.9 TYPE 2 DIABETES MELLITUS WITHOUT COMPLICATION, WITHOUT LONG-TERM CURRENT USE OF INSULIN (HCC): ICD-10-CM

## 2022-12-09 DIAGNOSIS — I10 BENIGN ESSENTIAL HYPERTENSION: ICD-10-CM

## 2022-12-09 DIAGNOSIS — Z12.12 SCREENING FOR COLORECTAL CANCER: ICD-10-CM

## 2022-12-09 DIAGNOSIS — F17.210 SMOKING GREATER THAN 20 PACK YEARS: ICD-10-CM

## 2022-12-09 DIAGNOSIS — R06.2 WHEEZING: ICD-10-CM

## 2022-12-09 RX ORDER — ALBUTEROL SULFATE 90 UG/1
2 AEROSOL, METERED RESPIRATORY (INHALATION) EVERY 6 HOURS PRN
Qty: 8 G | Refills: 2 | Status: SHIPPED | OUTPATIENT
Start: 2022-12-09 | End: 2022-12-09 | Stop reason: SDUPTHER

## 2022-12-09 RX ORDER — GUAIFENESIN 600 MG/1
1200 TABLET, EXTENDED RELEASE ORAL EVERY 12 HOURS SCHEDULED
Qty: 28 TABLET | Refills: 0 | Status: SHIPPED | OUTPATIENT
Start: 2022-12-09 | End: 2022-12-16

## 2022-12-09 RX ORDER — ALBUTEROL SULFATE 90 UG/1
2 AEROSOL, METERED RESPIRATORY (INHALATION) EVERY 6 HOURS PRN
Qty: 8 G | Refills: 2 | Status: SHIPPED | OUTPATIENT
Start: 2022-12-09

## 2022-12-09 NOTE — PROGRESS NOTES
ADULT ANNUAL [address]  [facility]    NAME: Daniel Acosta  AGE: 47 y o  SEX: female  : 1970     DATE: 12/10/2022     Assessment and Plan:     Problem List Items Addressed This Visit        Endocrine    Type 2 diabetes mellitus without complication, without long-term current use of insulin ([facility])  HbA1c: 8 8% today  Currently on metformin 1,000 mg BID  Patient requesting medication to improve glucose control as well as promoting weight loss  Will add Januvia 25 mg in the morning daily  Continue to encourage lifestyle modifications, implementing a healthy diet and starting an exercise routine  Lab Results   Component Value Date    HGBA1C 8 8 (H) 2022    HGBA1C 9 6 (H) 2022    HGBA1C 6 8 (H) 2022       Relevant Medications    sitaGLIPtin (JANUVIA) 25 mg tablet       Cardiovascular and Mediastinum    Benign essential hypertension  BP today 172/100  Previously controlled on losartan 100 mg daily  Patient reports extra stress with her job likely contributing to her high blood pressure  States BP at home: 120s-130s/70s-80s  Repeat blood pressure still elevated at 160/90  Asymptomatic  Advised the patient to maintain a BP log x 1 week at home  Consider adding another antihypertensive agent if BP trend continues to be elevated    Relevant Orders    Durable Medical Equipment (Blood pressure cuff)       Other    Nicotine dependence    Relevant Medications    albuterol (ProAir HFA) 90 mcg/act inhaler   Other Visit Diagnoses     Annual physical exam    -  Primary    Subacute cough   Nonproductive cough and congestion x 2 months  Patient reports wheezing;  No wheezing on exam       Relevant Medications    albuterol (ProAir HFA) 90 mcg/act inhaler    guaiFENesin (MUCINEX) 600 mg 12 hr tablet    Smoking greater than 20 pack years  Patient declines screening  Not interested in quitting at this time  Advised patient support was available if needed Encounter for screening mammogram for breast cancer      Scheduled    Screening for colorectal cancer      Patient declines    BMI 38 0-38 9,adult              Follow up in 1 week to review BP log    Immunizations and preventive care screenings were discussed with patient today  Appropriate education was printed on patient's after visit summary  Counseling:  Alcohol/drug use: discussed moderation in alcohol intake, the recommendations for healthy alcohol use, and avoidance of illicit drug use  Dental Health: discussed importance of regular tooth brushing, flossing, and dental visits  Injury prevention: discussed safety/seat belts, safety helmets, smoke detectors, carbon dioxide detectors, and smoking near bedding or upholstery  Sexual health: discussed sexually transmitted diseases, partner selection, use of condoms, avoidance of unintended pregnancy, and contraceptive alternatives  · Exercise: the importance of regular exercise/physical activity was discussed  Recommend exercise 3-5 times per week for at least 30 minutes  BMI Counseling: Body mass index is 38 87 kg/m²  The BMI is above normal  Nutrition recommendations include encouraging healthy choices of fruits and vegetables, decreasing fast food intake, moderation in carbohydrate intake, reducing intake of saturated and trans fat and reducing intake of cholesterol  Exercise recommendations include moderate physical activity 150 minutes/week, exercising 3-5 times per week and strength training exercises  Rationale for BMI follow-up plan is due to patient being overweight or obese  Return in 1 week (on 12/16/2022) for Recheck HTN, BP log       Chief Complaint:     Chief Complaint   Patient presents with   • Physical Exam   • Diabetes     Foot; a1c      History of Present Illness:     Adult Annual Physical   Patient here for a comprehensive physical exam  The patient reports problems - weight gain, hyperglycemia and persistent cough for 2 months  Diet and Physical Activity  · Diet/Nutrition: poor diet, frequent junk food and high fat diet  · Exercise: walking  Depression Screening  PHQ-2/9 Depression Screening         General Health  · Sleep: sleeps well and gets 7-8 hours of sleep on average  · Hearing: normal - bilateral   · Vision: no vision problems, most recent eye exam <1 year ago and wears glasses  · Dental: no dental visits for >1 year, brushes teeth twice daily and flosses teeth occasionally  /GYN Health  · Patient is: perimenopausal  · Last menstrual period: 7 months ago  · Contraceptive method: perimenopausal      Review of Systems:     Review of Systems   Constitutional: Negative for chills, fatigue and fever  HENT: Negative for congestion, rhinorrhea and sore throat  Eyes: Negative for visual disturbance  Respiratory: Positive for cough  Negative for shortness of breath  Cardiovascular: Negative for chest pain  Gastrointestinal: Negative for abdominal pain, nausea and vomiting  Musculoskeletal: Negative for arthralgias  Skin: Negative for wound  Neurological: Negative for dizziness, weakness, light-headedness and headaches  Psychiatric/Behavioral: Positive for dysphoric mood  Negative for sleep disturbance  The patient is nervous/anxious         Past Medical History:     Past Medical History:   Diagnosis Date   • Anxiety    • Calculus of distal right ureter     last assessed 08/06/2014   • Closed fracture of head of first metacarpal bone of right hand    • Depression    • Disease of thyroid gland    • Diverticulitis    • Fatty liver     last assessed 01/09/2017   • HLD (hyperlipidemia)     treating with diet   • Hypertension    • Kidney stone    • Lung nodules    • Migraine     last assessed 08/03/2012   • Pneumonia    • Pre-diabetes     last assessed 06/27/2017      Past Surgical History:     Past Surgical History:   Procedure Laterality Date   • APPENDECTOMY     • CHOLECYSTECTOMY  1999 laparoscopic   • COLON SURGERY  01/09/2018    colostomy   • COLONOSCOPY  02/2012    polyp, Dr Sylvia Chapa   • [id number] [facility] N/A 4/5/2018    Procedure: REVERSAL COLOSTOMY;  Surgeon: Philip Mccullough DO;  [facility];  Service: General   • FLEXIBLE SIGMOIDOSCOPY N/A 4/5/2018    Procedure: FLEX SIGMOIDOSCOPY;  Surgeon: Philip Mccullough DO;  [facility];  Service: General   • [address]    umbilical   • LAPAROTOMY N/A 1/7/2018    Procedure: LAPAROTOMY EXPLORATORY, sigmoid colon resection, colostomy, appendectomy;  Surgeon: Philip Mccullough DO;  [facility];  Service: General   • TN COLONOSCOPY FLX DX W/COLLJ SPEC WHEN PFRMD N/A 4/2/2018    Procedure: COLONOSCOPY;  Surgeon: Philip Mccullough DO;  Location: [facility]; Service: General   • TN COLONOSCOPY FLX DX W/COLLJ SPEC WHEN PFRMD N/A 4/16/2019    Procedure: COLONOSCOPY;  Surgeon: Philip Mccullough DO;  [facility];   Service: General   • TN EXPLORATORY LAPAROTOMY CELIOTOMY W/WO BIOPSY SPX N/A 4/5/2018    Procedure: EXPLORATORY LAPAROTOMY;  Surgeon: Philip Mccullough DO;  [facility];  Service: General   • TN REPAIR FIRST ABDOMINAL WALL HERNIA N/A 11/15/2018    Procedure: INCISIONAL HERNIA REPAIR AT UMBILICUS;  Surgeon: Philip Mccullough DO;  [facility];  Service: General   • REDUCTION MAMMAPLASTY Bilateral 1999   • TUBAL LIGATION        Social History:     Social History     Socioeconomic History   • Marital status: /Civil Union     Spouse name: None   • Number of children: None   • Years of education: 12   • Highest education level: None   Occupational History     Employer: [employer] [place]   Tobacco Use   • Smoking status: Every Day     Packs/day: 1 00     Years: 28 00     Pack years: 28 00     Types: Cigarettes   • Smokeless tobacco: Never   Substance and Sexual Activity   • Alcohol use: Not Currently     Comment: quit 5 years ago   • Drug use: No   • Sexual activity: None   Other Topics Concern   • None   Social History Narrative   • None     Social Determinants of Health     Financial Resource Strain: Not on file   Food Insecurity: Not on file   Transportation Needs: Not on file   Physical Activity: Not on file   Stress: Not on file   Social Connections: Not on file   Intimate Partner Violence: Not on file   Housing Stability: Not on file      Family History:     Family History   Problem Relation Age of Onset   • Alcohol abuse Mother    • Hepatitis Mother         chronic hepatitis C virus   • Heart disease Father    • Heart attack Father         MI   • Hypertension Father    • Diverticulitis Sister    • Diabetes Sister    • Hypertension Sister    • Cancer Brother 76        liver cancer   • Breast cancer Sister 48   • No Known Problems Daughter    • No Known Problems Daughter       Current Medications:     Current Outpatient Medications   Medication Sig Dispense Refill   • albuterol (ProAir HFA) 90 mcg/act inhaler Inhale 2 puffs every 6 (six) hours as needed for wheezing or shortness of breath (cough) 8 g 2   • ALPRAZolam (XANAX) 0 25 mg tablet Take 1 tablet (0 25 mg total) by mouth 2 (two) times a day as needed for anxiety 30 tablet 0   • atorvastatin (LIPITOR) 20 mg tablet Take 1 tablet (20 mg total) by mouth daily 30 tablet 5   • Blood Glucose Monitoring Suppl (In Touch) JESSICA Use 2 (two) times a day 1 Device 0   • Blood Pressure Monitoring (Blood Pressure Cuff) MISC Use daily 1 each 0   • guaiFENesin (MUCINEX) 600 mg 12 hr tablet Take 2 tablets (1,200 mg total) by mouth every 12 (twelve) hours for 7 days 28 tablet 0   • levothyroxine 50 mcg tablet TAKE 1 TABLET BY MOUTH EVERY DAY 30 tablet 3   • losartan (COZAAR) 100 MG tablet TAKE 1 TABLET BY MOUTH EVERY DAY 30 tablet 3   • metFORMIN (GLUCOPHAGE) 1000 MG tablet TAKE 1 TABLET BY MOUTH TWICE A  tablet 1   • nicotine (NICODERM CQ) 7 mg/24hr TD 24 hr patch Place 1 patch on the skin every 24 hours 28 patch 0   • OneTouch Ultra test strip TEST BLOOD SUGAR 2 TIMES A DAY 50 strip 23   • sitaGLIPtin (JANUVIA) 25 mg tablet Take 1 tablet (25 mg total) by mouth daily 30 tablet 0   • triamcinolone (KENALOG) 0 1 % ointment APPLY TO AFFECTED AREA TWICE A DAY FOR 10 DAYS 15 g 0   • sertraline (Zoloft) 50 mg tablet Take 1 tablet (50 mg total) by mouth daily 30 tablet 2     No current facility-administered medications for this visit  Allergies: Allergies   Allergen Reactions   • Penicillins Angioedema   • Amoxicillin Edema   • Doxycycline Vomiting   • Vicodin [Hydrocodone-Acetaminophen] GI Intolerance and Vomiting      Physical Exam:     BP (!) 172/100   Pulse 105   Temp (!) 97 3 °F (36 3 °C)   Resp 16   Ht 5' 7" (1 702 m)   Wt 113 kg (248 lb 3 2 oz)   SpO2 99%   BMI 38 87 kg/m²     Physical Exam  Vitals and nursing note reviewed  Constitutional:       General: She is not in acute distress  Appearance: Normal appearance  She is well-developed  Comments: Body mass index is 38 87 kg/m²  HENT:      Head: Normocephalic and atraumatic  Nose: No congestion or rhinorrhea  Eyes:      General:         Right eye: No discharge  Left eye: No discharge  Extraocular Movements: Extraocular movements intact  Conjunctiva/sclera: Conjunctivae normal    Cardiovascular:      Rate and Rhythm: Normal rate and regular rhythm  Heart sounds: Normal heart sounds  No murmur heard  Pulmonary:      Effort: Pulmonary effort is normal  No respiratory distress  Breath sounds: Normal breath sounds  Abdominal:      General: Bowel sounds are normal       Palpations: Abdomen is soft  Tenderness: There is no abdominal tenderness  Musculoskeletal:      Cervical back: Normal range of motion and neck supple  Right lower leg: No edema  Left lower leg: No edema  Skin:     General: Skin is warm and dry  Neurological:      General: No focal deficit present        Mental Status: She is alert and oriented to person, place, and time  Psychiatric:         Mood and Affect: Mood is depressed           Behavior: Behavior normal       Comments: Increased depression about weight gain and uncontrolled Blood glucose          Justin Lubin MD  [place] [id number]

## 2022-12-09 NOTE — PATIENT INSTRUCTIONS
Wellness Visit for Adults   AMBULATORY CARE:   A wellness visit  is when you see your healthcare provider to get screened for health problems  Your healthcare provider will also give you advice on how to stay healthy  Write down your questions so you remember to ask them  Ask your healthcare provider how often you should have a wellness visit  What happens at a wellness visit:  Your healthcare provider will ask about your health, and your family history of health problems  This includes high blood pressure, heart disease, and cancer  He or she will ask if you have symptoms that concern you, if you smoke, and about your mood  You may also be asked about your intake of medicines, supplements, food, and alcohol  Any of the following may be done:  · Your weight  will be checked  Your height may also be checked so your body mass index (BMI) can be calculated  Your BMI shows if you are at a healthy weight  · Your blood pressure  and heart rate will be checked  Your temperature may also be checked  · Blood and urine tests  may be done  Blood tests may be done to check your cholesterol levels  Abnormal cholesterol levels increase your risk for heart disease and stroke  You may also need a blood or urine test to check for diabetes if you are at increased risk  Urine tests may be done to look for signs of an infection or kidney disease  · A physical exam  includes checking your heartbeat and lungs with a stethoscope  Your healthcare provider may also check your skin to look for sun damage  · Screening tests  may be recommended  A screening test is done to check for diseases that may not cause symptoms  The screening tests you may need depend on your age, gender, family history, and lifestyle habits  For example, colorectal screening may be recommended if you are 48years old or older  Screening tests you need if you are a woman:   · A Pap smear  is used to screen for cervical cancer   Pap smears are usually done every 3 to 5 years depending on your age  You may need them more often if you have had abnormal Pap smear test results in the past  Ask your healthcare provider how often you should have a Pap smear  · A mammogram  is an x-ray of your breasts to screen for breast cancer  Experts recommend mammograms every 2 years starting at age 48 years  You may need a mammogram at age 52 years or younger if you have an increased risk for breast cancer  Talk to your healthcare provider about when you should start having mammograms and how often you need them  Vaccines you may need:   · Get an influenza vaccine  every year  The influenza vaccine protects you from the flu  Several types of viruses cause the flu  The viruses change over time, so new vaccines are made each year  · Get a tetanus-diphtheria (Td) booster vaccine  every 10 years  This vaccine protects you against tetanus and diphtheria  Tetanus is a severe infection that may cause painful muscle spasms and lockjaw  Diphtheria is a severe bacterial infection that causes a thick covering in the back of your mouth and throat  · Get a human papillomavirus (HPV) vaccine  if you are female and aged 23 to 32 or male 23 to 24 and never received it  This vaccine protects you from HPV infection  HPV is the most common infection spread by sexual contact  HPV may also cause vaginal, penile, and anal cancers  · Get a pneumococcal vaccine  if you are aged 72 years or older  The pneumococcal vaccine is an injection given to protect you from pneumococcal disease  Pneumococcal disease is an infection caused by pneumococcal bacteria  The infection may cause pneumonia, meningitis, or an ear infection  · Get a shingles vaccine  if you are 60 or older, even if you have had shingles before  The shingles vaccine is an injection to protect you from the varicella-zoster virus  This is the same virus that causes chickenpox   Shingles is a painful rash that develops in people who had chickenpox or have been exposed to the virus  How to eat healthy:  My Plate is a model for planning healthy meals  It shows the types and amounts of foods that should go on your plate  Fruits and vegetables make up about half of your plate, and grains and protein make up the other half  A serving of dairy is included on the side of your plate  The amount of calories and serving sizes you need depends on your age, gender, weight, and height  Examples of healthy foods are listed below:  · Eat a variety of vegetables  such as dark green, red, and orange vegetables  You can also include canned vegetables low in sodium (salt) and frozen vegetables without added butter or sauces  · Eat a variety of fresh fruits , canned fruit in 100% juice, frozen fruit, and dried fruit  · Include whole grains  At least half of the grains you eat should be whole grains  Examples include whole-wheat bread, wheat pasta, brown rice, and whole-grain cereals such as oatmeal     · Eat a variety of protein foods such as seafood (fish and shellfish), lean meat, and poultry without skin (turkey and chicken)  Examples of lean meats include pork leg, shoulder, or tenderloin, and beef round, sirloin, tenderloin, and extra lean ground beef  Other protein foods include eggs and egg substitutes, beans, peas, soy products, nuts, and seeds  · Choose low-fat dairy products such as skim or 1% milk or low-fat yogurt, cheese, and cottage cheese  · Limit unhealthy fats  such as butter, hard margarine, and shortening  Exercise:  Exercise at least 30 minutes per day on most days of the week  Some examples of exercise include walking, biking, dancing, and swimming  You can also fit in more physical activity by taking the stairs instead of the elevator or parking farther away from stores  Include muscle strengthening activities 2 days each week  Regular exercise provides many health benefits   It helps you manage your weight, and decreases your risk for type 2 diabetes, heart disease, stroke, and high blood pressure  Exercise can also help improve your mood  Ask your healthcare provider about the best exercise plan for you  General health and safety guidelines:   · Do not smoke  Nicotine and other chemicals in cigarettes and cigars can cause lung damage  Ask your healthcare provider for information if you currently smoke and need help to quit  E-cigarettes or smokeless tobacco still contain nicotine  Talk to your healthcare provider before you use these products  · Limit alcohol  A drink of alcohol is 12 ounces of beer, 5 ounces of wine, or 1½ ounces of liquor  · Lose weight, if needed  Being overweight increases your risk of certain health conditions  These include heart disease, high blood pressure, type 2 diabetes, and certain types of cancer  · Protect your skin  Do not sunbathe or use tanning beds  Use sunscreen with a SPF 15 or higher  Apply sunscreen at least 15 minutes before you go outside  Reapply sunscreen every 2 hours  Wear protective clothing, hats, and sunglasses when you are outside  · Drive safely  Always wear your seatbelt  Make sure everyone in your car wears a seatbelt  A seatbelt can save your life if you are in an accident  Do not use your cell phone when you are driving  This could distract you and cause an accident  Pull over if you need to make a call or send a text message  · Practice safe sex  Use latex condoms if are sexually active and have more than one partner  Your healthcare provider may recommend screening tests for sexually transmitted infections (STIs)  · Wear helmets, lifejackets, and protective gear  Always wear a helmet when you ride a bike or motorcycle, go skiing, or play sports that could cause a head injury  Wear protective equipment when you play sports  Wear a lifejacket when you are on a boat or doing water sports      © Copyright [employer] 2022 Information is for End User's use only and may not be sold, redistributed or otherwise used for commercial purposes  All illustrations and images included in CareNotes® are the copyrighted property of [employer]  or Melissa Mora  The above information is an  only  It is not intended as medical advice for individual conditions or treatments  Talk to your doctor, nurse or pharmacist before following any medical regimen to see if it is safe and effective for you  Type 2 Diabetes Management for Adults   AMBULATORY CARE:   Type 2 diabetes  is a disease that affects how your body uses glucose (sugar)  Either your body cannot make enough insulin, or it cannot use the insulin correctly  It is important to keep diabetes controlled to prevent damage to your heart, blood vessels, and other organs  Have someone call your local emergency number (911 in the [facility]) if:   · You cannot be woken  · You have signs of diabetic ketoacidosis:     ? confusion, fatigue    ? vomiting    ? rapid heartbeat    ? fruity smelling breath    ? extreme thirst    ? dry mouth and skin    · You have any of the following signs of a heart attack:      ? Squeezing, pressure, or pain in your chest    ? You may  also have any of the following:     § Discomfort or pain in your back, neck, jaw, stomach, or arm    § Shortness of breath    § Nausea or vomiting    § Lightheadedness or a sudden cold sweat    · You have any of the following signs of a stroke:      ? Numbness or drooping on one side of your face     ? Weakness in an arm or leg    ? Confusion or difficulty speaking    ? Dizziness, a severe headache, or vision loss    Call your doctor or diabetes care team if:   · You have a sore or wound that will not heal     · You have a change in the amount you urinate  · Your blood sugar levels are higher than your target goals  · You often have lower blood sugar levels than your target goals      · Your skin is red, dry, warm, or swollen  · You have trouble coping with diabetes, or you feel anxious or depressed  · You have questions or concerns about your condition or care  What you need to know about high blood sugar levels:  High blood sugar levels may not cause any symptoms  You may feel more thirsty or urinate more often than usual  Over time, high blood sugar levels can damage your nerves, blood vessels, tissues, and organs  The following can increase your blood sugar levels:  · Large meals or large amounts of carbohydrates at one time    · Less physical activity    · Stress    · Illness    · A lower dose of medicine or insulin, or a late dose    What you need to know about low blood sugar levels: You can prevent symptoms such as shakiness, dizziness, irritability, or confusion by preventing your blood sugar levels from going too low  · Treat a low blood sugar level right away  ? Drink 4 ounces of juice or have 1 tube of glucose gel  ? Check your blood sugar level again 10 to 15 minutes later  ? When the level goes back to normal, eat a meal or snack to prevent another decrease  · Keep glucose gel, raisins, or hard candy with you at all times to treat a low blood sugar level  · Your blood sugar level can get too low if you take diabetes medicine or insulin and do not eat enough food  · If you use insulin, check your blood sugar level before you exercise  ? If your blood sugar level is below 100 mg/dL, eat 4 crackers or 2 ounces of raisins, or drink 4 ounces of juice  ? Check your level every 30 minutes if you exercise more than 1 hour  ? You may need a snack during or after exercise  What you can do to manage your blood sugar levels:   · Check your blood sugar levels as directed and as needed  Several items are available to use to check your levels  You may need to check by testing a drop of blood in a glucose monitor   You may instead be given a continuous glucose monitoring (CGM) device  The device is worn at all times  The CGM checks your blood sugar level every 5 minutes  It sends results to an electronic device such as a smart phone  A CGM can be used with or without an insulin pump  Talk with your provider to find out which method is best for you  The goal for blood sugar levels before meals  is between 80 and 130 mg/dL and 2 hours after eating  is lower than 180 mg/dL  · Make healthy food choices  Work with a dietitian to develop a meal plan that works for you and your schedule  A dietitian can help you learn how to eat the right amount of carbohydrates during your meals and snacks  Carbohydrates can raise your blood sugar level if you eat too many at one time  Examples of foods that contain carbohydrates are breads, cereals, rice, pasta, and sweets  · Get regular physical activity  Physical activity can help you get to your target blood sugar level goal and manage your weight  Get at least 150 minutes of moderate to vigorous aerobic physical activity each week  Do not miss more than 2 days in a row  Do not sit longer than 30 minutes at a time  Your healthcare provider can help you create an activity plan  The plan can include the best activities for you and can help you build your strength and endurance  · Maintain a healthy weight  Ask your healthcare provider what a healthy weight is for you  Ask him or her to help you create a safe weight loss plan if you are overweight  · Take your diabetes medicine or insulin as directed  You may need diabetes medicine, insulin, or both to help control your blood sugar levels  Your healthcare provider will teach you how and when to take your diabetes medicine or insulin  You will also be taught about side effects oral diabetes medicine can cause  Insulin may be injected, or given through a pump or pen  You and your care team will discuss which method is best for you  ?  An insulin pump  is an implanted device that gives your insulin 24 hours a day  An insulin pump prevents the need for multiple insulin injections in a day  ? An insulin pen  is a device prefilled with the right amount of insulin  ? You and your family members will be taught how to draw up and give insulin  if this is the best method for you  Your education team will also teach you how to dispose of needles and syringes  ? You will learn how much insulin you need  and when to give it  You will be taught when not to give insulin  You will also be taught what to do if your blood sugar level drops too low  This may happen if you take insulin and do not eat the right amount of carbohydrates  Other things you can do to manage type 2 diabetes:   · Wear medical alert identification  Wear medical alert jewelry or carry a card that says you have diabetes  Ask your provider where to get these items  · Do not smoke  Nicotine and other chemicals in cigarettes and cigars can cause lung and blood vessel damage  It also makes it more difficult to manage your diabetes  Ask your provider for information if you currently smoke and need help to quit  Do not use e-cigarettes or smokeless tobacco in place of cigarettes or to help you quit  They still contain nicotine  · Check your feet each day for cuts, scratches, calluses, or other wounds  Look for redness and swelling, and feel for warmth  Wear shoes that fit well  Check your shoes for rocks or other objects that can hurt your feet  Do not walk barefoot or wear shoes without socks  Wear cotton socks to help keep your feet dry  · Ask about vaccines you may need  You have a higher risk for serious illness if you get the flu, pneumonia, COVID-19, or hepatitis  Ask your provider if you should get vaccines to prevent these or other diseases, and when to get the vaccines  · Talk to your care team if you become stressed about diabetes care    Sometimes being able to fit diabetes care into your life can cause increased stress  The stress can cause you not to take care of yourself properly  Your care team can help by offering tips about self-care  Your care team may suggest you talk to a mental health provider  The provider can listen and offer help with self-care issues  Follow up with your doctor or diabetes care team as directed: You may need to have blood tests done before your follow-up visit  The test results will show if changes need to be made in your treatment or self-care  Write down your questions so you remember to ask them during your visits  Talk to your provider if you cannot afford your medicine  © Copyright [employer] 2022 Information is for End User's use only and may not be sold, redistributed or otherwise used for commercial purposes  All illustrations and images included in CareNotes® are the copyrighted property of [employer]  or Melissa Mora  The above information is an  only  It is not intended as medical advice for individual conditions or treatments  Talk to your doctor, nurse or pharmacist before following any medical regimen to see if it is safe and effective for you

## 2022-12-12 ENCOUNTER — TELEPHONE (OUTPATIENT)
Dept: FAMILY MEDICINE CLINIC | Facility: OTHER | Age: 52
End: 2022-12-12

## 2022-12-12 NOTE — TELEPHONE ENCOUNTER
The patient called stating her [facility] and [place] & the mucinex need a prior auth    Can you please reach out to the pharmacy    Thank you!

## 2022-12-12 NOTE — TELEPHONE ENCOUNTER
The patient left a voice message stating the pharmacy is still waiting for a phone call from the dr office regarding the two prescriptions

## 2022-12-14 NOTE — TELEPHONE ENCOUNTER
Mucinex is OCT     Pharmacist stated when Covered will be over 400 dollars     Insurance [phone]   ID [id number]

## 2022-12-14 NOTE — TELEPHONE ENCOUNTER
Patient called and said with medicine cost, patient contacted the [employer] and they are sending her paperwork for us to get filled out by provider so she can get it free or at a discounted rate from them

## 2022-12-16 ENCOUNTER — TELEMEDICINE (OUTPATIENT)
Dept: FAMILY MEDICINE CLINIC | Facility: OTHER | Age: 52
End: 2022-12-16

## 2022-12-16 DIAGNOSIS — I10 BENIGN ESSENTIAL HYPERTENSION: Primary | ICD-10-CM

## 2022-12-16 DIAGNOSIS — E11.65 UNCONTROLLED TYPE 2 DIABETES MELLITUS WITH HYPERGLYCEMIA (HCC): ICD-10-CM

## 2022-12-16 NOTE — PROGRESS NOTES
Name: Dorette Romberg      : 1970      MRN: [id number]  Encounter Provider: Bebo Sheriff MD  Encounter Date: 2022   Encounter department: [address] [place] [address] Dr Theodore  Benign essential hypertension  -Elevated blood pressure readings found during previoud 2 visits, but likely due to increased stress at work, per patient  -BP log x 1 week showed normotensive readings  -Blood pressure currently managed with losartan 100 mg  -Advised patient to continue current regimen as well as entering BP blood pressure cuff at home  -If blood pressure continues to be elevated, will consider adding low dose CCB or thiazide    2  Uncontrolled type 2 diabetes mellitus with hyperglycemia (HCC)  Lab Results   Component Value Date    HGBA1C 8 8 (H) 2022    HGBA1C 9 6 (H) 2022   - Uncontrolled with current regimen  - Requires additional agent for better blood glucose control  -     sitaGLIPtin (JANUVIA) 25 mg tablet; Take 1 tablet (25 mg total) by mouth daily     - F/U 1 month after starting Januvia    Subjective      Hypertension  This is a chronic problem  The problem has been waxing and waning since onset  Condition status: currently labile  Pertinent negatives include no blurred vision, chest pain, headaches, palpitations or shortness of breath  There are no associated agents to hypertension  Risk factors for coronary artery disease include diabetes mellitus, dyslipidemia, obesity and smoking/tobacco exposure  Past treatments include angiotensin blockers  The current treatment provides moderate improvement  Compliance problems include diet and exercise  There is no history of kidney disease or heart failure  The patient has had elevated blood pressure readings with during her last 2 clinic visits  On , BP was 172/100 and on , BP was 173/88  During her more recent visit, recheck of her BP improved slightly to 163/86    The patient does report significant increase in stress at work, which is where she was coming from for this visit and will return to once this office visit is over  Does report much lower blood pressure readings at home  Patient was asked to keep a daily log and her blood pressure 3 times a day over the past week  Today, she presents for review of those recorded blood pressures which appear to be in normal ranges  SBP range 120s-130s and DBP range 60s-80s  She reports that they were never as high as when taken in the office 1 week prior  Denies headache/dizziness, blurred vision, N/V, weakness or numbness of extremities  Review of Systems   Constitutional: Negative for chills and fever  HENT: Negative for congestion and rhinorrhea  Eyes: Negative for blurred vision and visual disturbance  Respiratory: Negative for shortness of breath  Cardiovascular: Negative for chest pain and palpitations  Gastrointestinal: Negative for abdominal pain  Genitourinary: Negative for dysuria  Musculoskeletal: Negative for arthralgias  Skin: Negative for rash  Neurological: Negative for dizziness and headaches  Psychiatric/Behavioral: Negative for sleep disturbance         Current Outpatient Medications on File Prior to Visit   Medication Sig   • albuterol (ProAir HFA) 90 mcg/act inhaler Inhale 2 puffs every 6 (six) hours as needed for wheezing or shortness of breath (cough)   • ALPRAZolam (XANAX) 0 25 mg tablet Take 1 tablet (0 25 mg total) by mouth 2 (two) times a day as needed for anxiety   • atorvastatin (LIPITOR) 20 mg tablet Take 1 tablet (20 mg total) by mouth daily   • Blood Glucose Monitoring Suppl (In Touch) JESSICA Use 2 (two) times a day   • Blood Pressure Monitoring (Blood Pressure Cuff) MISC Use daily   • [] guaiFENesin (MUCINEX) 600 mg 12 hr tablet Take 2 tablets (1,200 mg total) by mouth every 12 (twelve) hours for 7 days   • levothyroxine 50 mcg tablet TAKE 1 TABLET BY MOUTH EVERY DAY   • losartan (COZAAR) 100 MG tablet TAKE 1 TABLET BY MOUTH EVERY DAY   • metFORMIN (GLUCOPHAGE) 1000 MG tablet TAKE 1 TABLET BY MOUTH TWICE A DAY   • nicotine (NICODERM CQ) 7 mg/24hr TD 24 hr patch Place 1 patch on the skin every 24 hours   • OneTouch Ultra test strip TEST BLOOD SUGAR 2 TIMES A DAY   • sertraline (Zoloft) 50 mg tablet Take 1 tablet (50 mg total) by mouth daily   • triamcinolone (KENALOG) 0 1 % ointment APPLY TO AFFECTED AREA TWICE A DAY FOR 10 DAYS       Objective     There were no vitals taken for this visit  Physical Exam  Nursing note reviewed  Constitutional:       General: She is not in acute distress  HENT:      Head: Normocephalic and atraumatic  Eyes:      Extraocular Movements: Extraocular movements intact  Conjunctiva/sclera: Conjunctivae normal    Neurological:      Mental Status: She is alert and oriented to person, place, and time     Psychiatric:         Mood and Affect: Mood normal        Mely Ro MD

## 2022-12-16 NOTE — PATIENT INSTRUCTIONS
Hypertension and Diabetes   WHAT YOU NEED TO KNOW:   Hypertension is high blood pressure (BP)  Hypertension is common in persons with diabetes  This type of hypertension is called secondary hypertension  A normal BP is 119/79 or lower  You can control hypertension and diabetes with a healthy lifestyle, or a combination of lifestyle and medicine  Controlled BP and blood sugar levels help prevent certain complications from diabetes  Examples include retinopathy (eye damage) and kidney damage  DISCHARGE INSTRUCTIONS:   Call or have someone call your local emergency number (911 in the [address] [facility]) for any of the following: You have any of the following signs of a heart attack:   Squeezing, pressure, or pain in your chest    You may  also have any of the following:     Discomfort or pain in your back, neck, jaw, stomach, or arm    Shortness of breath    Nausea or vomiting    Lightheadedness or a sudden cold sweat  You have any of the following signs of a stroke:   Numbness or drooping on one side of your face     Weakness in an arm or leg    Confusion or difficulty speaking    Dizziness, a severe headache, or vision loss    Return to the emergency department if:   You have a severe headache or vision loss  You feel faint, dizzy, confused, or drowsy  Call your doctor or diabetes care team if:   You have been taking your BP medicine and your BP is still higher than your healthcare provider says it should be  You have questions or concerns about your condition or care  Medicines: You may  need any of the following:  Medicine  may be used to help lower your BP  You may need more than one type of medicine  Take the medicine exactly as directed  Diuretics  help decrease extra fluid that collects in your body  This will help lower your BP  You may urinate more often while you take this medicine  Cholesterol medicine  helps lower your cholesterol level   A low cholesterol level helps prevent heart disease and makes it easier to control your BP  Take your medicine as directed  Contact your healthcare provider if you think your medicine is not helping or if you have side effects  Tell him or her if you are allergic to any medicine  Keep a list of the medicines, vitamins, and herbs you take  Include the amounts, and when and why you take them  Bring the list or the pill bottles to follow-up visits  Carry your medicine list with you in case of an emergency  Manage hypertension and diabetes:  Talk with your healthcare provider about these and other ways to manage hypertension and diabetes:  Check your BP at home  Avoid smoking, caffeine, and exercise at least 30 minutes before checking your BP  Sit and rest for 5 minutes before you take your blood pressure  Extend your arm and support it on a flat surface  Your arm should be at the same level as your heart  Follow the directions that came with your BP monitor  Check your BP 2 times, 1 minute apart, before you take your medicine in the morning  Also check your BP before your evening meal  Keep a record of your readings and bring it to your follow-up visits  Ask your healthcare provider what your BP should be  Check your blood sugar level at home  Follow your healthcare provider's instructions and check your blood sugar level as directed  You may need to check a drop of blood in a glucose test machine  Your care team provider may recommend a continuous glucose monitor (CGM)  A CGM is a device that is worn at all times  The CGM checks your blood sugar every 5 minutes  It sends results to an electronic device such as a smart phone  Keep a record of your blood sugar level readings and bring it to your follow-up visits  Ask your healthcare provider what your blood sugar levels should be  Manage any other health conditions you have    Health conditions such as kidney disease, thyroid disease, or adrenal gland disorder can increase your BP and blood sugar levels  Follow your healthcare provider's instructions and take all your medicines as directed  Lifestyle changes you can make:  Talk with your healthcare provider about these and other lifestyle changes for hypertension and diabetes:  Limit sodium (salt) as directed  Too much sodium can affect your fluid balance  Check labels to find low-sodium or no-salt-added foods  Some low-sodium foods use potassium salts for flavor  Too much potassium can also cause health problems  Your healthcare provider will tell you how much sodium and potassium are safe for you to have in a day  He or she may recommend that you limit sodium to 2,300 mg a day  Follow the meal plan recommended by your healthcare provider  A dietitian or your provider can help you create healthy meal plans  The plans will help you control sodium, carbohydrates, and fats in your meals  This can help you control both your blood sugar and BP levels  The plans usually include eating more fruits, vegetables, and low-fat dairy products  Your provider may talk to you about a Mediterranean style and Dietary Approaches to Stop Hypertension (DASH) eating plans  These eating plans can help you with weight loss and lowering your cholesterol  Get regular physical activity  Physical activity can help decrease your blood sugar level  It can also help to decrease your risk for heart disease and help you lose weight  Adults should have moderate intensity physical activity for at least 150 minutes every week  Spread the amount of activity over at least 3 days a week  Do not skip more than 2 days in a row  Children should get at least 60 minutes of moderate physical activity on most days of the week  Examples of moderate physical activity include brisk walking, running, and swimming  Do not sit for longer than 30 minutes  Work with your healthcare provider to create a plan for physical activity  Decrease stress  This may help lower your BP  Learn ways to relax, such as deep breathing or listening to music  Yoga and meditation may also help  Talk to your healthcare provider about ways to decrease stress  Know the risks if you choose to drink alcohol  Alcohol can cause your blood sugar levels to be low if you use insulin  Alcohol can cause high blood sugar and BP levels, and weight gain if you drink too much  Women 21 years or older and men 72 years or older should limit alcohol to 1 drink a day  Men aged 24 to 59 years should limit alcohol to 2 drinks a day  A drink of alcohol is 12 ounces of beer, 5 ounces of wine, or 1½ ounces of liquor  Do not smoke  Nicotine and other chemicals in cigarettes and cigars can increase your BP and make your blood sugar levels harder to control  Ask your healthcare provider for information if you currently smoke and need help to quit  E-cigarettes or smokeless tobacco still contain nicotine  Talk to your healthcare provider before you use these products  Follow up with your doctor or diabetes care team as directed: You will need to return to have your BP checked  You will also need other lab tests done, including an A1C to monitor your overall blood sugar control  Write down your questions so you remember to ask them during your visits  © Copyright [employer] 2022 Information is for End User's use only and may not be sold, redistributed or otherwise used for commercial purposes  All illustrations and images included in CareNotes® are the copyrighted property of [employer]  or Melissa Mora  The above information is an  only  It is not intended as medical advice for individual conditions or treatments  Talk to your doctor, nurse or pharmacist before following any medical regimen to see if it is safe and effective for you

## 2022-12-21 DIAGNOSIS — E78.2 MIXED HYPERLIPIDEMIA: ICD-10-CM

## 2022-12-21 RX ORDER — ATORVASTATIN CALCIUM 20 MG/1
TABLET, FILM COATED ORAL
Qty: 30 TABLET | Refills: 5 | Status: SHIPPED | OUTPATIENT
Start: 2022-12-21

## 2023-01-19 ENCOUNTER — HOSPITAL ENCOUNTER (EMERGENCY)
Facility: HOSPITAL | Age: 53
Discharge: HOME | End: 2023-01-19
Attending: EMERGENCY MEDICINE

## 2023-01-19 ENCOUNTER — APPOINTMENT (EMERGENCY)
Dept: CT IMAGING | Facility: HOSPITAL | Age: 53
End: 2023-01-19

## 2023-01-19 VITALS
OXYGEN SATURATION: 98 % | RESPIRATION RATE: 20 BRPM | HEART RATE: 97 BPM | DIASTOLIC BLOOD PRESSURE: 82 MMHG | TEMPERATURE: 97.6 F | SYSTOLIC BLOOD PRESSURE: 172 MMHG

## 2023-01-19 DIAGNOSIS — M54.50 RIGHT LUMBAR PAIN: Primary | ICD-10-CM

## 2023-01-19 LAB
ANION GAP SERPL CALCULATED.3IONS-SCNC: 9 MMOL/L (ref 4–13)
BACTERIA UR QL AUTO: ABNORMAL /HPF
BASOPHILS # BLD AUTO: 0.07 THOUSANDS/ÂΜL (ref 0–0.1)
BASOPHILS NFR BLD AUTO: 1 % (ref 0–1)
BILIRUB UR QL STRIP: NEGATIVE
BUN SERPL-MCNC: 12 MG/DL (ref 5–25)
CALCIUM SERPL-MCNC: 9.8 MG/DL (ref 8.4–10.2)
CHLORIDE SERPL-SCNC: 104 MMOL/L (ref 96–108)
CLARITY UR: ABNORMAL
CO2 SERPL-SCNC: 24 MMOL/L (ref 21–32)
COLOR UR: ABNORMAL
CREAT SERPL-MCNC: 0.92 MG/DL (ref 0.6–1.3)
EOSINOPHIL # BLD AUTO: 0.19 THOUSAND/ÂΜL (ref 0–0.61)
EOSINOPHIL NFR BLD AUTO: 2 % (ref 0–6)
ERYTHROCYTE [DISTWIDTH] IN BLOOD BY AUTOMATED COUNT: 13 % (ref 11.6–15.1)
GFR SERPL CREATININE-BSD FRML MDRD: 72 ML/MIN/1.73SQ M
GLUCOSE SERPL-MCNC: 215 MG/DL (ref 65–140)
GLUCOSE UR STRIP-MCNC: ABNORMAL MG/DL
HCT VFR BLD AUTO: 47.4 % (ref 34.8–46.1)
HGB BLD-MCNC: 15.1 G/DL (ref 11.5–15.4)
HGB UR QL STRIP.AUTO: ABNORMAL
IMM GRANULOCYTES # BLD AUTO: 0.03 THOUSAND/UL (ref 0–0.2)
IMM GRANULOCYTES NFR BLD AUTO: 0 % (ref 0–2)
KETONES UR STRIP-MCNC: NEGATIVE MG/DL
LEUKOCYTE ESTERASE UR QL STRIP: ABNORMAL
LYMPHOCYTES # BLD AUTO: 2.35 THOUSANDS/ÂΜL (ref 0.6–4.47)
LYMPHOCYTES NFR BLD AUTO: 22 % (ref 14–44)
MCH RBC QN AUTO: 28.3 PG (ref 26.8–34.3)
MCHC RBC AUTO-ENTMCNC: 31.9 G/DL (ref 31.4–37.4)
MCV RBC AUTO: 89 FL (ref 82–98)
MONOCYTES # BLD AUTO: 0.71 THOUSAND/ÂΜL (ref 0.17–1.22)
MONOCYTES NFR BLD AUTO: 7 % (ref 4–12)
MUCOUS THREADS UR QL AUTO: ABNORMAL
NEUTROPHILS # BLD AUTO: 7.48 THOUSANDS/ÂΜL (ref 1.85–7.62)
NEUTS SEG NFR BLD AUTO: 68 % (ref 43–75)
NITRITE UR QL STRIP: NEGATIVE
NON-SQ EPI CELLS URNS QL MICRO: ABNORMAL /HPF
NRBC BLD AUTO-RTO: 0 /100 WBCS
PH UR STRIP.AUTO: 5.5 [PH]
PLATELET # BLD AUTO: 374 THOUSANDS/UL (ref 149–390)
PMV BLD AUTO: 9.8 FL (ref 8.9–12.7)
POTASSIUM SERPL-SCNC: 3.9 MMOL/L (ref 3.5–5.3)
PROT UR STRIP-MCNC: ABNORMAL MG/DL
RBC # BLD AUTO: 5.33 MILLION/UL (ref 3.81–5.12)
RBC #/AREA URNS AUTO: ABNORMAL /HPF
SODIUM SERPL-SCNC: 137 MMOL/L (ref 135–147)
SP GR UR STRIP.AUTO: 1.03 (ref 1–1.03)
UROBILINOGEN UR STRIP-ACNC: <2 MG/DL
WBC # BLD AUTO: 10.83 THOUSAND/UL (ref 4.31–10.16)
WBC #/AREA URNS AUTO: ABNORMAL /HPF

## 2023-01-19 RX ORDER — KETOROLAC TROMETHAMINE 30 MG/ML
15 INJECTION, SOLUTION INTRAMUSCULAR; INTRAVENOUS ONCE
Status: COMPLETED | OUTPATIENT
Start: 2023-01-19 | End: 2023-01-19

## 2023-01-19 RX ORDER — LIDOCAINE 50 MG/G
1 PATCH TOPICAL DAILY
Qty: 7 PATCH | Refills: 0 | Status: SHIPPED | OUTPATIENT
Start: 2023-01-19 | End: 2023-01-26

## 2023-01-19 RX ORDER — METHOCARBAMOL 500 MG/1
500 TABLET, FILM COATED ORAL 4 TIMES DAILY
Qty: 56 TABLET | Refills: 0 | Status: SHIPPED | OUTPATIENT
Start: 2023-01-19 | End: 2023-02-02

## 2023-01-19 RX ADMIN — SODIUM CHLORIDE 500 ML: 0.9 INJECTION, SOLUTION INTRAVENOUS at 09:32

## 2023-01-19 RX ADMIN — KETOROLAC TROMETHAMINE 15 MG: 30 INJECTION, SOLUTION INTRAMUSCULAR; INTRAVENOUS at 09:33

## 2023-01-19 NOTE — ED ATTENDING ATTESTATION
1/19/2023  INarcisa MD, saw and evaluated the patient  I have discussed the patient with the resident/non-physician practitioner and agree with the resident's/non-physician practitioner's findings, Plan of Care, and MDM as documented in the resident's/non-physician practitioner's note, except where noted  All available labs and Radiology studies were reviewed  I was present for key portions of any procedure(s) performed by the resident/non-physician practitioner and I was immediately available to provide assistance  At this point I agree with the current assessment done in the Emergency Department  I have conducted an independent evaluation of this patient a history and physical is as follows:    Patient is a 50-year-old female presents to the emergency department for evaluation with right lower back discomfort  This has been present for a couple of weeks with waxing and waning  It occasionally radiates forward to the abdomen though has not changed significantly since started 2 weeks ago  It feels identical to her like that experienced with her prior kidney stone that took approximately 2 weeks to pass  Pain intensity was so severe yesterday she vomited once  She has otherwise been eating and drinking normally without emesis  No problems with bowel movements  She has not appreciated any changes with urination  She has not noticed any changes in skin in the affected region  No rash  She prefers to not take medication and has not used any beyond her normal prescribed medicines  She does have hypertension and relays that systolic pressures typically 1 30-1 50  It has been elevated since this episode started  She expresses concern that this may be a stone that she cannot pass  Prior stone passed while using tamsulosin  Medical history otherwise significant for hypertension, diabetes and hypothyroidism  Exam patient appears uncomfortable especially with position changes  Mucous membranes are moist   Heart sounds regular  Lungs clear to auscultation bilaterally  There is no midline lower thoracic, lumbar or sacral tenderness  No left paraspinal, CVA tenderness  There is tenderness in the right CVA region as well as in the right lower paravertebral region  Abdomen is soft with normoactive bowel sounds  No tenderness to palpation  Differential diagnosis includes but is not limited to recurrent ureterolithiasis, lumbar radiculopathy with associated muscle spasm-less likely UTI or other intra-abdominal process  ED Course  ED Course as of 01/19/23 0942   u Jan 19, 2023   0941 Blood pressure and heart rate improved  Systolic pressure 635  Heart rate 87           Critical Care Time  Procedures

## 2023-01-19 NOTE — ED PROVIDER NOTES
History  Chief Complaint   Patient presents with   • Flank Pain     Pt c/o right sided flank pain xcouple days, vomited last night, hx kidney stones feel the same     27-year-old female with history of prior kidney stone presents due to nonradiating right sided low back/flank pain which began a couple weeks ago that is progressively worsening  She reports waxing/waning squeezing pain which is 6 out of 10 in severity but can spike to 9 out of 10 in severity  She states that she vomited yesterday secondary to pain denies nausea currently  She denies any hematuria or dysuria as well as any abdominal pain, fevers, chills, or left-sided pain and any recent trauma  The patient has not eaten since yesterday around 11:00 AM but denies any use of pain medications or other medications for symptoms  Flank Pain  Pain location:  R flank  Pain quality: squeezing    Pain radiates to:  Does not radiate  Pain severity:  Moderate  Onset quality:  Gradual  Timing:  Constant  Progression:  Waxing and waning  Chronicity:  New  Relieved by:  Position changes  Worsened by:  Position changes  Ineffective treatments:  None tried  Associated symptoms: vomiting (X1 yesterday)    Associated symptoms: no chest pain, no chills, no constipation, no diarrhea, no dysuria, no fever, no hematuria, no nausea and no shortness of breath    Risk factors: no NSAID use and not pregnant        Prior to Admission Medications   Prescriptions Last Dose Informant Patient Reported? Taking?    ALPRAZolam (XANAX) 0 25 mg tablet   No No   Sig: Take 1 tablet (0 25 mg total) by mouth 2 (two) times a day as needed for anxiety   Blood Glucose Monitoring Suppl (In Touch) JESSICA   No No   Sig: Use 2 (two) times a day   Blood Pressure Monitoring (Blood Pressure Cuff) MISC   No No   Sig: Use daily   OneTouch Ultra test strip   No No   Sig: TEST BLOOD SUGAR 2 TIMES A DAY   albuterol (ProAir HFA) 90 mcg/act inhaler   No No   Sig: Inhale 2 puffs every 6 (six) hours as needed for wheezing or shortness of breath (cough)   atorvastatin (LIPITOR) 20 mg tablet   No No   Sig: TAKE 1 TABLET BY MOUTH EVERY DAY   levothyroxine 50 mcg tablet   No No   Sig: TAKE 1 TABLET BY MOUTH EVERY DAY   losartan (COZAAR) 100 MG tablet   No No   Sig: TAKE 1 TABLET BY MOUTH EVERY DAY   metFORMIN (GLUCOPHAGE) 1000 MG tablet   No No   Sig: TAKE 1 TABLET BY MOUTH TWICE A DAY   nicotine (NICODERM CQ) 7 mg/24hr TD 24 hr patch   No No   Sig: Place 1 patch on the skin every 24 hours   sertraline (Zoloft) 50 mg tablet   No No   Sig: Take 1 tablet (50 mg total) by mouth daily   sitaGLIPtin (JANUVIA) 25 mg tablet   No No   Sig: Take 1 tablet (25 mg total) by mouth daily   triamcinolone (KENALOG) 0 1 % ointment   No No   Sig: APPLY TO AFFECTED AREA TWICE A DAY FOR 10 DAYS      Facility-Administered Medications: None       Past Medical History:   Diagnosis Date   • Anxiety    • Calculus of distal right ureter     last assessed 08/06/2014   • Closed fracture of head of first metacarpal bone of right hand    • Depression    • Disease of thyroid gland    • Diverticulitis    • Fatty liver     last assessed 01/09/2017   • HLD (hyperlipidemia)     treating with diet   • Hypertension    • Kidney stone    • Lung nodules    • Migraine     last assessed 08/03/2012   • Pneumonia    • Pre-diabetes     last assessed 06/27/2017       Past Surgical History:   Procedure Laterality Date   • APPENDECTOMY     • CHOLECYSTECTOMY  1999    laparoscopic   • COLON SURGERY  01/09/2018    colostomy   • COLONOSCOPY  02/2012    polyp, Dr Rito Alcocer   • COLOSTOMY CLOSURE N/A 4/5/2018    Procedure: REVERSAL COLOSTOMY;  Surgeon: Nate Degroot DO;  [facility];  Service: General   • FLEXIBLE SIGMOIDOSCOPY N/A 4/5/2018    Procedure: FLEX SIGMOIDOSCOPY;  Surgeon: Nate Degroot DO;  [facility];  Service: General   • HERNIA REPAIR  8432    umbilical   • LAPAROTOMY N/A 1/7/2018    Procedure: LAPAROTOMY EXPLORATORY, sigmoid colon resection, colostomy, appendectomy;  Surgeon: David Guadalupe DO;  [facility];  Service: General   • MI COLONOSCOPY FLX DX W/COLLJ SPEC WHEN PFRMD N/A 4/2/2018    Procedure: COLONOSCOPY;  Surgeon: David Guadalupe DO;  [facility]; Service: General   • MI COLONOSCOPY FLX DX W/COLLJ SPEC WHEN PFRMD N/A 4/16/2019    Procedure: COLONOSCOPY;  Surgeon: David Guadalupe DO;  [facility]; Service: General   • MI EXPLORATORY LAPAROTOMY CELIOTOMY W/WO BIOPSY SPX N/A 4/5/2018    Procedure: EXPLORATORY LAPAROTOMY;  Surgeon: David Guadalupe DO;  [facility];  Service: General   • MI REPAIR FIRST ABDOMINAL WALL HERNIA N/A 11/15/2018    Procedure: INCISIONAL HERNIA REPAIR AT UMBILICUS;  Surgeon: David Guadalupe DO;  [facility];  Service: General   • REDUCTION MAMMAPLASTY Bilateral 1999   • TUBAL LIGATION         Family History   Problem Relation Age of Onset   • Alcohol abuse Mother    • Hepatitis Mother         chronic hepatitis C virus   • Heart disease Father    • Heart attack Father         MI   • Hypertension Father    • Diverticulitis Sister    • Diabetes Sister    • Hypertension Sister    • Cancer Brother 76        liver cancer   • Breast cancer Sister 48   • No Known Problems Daughter    • No Known Problems Daughter      I have reviewed and agree with the history as documented  E-Cigarette/Vaping     E-Cigarette/Vaping Substances     Social History     Tobacco Use   • Smoking status: Every Day     Packs/day: 1 00     Years: 28 00     Pack years: 28 00     Types: Cigarettes   • Smokeless tobacco: Never   Substance Use Topics   • Alcohol use: Not Currently     Comment: quit 5 years ago   • Drug use: No        Review of Systems   Constitutional: Positive for appetite change  Negative for chills and fever  Respiratory: Negative for shortness of breath  Cardiovascular: Negative for chest pain  Gastrointestinal: Positive for vomiting (X1 yesterday)   Negative for abdominal pain, constipation, diarrhea and nausea  Genitourinary: Positive for flank pain (Right lower)  Negative for difficulty urinating, dysuria and hematuria  Musculoskeletal: Positive for back pain (Right lower)  Skin: Negative for rash  Neurological: Negative for headaches  Physical Exam  ED Triage Vitals   Temperature Pulse Respirations Blood Pressure SpO2   01/19/23 0857 01/19/23 0857 01/19/23 0857 01/19/23 0857 01/19/23 0857   97 6 °F (36 4 °C) (!) 121 20 (!) 213/111 98 %      Temp src Heart Rate Source Patient Position - Orthostatic VS BP Location FiO2 (%)   -- 01/19/23 0857 01/19/23 0857 01/19/23 0945 --    Monitor Sitting Right arm       Pain Score       01/19/23 0857       9             Orthostatic Vital Signs  Vitals:    01/19/23 0857 01/19/23 0945   BP: (!) 213/111 (!) 172/82   Pulse: (!) 121 97   Patient Position - Orthostatic VS: Sitting Sitting       Physical Exam  Vitals reviewed  Constitutional:       General: She is not in acute distress  Appearance: She is obese  She is not diaphoretic  HENT:      Head: Normocephalic and atraumatic  Mouth/Throat:      Mouth: Mucous membranes are moist       Pharynx: Oropharynx is clear  Eyes:      Extraocular Movements: Extraocular movements intact  Conjunctiva/sclera: Conjunctivae normal       Pupils: Pupils are equal, round, and reactive to light  Cardiovascular:      Rate and Rhythm: Normal rate and regular rhythm  Pulses: Normal pulses  Heart sounds: Normal heart sounds  No murmur heard  No friction rub  No gallop  Pulmonary:      Effort: Pulmonary effort is normal  No respiratory distress  Breath sounds: Normal breath sounds  No stridor  No wheezing, rhonchi or rales  Abdominal:      General: Bowel sounds are normal  There is no distension  Palpations: Abdomen is soft  Tenderness: There is no abdominal tenderness  There is no right CVA tenderness, left CVA tenderness, guarding or rebound  Negative signs include Lloyd's sign, Rovsing's sign, McBurney's sign, psoas sign and obturator sign  Musculoskeletal:         General: Normal range of motion  Cervical back: Normal range of motion and neck supple  Lumbar back: Tenderness: Right-sided  Negative right straight leg raise test       Right lower leg: No edema  Left lower leg: No edema  Lymphadenopathy:      Cervical: No cervical adenopathy  Skin:     General: Skin is warm and dry  Findings: No rash  Neurological:      General: No focal deficit present  Mental Status: She is alert and oriented to person, place, and time     Psychiatric:         Mood and Affect: Mood normal          Behavior: Behavior normal          ED Medications  Medications   sodium chloride 0 9 % bolus 500 mL (500 mL Intravenous New Bag 1/19/23 0932)   ketorolac (TORADOL) injection 15 mg (15 mg Intravenous Given 1/19/23 0933)       Diagnostic Studies  Results Reviewed     Procedure Component Value Units Date/Time    Urine Microscopic [[id number]]  (Abnormal) Collected: 01/19/23 0932    Lab Status: Final result Specimen: Urine, Clean Catch Updated: 01/19/23 1030     RBC, UA 4-10 /hpf      WBC, UA None Seen /hpf      Epithelial Cells Moderate /hpf      Bacteria, UA None Seen /hpf      MUCUS THREADS Moderate    UA (URINE) with reflex to Scope [[id number]]  (Abnormal) Collected: 01/19/23 0932    Lab Status: Final result Specimen: Urine, Clean Catch Updated: 01/19/23 1014     Color, UA Light Orange     Clarity, UA Extra Turbid     Specific [place], UA 1 026     pH, UA 5 5     Leukocytes, UA Small     Nitrite, UA Negative     Protein, UA 30 (1+) mg/dl      Glucose, UA Trace mg/dl      Ketones, UA Negative mg/dl      Urobilinogen, UA <2 0 mg/dl      Bilirubin, UA Negative     Occult Blood, UA Small    Basic metabolic panel [[id number]]  (Abnormal) Collected: 01/19/23 0932    Lab Status: Final result Specimen: Blood from Arm, Left Updated: 01/19/23 1005 Sodium 137 mmol/L      Potassium 3 9 mmol/L      Chloride 104 mmol/L      CO2 24 mmol/L      ANION GAP 9 mmol/L      BUN 12 mg/dL      Creatinine 0 92 mg/dL      Glucose 215 mg/dL      Calcium 9 8 mg/dL      eGFR 72 ml/min/1 73sq m     Narrative:      National Kidney Disease Foundation guidelines for Chronic Kidney Disease (CKD):   •  Stage 1 with normal or high GFR (GFR > 90 mL/min/1 73 square meters)  •  Stage 2 Mild CKD (GFR = 60-89 mL/min/1 73 square meters)  •  Stage 3A Moderate CKD (GFR = 45-59 mL/min/1 73 square meters)  •  Stage 3B Moderate CKD (GFR = 30-44 mL/min/1 73 square meters)  •  Stage 4 Severe CKD (GFR = 15-29 mL/min/1 73 square meters)  •  Stage 5 End Stage CKD (GFR <15 mL/min/1 73 square meters)  Note: GFR calculation is accurate only with a steady state creatinine    CBC and differential [[id number]]  (Abnormal) Collected: 01/19/23 0932    Lab Status: Final result Specimen: Blood from Arm, Left Updated: 01/19/23 0946     WBC 10 83 Thousand/uL      RBC 5 33 Million/uL      Hemoglobin 15 1 g/dL      Hematocrit 47 4 %      MCV 89 fL      MCH 28 3 pg      MCHC 31 9 g/dL      RDW 13 0 %      MPV 9 8 fL      Platelets 847 Thousands/uL      nRBC 0 /100 WBCs      Neutrophils Relative 68 %      Immat GRANS % 0 %      Lymphocytes Relative 22 %      Monocytes Relative 7 %      Eosinophils Relative 2 %      Basophils Relative 1 %      Neutrophils Absolute 7 48 Thousands/µL      Immature Grans Absolute 0 03 Thousand/uL      Lymphocytes Absolute 2 35 Thousands/µL      Monocytes Absolute 0 71 Thousand/µL      Eosinophils Absolute 0 19 Thousand/µL      Basophils Absolute 0 07 Thousands/µL                  CT renal stone study abdomen pelvis wo contrast   Final Result by Kamran Theodore MD (01/19 8306)      No acute pathology  No urolithiasis or obstructive uropathy  Hepatomegaly           Workstation performed: [id number]               Procedures  Procedures      ED Course  ED Course as of 01/19/23 1057   Thu Jan 19, 2023   1004 CT negative for stone   1004 Mild leukocytosis   1014 Small blood and small leukocytes on UA   1015 Blood pressure and heart rate improved   1056 Mildly elevated RBCs (4-10) in urine  Also moderate epithelial cells  SBIRT 22yo+    Flowsheet Row Most Recent Value   SBIRT (23 yo +)    In order to provide better care to our patients, we are screening all of our patients for alcohol and drug use  Would it be okay to ask you these screening questions? Unable to answer at this time Filed at: 01/19/2023 0321                Medical Decision Making  DDx: ureteral calculus, muscle strain/spasm, less likely appendicitis or radicular pain  CT negative for stones  No NIGEL  Slight leukocytosis  Urine with small leukocytes/blood  Favor muscle spasm  Pain improved with Toradol  Will discharge with methocarbamol and lidocaine patches  Also recommended patient use Tylenol/Profen as needed  Follow-up with PCP  Right lumbar pain: acute illness or injury  Amount and/or Complexity of Data Reviewed  Labs: ordered  Radiology: ordered  Risk  Prescription drug management  Disposition  Final diagnoses:   Right lumbar pain     Time reflects when diagnosis was documented in both MDM as applicable and the Disposition within this note     Time User Action Codes Description Comment    1/19/2023 10:46 AM Jethro Guadarrama Add [M54 50] Right lumbar pain       ED Disposition     ED Disposition   Discharge    Condition   Stable    Date/Time   Thu Jan 19, 2023 10:46 AM    Comment   Fili Moreno discharge to home/self care                 Follow-up Information     Follow up With Specialties Details Why Contact Tamanna Bauer, DO Family Medicine  As needed [address] [facility]  [phone]            Patient's Medications   Discharge Prescriptions    LIDOCAINE (LIDODERM) 5 %    Apply 1 patch topically over 12 hours daily for 7 days Remove & Discard patch within 12 hours or as directed by MD       Start Date: 1/19/2023 End Date: 1/26/2023       Order Dose: 1 patch       Quantity: 7 patch    Refills: 0    METHOCARBAMOL (ROBAXIN) 500 MG TABLET    Take 1 tablet (500 mg total) by mouth 4 (four) times a day for 14 days       Start Date: 1/19/2023 End Date: 2/2/2023       Order Dose: 500 mg       Quantity: 56 tablet    Refills: 0     No discharge procedures on file  PDMP Review       Value Time User    PDMP Reviewed  Yes 11/16/2021 12:20 PM Toro Gonzalez DO           ED Provider  Attending physically available and evaluated Falgunigerhard Londonomarvin SINGLETON managed the patient along with the ED Attending      Electronically Signed by         Claudia Smith MD  01/19/23 0181

## 2023-01-23 DIAGNOSIS — E11.9 TYPE 2 DIABETES MELLITUS WITHOUT COMPLICATION, WITHOUT LONG-TERM CURRENT USE OF INSULIN (HCC): ICD-10-CM

## 2023-02-03 ENCOUNTER — HOSPITAL ENCOUNTER (OUTPATIENT)
Dept: MAMMOGRAPHY | Facility: HOSPITAL | Age: 53
Discharge: HOME | End: 2023-02-03

## 2023-02-03 VITALS — HEIGHT: 67 IN | BODY MASS INDEX: 38.92 KG/M2 | WEIGHT: 248 LBS

## 2023-02-03 DIAGNOSIS — Z12.31 ENCOUNTER FOR SCREENING MAMMOGRAM FOR BREAST CANCER: ICD-10-CM

## 2023-02-06 DIAGNOSIS — E11.65 UNCONTROLLED TYPE 2 DIABETES MELLITUS WITH HYPERGLYCEMIA (HCC): ICD-10-CM

## 2023-02-07 ENCOUNTER — TELEPHONE (OUTPATIENT)
Dept: FAMILY MEDICINE CLINIC | Facility: OTHER | Age: 53
End: 2023-02-07

## 2023-02-07 NOTE — TELEPHONE ENCOUNTER
The patient called stating that her insurance will not cover the [place] and [place] but they will cover tradjenta 25 mg  Can you please place a script for the tradjenta if it's ok for her to switch    Please advise    Thank you!

## 2023-02-08 DIAGNOSIS — I10 ESSENTIAL HYPERTENSION: ICD-10-CM

## 2023-02-08 RX ORDER — LOSARTAN POTASSIUM 100 MG/1
100 TABLET ORAL DAILY
Qty: 30 TABLET | Refills: 3 | Status: SHIPPED | OUTPATIENT
Start: 2023-02-08 | End: 2023-06-08

## 2023-02-16 LAB
LEFT EYE DIABETIC RETINOPATHY: NORMAL
RIGHT EYE DIABETIC RETINOPATHY: NORMAL

## 2023-02-22 DIAGNOSIS — I10 ESSENTIAL HYPERTENSION: ICD-10-CM

## 2023-02-22 RX ORDER — LOSARTAN POTASSIUM 100 MG/1
100 TABLET ORAL DAILY
Qty: 30 TABLET | Refills: 3 | Status: SHIPPED | OUTPATIENT
Start: 2023-02-22 | End: 2023-06-22

## 2023-03-10 ENCOUNTER — TELEPHONE (OUTPATIENT)
Dept: FAMILY MEDICINE CLINIC | Facility: OTHER | Age: 53
End: 2023-03-10

## 2023-03-16 ENCOUNTER — TELEPHONE (OUTPATIENT)
Dept: FAMILY MEDICINE CLINIC | Facility: OTHER | Age: 53
End: 2023-03-16

## 2023-03-16 NOTE — TELEPHONE ENCOUNTER
The patient called asking if you can call her  She received the jardiance in the mail and she would like to start taking it tomorrow and she has some questions  Please advise    Thank you!

## 2023-03-22 DIAGNOSIS — E03.9 HYPOTHYROIDISM, UNSPECIFIED TYPE: ICD-10-CM

## 2023-03-22 RX ORDER — LEVOTHYROXINE SODIUM 0.05 MG/1
TABLET ORAL
Qty: 30 TABLET | Refills: 3 | Status: SHIPPED | OUTPATIENT
Start: 2023-03-22

## 2023-04-05 DIAGNOSIS — F41.9 ANXIETY: ICD-10-CM

## 2023-04-05 RX ORDER — ALPRAZOLAM 0.25 MG/1
0.25 TABLET ORAL 2 TIMES DAILY PRN
Qty: 30 TABLET | Refills: 0 | Status: SHIPPED | OUTPATIENT
Start: 2023-04-05

## 2023-04-06 ENCOUNTER — TELEPHONE (OUTPATIENT)
Dept: FAMILY MEDICINE CLINIC | Facility: OTHER | Age: 53
End: 2023-04-06

## 2023-04-06 NOTE — TELEPHONE ENCOUNTER
Patient called back and said the bite is still puffy and has not gone down much in size   Patient would like an antibiotic to take for it      She is unable to come in office for follow up due to her work schedule    Please advise   Thank you
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
## 2023-04-07 DIAGNOSIS — L02.91 SOFT TISSUE ABSCESS: Primary | ICD-10-CM

## 2023-04-07 RX ORDER — DOXYCYCLINE HYCLATE 100 MG/1
100 CAPSULE ORAL EVERY 12 HOURS SCHEDULED
Qty: 10 CAPSULE | Refills: 0 | Status: SHIPPED | OUTPATIENT
Start: 2023-04-07 | End: 2023-04-11 | Stop reason: ALTCHOICE

## 2023-04-10 NOTE — TELEPHONE ENCOUNTER
Patient notified regarding rx sent into pharmacy     She also would like to know if she can get an order to have a lyme test done?  She is coming in June for an appointment     Please advise   Thank  you
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
## 2023-04-11 RX ORDER — AZITHROMYCIN 500 MG/1
500 TABLET, FILM COATED ORAL DAILY
Qty: 7 TABLET | Refills: 0 | Status: SHIPPED | OUTPATIENT
Start: 2023-04-11 | End: 2023-04-18

## 2023-04-13 PROBLEM — R21 RASH OF NECK: Status: ACTIVE | Noted: 2023-04-13

## 2023-05-15 ENCOUNTER — TELEPHONE (OUTPATIENT)
Dept: FAMILY MEDICINE CLINIC | Facility: OTHER | Age: 53
End: 2023-05-15

## 2023-05-15 NOTE — TELEPHONE ENCOUNTER
She does not need an order for A1c as this can be done as point-of-care in office on day of appointment (not a fasting test)      Jarrod Payne,

## 2023-06-01 DIAGNOSIS — E11.65 UNCONTROLLED TYPE 2 DIABETES MELLITUS WITH HYPERGLYCEMIA (HCC): ICD-10-CM

## 2023-06-01 NOTE — TELEPHONE ENCOUNTER
Unsure what pharmacy it would get sent to     She is on the assistance program for it and she said it was mailed to her     Please look into this     Thank you

## 2023-06-15 DIAGNOSIS — I10 ESSENTIAL HYPERTENSION: ICD-10-CM

## 2023-06-15 RX ORDER — LOSARTAN POTASSIUM 100 MG/1
TABLET ORAL
Qty: 30 TABLET | Refills: 3 | Status: SHIPPED | OUTPATIENT
Start: 2023-06-15

## 2023-06-18 DIAGNOSIS — E78.2 MIXED HYPERLIPIDEMIA: ICD-10-CM

## 2023-06-19 RX ORDER — ATORVASTATIN CALCIUM 20 MG/1
TABLET, FILM COATED ORAL
Qty: 30 TABLET | Refills: 5 | Status: SHIPPED | OUTPATIENT
Start: 2023-06-19

## 2023-06-22 ENCOUNTER — OFFICE VISIT (OUTPATIENT)
Dept: FAMILY MEDICINE CLINIC | Facility: OTHER | Age: 53
End: 2023-06-22
Payer: COMMERCIAL

## 2023-06-22 VITALS
OXYGEN SATURATION: 99 % | SYSTOLIC BLOOD PRESSURE: 128 MMHG | HEIGHT: 67 IN | DIASTOLIC BLOOD PRESSURE: 84 MMHG | RESPIRATION RATE: 15 BRPM | TEMPERATURE: 98.2 F | WEIGHT: 243 LBS | HEART RATE: 85 BPM | BODY MASS INDEX: 38.14 KG/M2

## 2023-06-22 DIAGNOSIS — D72.829 LEUKOCYTOSIS, UNSPECIFIED TYPE: ICD-10-CM

## 2023-06-22 DIAGNOSIS — I10 BENIGN ESSENTIAL HYPERTENSION: ICD-10-CM

## 2023-06-22 DIAGNOSIS — E11.9 TYPE 2 DIABETES MELLITUS WITHOUT COMPLICATION, WITHOUT LONG-TERM CURRENT USE OF INSULIN (HCC): Primary | ICD-10-CM

## 2023-06-22 DIAGNOSIS — E78.2 MIXED HYPERLIPIDEMIA: ICD-10-CM

## 2023-06-22 LAB — SL AMB POCT HEMOGLOBIN AIC: 6.9 (ref ?–6.5)

## 2023-06-22 PROCEDURE — 99213 OFFICE O/P EST LOW 20 MIN: CPT

## 2023-06-22 PROCEDURE — 83036 HEMOGLOBIN GLYCOSYLATED A1C: CPT

## 2023-06-22 RX ORDER — HYDROCHLOROTHIAZIDE 25 MG/1
25 TABLET ORAL DAILY
Qty: 30 TABLET | Refills: 0 | Status: SHIPPED | OUTPATIENT
Start: 2023-06-22 | End: 2023-07-22

## 2023-06-22 NOTE — PROGRESS NOTES
Name: Clay Koo      : 1970      MRN: [id number]  Encounter Provider: Aissatou Milian MD  Encounter Date: 2023   Encounter department: [facility] Megan Murdock w/ T2DM, recently added Jardiance to metformin, reporting episodes of lightheadedness/shakiness in the morning upon awakening that she has been attributing to metformin and is now requesting if this medication can be discontinued  Does check blood sugars at home but has not checked during these episodes  Patient instructed that metformin and Jardiance rarely produces hypoglycemia when used on their own, however a combination may crease that risk  Patient aware of signs to look for advised to have juices or candy on hand  Recommend checking BS during these events and creating a log  Patient also continues to have uncontrolled hypertension  Will add hctz 25 mg daily and advised patient to keep a BP log, checking her blood pressure 3 times a day x  2 weeks  F/U in 3 weeks to review logs and make adjustments if appropriate  1  Type 2 diabetes mellitus without complication, without long-term current use of insulin (HCC)  -     POCT hemoglobin A1c  -     Comprehensive metabolic panel; Future  -     TSH, 3rd generation with Free T4 reflex; Future; Expected date: 2023    2  Benign essential hypertension  -     hydrochlorothiazide (HYDRODIURIL) 25 mg tablet; Take 1 tablet (25 mg total) by mouth daily  -     Comprehensive metabolic panel; Future    3  Leukocytosis, unspecified type  -     CBC and differential; Future    4  Mixed hyperlipidemia  -     Lipid Panel with Direct LDL reflex; Future; Expected date: 2023    5  BMI 38 0-38 9,adult  -     TSH, 3rd generation with Free T4 reflex; Future; Expected date: 2023          Subjective      Diabetes  She presents for her follow-up diabetic visit  She has type 2 diabetes mellitus  Her disease course has been improving   Hypoglycemia symptoms include headaches and tremors  Pertinent negatives for hypoglycemia include no dizziness  Pertinent negatives for diabetes include no chest pain and no foot paresthesias  There are no hypoglycemic complications  Symptoms are improving  Pertinent negatives for diabetic complications include no retinopathy  Risk factors for coronary artery disease include diabetes mellitus, dyslipidemia, hypertension and obesity  Current diabetic treatment includes oral agent (dual therapy)  She is compliant with treatment most of the time  Her weight is increasing steadily  She is following a diabetic diet  When asked about meal planning, she reported none  She participates in exercise intermittently  Her breakfast blood glucose range is generally <70 mg/dl  Her overall blood glucose range is 180-200 mg/dl  An ACE inhibitor/angiotensin II receptor blocker is being taken  She does not see a podiatrist Eye exam is current (Last exam: 2/16/23 w/ no diabetic retinopathy)  Hypertension  This is a chronic problem  The problem has been gradually worsening since onset  The problem is uncontrolled  Associated symptoms include headaches  Pertinent negatives include no chest pain, palpitations or shortness of breath  Risk factors for coronary artery disease include diabetes mellitus, dyslipidemia and smoking/tobacco exposure  Past treatments include angiotensin blockers  The current treatment provides mild improvement  There is no history of retinopathy  Patient reports that she officially started taking Alcario Hatchet in 3/2023  He has noticed improvement of her generalized fatigue/malaise as well as numbness in her extremities  She has been working to implement a [employer]ae and has started exercising  She does note episodes of lightheadedness and shakiness in the morning when she wakes up that she feels may be due to low blood sugar   She has been attributing those symptoms to metformin but she has not been checking her blood sugars she experiences these events  She denies any other symptoms that may be related to metformin including GI disturbances  Patient also reports blood pressure is still not well controlled when she checks it at home  Last check for her yesterday revealed a blood pressure of 199/100  She does endorse headaches when her blood pressure gets that high  Denies any lightheadedness, chest pain, palpitations or other concerning symptoms at this time  Review of Systems   Constitutional: Negative for chills and fever  HENT: Negative for congestion, rhinorrhea and sore throat  Eyes: Negative for visual disturbance  Respiratory: Negative for shortness of breath  Cardiovascular: Negative for chest pain and palpitations  Gastrointestinal: Negative for abdominal pain, diarrhea, nausea and vomiting  Musculoskeletal: Negative for arthralgias  Skin: Negative for rash  Neurological: Positive for tremors and headaches  Negative for dizziness and light-headedness         Current Outpatient Medications on File Prior to Visit   Medication Sig   • albuterol (ProAir HFA) 90 mcg/act inhaler Inhale 2 puffs every 6 (six) hours as needed for wheezing or shortness of breath (cough)   • ALPRAZolam (XANAX) 0 25 mg tablet Take 1 tablet (0 25 mg total) by mouth 2 (two) times a day as needed for anxiety   • atorvastatin (LIPITOR) 20 mg tablet TAKE 1 TABLET BY MOUTH EVERY DAY   • Blood Glucose Monitoring Suppl (In Touch) JESSICA Use 2 (two) times a day   • levothyroxine 50 mcg tablet TAKE 1 TABLET BY MOUTH EVERY DAY   • losartan (COZAAR) 100 MG tablet TAKE 1 TABLET BY MOUTH EVERY DAY   • metFORMIN (GLUCOPHAGE) 1000 MG tablet TAKE 1 TABLET BY MOUTH TWICE A DAY   • nicotine (NICODERM CQ) 7 mg/24hr TD 24 hr patch Place 1 patch on the skin every 24 hours   • OneTouch Ultra test strip TEST BLOOD SUGAR 2 TIMES A DAY   • sitaGLIPtin (JANUVIA) 25 mg tablet Take 1 tablet (25 mg total) by mouth daily   • sertraline (Zoloft) 50 mg "tablet Take 1 tablet (50 mg total) by mouth daily   • [DISCONTINUED] Blood Pressure Monitoring (Blood Pressure Cuff) MISC Use daily   • [DISCONTINUED] lidocaine (Lidoderm) 5 % Apply 1 patch topically over 12 hours daily for 7 days Remove & Discard patch within 12 hours or as directed by MD       Objective     /84 Comment: left arm  Pulse 85   Temp 98 2 °F (36 8 °C)   Resp 15   Ht 5' 7\" (1 702 m)   Wt 110 kg (243 lb)   SpO2 99%   BMI 38 06 kg/m²     Physical Exam  Nursing note reviewed  Constitutional:       General: She is not in acute distress  Appearance: She is not ill-appearing  Comments: Body mass index is 38 06 kg/m²  HENT:      Head: Normocephalic and atraumatic  Right Ear: External ear normal       Left Ear: External ear normal    Eyes:      Extraocular Movements: Extraocular movements intact  Conjunctiva/sclera: Conjunctivae normal    Cardiovascular:      Rate and Rhythm: Normal rate and regular rhythm  Pulses: Normal pulses  Heart sounds: Normal heart sounds  Pulmonary:      Effort: Pulmonary effort is normal  No respiratory distress  Abdominal:      Palpations: Abdomen is soft  Tenderness: There is no abdominal tenderness  Musculoskeletal:      Right lower leg: No edema  Left lower leg: No edema  Skin:     General: Skin is warm and dry  Neurological:      Mental Status: She is alert and oriented to person, place, and time     Psychiatric:         Mood and Affect: Mood normal        Jayne Casillas MD  "

## 2023-06-22 NOTE — PATIENT INSTRUCTIONS
Hypertension   WHAT YOU NEED TO KNOW:   Hypertension is high blood pressure  Your blood pressure is the force of your blood moving against the walls of your arteries  Hypertension causes your blood pressure to get so high that your heart has to work much harder than normal  This can damage your heart  Hypertension that does not respond to medicines and lifestyle changes is called resistant hypertension  Hypertension is considered chronic when it continues for 3 months or longer  DISCHARGE INSTRUCTIONS:   Call your local emergency number (911 in the [address] [facility] [facility]) or have someone call if:   You have chest pain  You have any of the following signs of a heart attack:      Squeezing, pressure, or pain in your chest    You may  also have any of the following:     Discomfort or pain in your back, neck, jaw, stomach, or arm    Shortness of breath    Nausea or vomiting    Lightheadedness or a sudden cold sweat    You become confused or have trouble speaking  You suddenly feel lightheaded or have trouble breathing  Return to the emergency department if:   You have a severe headache or vision loss  You have weakness in an arm or leg  Call your doctor or cardiologist if:   You feel faint, dizzy, confused, or drowsy  You have been taking your blood pressure medicine but your pressure is higher than your provider says it should be  You have questions or concerns about your condition or care  Medicines: You may  need any of the following:  Antihypertensives  may be used to help lower your blood pressure  Several kinds of medicines are available  Your healthcare provider will choose medicines based on the kind of hypertension you have  You may need more than one type of medicine  Take the medicine exactly as directed  Diuretics  help decrease extra fluid that collects in your body  This will help lower your blood pressure  You may urinate more often while you take this medicine      Cholesterol medicine  helps lower your cholesterol level  A low cholesterol level helps prevent heart disease and makes it easier to control your blood pressure  Take your medicine as directed  Contact your healthcare provider if you think your medicine is not helping or if you have side effects  Tell your provider if you are allergic to any medicine  Keep a list of the medicines, vitamins, and herbs you take  Include the amounts, and when and why you take them  Bring the list or the pill bottles to follow-up visits  Carry your medicine list with you in case of an emergency  Follow up with your doctor or cardiologist as directed: You will need to return to have your blood pressure checked and to have other lab tests done  Write down your questions so you remember to ask them during your visits  Stages of hypertension:  Your healthcare provider will give you a blood pressure goal based on your age, health, and risk for cardiovascular disease  The following are general guidelines on the stages of hypertension:  Normal blood pressure is 119/79 or lower   Your healthcare provider may only check your blood pressure each year if it stays at a normal level  Elevated blood pressure is 120/79 to 129/79   This is sometimes called prehypertension  Your healthcare provider may suggest lifestyle changes to help lower your blood pressure to a normal level  He or she may then check it again in 3 to 6 months  Stage 1 hypertension is 130/80  to 139/89   Your provider may recommend lifestyle changes, medication, and checks every 3 to 6 months until your blood pressure is controlled  Stage 2 hypertension is 140/90 or higher   Your provider will recommend lifestyle changes and have you take 2 kinds of hypertension medicines  You will also need to have your blood pressure checked monthly until it is controlled  Manage hypertension:   Check your blood pressure at home    Do not smoke, have caffeine, or exercise for at least 30 minutes before you check your blood pressure  Sit and rest for 5 minutes before you check your blood pressure  Extend your arm and support it on a flat surface  Your arm should be at the same level as your heart  Follow the directions that came with your blood pressure monitor  Check your blood pressure 2 times, 1 minute apart, before you take your medicine in the morning  Also check your blood pressure before your evening meal  Keep a record of your readings and bring it to your follow-up visits  Ask your healthcare provider what your blood pressure should be  Manage any other health conditions you have  Health conditions such as diabetes can increase your risk for hypertension  Follow your healthcare provider's instructions and take all your medicines as directed  Ask about all medicines  Certain medicines can increase your blood pressure  Examples include oral birth control pills, decongestants, herbal supplements, and NSAIDs, such as ibuprofen  Your healthcare provider can tell you which medicines are safe for you to take  This includes prescription and over-the-counter medicines  Lifestyle changes you can make to manage hypertension:  Your healthcare provider may recommend you work with a team to manage hypertension  The team may include medical experts such as a dietitian, an exercise or physical therapist, and a behavior therapist  Your family members may be included in helping you create lifestyle changes  Limit sodium (salt) as directed  Too much sodium can affect your fluid balance  Check labels to find low-sodium or no-salt-added foods  Some low-sodium foods use potassium salts for flavor  Too much potassium can also cause health problems  Your healthcare provider will tell you how much sodium and potassium are safe for you to have in a day  He or she may recommend that you limit sodium to 2,300 mg a day  Follow the meal plan recommended by your healthcare provider    A dietitian or your provider can give you more information on low-sodium plans or the DASH (Dietary Approaches to Stop Hypertension) eating plan  The DASH plan is low in sodium, processed sugar, unhealthy fats, and total fat  It is high in potassium, calcium, and fiber  These can be found in vegetables, fruit, and whole-grain foods  Be physically active throughout the day  Physical activity, such as exercise, can help control your blood pressure and your weight  Be physically active for at least 30 minutes per day, on most days of the week  Include aerobic activity, such as walking or riding a bicycle  Also include strength training at least 2 times each week  Your healthcare providers can help you create a physical activity plan  Decrease stress  This may help lower your blood pressure  Learn ways to relax, such as deep breathing or listening to music  Limit alcohol as directed  Alcohol can increase your blood pressure  A drink of alcohol is 12 ounces of beer, 5 ounces of wine, or 1½ ounces of liquor  Do not smoke  Nicotine and other chemicals in cigarettes and cigars can increase your blood pressure and also cause lung damage  Ask your healthcare provider for information if you currently smoke and need help to quit  E-cigarettes or smokeless tobacco still contain nicotine  Talk to your healthcare provider before you use these products  © Copyright Reesa Essex 2022 Information is for End User's use only and may not be sold, redistributed or otherwise used for commercial purposes  The above information is an  only  It is not intended as medical advice for individual conditions or treatments  Talk to your doctor, nurse or pharmacist before following any medical regimen to see if it is safe and effective for you

## 2023-06-30 DIAGNOSIS — F41.9 ANXIETY: ICD-10-CM

## 2023-06-30 RX ORDER — ALPRAZOLAM 0.25 MG/1
0.25 TABLET ORAL 2 TIMES DAILY PRN
Qty: 30 TABLET | Refills: 0 | Status: SHIPPED | OUTPATIENT
Start: 2023-06-30

## 2023-07-08 LAB
ALBUMIN SERPL-MCNC: 4.2 G/DL (ref 3.8–4.9)
ALBUMIN/GLOB SERPL: 1.8 {RATIO} (ref 1.2–2.2)
ALP SERPL-CCNC: 96 IU/L (ref 44–121)
ALT SERPL-CCNC: 19 IU/L (ref 0–32)
AST SERPL-CCNC: 17 IU/L (ref 0–40)
BASOPHILS # BLD AUTO: 0.1 X10E3/UL (ref 0–0.2)
BASOPHILS NFR BLD AUTO: 1 %
BILIRUB SERPL-MCNC: <0.2 MG/DL (ref 0–1.2)
BUN SERPL-MCNC: 11 MG/DL (ref 6–24)
BUN/CREAT SERPL: 13 (ref 9–23)
CALCIUM SERPL-MCNC: 9.3 MG/DL (ref 8.7–10.2)
CHLORIDE SERPL-SCNC: 106 MMOL/L (ref 96–106)
CHOLEST SERPL-MCNC: 147 MG/DL (ref 100–199)
CO2 SERPL-SCNC: 21 MMOL/L (ref 20–29)
CREAT SERPL-MCNC: 0.83 MG/DL (ref 0.57–1)
EGFR: 85 ML/MIN/1.73
EOSINOPHIL # BLD AUTO: 0.3 X10E3/UL (ref 0–0.4)
EOSINOPHIL NFR BLD AUTO: 4 %
ERYTHROCYTE [DISTWIDTH] IN BLOOD BY AUTOMATED COUNT: 13.1 % (ref 11.7–15.4)
GLOBULIN SER-MCNC: 2.3 G/DL (ref 1.5–4.5)
GLUCOSE SERPL-MCNC: 154 MG/DL (ref 70–99)
HCT VFR BLD AUTO: 43.2 % (ref 34–46.6)
HDLC SERPL-MCNC: 39 MG/DL
HGB BLD-MCNC: 14.1 G/DL (ref 11.1–15.9)
IMM GRANULOCYTES # BLD: 0 X10E3/UL (ref 0–0.1)
IMM GRANULOCYTES NFR BLD: 0 %
LDLC SERPL CALC-MCNC: 75 MG/DL (ref 0–99)
LYMPHOCYTES # BLD AUTO: 2.3 X10E3/UL (ref 0.7–3.1)
LYMPHOCYTES NFR BLD AUTO: 26 %
MCH RBC QN AUTO: 28.7 PG (ref 26.6–33)
MCHC RBC AUTO-ENTMCNC: 32.6 G/DL (ref 31.5–35.7)
MCV RBC AUTO: 88 FL (ref 79–97)
MONOCYTES # BLD AUTO: 0.6 X10E3/UL (ref 0.1–0.9)
MONOCYTES NFR BLD AUTO: 7 %
NEUTROPHILS # BLD AUTO: 5.7 X10E3/UL (ref 1.4–7)
NEUTROPHILS NFR BLD AUTO: 62 %
PLATELET # BLD AUTO: 340 X10E3/UL (ref 150–450)
POTASSIUM SERPL-SCNC: 4.5 MMOL/L (ref 3.5–5.2)
PROT SERPL-MCNC: 6.5 G/DL (ref 6–8.5)
RBC # BLD AUTO: 4.91 X10E6/UL (ref 3.77–5.28)
SODIUM SERPL-SCNC: 141 MMOL/L (ref 134–144)
TRIGL SERPL-MCNC: 198 MG/DL (ref 0–149)
TSH SERPL DL<=0.005 MIU/L-ACNC: 4.24 UIU/ML (ref 0.45–4.5)
WBC # BLD AUTO: 9 X10E3/UL (ref 3.4–10.8)

## 2023-07-10 ENCOUNTER — OFFICE VISIT (OUTPATIENT)
Dept: FAMILY MEDICINE CLINIC | Facility: OTHER | Age: 53
End: 2023-07-10
Payer: COMMERCIAL

## 2023-07-10 VITALS
DIASTOLIC BLOOD PRESSURE: 88 MMHG | HEIGHT: 67 IN | BODY MASS INDEX: 38.45 KG/M2 | TEMPERATURE: 97.6 F | HEART RATE: 93 BPM | OXYGEN SATURATION: 98 % | SYSTOLIC BLOOD PRESSURE: 148 MMHG | WEIGHT: 245 LBS | RESPIRATION RATE: 16 BRPM

## 2023-07-10 DIAGNOSIS — I10 BENIGN ESSENTIAL HYPERTENSION: Primary | ICD-10-CM

## 2023-07-10 DIAGNOSIS — E11.9 TYPE 2 DIABETES MELLITUS WITHOUT COMPLICATION, WITHOUT LONG-TERM CURRENT USE OF INSULIN (HCC): ICD-10-CM

## 2023-07-10 PROCEDURE — 99213 OFFICE O/P EST LOW 20 MIN: CPT

## 2023-07-10 RX ORDER — HYDROCHLOROTHIAZIDE 50 MG/1
50 TABLET ORAL DAILY
Qty: 30 TABLET | Refills: 0 | Status: SHIPPED | OUTPATIENT
Start: 2023-07-10 | End: 2023-08-09

## 2023-07-10 NOTE — PROGRESS NOTES
Name: Kira Bronson      : 1970      MRN: [id number]  Encounter Provider: Gabi Argueta MD  Encounter Date: 7/10/2023   Encounter department: [address] [place] [address]     52yoF w/ T2DM and HTN who returns for follow up on previously reported symptoms of lightheadedness/shakiness that was concerning for hypoglycemic events. These symptoms have since resolved and BG remains between 110 - 180. She is doing well with Januvia and metformin. Blood pressure also continues to be elevated when checked at home as well as in the office today. Will increase hctz to 50 mg daily. Continue with losartan 100 mg daily. Continue BP log at home. Will f/u in 2 weeks for BP recheck. Return in 2 months for DM mgmt/foot exam.    1. Benign essential hypertension  -     hydrochlorothiazide (HYDRODIURIL) 50 mg tablet; Take 1 tablet (50 mg total) by mouth daily    2. Type 2 diabetes mellitus without complication, without long-term current use of insulin (HCC)     Subjective     Hypertension  This is a chronic problem. The current episode started more than 1 year ago. The problem is uncontrolled. Associated symptoms include headaches. Pertinent negatives include no chest pain or shortness of breath. Risk factors for coronary artery disease include diabetes mellitus, dyslipidemia, post-menopausal state, obesity, smoking/tobacco exposure and stress. Past treatments include angiotensin blockers. The current treatment provides moderate improvement. Compliance problems include exercise and diet. There is no history of kidney disease, CAD/MI or retinopathy. Identifiable causes of hypertension include a thyroid problem. Diabetes  She presents for her follow-up diabetic visit. She has type 2 diabetes mellitus. Her disease course has been improving. Hypoglycemia symptoms include headaches. Pertinent negatives for hypoglycemia include no dizziness. There are no diabetic associated symptoms.  Pertinent negatives for diabetes include no chest pain. There are no hypoglycemic complications. Symptoms are improving. Pertinent negatives for diabetic complications include no autonomic neuropathy, nephropathy, peripheral neuropathy or retinopathy. Risk factors for coronary artery disease include diabetes mellitus, dyslipidemia, hypertension, obesity, post-menopausal, stress and tobacco exposure. Current diabetic treatment includes oral agent (dual therapy). Her weight is fluctuating minimally. When asked about meal planning, she reported none. She participates in exercise intermittently. Her overall blood glucose range is 140-180 mg/dl. An ACE inhibitor/angiotensin II receptor blocker is being taken. She does not see a podiatrist.Eye exam is current. Review of Systems   Constitutional: Negative for chills and fever. HENT: Negative for congestion. Eyes: Negative for visual disturbance. Respiratory: Negative for shortness of breath. Cardiovascular: Negative for chest pain. Gastrointestinal: Negative for abdominal pain. Endocrine: Positive for heat intolerance (menopausal symptoms). Genitourinary: Positive for frequency (2/2 initiation of diuretic medication). Musculoskeletal: Negative for arthralgias. Skin: Negative for rash. Neurological: Positive for headaches. Negative for dizziness and light-headedness.        Current Outpatient Medications on File Prior to Visit   Medication Sig   • albuterol (ProAir HFA) 90 mcg/act inhaler Inhale 2 puffs every 6 (six) hours as needed for wheezing or shortness of breath (cough)   • ALPRAZolam (XANAX) 0.25 mg tablet Take 1 tablet (0.25 mg total) by mouth 2 (two) times a day as needed for anxiety   • atorvastatin (LIPITOR) 20 mg tablet TAKE 1 TABLET BY MOUTH EVERY DAY   • Blood Glucose Monitoring Suppl (In Touch) JESSICA Use 2 (two) times a day   • losartan (COZAAR) 100 MG tablet TAKE 1 TABLET BY MOUTH EVERY DAY   • metFORMIN (GLUCOPHAGE) 1000 MG tablet TAKE 1 TABLET BY MOUTH TWICE A DAY   • nicotine (NICODERM CQ) 7 mg/24hr TD 24 hr patch Place 1 patch on the skin every 24 hours   • OneTouch Ultra test strip TEST BLOOD SUGAR 2 TIMES A DAY   • sitaGLIPtin (JANUVIA) 25 mg tablet Take 1 tablet (25 mg total) by mouth daily   • [DISCONTINUED] levothyroxine 50 mcg tablet TAKE 1 TABLET BY MOUTH EVERY DAY   • sertraline (Zoloft) 50 mg tablet Take 1 tablet (50 mg total) by mouth daily       Objective     /88   Pulse 93   Temp 97.6 °F (36.4 °C)   Resp 16   Ht 5' 7" (1.702 m)   Wt 111 kg (245 lb)   SpO2 98%   BMI 38.37 kg/m²     Physical Exam  Nursing note reviewed. Constitutional:       General: She is not in acute distress. Appearance: She is not ill-appearing. Comments: Body mass index is 38.37 kg/m². HENT:      Head: Normocephalic and atraumatic. Right Ear: External ear normal.      Left Ear: External ear normal.   Eyes:      Extraocular Movements: Extraocular movements intact. Conjunctiva/sclera: Conjunctivae normal.   Cardiovascular:      Rate and Rhythm: Normal rate and regular rhythm. Pulses: Normal pulses. Heart sounds: Normal heart sounds. Pulmonary:      Effort: Pulmonary effort is normal. No respiratory distress. Abdominal:      Palpations: Abdomen is soft. Tenderness: There is no abdominal tenderness. Musculoskeletal:      Right lower leg: No edema. Left lower leg: No edema. Skin:     General: Skin is warm and dry. Neurological:      Mental Status: She is alert and oriented to person, place, and time.    Psychiatric:         Mood and Affect: Mood normal.       Iliana Barney MD

## 2023-07-10 NOTE — PATIENT INSTRUCTIONS
Hypertension   WHAT YOU NEED TO KNOW:   Hypertension is high blood pressure. Your blood pressure is the force of your blood moving against the walls of your arteries. Hypertension causes your blood pressure to get so high that your heart has to work much harder than normal. This can damage your heart. Hypertension that does not respond to medicines and lifestyle changes is called resistant hypertension. Hypertension is considered chronic when it continues for 3 months or longer. DISCHARGE INSTRUCTIONS:   Call your local emergency number (911 in the [address]) or have someone call if:   You have chest pain. You have any of the following signs of a heart attack:      Squeezing, pressure, or pain in your chest    You may  also have any of the following:     Discomfort or pain in your back, neck, jaw, stomach, or arm    Shortness of breath    Nausea or vomiting    Lightheadedness or a sudden cold sweat    You become confused or have trouble speaking. You suddenly feel lightheaded or have trouble breathing. Return to the emergency department if:   You have a severe headache or vision loss. You have weakness in an arm or leg. Call your doctor or cardiologist if:   You feel faint, dizzy, confused, or drowsy. You have been taking your blood pressure medicine but your pressure is higher than your provider says it should be. You have questions or concerns about your condition or care. Medicines: You may  need any of the following:  Antihypertensives  may be used to help lower your blood pressure. Several kinds of medicines are available. Your healthcare provider will choose medicines based on the kind of hypertension you have. You may need more than one type of medicine. Take the medicine exactly as directed. Diuretics  help decrease extra fluid that collects in your body. This will help lower your blood pressure. You may urinate more often while you take this medicine.     Cholesterol medicine  helps lower your cholesterol level. A low cholesterol level helps prevent heart disease and makes it easier to control your blood pressure. Take your medicine as directed. Contact your healthcare provider if you think your medicine is not helping or if you have side effects. Tell your provider if you are allergic to any medicine. Keep a list of the medicines, vitamins, and herbs you take. Include the amounts, and when and why you take them. Bring the list or the pill bottles to follow-up visits. Carry your medicine list with you in case of an emergency. Follow up with your doctor or cardiologist as directed: You will need to return to have your blood pressure checked and to have other lab tests done. Write down your questions so you remember to ask them during your visits. Stages of hypertension:  Your healthcare provider will give you a blood pressure goal based on your age, health, and risk for cardiovascular disease. The following are general guidelines on the stages of hypertension:  Normal blood pressure is 119/79 or lower . Your healthcare provider may only check your blood pressure each year if it stays at a normal level. Elevated blood pressure is 120/79 to 129/79 . This is sometimes called prehypertension. Your healthcare provider may suggest lifestyle changes to help lower your blood pressure to a normal level. He or she may then check it again in 3 to 6 months. Stage 1 hypertension is 130/80  to 139/89 . Your provider may recommend lifestyle changes, medication, and checks every 3 to 6 months until your blood pressure is controlled. Stage 2 hypertension is 140/90 or higher . Your provider will recommend lifestyle changes and have you take 2 kinds of hypertension medicines. You will also need to have your blood pressure checked monthly until it is controlled. Manage hypertension:   Check your blood pressure at home.   Do not smoke, have caffeine, or exercise for at least 30 minutes before you check your blood pressure. Sit and rest for 5 minutes before you check your blood pressure. Extend your arm and support it on a flat surface. Your arm should be at the same level as your heart. Follow the directions that came with your blood pressure monitor. Check your blood pressure 2 times, 1 minute apart, before you take your medicine in the morning. Also check your blood pressure before your evening meal. Keep a record of your readings and bring it to your follow-up visits. Ask your healthcare provider what your blood pressure should be. Manage any other health conditions you have. Health conditions such as diabetes can increase your risk for hypertension. Follow your healthcare provider's instructions and take all your medicines as directed. Ask about all medicines. Certain medicines can increase your blood pressure. Examples include oral birth control pills, decongestants, herbal supplements, and NSAIDs, such as ibuprofen. Your healthcare provider can tell you which medicines are safe for you to take. This includes prescription and over-the-counter medicines. Lifestyle changes you can make to manage hypertension:  Your healthcare provider may recommend you work with a team to manage hypertension. The team may include medical experts such as a dietitian, an exercise or physical therapist, and a behavior therapist. Your family members may be included in helping you create lifestyle changes. Limit sodium (salt) as directed. Too much sodium can affect your fluid balance. Check labels to find low-sodium or no-salt-added foods. Some low-sodium foods use potassium salts for flavor. Too much potassium can also cause health problems. Your healthcare provider will tell you how much sodium and potassium are safe for you to have in a day. He or she may recommend that you limit sodium to 2,300 mg a day. Follow the meal plan recommended by your healthcare provider.   A dietitian or your provider can give you more information on low-sodium plans or the DASH (Dietary Approaches to Stop Hypertension) eating plan. The DASH plan is low in sodium, processed sugar, unhealthy fats, and total fat. It is high in potassium, calcium, and fiber. These can be found in vegetables, fruit, and whole-grain foods. Be physically active throughout the day. Physical activity, such as exercise, can help control your blood pressure and your weight. Be physically active for at least 30 minutes per day, on most days of the week. Include aerobic activity, such as walking or riding a bicycle. Also include strength training at least 2 times each week. Your healthcare providers can help you create a physical activity plan. Decrease stress. This may help lower your blood pressure. Learn ways to relax, such as deep breathing or listening to music. Limit alcohol as directed. Alcohol can increase your blood pressure. A drink of alcohol is 12 ounces of beer, 5 ounces of wine, or 1½ ounces of liquor. Do not smoke. Nicotine and other chemicals in cigarettes and cigars can increase your blood pressure and also cause lung damage. Ask your healthcare provider for information if you currently smoke and need help to quit. E-cigarettes or smokeless tobacco still contain nicotine. Talk to your healthcare provider before you use these products. © Copyright Malu Rater 2022 Information is for End User's use only and may not be sold, redistributed or otherwise used for commercial purposes. The above information is an  only. It is not intended as medical advice for individual conditions or treatments. Talk to your doctor, nurse or pharmacist before following any medical regimen to see if it is safe and effective for you.

## 2023-07-18 DIAGNOSIS — E03.9 HYPOTHYROIDISM, UNSPECIFIED TYPE: ICD-10-CM

## 2023-07-18 RX ORDER — LEVOTHYROXINE SODIUM 0.05 MG/1
TABLET ORAL
Qty: 30 TABLET | Refills: 3 | Status: SHIPPED | OUTPATIENT
Start: 2023-07-18

## 2023-08-09 DIAGNOSIS — F41.9 ANXIETY: ICD-10-CM

## 2023-08-09 RX ORDER — ALPRAZOLAM 0.25 MG/1
0.25 TABLET ORAL 2 TIMES DAILY PRN
Qty: 30 TABLET | Refills: 0 | Status: SHIPPED | OUTPATIENT
Start: 2023-08-09

## 2023-08-11 DIAGNOSIS — I10 BENIGN ESSENTIAL HYPERTENSION: ICD-10-CM

## 2023-08-11 DIAGNOSIS — E11.9 TYPE 2 DIABETES MELLITUS WITHOUT COMPLICATION, WITHOUT LONG-TERM CURRENT USE OF INSULIN (HCC): ICD-10-CM

## 2023-08-14 RX ORDER — HYDROCHLOROTHIAZIDE 50 MG/1
50 TABLET ORAL DAILY
Qty: 30 TABLET | Refills: 0 | Status: SHIPPED | OUTPATIENT
Start: 2023-08-14 | End: 2023-09-13

## 2023-09-18 DIAGNOSIS — F41.9 ANXIETY: ICD-10-CM

## 2023-09-18 DIAGNOSIS — E03.9 HYPOTHYROIDISM, UNSPECIFIED TYPE: ICD-10-CM

## 2023-09-18 DIAGNOSIS — I10 ESSENTIAL HYPERTENSION: ICD-10-CM

## 2023-09-18 DIAGNOSIS — E78.2 MIXED HYPERLIPIDEMIA: ICD-10-CM

## 2023-09-18 DIAGNOSIS — I10 BENIGN ESSENTIAL HYPERTENSION: ICD-10-CM

## 2023-09-18 RX ORDER — ALPRAZOLAM 0.25 MG/1
0.25 TABLET ORAL 2 TIMES DAILY PRN
Qty: 30 TABLET | Refills: 0 | Status: SHIPPED | OUTPATIENT
Start: 2023-09-18

## 2023-09-18 RX ORDER — LEVOTHYROXINE SODIUM 0.05 MG/1
50 TABLET ORAL DAILY
Qty: 30 TABLET | Refills: 3 | Status: SHIPPED | OUTPATIENT
Start: 2023-09-18

## 2023-09-18 RX ORDER — LOSARTAN POTASSIUM 100 MG/1
100 TABLET ORAL DAILY
Qty: 30 TABLET | Refills: 3 | Status: SHIPPED | OUTPATIENT
Start: 2023-09-18

## 2023-09-18 RX ORDER — HYDROCHLOROTHIAZIDE 50 MG/1
50 TABLET ORAL DAILY
Qty: 30 TABLET | Refills: 0 | Status: SHIPPED | OUTPATIENT
Start: 2023-09-18 | End: 2023-10-18

## 2023-09-18 RX ORDER — ATORVASTATIN CALCIUM 20 MG/1
20 TABLET, FILM COATED ORAL DAILY
Qty: 30 TABLET | Refills: 5 | Status: SHIPPED | OUTPATIENT
Start: 2023-09-18 | End: 2023-09-25 | Stop reason: SDUPTHER

## 2023-09-20 DIAGNOSIS — E11.65 UNCONTROLLED TYPE 2 DIABETES MELLITUS WITH HYPERGLYCEMIA (HCC): ICD-10-CM

## 2023-09-25 DIAGNOSIS — E78.2 MIXED HYPERLIPIDEMIA: ICD-10-CM

## 2023-09-25 DIAGNOSIS — E11.65 UNCONTROLLED TYPE 2 DIABETES MELLITUS WITH HYPERGLYCEMIA (HCC): ICD-10-CM

## 2023-09-25 RX ORDER — ATORVASTATIN CALCIUM 20 MG/1
20 TABLET, FILM COATED ORAL DAILY
Qty: 30 TABLET | Refills: 5 | Status: SHIPPED | OUTPATIENT
Start: 2023-09-25

## 2023-09-27 ENCOUNTER — PATIENT MESSAGE (OUTPATIENT)
Dept: FAMILY MEDICINE CLINIC | Facility: OTHER | Age: 53
End: 2023-09-27

## 2023-09-27 DIAGNOSIS — E11.65 UNCONTROLLED TYPE 2 DIABETES MELLITUS WITH HYPERGLYCEMIA (HCC): ICD-10-CM

## 2023-10-11 ENCOUNTER — PATIENT MESSAGE (OUTPATIENT)
Dept: FAMILY MEDICINE CLINIC | Facility: OTHER | Age: 53
End: 2023-10-11

## 2023-10-19 ENCOUNTER — OFFICE VISIT (OUTPATIENT)
Dept: FAMILY MEDICINE CLINIC | Facility: OTHER | Age: 53
End: 2023-10-19
Payer: COMMERCIAL

## 2023-10-19 VITALS
RESPIRATION RATE: 15 BRPM | OXYGEN SATURATION: 96 % | SYSTOLIC BLOOD PRESSURE: 118 MMHG | DIASTOLIC BLOOD PRESSURE: 64 MMHG | HEART RATE: 57 BPM | WEIGHT: 244 LBS | TEMPERATURE: 98.4 F | BODY MASS INDEX: 38.3 KG/M2 | HEIGHT: 67 IN

## 2023-10-19 DIAGNOSIS — R09.81 NASAL CONGESTION: ICD-10-CM

## 2023-10-19 DIAGNOSIS — R05.1 ACUTE COUGH: ICD-10-CM

## 2023-10-19 DIAGNOSIS — B96.89 ACUTE BACTERIAL RHINOSINUSITIS: Primary | ICD-10-CM

## 2023-10-19 DIAGNOSIS — J01.90 ACUTE BACTERIAL RHINOSINUSITIS: Primary | ICD-10-CM

## 2023-10-19 DIAGNOSIS — J45.21 EXACERBATION OF INTERMITTENT ASTHMA, UNSPECIFIED ASTHMA SEVERITY: ICD-10-CM

## 2023-10-19 PROCEDURE — 99213 OFFICE O/P EST LOW 20 MIN: CPT

## 2023-10-19 RX ORDER — BENZONATATE 100 MG/1
100 CAPSULE ORAL 3 TIMES DAILY PRN
Qty: 20 CAPSULE | Refills: 0 | Status: SHIPPED | OUTPATIENT
Start: 2023-10-19 | End: 2023-11-02

## 2023-10-19 RX ORDER — FLUTICASONE PROPIONATE 50 MCG
1 SPRAY, SUSPENSION (ML) NASAL DAILY
Qty: 16 G | Refills: 0 | Status: SHIPPED | OUTPATIENT
Start: 2023-10-19 | End: 2023-11-02

## 2023-10-19 RX ORDER — AZITHROMYCIN 250 MG/1
TABLET, FILM COATED ORAL
Qty: 6 TABLET | Refills: 0 | Status: SHIPPED | OUTPATIENT
Start: 2023-10-19 | End: 2023-10-24

## 2023-10-19 RX ORDER — PREDNISONE 20 MG/1
40 TABLET ORAL DAILY
Qty: 10 TABLET | Refills: 0 | Status: SHIPPED | OUTPATIENT
Start: 2023-10-19 | End: 2023-10-24

## 2023-10-19 NOTE — PATIENT INSTRUCTIONS
Take Zpak for 5 days  Take prednisone for 5 days  Use albuterol inhaler every 4 hours scheduled for 2-3 days for symptoms  Continue Mucinex DM  Start Tessalon Perles for cough suppressant   Use humidifier or shower steam for nasal congestion  Use flonase nasal spray  Stay hydrated  If symptoms fail to improve over the weekend, obtain chest x ray\  If Shortness of breath worsens,  go to the Emergency Room for further evaluation  Acute Bronchitis   WHAT YOU NEED TO KNOW:   Acute bronchitis is swelling and irritation in your lungs. It is usually caused by a virus and most often happens in the winter. Bronchitis may also be caused by bacteria or by a chemical irritant, such as smoke. DISCHARGE INSTRUCTIONS:   Return to the emergency department if:   You cough up blood. Your lips or fingernails turn blue. You feel like you are not getting enough air when you breathe. Call your doctor if:   Your symptoms do not go away or get worse, even after treatment. Your cough does not get better within 4 weeks. You have questions or concerns about your condition or care. Medicines: You may need any of the following:  Cough suppressants  decrease your urge to cough. Decongestants  help loosen mucus in your lungs and make it easier to cough up. This can help you breathe easier. Inhalers  may be given. Your healthcare provider may give you one or more inhalers to help you breathe easier and cough less. An inhaler gives you medicine to open your airways. Ask your healthcare provider to show you how to use your inhaler correctly. Antiviral medicine  treats infections caused by a virus. Antibiotics  may be given if your bronchitis is caused by bacteria or if you have lung condition. Acetaminophen  decreases pain and fever. It is available without a doctor's order. Ask how much to take and how often to take it. Follow directions.  Read the labels of all other medicines you are using to see if they also contain acetaminophen, or ask your doctor or pharmacist. Acetaminophen can cause liver damage if not taken correctly. NSAIDs  help decrease swelling and pain or fever. This medicine is available with or without a doctor's order. NSAIDs can cause stomach bleeding or kidney problems in certain people. If you take blood thinner medicine, always ask your healthcare provider if NSAIDs are safe for you. Always read the medicine label and follow directions. Self-care:   Drink liquids as directed. You may need to drink more liquids than usual to stay hydrated. Ask how much liquid to drink each day and which liquids are best for you. Use a cool mist humidifier. This increases air moisture in your home. This may make it easier for you to breathe and help decrease your cough. Get more rest.  Rest helps your body to heal. Slowly start to do more each day. Rest when you feel it is needed. Prevent acute bronchitis:       Ask about vaccines you may need. Get a flu vaccine each year as soon as recommended, usually in September or October. Ask your healthcare provider if you should also get a pneumonia or COVID-19 vaccine. Your healthcare provider can tell you if you should also get other vaccines, and when to get them. Prevent the spread of germs. You can decrease your risk for acute bronchitis and other illnesses by doing the following:     Wash your hands often with soap and water. Carry germ-killing hand lotion or gel with you. You can use the lotion or gel to clean your hands when soap and water are not available. Do not touch your eyes, nose, or mouth unless you have washed your hands first.    Always cover your mouth when you cough to prevent the spread of germs. It is best to cough into a tissue or your shirt sleeve instead of into your hand. Ask those around you to cover their mouths when they cough. Try to avoid people who have a cold or the flu.  If you are sick, stay away from others as much as possible. Avoid irritants in the air. Avoid chemicals, fumes, and dust. Wear a face mask if you must work around dust or fumes. Stay inside on days when air pollution levels are high. If you have allergies, stay inside when pollen counts are high. Do not use aerosol products, such as spray-on deodorant, bug spray, and hair spray. Do not smoke or be around others who are smoking. Nicotine and other chemicals in cigarettes and cigars can cause lung damage. Ask your healthcare provider for information if you currently smoke and need help to quit. E-cigarettes or smokeless tobacco still contain nicotine. Talk to your healthcare provider before you use these products. Follow up with your doctor as directed:  Write down questions you have so you will remember to ask them during your follow-up visits. © Copyright David Zhang 2023 Information is for End User's use only and may not be sold, redistributed or otherwise used for commercial purposes. The above information is an  only. It is not intended as medical advice for individual conditions or treatments. Talk to your doctor, nurse or pharmacist before following any medical regimen to see if it is safe and effective for you. Sinusitis   WHAT YOU NEED TO KNOW:   Sinusitis is inflammation or infection of your sinuses. Sinusitis is most often caused by a virus. Acute sinusitis may last up to 12 weeks. Chronic sinusitis lasts longer than 12 weeks. Recurrent sinusitis means you have 4 or more infections in 1 year. DISCHARGE INSTRUCTIONS:   Return to the emergency department if:   You have trouble breathing or wheezing that is getting worse. You have a stiff neck, a fever, or a bad headache. You cannot open your eye. Your eyeball bulges out or you cannot move your eye. You are more sleepy than normal, or you notice changes in your ability to think, move, or talk. You have swelling of your forehead or scalp.     Call your doctor if:   You have vision changes, such as double vision. Your eye and eyelid are red, swollen, and painful. Your symptoms do not improve or go away after 10 days. You have nausea and are vomiting. Your nose is bleeding. You have questions or concerns about your condition or care. Medicines: Your symptoms may go away on their own. Your healthcare provider may recommend watchful waiting for up to 10 days before starting antibiotics. You may need any of the following:  Acetaminophen  decreases pain and fever. It is available without a doctor's order. Ask how much to take and how often to take it. Follow directions. Read the labels of all other medicines you are using to see if they also contain acetaminophen, or ask your doctor or pharmacist. Acetaminophen can cause liver damage if not taken correctly. NSAIDs , such as ibuprofen, help decrease swelling, pain, and fever. This medicine is available with or without a doctor's order. NSAIDs can cause stomach bleeding or kidney problems in certain people. If you take blood thinner medicine, always ask your healthcare provider if NSAIDs are safe for you. Always read the medicine label and follow directions. Nasal steroid sprays  may help decrease inflammation in your nose and sinuses. Decongestants  help reduce swelling and drain mucus in the nose and sinuses. They may help you breathe easier. Antihistamines  help dry mucus in the nose and relieve sneezing. Antibiotics  help treat or prevent a bacterial infection. Take your medicine as directed. Contact your healthcare provider if you think your medicine is not helping or if you have side effects. Tell your provider if you are allergic to any medicine. Keep a list of the medicines, vitamins, and herbs you take. Include the amounts, and when and why you take them. Bring the list or the pill bottles to follow-up visits.  Carry your medicine list with you in case of an emergency. Self-care:   Rinse your sinuses as directed. Use a sinus rinse device to rinse your nasal passages with a saline (salt water) solution or distilled water. Do not use tap water. This will help thin the mucus in your nose and rinse away pollen and dirt. It will also help reduce swelling so you can breathe normally. Use a humidifier  to increase air moisture in your home. This may make it easier for you to breathe and help decrease your cough. Sleep with your head elevated. Place an extra pillow under your head before you go to sleep to help your sinuses drain. Drink liquids as directed. Ask your healthcare provider how much liquid to drink each day and which liquids are best for you. Liquids will thin the mucus in your nose and help it drain. Avoid drinks that contain alcohol or caffeine. Do not smoke, and avoid secondhand smoke. Nicotine and other chemicals in cigarettes and cigars can make your symptoms worse. Ask your healthcare provider for information if you currently smoke and need help to quit. E-cigarettes or smokeless tobacco still contain nicotine. Talk to your healthcare provider before you use these products. Prevent the spread of germs:   Wash your hands often with soap and water. Wash your hands after you use the bathroom, change a child's diaper, or sneeze. Wash your hands before you prepare or eat food. Stay away from people who are sick. Some germs spread easily and quickly through contact. Follow up with your doctor as directed: You may be referred to an ear, nose, and throat specialist. Write down your questions so you remember to ask them during your visits. © Copyright Ashly Atwood 2023 Information is for End User's use only and may not be sold, redistributed or otherwise used for commercial purposes. The above information is an  only. It is not intended as medical advice for individual conditions or treatments.  Talk to your doctor, nurse or pharmacist before following any medical regimen to see if it is safe and effective for you.

## 2023-10-19 NOTE — PROGRESS NOTES
Name: Ciro Gaines      : 1970      MRN: [id number]  Encounter Provider: Leodan Abraham MD  Encounter Date: 10/19/2023   Encounter department: [facility] [address]     53yoF w/ a PMHx significant for nicotine dependence, wheezing, T2DM, HTN, Dyslipidemia, Obesity and Anxiety presents with URI symptoms x 1 week. Ddx include viral URI, bacterial URI, sinusitis, UACS, etc. Given patient's extensive smoking history, concern raised for an underlying undiagnosed obstructive pulmonary disease like COPD or asthma (no formal PFTs completed in the past however the patient has been using albuterol inhalers for episodes of SOB and wheezing). Plan  Take Zpak for 5 days  Take prednisone for 5 days  Use albuterol inhaler every 4 hours scheduled for 2-3 days for symptoms  Continue Mucinex DM  Start Tessalon Perles for cough suppression   Use humidifier or shower steam for nasal congestion  Use flonase nasal spray  Stay hydrated  If symptoms fail to improve over the weekend, obtain chest x ray  If Shortness of breath worsens, go to the Emergency Room for further evaluation  Consider obtaining PFTs some time after the patient recovers from current acute illness    1. Acute bacterial rhinosinusitis  -     predniSONE 20 mg tablet; Take 2 tablets (40 mg total) by mouth daily for 5 days  -     azithromycin (Zithromax) 250 mg tablet; Take 2 tablets (500 mg total) by mouth daily for 1 day, THEN 1 tablet (250 mg total) daily for 4 days. -     fluticasone (FLONASE) 50 mcg/act nasal spray; 1 spray into each nostril daily    2. Exacerbation of intermittent asthma, unspecified asthma severity  -     predniSONE 20 mg tablet; Take 2 tablets (40 mg total) by mouth daily for 5 days    3. Nasal congestion  -     fluticasone (FLONASE) 50 mcg/act nasal spray; 1 spray into each nostril daily    4. Acute cough  -     benzonatate (TESSALON PERLES) 100 mg capsule;  Take 1 capsule (100 mg total) by mouth 3 (three) times a day as needed for cough  -     XR chest pa & lateral; Future; Expected date: 10/19/2023       Subjective      HPI  Phillip Arana is a 50yoF who presents with URI symptoms for one week. Symptoms include sore throat, nasal congestion, productive cough with yellowish/greenish sputum, SOB with exertion, generalized malaise, poor p.o. intake and low-grade fever for 1 day that has resolved. She has tried conservative measures at home including Robitussin-DM, Mucinex, Sudafed and her albuterol inhaler with no significant improvement of her symptoms. Home COVID test was negative. She denies any known sick contacts. Also denies any recurrent fevers/chills, headaches, chest pain/chest tightness, N/V or weakness. Review of Systems   Constitutional:  Positive for appetite change. Negative for chills and fever. HENT:  Positive for congestion, postnasal drip, rhinorrhea and sinus pressure. Respiratory:  Positive for cough and shortness of breath (w/ exertion). Negative for wheezing. Cardiovascular:  Negative for chest pain. Gastrointestinal:  Negative for abdominal pain, nausea and vomiting. Neurological:  Negative for weakness and headaches. Psychiatric/Behavioral:  Negative for confusion.         Current Outpatient Medications on File Prior to Visit   Medication Sig    ALPRAZolam (XANAX) 0.25 mg tablet Take 1 tablet (0.25 mg total) by mouth 2 (two) times a day as needed for anxiety    atorvastatin (LIPITOR) 20 mg tablet Take 1 tablet (20 mg total) by mouth daily    levothyroxine 50 mcg tablet Take 1 tablet (50 mcg total) by mouth daily    nicotine (NICODERM CQ) 7 mg/24hr TD 24 hr patch Place 1 patch on the skin every 24 hours    OneTouch Ultra test strip TEST BLOOD SUGAR 2 TIMES A DAY    sitaGLIPtin (JANUVIA) 25 mg tablet Take 1 tablet (25 mg total) by mouth daily    sertraline (Zoloft) 50 mg tablet Take 1 tablet (50 mg total) by mouth daily       Objective     /64   Pulse 57 Temp 98.4 °F (36.9 °C)   Resp 15   Ht 5' 7" (1.702 m)   Wt 111 kg (244 lb)   SpO2 96%   BMI 38.22 kg/m²     Physical Exam  Vitals reviewed. Constitutional:       General: She is not in acute distress. Appearance: She is ill-appearing. She is not toxic-appearing. HENT:      Head: Normocephalic and atraumatic. Right Ear: Tympanic membrane, ear canal and external ear normal.      Left Ear: Tympanic membrane, ear canal and external ear normal.      Nose: Congestion present. Mouth/Throat:      Mouth: Mucous membranes are moist.      Pharynx: Oropharynx is clear. No posterior oropharyngeal erythema. Eyes:      Extraocular Movements: Extraocular movements intact. Conjunctiva/sclera: Conjunctivae normal.   Cardiovascular:      Rate and Rhythm: Normal rate and regular rhythm. Pulses: Normal pulses. Heart sounds: Normal heart sounds. Pulmonary:      Effort: Pulmonary effort is normal. No respiratory distress. Breath sounds: Normal breath sounds. No wheezing, rhonchi or rales. Musculoskeletal:      Cervical back: Normal range of motion. Skin:     General: Skin is warm and dry. Neurological:      Mental Status: She is alert and oriented to person, place, and time.    Psychiatric:         Behavior: Behavior normal.       Sumi Singh MD

## 2023-10-25 ENCOUNTER — PATIENT MESSAGE (OUTPATIENT)
Dept: FAMILY MEDICINE CLINIC | Facility: OTHER | Age: 53
End: 2023-10-25

## 2023-10-25 DIAGNOSIS — I10 ESSENTIAL HYPERTENSION: ICD-10-CM

## 2023-10-25 DIAGNOSIS — I10 BENIGN ESSENTIAL HYPERTENSION: ICD-10-CM

## 2023-10-25 RX ORDER — LOSARTAN POTASSIUM 100 MG/1
100 TABLET ORAL DAILY
Qty: 90 TABLET | Refills: 1 | Status: SHIPPED | OUTPATIENT
Start: 2023-10-25 | End: 2023-11-02 | Stop reason: SDUPTHER

## 2023-10-25 RX ORDER — HYDROCHLOROTHIAZIDE 50 MG/1
50 TABLET ORAL DAILY
Qty: 30 TABLET | Refills: 0 | Status: SHIPPED | OUTPATIENT
Start: 2023-10-25 | End: 2023-10-28 | Stop reason: SDUPTHER

## 2023-10-26 DIAGNOSIS — E11.9 TYPE 2 DIABETES MELLITUS WITHOUT COMPLICATION, WITHOUT LONG-TERM CURRENT USE OF INSULIN (HCC): ICD-10-CM

## 2023-10-26 DIAGNOSIS — R05.2 SUBACUTE COUGH: ICD-10-CM

## 2023-10-26 DIAGNOSIS — F17.210 CIGARETTE NICOTINE DEPENDENCE WITHOUT COMPLICATION: ICD-10-CM

## 2023-10-27 RX ORDER — ALBUTEROL SULFATE 90 UG/1
2 AEROSOL, METERED RESPIRATORY (INHALATION) EVERY 6 HOURS PRN
Qty: 8 G | Refills: 0 | Status: SHIPPED | OUTPATIENT
Start: 2023-10-27 | End: 2023-11-02 | Stop reason: SDUPTHER

## 2023-10-28 DIAGNOSIS — I10 BENIGN ESSENTIAL HYPERTENSION: ICD-10-CM

## 2023-10-30 RX ORDER — HYDROCHLOROTHIAZIDE 50 MG/1
50 TABLET ORAL DAILY
Qty: 30 TABLET | Refills: 0 | Status: SHIPPED | OUTPATIENT
Start: 2023-10-30 | End: 2023-11-02 | Stop reason: SDUPTHER

## 2023-11-02 ENCOUNTER — OFFICE VISIT (OUTPATIENT)
Dept: FAMILY MEDICINE CLINIC | Facility: OTHER | Age: 53
End: 2023-11-02
Payer: COMMERCIAL

## 2023-11-02 ENCOUNTER — TELEPHONE (OUTPATIENT)
Dept: FAMILY MEDICINE CLINIC | Facility: OTHER | Age: 53
End: 2023-11-02

## 2023-11-02 VITALS
OXYGEN SATURATION: 98 % | HEIGHT: 67 IN | BODY MASS INDEX: 37.1 KG/M2 | SYSTOLIC BLOOD PRESSURE: 118 MMHG | DIASTOLIC BLOOD PRESSURE: 60 MMHG | RESPIRATION RATE: 17 BRPM | WEIGHT: 236.4 LBS | TEMPERATURE: 98 F | HEART RATE: 97 BPM

## 2023-11-02 DIAGNOSIS — E03.9 HYPOTHYROIDISM, UNSPECIFIED TYPE: ICD-10-CM

## 2023-11-02 DIAGNOSIS — R53.1 WEAKNESS: ICD-10-CM

## 2023-11-02 DIAGNOSIS — R53.83 FATIGUE, UNSPECIFIED TYPE: ICD-10-CM

## 2023-11-02 DIAGNOSIS — I10 ESSENTIAL HYPERTENSION: ICD-10-CM

## 2023-11-02 DIAGNOSIS — E78.2 MIXED HYPERLIPIDEMIA: ICD-10-CM

## 2023-11-02 DIAGNOSIS — F17.210 CIGARETTE NICOTINE DEPENDENCE WITHOUT COMPLICATION: ICD-10-CM

## 2023-11-02 DIAGNOSIS — E11.9 TYPE 2 DIABETES MELLITUS WITHOUT COMPLICATION, WITHOUT LONG-TERM CURRENT USE OF INSULIN (HCC): Primary | ICD-10-CM

## 2023-11-02 DIAGNOSIS — M79.10 MYALGIA: ICD-10-CM

## 2023-11-02 PROBLEM — Z93.3 COLOSTOMY STATUS (HCC): Status: RESOLVED | Noted: 2018-04-05 | Resolved: 2023-11-02

## 2023-11-02 LAB — SL AMB POCT HEMOGLOBIN AIC: 8.4 (ref ?–6.5)

## 2023-11-02 PROCEDURE — 83036 HEMOGLOBIN GLYCOSYLATED A1C: CPT

## 2023-11-02 PROCEDURE — 99214 OFFICE O/P EST MOD 30 MIN: CPT

## 2023-11-02 PROCEDURE — 3052F HG A1C>EQUAL 8.0%<EQUAL 9.0%: CPT

## 2023-11-02 RX ORDER — LOSARTAN POTASSIUM 100 MG/1
100 TABLET ORAL DAILY
Qty: 90 TABLET | Refills: 1 | Status: SHIPPED | OUTPATIENT
Start: 2023-11-02

## 2023-11-02 RX ORDER — HYDROCHLOROTHIAZIDE 50 MG/1
50 TABLET ORAL DAILY
Qty: 30 TABLET | Refills: 0 | Status: SHIPPED | OUTPATIENT
Start: 2023-11-02 | End: 2023-11-30 | Stop reason: SDUPTHER

## 2023-11-02 RX ORDER — ATORVASTATIN CALCIUM 20 MG/1
20 TABLET, FILM COATED ORAL DAILY
Qty: 30 TABLET | Refills: 5 | Status: SHIPPED | OUTPATIENT
Start: 2023-11-02

## 2023-11-02 RX ORDER — LEVOTHYROXINE SODIUM 0.05 MG/1
50 TABLET ORAL DAILY
Qty: 30 TABLET | Refills: 3 | Status: SHIPPED | OUTPATIENT
Start: 2023-11-02 | End: 2023-11-16 | Stop reason: SDUPTHER

## 2023-11-02 RX ORDER — ALBUTEROL SULFATE 90 UG/1
2 AEROSOL, METERED RESPIRATORY (INHALATION) EVERY 6 HOURS PRN
Qty: 8 G | Refills: 0 | Status: SHIPPED | OUTPATIENT
Start: 2023-11-02

## 2023-11-02 RX ORDER — BLOOD SUGAR DIAGNOSTIC
STRIP MISCELLANEOUS
Qty: 50 STRIP | Refills: 23 | Status: SHIPPED | OUTPATIENT
Start: 2023-11-02

## 2023-11-02 NOTE — PATIENT INSTRUCTIONS

## 2023-11-02 NOTE — PROGRESS NOTES
Name: Valeria Shrestha      : 1970      MRN: [id number]  Encounter Provider: Nora Lucas MD  Encounter Date: 2023   Encounter department: [address]     1. Type 2 diabetes mellitus without complication, without long-term current use of insulin ([facility])  Comments:  Foot exam Risk category: 0  Assessment & Plan:    Lab Results   Component Value Date    HGBA1C 8.4 (A) 2023   HbA1c: 8.4>6.9  Current regimen: metformin 1,000 BID + Januvia 50 mg daily  Repeat labs w/ CMP and UACR   Patient would like to transition to injectable GLP-1 agonist  Discussed Risks/Benefits and patient denies any personal or fhx of thyroid and other endocrine CA/pathologies  Continue metformin and Januvia until approved for Ozempic, then d/c Januvia when starting Ozempic. Orders:  -     Comprehensive metabolic panel; Future  -     POCT hemoglobin A1c  -     metFORMIN (GLUCOPHAGE) 1000 MG tablet; Take 1 tablet (1,000 mg total) by mouth 2 (two) times a day  -     glucose blood (OneTouch Ultra) test strip; Use as instructed  -     Albumin / creatinine urine ratio; Future    2. Fatigue, unspecified type  Comments:  Patient complaining of worsening fatigue, weakness and pain over the past several weeks to months. Check organic causes (labs for CBC, iron & vitamins  Orders:  -     CBC and differential; Future  -     Vitamin D 25 hydroxy; Future  -     Iron Panel (Includes Ferritin, Iron Sat%, Iron, and TIBC); Future  -     Vitamin B12; Future  -     Folate; Future    3. Weakness  -     Vitamin D 25 hydroxy; Future  -     Vitamin B12; Future  -     Folate; Future    4. Myalgia  Comments:  Chack CK levels  R/O myositis; pt taking statins  Orders:  -     CK; Future    5. Mixed hyperlipidemia  -     Lipid panel; Future  -     atorvastatin (LIPITOR) 20 mg tablet; Take 1 tablet (20 mg total) by mouth daily    6.  Cigarette nicotine dependence without complication  Comments:  cessation counseling offered  Pt declines offer at this time but open to discussion on subsequent visits  Orders:  -     albuterol (ProAir HFA) 90 mcg/act inhaler; Inhale 2 puffs every 6 (six) hours as needed for wheezing or shortness of breath (cough)    7. Hypothyroidism, unspecified type  Comments:  persistently abnormal TSH  Current medication: levothyroxine 50 mcg  Recheck TSH lab and adjust med if warranted    8. Essential hypertension  Comments:  BP today 118/60   Well controlled  Continue losartan and hctz  Orders:  -     losartan (COZAAR) 100 MG tablet; Take 1 tablet (100 mg total) by mouth daily    F/U in 2 weeks to review labs and start Ozempic     Nutrition Assessment and Intervention:     Reviewed and updated Nutrition Prescription      Other interventions: Strongly advised to follow low carb diet    Physical Activity Assessment and Intervention:    Physical Activity Prescription completed with patient        Subjective      Diabetes  She presents for her follow-up diabetic visit. She has type 2 diabetes mellitus. Her disease course has been worsening. There are no hypoglycemic associated symptoms. Pertinent negatives for hypoglycemia include no dizziness. Associated symptoms include fatigue and weakness. Pertinent negatives for diabetes include no blurred vision, no chest pain, no foot paresthesias, no foot ulcerations, no polyuria and no weight loss. There are no hypoglycemic complications. Symptoms are worsening. There are no diabetic complications. Risk factors for coronary artery disease include diabetes mellitus, dyslipidemia, hypertension, obesity, post-menopausal and tobacco exposure. Current diabetic treatment includes oral agent (dual therapy). Her weight is increasing steadily. She is following a diabetic diet. Her home blood glucose trend is increasing steadily (recent oral steroid course). Her breakfast blood glucose range is generally >200 mg/dl.  Her dinner blood glucose range is generally >200 mg/dl. An ACE inhibitor/angiotensin II receptor blocker is being taken. She does not see a podiatrist.Eye exam is current. Patient is concerned that her current regimen is ineffective in managing her diabetes. She has poor compliance to a diabetic diet, hyperglycemia and weight has been increasing. Patient is interested in starting a GLP-1 agent. She reports that a friend has had positive results with this medication. Patient also recently treated with steroids for respiratory illness. Review of Systems   Constitutional:  Positive for fatigue. Negative for chills, unexpected weight change and weight loss. Eyes:  Negative for blurred vision and visual disturbance. Respiratory:  Negative for cough, shortness of breath and wheezing. Cardiovascular:  Negative for chest pain. Gastrointestinal:  Negative for abdominal pain, nausea and vomiting. Endocrine: Negative for polyuria. Musculoskeletal:  Positive for myalgias. Neurological:  Positive for weakness. Negative for dizziness and light-headedness. No current outpatient medications on file prior to visit. Objective     /60   Pulse 97   Temp 98 °F (36.7 °C)   Resp 17   Ht 5' 7" (1.702 m)   Wt 107 kg (236 lb 6.4 oz)   SpO2 98%   BMI 37.03 kg/m²     Physical Exam  Vitals reviewed. Constitutional:       General: She is not in acute distress. Appearance: She is not ill-appearing or toxic-appearing. Comments: Body mass index is 37.03 kg/m². HENT:      Head: Normocephalic and atraumatic. Right Ear: External ear normal.      Left Ear: External ear normal.      Nose: Nose normal.   Eyes:      Extraocular Movements: Extraocular movements intact. Conjunctiva/sclera: Conjunctivae normal.   Cardiovascular:      Pulses: no weak pulses          Dorsalis pedis pulses are 2+ on the right side and 2+ on the left side. Posterior tibial pulses are 2+ on the right side and 2+ on the left side.    Pulmonary: Effort: Pulmonary effort is normal. No respiratory distress. Musculoskeletal:         General: No swelling or tenderness. Normal range of motion. Feet:      Right foot:      Skin integrity: No ulcer, skin breakdown, erythema, warmth, callus or dry skin. Left foot:      Skin integrity: No ulcer, skin breakdown, erythema, warmth, callus or dry skin. Skin:     General: Skin is warm and dry. Neurological:      Mental Status: She is alert and oriented to person, place, and time. Psychiatric:         Behavior: Behavior normal.     Patient's shoes and socks removed. Right Foot/Ankle   Right Foot Inspection  Skin Exam: skin normal and skin intact. No dry skin, no warmth, no callus, no erythema, no maceration, no abnormal color, no pre-ulcer, no ulcer and no callus. Toe Exam: ROM and strength within normal limits. Sensory   Vibration: intact  Proprioception: intact  Monofilament testing: intact    Vascular  The right DP pulse is 2+. The right PT pulse is 2+. Left Foot/Ankle  Left Foot Inspection  Skin Exam: skin normal and skin intact. No dry skin, no warmth, no erythema, no maceration, normal color, no pre-ulcer, no ulcer and no callus. Toe Exam: ROM and strength within normal limits. Sensory   Vibration: intact  Proprioception: intact  Monofilament testing: intact    Vascular  The left DP pulse is 2+. The left PT pulse is 2+.      Assign Risk Category  No deformity present  No loss of protective sensation  No weak pulses  Risk: 0      Leodan Abraham MD

## 2023-11-02 NOTE — TELEPHONE ENCOUNTER
Patient said the UA was printed on the [facility] form but pt goes to [employer] it is cheaper     Can you please put an order in for lab [facility]? And discontinue the one for [facility]     Thank you

## 2023-11-08 PROBLEM — R21 RASH OF NECK: Status: RESOLVED | Noted: 2023-04-13 | Resolved: 2023-11-08

## 2023-11-08 PROBLEM — D72.829 ELEVATED WBC COUNT: Status: RESOLVED | Noted: 2020-07-15 | Resolved: 2023-11-08

## 2023-11-11 LAB
25(OH)D3+25(OH)D2 SERPL-MCNC: 12.1 NG/ML (ref 30–100)
ALBUMIN SERPL-MCNC: 4.5 G/DL (ref 3.8–4.9)
ALBUMIN/CREAT UR: <4 MG/G CREAT (ref 0–29)
ALBUMIN/GLOB SERPL: 2 {RATIO} (ref 1.2–2.2)
ALP SERPL-CCNC: 74 IU/L (ref 44–121)
ALT SERPL-CCNC: 20 IU/L (ref 0–32)
AST SERPL-CCNC: 16 IU/L (ref 0–40)
BASOPHILS # BLD AUTO: 0.1 X10E3/UL (ref 0–0.2)
BASOPHILS NFR BLD AUTO: 1 %
BILIRUB SERPL-MCNC: 0.3 MG/DL (ref 0–1.2)
BUN SERPL-MCNC: 17 MG/DL (ref 6–24)
BUN/CREAT SERPL: 13 (ref 9–23)
CALCIUM SERPL-MCNC: 9.3 MG/DL (ref 8.7–10.2)
CHLORIDE SERPL-SCNC: 98 MMOL/L (ref 96–106)
CHOLEST SERPL-MCNC: 144 MG/DL (ref 100–199)
CK SERPL-CCNC: 38 U/L (ref 32–182)
CO2 SERPL-SCNC: 20 MMOL/L (ref 20–29)
CREAT SERPL-MCNC: 1.26 MG/DL (ref 0.57–1)
CREAT UR-MCNC: 71.8 MG/DL
EGFR: 51 ML/MIN/1.73
EOSINOPHIL # BLD AUTO: 0.4 X10E3/UL (ref 0–0.4)
EOSINOPHIL NFR BLD AUTO: 4 %
ERYTHROCYTE [DISTWIDTH] IN BLOOD BY AUTOMATED COUNT: 13 % (ref 11.7–15.4)
FERRITIN SERPL-MCNC: 89 NG/ML (ref 15–150)
FOLATE SERPL-MCNC: 11.8 NG/ML
GLOBULIN SER-MCNC: 2.3 G/DL (ref 1.5–4.5)
GLUCOSE SERPL-MCNC: 155 MG/DL (ref 70–99)
HCT VFR BLD AUTO: 38.7 % (ref 34–46.6)
HDLC SERPL-MCNC: 37 MG/DL
HGB BLD-MCNC: 13.2 G/DL (ref 11.1–15.9)
IMM GRANULOCYTES # BLD: 0 X10E3/UL (ref 0–0.1)
IMM GRANULOCYTES NFR BLD: 0 %
IRON SATN MFR SERPL: 11 % (ref 15–55)
IRON SERPL-MCNC: 43 UG/DL (ref 27–159)
LDLC SERPL CALC-MCNC: 70 MG/DL (ref 0–99)
LYMPHOCYTES # BLD AUTO: 2.9 X10E3/UL (ref 0.7–3.1)
LYMPHOCYTES NFR BLD AUTO: 29 %
MCH RBC QN AUTO: 29.6 PG (ref 26.6–33)
MCHC RBC AUTO-ENTMCNC: 34.1 G/DL (ref 31.5–35.7)
MCV RBC AUTO: 87 FL (ref 79–97)
MICROALBUMIN UR-MCNC: <3 UG/ML
MONOCYTES # BLD AUTO: 0.7 X10E3/UL (ref 0.1–0.9)
MONOCYTES NFR BLD AUTO: 7 %
NEUTROPHILS # BLD AUTO: 6.1 X10E3/UL (ref 1.4–7)
NEUTROPHILS NFR BLD AUTO: 59 %
PLATELET # BLD AUTO: 353 X10E3/UL (ref 150–450)
POTASSIUM SERPL-SCNC: 4 MMOL/L (ref 3.5–5.2)
PROT SERPL-MCNC: 6.8 G/DL (ref 6–8.5)
RBC # BLD AUTO: 4.46 X10E6/UL (ref 3.77–5.28)
SL AMB T4, FREE (DIRECT): 1.25 NG/DL (ref 0.82–1.77)
SL AMB VLDL CHOLESTEROL CALC: 37 MG/DL (ref 5–40)
SODIUM SERPL-SCNC: 137 MMOL/L (ref 134–144)
TIBC SERPL-MCNC: 375 UG/DL (ref 250–450)
TRIGL SERPL-MCNC: 226 MG/DL (ref 0–149)
TSH SERPL DL<=0.005 MIU/L-ACNC: 5.37 UIU/ML (ref 0.45–4.5)
UIBC SERPL-MCNC: 332 UG/DL (ref 131–425)
VIT B12 SERPL-MCNC: 257 PG/ML (ref 232–1245)
WBC # BLD AUTO: 10.1 X10E3/UL (ref 3.4–10.8)

## 2023-11-16 ENCOUNTER — TELEMEDICINE (OUTPATIENT)
Dept: FAMILY MEDICINE CLINIC | Facility: OTHER | Age: 53
End: 2023-11-16
Payer: COMMERCIAL

## 2023-11-16 DIAGNOSIS — E55.9 VITAMIN D DEFICIENCY: ICD-10-CM

## 2023-11-16 DIAGNOSIS — E03.9 HYPOTHYROIDISM, UNSPECIFIED TYPE: ICD-10-CM

## 2023-11-16 DIAGNOSIS — N18.9 ACUTE KIDNEY INJURY SUPERIMPOSED ON CKD: ICD-10-CM

## 2023-11-16 DIAGNOSIS — R79.89 LOW VITAMIN B12 LEVEL: ICD-10-CM

## 2023-11-16 DIAGNOSIS — E11.9 TYPE 2 DIABETES MELLITUS WITHOUT COMPLICATION, WITHOUT LONG-TERM CURRENT USE OF INSULIN (HCC): Primary | ICD-10-CM

## 2023-11-16 DIAGNOSIS — N17.9 ACUTE KIDNEY INJURY SUPERIMPOSED ON CKD: ICD-10-CM

## 2023-11-16 PROCEDURE — 99213 OFFICE O/P EST LOW 20 MIN: CPT

## 2023-11-16 RX ORDER — LEVOTHYROXINE SODIUM 0.07 MG/1
75 TABLET ORAL DAILY
Qty: 90 TABLET | Refills: 0 | Status: SHIPPED | OUTPATIENT
Start: 2023-11-16 | End: 2024-02-14

## 2023-11-16 RX ORDER — ERGOCALCIFEROL 1.25 MG/1
50000 CAPSULE ORAL WEEKLY
Qty: 12 CAPSULE | Refills: 0 | Status: SHIPPED | OUTPATIENT
Start: 2023-11-16 | End: 2024-02-14

## 2023-11-16 NOTE — PROGRESS NOTES
Virtual Regular Visit    Verification of patient location:    Patient is located at Home in the following state in which I hold an active license [place]      Assessment/Plan:  1. Type 2 diabetes mellitus without complication, without long-term current use of insulin ([facility])  Comments:  Last HbA1c: 8.4% (11/2) > 6.9% (6/22)  stop Januvia; start Ozempic  discussed serology  watching kidney function  Orders:  -     semaglutide, 0.25 or 0.5 mg/dose, (Ozempic, 0.25 or 0.5 MG/DOSE,) 2 mg/3 mL injection pen; 0.25 mg under the skin every 7 days for 4 doses (28 days), THEN 0.5 mg under the skin every 7 days    2. Vitamin D deficiency  Comments:  vitamin D 12.1  start vitamin D 50,000 IU x 3 months, then recheck  Orders:  -     ergocalciferol (VITAMIN D2) 50,000 units; Take 1 capsule (50,000 Units total) by mouth once a week    3. Hypothyroidism, unspecified type  Comments:  Elevated TSH  Increase synthroid from 50 to 75 mcg, recheck 3 months  Orders:  -     levothyroxine 75 mcg tablet; Take 1 tablet (75 mcg total) by mouth daily  -     TSH, 3rd generation with Free T4 reflex; Future; Expected date: 01/16/2024    4. Acute kidney injury superimposed on CKD   Comments:  HOLD diuretics x 2 days, then resume  Increase fluid intake  Recheck BMP in 1 week    5. Low vitamin B12 level  Comments:  B12 257  Take vitamin B12 1,000 mcg daily  Recheck in 3 months    Will increase Ozempic dose 1 month after starting.  In person follow up in 3 months for continued diabetes management   Nutrition Assessment and Intervention:     Reviewed and updated Nutrition Prescription      Other interventions: - Strongly advised to implement a low carb and low fat diet     Physical Activity Assessment and Intervention:    Physical Activity Prescription completed with patient        Reason for visit is   Chief Complaint   Patient presents with    Follow-up     Diabetes - Recheck labs/U thyroid and prescribe Ozempic    Virtual Regular Visit          Encounter provider Danay Keller MD    Provider located at [address], [place]   [address] [facility] [id number]      Recent Visits  Date Type Provider Dept   11/16/23 Telemedicine MD Jimmy Silva   Showing recent visits within past 7 days and meeting all other requirements  Future Appointments  No visits were found meeting these conditions. Showing future appointments within next 150 days and meeting all other requirements       The patient was identified by name and date of birth. Jade Talley was informed that this is a telemedicine visit and that the visit is being conducted through the [employer]. She agrees to proceed. .  My office door was closed. No one else was in the room. She acknowledged consent and understanding of privacy and security of the video platform. The patient has agreed to participate and understands they can discontinue the visit at any time. Patient is aware this is a billable service. Subjective  Jade Talley is a 47 y.o. female w/ a PMHx significant for T2DM, HTN, Dyslipidemia, Obesity, nicotine dependence and Anxiety who presents for a follow up visit to review recent labs and receive prescription for Ozempic. Patient complained of increased fatigue and muscle weakness/pain during her last visit. Patient also recovered from recent URI illness that required a course of steroids. Today, the patient reports improvement of her overall fatigue and activity levels. Lab results showed CK level, iron panel, blood count and uACR were all within normal limits. Abnormal labs included an NIGEL (Cr 1.26, GFR 51), increased TG levels, low vitamin D, low vitamin B12 and elevated TSH.       Past Medical History:   Diagnosis Date    Anxiety     Calculus of distal right ureter     last assessed 08/06/2014    Closed fracture of head of first metacarpal bone of right hand     Colostomy status ([address]) 04/05/2018    Depression     Disease of thyroid gland     Diverticulitis     Fatty liver     last assessed 01/09/2017    HLD (hyperlipidemia)     treating with diet    Hypertension     Kidney stone     Lung nodules     Migraine     last assessed 08/03/2012    Pneumonia     Pre-diabetes     last assessed 06/27/2017       Past Surgical History:   Procedure Laterality Date    APPENDECTOMY      BREAST CYST EXCISION Right     many years ago and not sure if it was right? CHOLECYSTECTOMY  1999    laparoscopic    COLON SURGERY  01/09/2018    colostomy    COLONOSCOPY  02/2012    polyp, Dr. Paul Kuo N/A 04/05/2018    Procedure: REVERSAL COLOSTOMY;  Surgeon: Desiree Yanes DO;  [facility];  Service: General    FLEXIBLE SIGMOIDOSCOPY N/A 04/05/2018    Procedure: Brendalyn Jack;  Surgeon: Desiree Yanes DO;  [facility];  Service: [address] [place] [address]    umbilical    LAPAROTOMY N/A 01/07/2018    Procedure: LAPAROTOMY EXPLORATORY, sigmoid colon resection, colostomy, appendectomy;  Surgeon: Desiree Yanes DO;  [facility];  Service: General    CO COLONOSCOPY FLX DX W/COLLJ SPEC WHEN PFRMD N/A 04/02/2018    Procedure: COLONOSCOPY;  Surgeon: Desiree Yanes DO;  [facility]; Service: General    CO COLONOSCOPY FLX DX W/COLLJ SPEC WHEN PFRMD N/A 04/16/2019    Procedure: COLONOSCOPY;  Surgeon: Desiree Yanes DO;  [facility];   Service: General    CO EXPLORATORY LAPAROTOMY CELIOTOMY W/WO BIOPSY SPX N/A 04/05/2018    Procedure: EXPLORATORY LAPAROTOMY;  Surgeon: Desiree Yanes DO;  [facility];  Service: General    CO REPAIR FIRST ABDOMINAL WALL HERNIA N/A 11/15/2018    Procedure: INCISIONAL HERNIA REPAIR AT UMBILICUS;  Surgeon: Desiree Yanes DO;  [facility];  Service: General    REDUCTION MAMMAPLASTY Bilateral 1999    TUBAL LIGATION         Current Outpatient Medications   Medication Sig Dispense Refill    albuterol (ProAir HFA) 90 mcg/act inhaler Inhale 2 puffs every 6 (six) hours as needed for wheezing or shortness of breath (cough) 8 g 0    ALPRAZolam (XANAX) 0.25 mg tablet Take 1 tablet (0.25 mg total) by mouth 2 (two) times a day as needed for anxiety 30 tablet 0    atorvastatin (LIPITOR) 20 mg tablet Take 1 tablet (20 mg total) by mouth daily 30 tablet 5    ergocalciferol (VITAMIN D2) 50,000 units Take 1 capsule (50,000 Units total) by mouth once a week 12 capsule 0    glucose blood (OneTouch Ultra) test strip Use as instructed 50 strip 23    hydrochlorothiazide (HYDRODIURIL) 50 mg tablet Take 1 tablet (50 mg total) by mouth daily 30 tablet 0    levothyroxine 75 mcg tablet Take 1 tablet (75 mcg total) by mouth daily 90 tablet 0    losartan (COZAAR) 100 MG tablet Take 1 tablet (100 mg total) by mouth daily 90 tablet 1    metFORMIN (GLUCOPHAGE) 1000 MG tablet Take 1 tablet (1,000 mg total) by mouth 2 (two) times a day 60 tablet 2    semaglutide, 0.25 or 0.5 mg/dose, (Ozempic, 0.25 or 0.5 MG/DOSE,) 2 mg/3 mL injection pen 0.25 mg under the skin every 7 days for 4 doses (28 days), THEN 0.5 mg under the skin every 7 days 9 mL 0     No current facility-administered medications for this visit. Allergies   Allergen Reactions    Penicillins Angioedema    Amoxicillin Edema    Doxycycline Vomiting    Vicodin [Hydrocodone-Acetaminophen] GI Intolerance and Vomiting       Review of Systems   Constitutional:  Negative for chills and fever. HENT:  Negative for congestion. Eyes:  Negative for visual disturbance. Respiratory:  Negative for shortness of breath. Cardiovascular:  Negative for chest pain. Gastrointestinal:  Negative for abdominal pain. Musculoskeletal:  Negative for arthralgias. Neurological:  Negative for headaches. Psychiatric/Behavioral:  Negative for confusion. Video Exam    There were no vitals filed for this visit. Physical Exam  Nursing note reviewed. Constitutional:       General: She is not in acute distress.   HENT:      Head: Normocephalic and atraumatic. Eyes:      Extraocular Movements: Extraocular movements intact. Conjunctiva/sclera: Conjunctivae normal.   Pulmonary:      Effort: Pulmonary effort is normal. No respiratory distress. Neurological:      Mental Status: She is alert and oriented to person, place, and time.    Psychiatric:         Mood and Affect: Mood normal.          Visit Time  Total Visit Duration: 30

## 2023-11-16 NOTE — PATIENT INSTRUCTIONS
Drink 6 bottles of water daily (16 oz) --- patient normally only drinks 3 bottles  HOLD HCTZ and losartan for 2 days; resume on Sunday  Repeat BMP in 1 week  Take B12 vitamin OTC  Take folate vitamin OTC  Continue multivitamins daily  Take vitamin D 50,000 units 1x weekly for 12 weeks, then switch to vitamin D OTC, 2,000 units daily  Increase Thyroid med to 75 mcg and repeat Tsh in 3 months  Semaglutide (By injection)   Semaglutide ([id number]-tide)  Treats type 2 diabetes. Lowers the risk of heart attack, stroke, or death in patients with type 2 diabetes and heart or blood vessel disease. Also used to help lose weight and keep the weight off in patients with obesity caused by certain conditions. Brand Name(s): Ozempic 0.25 MG or 0.5 MG Doses, Ozempic 1 MG Doses, Ozempic 2 MG Doses, Wegovy   There may be other brand names for this medicine. When This Medicine Should Not Be Used: This medicine is not right for everyone. Do not use it if you had an allergic reaction to semaglutide, or if you have multiple endocrine neoplasia syndrome type 2 (MEN 2) or if you or anyone in your family has had medullary thyroid cancer. How to Use This Medicine:   Injectable  Your doctor will prescribe your exact dose and tell you how often it should be given. This medicine is given as a shot under your skin. It is given into your stomach, thigh, or upper arm. You may be taught how to give your medicine at home. Make sure you understand all instructions before giving yourself an injection. Do not use more medicine or use it more often than your doctor tells you to. If you use insulin in addition to this medicine, do not mix them into the same syringe. You may give the shots in the same area (including your stomach), but do not give the shots right next to each other. Check the liquid in the pen. It should be clear and colorless. Do not use it if it is cloudy, discolored, or has particles in it.   You will be shown the body areas where this shot can be given. Use a different body area each time you give yourself a shot. Keep track of where you give each shot to make sure you rotate body areas. Use a new needle and syringe each time you inject your medicine. Never share medicine pens with others under any circumstances. Sharing needles or pens can result in transmission of infection. This medicine should come with a Medication Guide. Ask your pharmacist for a copy if you do not have one. Missed dose:   Ozempic®: If you miss a dose of this medicine, use it as soon as possible within 5 days after the missed dose. If you miss a dose for more than 5 days, skip the missed dose and go back to your regular dosing schedule. Wegovy®: If you miss a dose, and the next scheduled dose is more than 2 days away, use it as soon as possible. If you miss a dos, and the next scheduled dose is less than 2 days away, skip the missed dose and go back to your regular dosing schedule. If you miss a dose of this medicine for more than 2 weeks, use it on the next scheduled dose. Ask your doctor about how to restart your treatment. Store your new, unused medicine pen in its original carton in the refrigerator. Do not freeze. You may store the opened Ozempic® pen in the refrigerator or at room temperature for 56 days or the opened [facility] pen in the refrigerator or at room temperature for 28 days. Throw away the pen after you use it for 56 days for Ozempic® or 28 days for [facility], even if it still has medicine in it. Drugs and Foods to Avoid:   Ask your doctor or pharmacist before using any other medicine, including over-the-counter medicines, vitamins, and herbal products. Some medicines may affect how semaglutide works. Tell your doctor if you are using other diabetes medicine (including glimepiride, glipizide, glyburide, or insulin) or any oral medicine.   Warnings While Using This Medicine:   Tell your doctor if you are pregnant or planning to become pregnant. Do not use this medicine for at least 2 months before you plan to become pregnant. Tell your doctor if you are breastfeeding, or if you have kidney disease, pancreas problems, diabetes, digestion problems, or a history of diabetic retinopathy or depression. This medicine may cause the following problems:  Increased risk of thyroid tumor  Pancreatitis (swelling of the pancreas)  Gallbladder problems, including cholelithiasis, cholecystitis  Low blood sugar (when used with other diabetes medicine)  Kidney problems  Eye or vision problems, including diabetic retinopathy  Increased heart rate  Increased risk for depression or thoughts of suicide  Your doctor will do lab tests at regular visits to check on the effects of this medicine. Keep all appointments. Keep all medicine out of the reach of children. Never share your medicine with anyone. Possible Side Effects While Using This Medicine:   Call your doctor right away if you notice any of these side effects: Allergic reaction: Itching or hives, swelling in your face or hands, swelling or tingling in your mouth or throat, chest tightness, trouble breathing  Blurred vision or any other change in vision  Change in how much or how often you urinate, lower back or side pain, blood in your urine  Shaking, trembling, sweating, fast or pounding heartbeat, lightheadedness, hunger, confusion  Sudden and severe stomach pain, nausea, vomiting, fever, passing gas, stomach upset or bloating, yellow eyes or skin  Unusual moods or behaviors, depression, thoughts of hurting yourself or others  If you notice these less serious side effects, talk with your doctor:   Constipation, diarrhea  Headache, dizziness  Redness, itching, bump, swelling, or any changes in your skin where the shot was given  Tiredness  If you notice other side effects that you think are caused by this medicine, tell your doctor. Call your doctor for medical advice about side effects.  You may report side effects to FDA at 8-708-FDA-8685  © Copyright Wen Camacho 2023 Information is for End User's use only and may not be sold, redistributed or otherwise used for commercial purposes. The above information is an  only. It is not intended as medical advice for individual conditions or treatments. Talk to your doctor, nurse or pharmacist before following any medical regimen to see if it is safe and effective for you.

## 2023-11-17 ENCOUNTER — TELEPHONE (OUTPATIENT)
Dept: FAMILY MEDICINE CLINIC | Facility: OTHER | Age: 53
End: 2023-11-17

## 2023-11-17 DIAGNOSIS — E11.9 TYPE 2 DIABETES MELLITUS WITHOUT COMPLICATION, WITHOUT LONG-TERM CURRENT USE OF INSULIN (HCC): ICD-10-CM

## 2023-11-17 DIAGNOSIS — E11.9 TYPE 2 DIABETES MELLITUS WITHOUT COMPLICATION, WITHOUT LONG-TERM CURRENT USE OF INSULIN (HCC): Primary | ICD-10-CM

## 2023-11-17 NOTE — TELEPHONE ENCOUNTER
Pt's insurance considers Ozempic part of their formulary medications so they wouldn't accept a prior authorization request.  Patient would have to pay $911 out of pocket until she reaches her OOP deductible. Pt stated she would like to go back to the Januvia and is requesting an increase on this medication instead of the Ozempic. Please advise. Thank you.

## 2023-11-17 NOTE — PROGRESS NOTES
Pt's insurance considers Ozempic part of their formulary medications so they wouldn't accept a prior authorization request.  Patient would have to pay $911 out of pocket until she reaches her OOP deductible. Will resume Januvia at an increased dose and continue surveillance and monitoring.     Sebastian Ormond, MD  Family Medicine PGY-3  [facility]

## 2023-11-30 DIAGNOSIS — I10 ESSENTIAL HYPERTENSION: ICD-10-CM

## 2023-11-30 RX ORDER — HYDROCHLOROTHIAZIDE 50 MG/1
50 TABLET ORAL DAILY
Qty: 30 TABLET | Refills: 0 | Status: SHIPPED | OUTPATIENT
Start: 2023-11-30 | End: 2023-12-30

## 2023-12-01 LAB
SL AMB T4, FREE (DIRECT): 1.33 NG/DL (ref 0.82–1.77)
TSH SERPL DL<=0.005 MIU/L-ACNC: 5.69 UIU/ML (ref 0.45–4.5)

## 2023-12-04 DIAGNOSIS — N18.9 ACUTE KIDNEY INJURY SUPERIMPOSED ON CKD: Primary | ICD-10-CM

## 2023-12-04 DIAGNOSIS — E03.9 HYPOTHYROIDISM, UNSPECIFIED TYPE: ICD-10-CM

## 2023-12-04 DIAGNOSIS — N17.9 ACUTE KIDNEY INJURY SUPERIMPOSED ON CKD: Primary | ICD-10-CM

## 2023-12-06 NOTE — ASSESSMENT & PLAN NOTE
Lab Results   Component Value Date    HGBA1C 8.4 (A) 11/02/2023   HbA1c: 8.4>6.9  Current regimen: metformin 1,000 BID + Januvia 50 mg daily  Repeat labs w/ CMP and UACR   Patient would like to transition to injectable GLP-1 agonist  Discussed Risks/Benefits and patient denies any personal or fhx of thyroid and other endocrine CA/pathologies  Continue metformin and Januvia until approved for Ozempic, then d/c Januvia when starting Ozempic.

## 2023-12-15 DIAGNOSIS — F41.9 ANXIETY: ICD-10-CM

## 2023-12-15 RX ORDER — ALPRAZOLAM 0.25 MG/1
0.25 TABLET ORAL 2 TIMES DAILY PRN
Qty: 30 TABLET | Refills: 0 | Status: CANCELLED | OUTPATIENT
Start: 2023-12-15

## 2023-12-21 RX ORDER — ALPRAZOLAM 0.25 MG/1
0.25 TABLET ORAL 2 TIMES DAILY PRN
Qty: 30 TABLET | Refills: 0 | Status: SHIPPED | OUTPATIENT
Start: 2023-12-21

## 2024-01-02 DIAGNOSIS — E11.9 TYPE 2 DIABETES MELLITUS WITHOUT COMPLICATION, WITHOUT LONG-TERM CURRENT USE OF INSULIN (HCC): ICD-10-CM

## 2024-01-02 DIAGNOSIS — I10 ESSENTIAL HYPERTENSION: ICD-10-CM

## 2024-01-02 RX ORDER — HYDROCHLOROTHIAZIDE 50 MG/1
50 TABLET ORAL DAILY
Qty: 30 TABLET | Refills: 0 | Status: SHIPPED | OUTPATIENT
Start: 2024-01-02 | End: 2024-02-01

## 2024-01-08 ENCOUNTER — PATIENT MESSAGE (OUTPATIENT)
Dept: FAMILY MEDICINE CLINIC | Facility: OTHER | Age: 54
End: 2024-01-08

## 2024-01-08 DIAGNOSIS — Z12.31 ENCOUNTER FOR SCREENING MAMMOGRAM FOR BREAST CANCER: Primary | ICD-10-CM

## 2024-01-09 DIAGNOSIS — I10 ESSENTIAL HYPERTENSION: ICD-10-CM

## 2024-01-09 RX ORDER — LOSARTAN POTASSIUM 100 MG/1
100 TABLET ORAL DAILY
Qty: 90 TABLET | Refills: 0 | Status: SHIPPED | OUTPATIENT
Start: 2024-01-09

## 2024-02-01 DIAGNOSIS — E03.9 HYPOTHYROIDISM, UNSPECIFIED TYPE: ICD-10-CM

## 2024-02-01 DIAGNOSIS — F41.9 ANXIETY: ICD-10-CM

## 2024-02-01 RX ORDER — ALPRAZOLAM 0.25 MG/1
0.25 TABLET ORAL 2 TIMES DAILY PRN
Qty: 30 TABLET | Refills: 0 | Status: SHIPPED | OUTPATIENT
Start: 2024-02-01

## 2024-02-01 RX ORDER — LEVOTHYROXINE SODIUM 0.07 MG/1
75 TABLET ORAL DAILY
Qty: 90 TABLET | Refills: 0 | Status: SHIPPED | OUTPATIENT
Start: 2024-02-01 | End: 2024-05-01

## 2024-02-03 DIAGNOSIS — I10 ESSENTIAL HYPERTENSION: ICD-10-CM

## 2024-02-05 DIAGNOSIS — I10 ESSENTIAL HYPERTENSION: ICD-10-CM

## 2024-02-05 RX ORDER — LOSARTAN POTASSIUM 100 MG/1
100 TABLET ORAL DAILY
Qty: 90 TABLET | Refills: 0 | Status: SHIPPED | OUTPATIENT
Start: 2024-02-05

## 2024-02-05 RX ORDER — HYDROCHLOROTHIAZIDE 50 MG/1
50 TABLET ORAL DAILY
Qty: 30 TABLET | Refills: 0 | Status: SHIPPED | OUTPATIENT
Start: 2024-02-05 | End: 2024-02-06 | Stop reason: SDUPTHER

## 2024-02-06 ENCOUNTER — TELEPHONE (OUTPATIENT)
Dept: FAMILY MEDICINE CLINIC | Facility: OTHER | Age: 54
End: 2024-02-06

## 2024-02-06 DIAGNOSIS — I10 ESSENTIAL HYPERTENSION: ICD-10-CM

## 2024-02-06 RX ORDER — HYDROCHLOROTHIAZIDE 50 MG/1
50 TABLET ORAL DAILY
Qty: 30 TABLET | Refills: 0 | Status: SHIPPED | OUTPATIENT
Start: 2024-02-06 | End: 2024-03-07

## 2024-02-06 RX ORDER — HYDROCHLOROTHIAZIDE 50 MG/1
50 TABLET ORAL DAILY
Qty: 30 TABLET | Refills: 0 | OUTPATIENT
Start: 2024-02-06 | End: 2024-03-07

## 2024-02-15 LAB
BUN SERPL-MCNC: 16 MG/DL (ref 6–24)
BUN/CREAT SERPL: 15 (ref 9–23)
CALCIUM SERPL-MCNC: 10 MG/DL (ref 8.7–10.2)
CHLORIDE SERPL-SCNC: 102 MMOL/L (ref 96–106)
CO2 SERPL-SCNC: 24 MMOL/L (ref 20–29)
CREAT SERPL-MCNC: 1.1 MG/DL (ref 0.57–1)
EGFR: 60 ML/MIN/1.73
GLUCOSE SERPL-MCNC: 132 MG/DL (ref 70–99)
POTASSIUM SERPL-SCNC: 3.6 MMOL/L (ref 3.5–5.2)
SL AMB T4, FREE (DIRECT): 1.43 NG/DL (ref 0.82–1.77)
SODIUM SERPL-SCNC: 142 MMOL/L (ref 134–144)
TSH SERPL DL<=0.005 MIU/L-ACNC: 4.56 UIU/ML (ref 0.45–4.5)

## 2024-02-20 ENCOUNTER — PATIENT MESSAGE (OUTPATIENT)
Dept: FAMILY MEDICINE CLINIC | Facility: OTHER | Age: 54
End: 2024-02-20

## 2024-02-20 ENCOUNTER — TELEMEDICINE (OUTPATIENT)
Dept: FAMILY MEDICINE CLINIC | Facility: OTHER | Age: 54
End: 2024-02-20
Payer: COMMERCIAL

## 2024-02-20 DIAGNOSIS — E11.9 TYPE 2 DIABETES MELLITUS WITHOUT COMPLICATION, WITHOUT LONG-TERM CURRENT USE OF INSULIN (HCC): ICD-10-CM

## 2024-02-20 DIAGNOSIS — E03.9 HYPOTHYROIDISM, UNSPECIFIED TYPE: Primary | ICD-10-CM

## 2024-02-20 DIAGNOSIS — I10 ESSENTIAL HYPERTENSION: ICD-10-CM

## 2024-02-20 PROCEDURE — 99213 OFFICE O/P EST LOW 20 MIN: CPT

## 2024-02-20 RX ORDER — LOSARTAN POTASSIUM 100 MG/1
100 TABLET ORAL DAILY
Qty: 90 TABLET | Refills: 1 | Status: SHIPPED | OUTPATIENT
Start: 2024-02-20

## 2024-02-20 RX ORDER — HYDROCHLOROTHIAZIDE 50 MG/1
50 TABLET ORAL DAILY
Qty: 90 TABLET | Refills: 1 | Status: SHIPPED | OUTPATIENT
Start: 2024-02-20

## 2024-02-20 NOTE — PROGRESS NOTES
Virtual Regular Visit    Verification of patient location:    Patient is located at Home in the following state in which I hold an active license [place]    Assessment/Plan:    Problem List Items Addressed This Visit          Endocrine    Hypothyroidism - Primary    Relevant Orders    TSH, 3rd generation with Free T4 reflex    Type 2 diabetes mellitus without complication, without long-term current use of insulin ([facility])       Lab Results   Component Value Date    HGBA1C 8.4 (A) 11/02/2023   HbA1c: 8.4>6.9  Continue to monitor kidney function   Patient continues to take metformin and Januvia  BS at home as high as 200  Still not approved by insurance to start Ozempic  Patient due for recheck of HbA1c and review of home glucose checks  If continues to be elevated and uncontrolled, will add an SGLT2i  Recommended evaluation by Endocrinology for help with obtaining GLP 1 agonist, patient will consider         Relevant Medications    semaglutide, 0.25 or 0.5 mg/dose, (Ozempic, 0.25 or 0.5 MG/DOSE,) 2 mg/3 mL injection pen    Other Relevant Orders    Hemoglobin A1C     Other Visit Diagnoses       Essential hypertension        Asymptomatic  Continue losartan and hctz    Relevant Medications    hydroCHLOROthiazide 50 mg tablet    losartan (COZAAR) 100 MG tablet         F/U in 6 weeks in person to review BS log, hbA1c and TSH. Confirm that the patient has completed high dose vitamin D course, started taking daily vitamin D and daily vitamin B12. Reorder vitamin D and vitamin B12 labs.     Reason for visit is   Chief Complaint   Patient presents with    Virtual Regular Visit      Encounter provider Amanda Chauhan MD    Provider located at [place] [address] [address] [address]      Recent Visits  No visits were found meeting these conditions.  Showing recent visits within past 7 days and meeting all other requirements  Future Appointments  No visits were found meeting these  "conditions.  Showing future appointments within next 150 days and meeting all other requirements       The patient was identified by name and date of birth. Joann Marie was informed that this is a telemedicine visit and that the visit is being conducted through the Epic Embedded platform. She agrees to proceed..  My office door was closed. No one else was in the room.  She acknowledged consent and understanding of privacy and security of the video platform. The patient has agreed to participate and understands they can discontinue the visit at any time.    Patient is aware this is a billable service.     Subjective  Joann Marie is a 53 y.o. female with a past medical history significant for T2DM, HTN, Dyslipidemia, Obesity, nicotine dependence and Anxiety.    HPI   Patient presents for a follow-up visit to review recent labs for kidney function, thyroid hormone function and continued management of diabetes. Patient reports having a poor appetite and not being compliant with a diabetic diet because she is a \"picky eater\".  Patient has been checking her blood sugars at home with highest readings reported to be 200.  Symptoms of fatigue has been slightly better since she was last seen.  She denies any lightheadedness/dizziness, chest pain, shortness of breath, headache, palpitations, weakness, intolerance to heat/cold, tremors or other concerning symptoms at this time.    Past Medical History:   Diagnosis Date    Anxiety     Calculus of distal right ureter     last assessed 08/06/2014    Closed fracture of head of first metacarpal bone of right hand     Colostomy status (HCC) 04/05/2018    Depression     Disease of thyroid gland     Diverticulitis     Fatty liver     last assessed 01/09/2017    HLD (hyperlipidemia)     treating with diet    Hypertension     Kidney stone     Lung nodules     Migraine     last assessed 08/03/2012    Pneumonia     Pre-diabetes     last assessed 06/27/2017       Past Surgical " History:   Procedure Laterality Date    APPENDECTOMY      BREAST CYST EXCISION Right     many years ago and not sure if it was right?    CHOLECYSTECTOMY  1999    laparoscopic    COLON SURGERY  01/09/2018    colostomy    COLONOSCOPY  02/2012    polyp, Dr. Zee    COLOSTOMY CLOSURE N/A 04/05/2018    Procedure: REVERSAL COLOSTOMY;  Surgeon: Walt Gardiner DO;  [facility];  Service: General    FLEXIBLE SIGMOIDOSCOPY N/A 04/05/2018    Procedure: FLEX SIGMOIDOSCOPY;  Surgeon: Walt Gardiner DO;  [facility];  Service: General    HERNIA REPAIR  1999    umbilical    LAPAROTOMY N/A 01/07/2018    Procedure: LAPAROTOMY EXPLORATORY, sigmoid colon resection, colostomy, appendectomy;  Surgeon: Walt Gardiner DO;  [facility];  Service: General    SD COLONOSCOPY FLX DX W/COLLJ SPEC WHEN PFRMD N/A 04/02/2018    Procedure: COLONOSCOPY;  Surgeon: Walt Gardiner DO;  [facility];  Service: General    SD COLONOSCOPY FLX DX W/COLLJ SPEC WHEN PFRMD N/A 04/16/2019    Procedure: COLONOSCOPY;  Surgeon: Walt Gardiner DO;  [facility];  Service: General    SD EXPLORATORY LAPAROTOMY CELIOTOMY W/WO BIOPSY SPX N/A 04/05/2018    Procedure: EXPLORATORY LAPAROTOMY;  Surgeon: Walt Gardiner DO;  [facility];  Service: General    SD REPAIR FIRST ABDOMINAL WALL HERNIA N/A 11/15/2018    Procedure: INCISIONAL HERNIA REPAIR AT UMBILICUS;  Surgeon: Walt Gardiner DO;  [facility];  Service: General    REDUCTION MAMMAPLASTY Bilateral 1999    TUBAL LIGATION         Current Outpatient Medications   Medication Sig Dispense Refill    hydroCHLOROthiazide 50 mg tablet Take 1 tablet (50 mg total) by mouth daily 90 tablet 1    losartan (COZAAR) 100 MG tablet Take 1 tablet (100 mg total) by mouth daily 90 tablet 1    semaglutide, 0.25 or 0.5 mg/dose, (Ozempic, 0.25 or 0.5 MG/DOSE,) 2 mg/3 mL injection pen 0.25 mg under the skin every 7 days for 4 doses (28 days), THEN 0.5 mg under the  skin every 7 days 9 mL 0    albuterol (ProAir HFA) 90 mcg/act inhaler Inhale 2 puffs every 6 (six) hours as needed for wheezing or shortness of breath (cough) 8 g 0    ALPRAZolam (XANAX) 0.25 mg tablet Take 1 tablet (0.25 mg total) by mouth 2 (two) times a day as needed for anxiety 30 tablet 0    atorvastatin (LIPITOR) 20 mg tablet Take 1 tablet (20 mg total) by mouth daily 30 tablet 5    ergocalciferol (VITAMIN D2) 50,000 units Take 1 capsule (50,000 Units total) by mouth once a week 12 capsule 0    glucose blood (OneTouch Ultra) test strip Use as instructed 50 strip 23    levothyroxine 75 mcg tablet Take 1 tablet (75 mcg total) by mouth daily 90 tablet 0    metFORMIN (GLUCOPHAGE) 1000 MG tablet Take 1 tablet (1,000 mg total) by mouth 2 (two) times a day 180 tablet 1    sitaGLIPtin (JANUVIA) 50 mg tablet Take 1 tablet (50 mg total) by mouth daily 90 tablet 0     No current facility-administered medications for this visit.        Allergies   Allergen Reactions    Penicillins Angioedema    Amoxicillin Edema    Doxycycline Vomiting    Vicodin [Hydrocodone-Acetaminophen] GI Intolerance and Vomiting       Review of Systems   Constitutional:  Negative for chills and fever.   HENT:  Negative for congestion.    Eyes:  Negative for visual disturbance.   Respiratory:  Negative for shortness of breath.    Cardiovascular:  Negative for chest pain.   Gastrointestinal:  Negative for abdominal pain.   Endocrine: Negative for cold intolerance, heat intolerance, polydipsia, polyphagia and polyuria.   Musculoskeletal:  Negative for arthralgias.   Neurological:  Negative for headaches.   Psychiatric/Behavioral:  Negative for confusion.        Video Exam    There were no vitals filed for this visit.    Physical Exam  Nursing note reviewed.   Constitutional:       General: She is not in acute distress.     Appearance: She is not ill-appearing.   HENT:      Head: Normocephalic and atraumatic.   Eyes:      Extraocular Movements:  Extraocular movements intact.      Conjunctiva/sclera: Conjunctivae normal.   Pulmonary:      Effort: Pulmonary effort is normal. No respiratory distress.   Neurological:      Mental Status: She is alert and oriented to person, place, and time.   Psychiatric:         Mood and Affect: Mood normal.          Visit Time  Total Visit Duration: 20

## 2024-03-07 DIAGNOSIS — E11.9 TYPE 2 DIABETES MELLITUS WITHOUT COMPLICATION, WITHOUT LONG-TERM CURRENT USE OF INSULIN (HCC): ICD-10-CM

## 2024-03-07 DIAGNOSIS — F41.9 ANXIETY: ICD-10-CM

## 2024-03-08 RX ORDER — ALPRAZOLAM 0.25 MG/1
0.25 TABLET ORAL 2 TIMES DAILY PRN
Qty: 30 TABLET | Refills: 0 | Status: SHIPPED | OUTPATIENT
Start: 2024-03-08

## 2024-03-24 NOTE — ASSESSMENT & PLAN NOTE
Lab Results   Component Value Date    HGBA1C 8.4 (A) 11/02/2023   HbA1c: 8.4>6.9  Continue to monitor kidney function   Patient continues to take metformin and Januvia  BS at home as high as 200  Still not approved by insurance to start Ozempic  Patient due for recheck of HbA1c and review of home glucose checks  If continues to be elevated and uncontrolled, will add an SGLT2i  Recommended evaluation by Endocrinology for help with obtaining GLP 1 agonist, patient will consider

## 2024-03-26 ENCOUNTER — PATIENT MESSAGE (OUTPATIENT)
Dept: FAMILY MEDICINE CLINIC | Facility: OTHER | Age: 54
End: 2024-03-26

## 2024-04-01 DIAGNOSIS — I10 ESSENTIAL HYPERTENSION: ICD-10-CM

## 2024-04-01 RX ORDER — LOSARTAN POTASSIUM 100 MG/1
100 TABLET ORAL DAILY
Qty: 90 TABLET | Refills: 1 | Status: SHIPPED | OUTPATIENT
Start: 2024-04-01

## 2024-04-12 DIAGNOSIS — E11.9 TYPE 2 DIABETES MELLITUS WITHOUT COMPLICATION, WITHOUT LONG-TERM CURRENT USE OF INSULIN (HCC): Primary | ICD-10-CM

## 2024-04-12 RX ORDER — GLIPIZIDE 5 MG/1
5 TABLET, FILM COATED, EXTENDED RELEASE ORAL DAILY
Qty: 90 TABLET | Refills: 0 | Status: SHIPPED | OUTPATIENT
Start: 2024-04-12 | End: 2024-04-12

## 2024-04-12 RX ORDER — GLIPIZIDE 5 MG/1
5 TABLET, FILM COATED, EXTENDED RELEASE ORAL DAILY
Qty: 90 TABLET | Refills: 0 | Status: SHIPPED | OUTPATIENT
Start: 2024-04-12

## 2024-05-04 DIAGNOSIS — E11.9 TYPE 2 DIABETES MELLITUS WITHOUT COMPLICATION, WITHOUT LONG-TERM CURRENT USE OF INSULIN (HCC): ICD-10-CM

## 2024-05-04 DIAGNOSIS — E78.2 MIXED HYPERLIPIDEMIA: ICD-10-CM

## 2024-05-04 DIAGNOSIS — E03.9 HYPOTHYROIDISM, UNSPECIFIED TYPE: ICD-10-CM

## 2024-05-04 DIAGNOSIS — E55.9 VITAMIN D DEFICIENCY: ICD-10-CM

## 2024-05-06 ENCOUNTER — TELEPHONE (OUTPATIENT)
Age: 54
End: 2024-05-06

## 2024-05-06 DIAGNOSIS — I10 ESSENTIAL HYPERTENSION: ICD-10-CM

## 2024-05-06 DIAGNOSIS — E03.9 HYPOTHYROIDISM, UNSPECIFIED TYPE: ICD-10-CM

## 2024-05-06 DIAGNOSIS — E11.9 TYPE 2 DIABETES MELLITUS WITHOUT COMPLICATION, WITHOUT LONG-TERM CURRENT USE OF INSULIN (HCC): ICD-10-CM

## 2024-05-06 RX ORDER — LEVOTHYROXINE SODIUM 0.07 MG/1
75 TABLET ORAL DAILY
Qty: 90 TABLET | Refills: 0 | Status: SHIPPED | OUTPATIENT
Start: 2024-05-06 | End: 2024-05-06 | Stop reason: SDUPTHER

## 2024-05-06 RX ORDER — ATORVASTATIN CALCIUM 20 MG/1
20 TABLET, FILM COATED ORAL DAILY
Qty: 30 TABLET | Refills: 0 | Status: SHIPPED | OUTPATIENT
Start: 2024-05-06

## 2024-05-06 RX ORDER — HYDROCHLOROTHIAZIDE 50 MG/1
50 TABLET ORAL DAILY
Qty: 90 TABLET | Refills: 1 | Status: SHIPPED | OUTPATIENT
Start: 2024-05-06

## 2024-05-06 RX ORDER — ERGOCALCIFEROL 1.25 MG/1
50000 CAPSULE ORAL WEEKLY
Qty: 12 CAPSULE | Refills: 0 | Status: SHIPPED | OUTPATIENT
Start: 2024-05-06 | End: 2024-08-04

## 2024-05-06 RX ORDER — LEVOTHYROXINE SODIUM 0.07 MG/1
75 TABLET ORAL DAILY
Qty: 90 TABLET | Refills: 0 | Status: SHIPPED | OUTPATIENT
Start: 2024-05-06 | End: 2024-08-04

## 2024-05-06 RX ORDER — GLIPIZIDE 5 MG/1
5 TABLET, FILM COATED, EXTENDED RELEASE ORAL DAILY
Qty: 90 TABLET | Refills: 0 | Status: SHIPPED | OUTPATIENT
Start: 2024-05-06 | End: 2024-05-17 | Stop reason: SDUPTHER

## 2024-05-06 NOTE — TELEPHONE ENCOUNTER
Patient called in, stating that pharmacy has been trying to fax over a request for patients three medications to be filled, as patient is completely out, pharmacy can't seem to get through    hydroCHLOROthiazide 50 mg tablet     levothyroxine 75 mcg tablet    metFORMIN (GLUCOPHAGE) 1000 MG tablet    Please advise back to patient, if any additional question     Or if scripts had been placed.     Preferred pharmacy:   [id number] - [place] - [phone] [[id number]]

## 2024-05-09 ENCOUNTER — HOSPITAL ENCOUNTER (OUTPATIENT)
Dept: MAMMOGRAPHY | Facility: HOSPITAL | Age: 54
Discharge: HOME | End: 2024-05-09
Payer: COMMERCIAL

## 2024-05-09 VITALS — HEIGHT: 67 IN | BODY MASS INDEX: 37.02 KG/M2 | WEIGHT: 235.89 LBS

## 2024-05-09 DIAGNOSIS — Z12.31 ENCOUNTER FOR SCREENING MAMMOGRAM FOR BREAST CANCER: ICD-10-CM

## 2024-05-09 PROCEDURE — 77063 BREAST TOMOSYNTHESIS BI: CPT

## 2024-05-09 PROCEDURE — 77067 SCR MAMMO BI INCL CAD: CPT

## 2024-05-13 ENCOUNTER — TELEPHONE (OUTPATIENT)
Age: 54
End: 2024-05-13

## 2024-05-13 NOTE — TELEPHONE ENCOUNTER
Called pt with mammogram results. Pt thanked us for call and no further questions.     Please call and inform the patient that her mammogram was normal. Please repeat screening mammogram in 1 year. Thanks

## 2024-05-15 LAB
HBA1C MFR BLD: 9.5 % (ref 4.8–5.6)
TSH SERPL DL<=0.005 MIU/L-ACNC: 3.77 UIU/ML (ref 0.45–4.5)

## 2024-05-17 ENCOUNTER — OFFICE VISIT (OUTPATIENT)
Age: 54
End: 2024-05-17
Payer: COMMERCIAL

## 2024-05-17 VITALS
HEART RATE: 98 BPM | SYSTOLIC BLOOD PRESSURE: 134 MMHG | OXYGEN SATURATION: 97 % | HEIGHT: 67 IN | WEIGHT: 236.2 LBS | TEMPERATURE: 98 F | RESPIRATION RATE: 18 BRPM | DIASTOLIC BLOOD PRESSURE: 80 MMHG | BODY MASS INDEX: 37.07 KG/M2

## 2024-05-17 DIAGNOSIS — E11.9 TYPE 2 DIABETES MELLITUS WITHOUT COMPLICATION, WITHOUT LONG-TERM CURRENT USE OF INSULIN (HCC): Primary | ICD-10-CM

## 2024-05-17 DIAGNOSIS — E55.9 VITAMIN D DEFICIENCY: ICD-10-CM

## 2024-05-17 DIAGNOSIS — Z78.0 POSTMENOPAUSAL: ICD-10-CM

## 2024-05-17 DIAGNOSIS — E03.9 HYPOTHYROIDISM, UNSPECIFIED TYPE: ICD-10-CM

## 2024-05-17 DIAGNOSIS — F17.210 CIGARETTE NICOTINE DEPENDENCE WITHOUT COMPLICATION: ICD-10-CM

## 2024-05-17 DIAGNOSIS — R30.0 DYSURIA: ICD-10-CM

## 2024-05-17 PROCEDURE — 99213 OFFICE O/P EST LOW 20 MIN: CPT

## 2024-05-17 RX ORDER — METFORMIN HYDROCHLORIDE 500 MG/1
1000 TABLET, EXTENDED RELEASE ORAL 2 TIMES DAILY WITH MEALS
Qty: 180 TABLET | Refills: 1 | Status: SHIPPED | OUTPATIENT
Start: 2024-05-17

## 2024-05-17 RX ORDER — GLIPIZIDE 10 MG/1
10 TABLET, FILM COATED, EXTENDED RELEASE ORAL DAILY
Qty: 90 TABLET | Refills: 1 | Status: SHIPPED | OUTPATIENT
Start: 2024-05-17

## 2024-05-17 NOTE — PATIENT INSTRUCTIONS
Diabetes and Nutrition   WHAT YOU NEED TO KNOW:   Nutrition plans help keep blood sugar levels steady. They also help delay or prevent complications of diabetes, such as diabetic kidney disease.  DISCHARGE INSTRUCTIONS:   Call your local emergency number (911 in the [place]) if:   You have any of the following signs of a heart attack:      Squeezing, pressure, or pain in your chest    You may  also have any of the following:     Discomfort or pain in your back, neck, jaw, stomach, or arm    Shortness of breath    Nausea or vomiting    Lightheadedness or a sudden cold sweat      Return to the emergency department if:   You have a low blood sugar level and it does not improve with treatment. Symptoms are trouble thinking, a pounding heartbeat, and sweating.    Your blood sugar level is above 240 mg/dL and does not come down within 15 minutes of treatment.    You have ketones in your blood or urine.    You have nausea or are vomiting and cannot keep any food or liquid down.    You have blurred or double vision.    Your breath has a fruity, sweet smell, or your breathing is shallow.    Call your doctor or diabetes care team provider if:   Your blood sugar levels are higher than your target goals.    You often have low blood sugar levels.    You have trouble coping with diabetes, or you feel anxious or depressed.    You have questions or concerns about your condition or care.    A dietitian will help you create a nutrition plan  to meet your needs and your family's needs. Your dietitian may explain a plan such as the Dietary Approaches to Stop Hypertension (DASH) eating plan or the Mediterranean diet. The goal is for you to reach or maintain healthy weight, blood sugar, blood pressure, and lipid levels. You should meet with the dietitian at least 1 time each year. You will learn the following:  How food affects your blood sugar levels    How to create healthy eating habits    How to make food choices based on your activity  level, weight, and glucose levels    How your favorite foods may fit into your plan    How to keep track of carbohydrates    Correct portion sizes for each food    Changes you can make to your plan if you get pregnant or are breastfeeding       What you can do before you meet with the dietitian:   Do not skip meals.  The goal is to keep your blood sugar level steady. Blood sugar levels may drop too low if you have received insulin and do not eat.    Eat more high-fiber foods.  Examples include fresh or frozen fruits and vegetables, whole-grain breads, and beans. Fiber helps control or lower blood sugar and cholesterol levels. Choose whole fruits instead of fruit juice as much as possible. Sugar may be added to juice, and fiber may be removed.         Choose heart-healthy fats.  Foods high in heart-healthy fats include olive oil, nuts, avocados, and fatty fish, such as salmon and tuna. Foods high in unhealthy fats include red meat, full-fat dairy products, and soft margarine. Unhealthy fats can increase your risk for heart disease, increase bad cholesterol, and lower good cholesterol.         Choose complex carbohydrates.  Foods with complex carbohydrates include brown rice, whole-grain breads and cereals, and cooked beans. Foods with simple carbohydrates include white bread, white rice, most cold cereals, and snack foods. Your plan will include the amount of carbohydrate to have at one time or in a day. Your blood sugar level can get too high if you eat too much carbohydrate at one time. Blood sugar levels do not spike as high or drop as quickly with complex carbohydrates as with simple carbohydrates. Choose complex carbohydrates whenever possible.    Have less sodium (salt).  The risk for high blood pressure (BP) increases with high-sodium foods. Limit high-sodium foods, such as soy sauce, potato chips, and canned soup. Do not add salt to food you cook. Limit your use of table salt. Read labels to have no more than  2,300 milligrams of sodium in one day.         Limit artificial sweeteners.  These may be found in food or drinks, such as diet soft drinks or other low-calorie beverages. Artificial sweeteners are low in calories. They may help you lower your overall calories and carbohydrates. It is important not to have more calories from other foods to make up for the calories saved. Artificial sweeteners do not have any nutrition. Eat whole foods and drink water as much as possible. Your plan may include beverages with artificial sweeteners for a short time. These can help you transition from high-sugar beverages to water.    Use the plate method for each meal.  This method can help you eat the right amount of carbohydrates and keep your blood sugar levels under control.    Draw an imaginary line down the middle of a 9-inch dinner plate.  On one side, draw another line to divide that section in half. Your plate will have one large section and 2 small sections.    Fill the largest section with non-starchy vegetables.  These include broccoli, spinach, cucumbers, peppers, cauliflower, and tomatoes.    Add a carbohydrate to one of the small sections.  Examples include pasta, rice, whole-grain bread, tortillas, corn, potatoes, and beans. Your plan may allow a serving of low-fat dairy or a small fruit as a carbohydrate.    Add meat or another source of protein to the other small section.  Examples include chicken or turkey without skin, fish, lean beef or pork, low-fat cheese, tofu, and eggs.    Have a low-calorie or calorie-free drink with your meal.  Examples include water or unsweetened tea or coffee.       Reach and maintain a healthy weight:  A healthy weight can help you control your diabetes. Ask your healthcare provider what a healthy weight is for you. Even a weight loss of 3% to 7% of excess body weight can help you manage diabetes. Your provider can help you create a weight-loss plan, if needed. For example, your goal may  be to lose at least 5% of your extra weight in the first 6 months.  What you need to know if you choose to drink alcohol:   Alcohol can cause health problems.  Alcohol can cause hypoglycemia (very low blood sugar level), especially if you use insulin. Alcohol can cause high blood sugar and BP levels, and weight gain if you drink too much.    Hypoglycemia can happen hours after you drink alcohol.  Check your blood sugar level for several hours after you drink alcohol. Have a source of fast-acting carbohydrates with you in case your level goes too low. You need immediate care if you have signs or symptoms of hypoglycemia, such as sweating, confusion, or fainting.    Limit alcohol as directed.  Your healthcare provider can tell you how many drinks are okay for you within 24 hours or within 1 week. Women 21 years or older and men 65 years or older should limit alcohol to 1 drink a day. Men aged 21 to 64 years should limit alcohol to 2 drinks a day. A drink of alcohol is 12 ounces of beer, 5 ounces of wine, or 1½ ounces of liquor.    Always have food when you drink alcohol.  Your blood sugar may fall to a low level if you drink when your stomach is empty.    Follow up with your doctor or diabetes team provider as directed:  Your doctor or provider may recommend counseling to help you make nutrition changes. Write down your questions so you remember to ask them during your visits.  © Copyright [employer] 2023 Information is for End User's use only and may not be sold, redistributed or otherwise used for commercial purposes.  The above information is an  only. It is not intended as medical advice for individual conditions or treatments. Talk to your doctor, nurse or pharmacist before following any medical regimen to see if it is safe and effective for you.

## 2024-05-17 NOTE — PROGRESS NOTES
Ambulatory Visit  Name: Joann Marie      : 1970      MRN: [id number]  Encounter Provider: Amanda Chauhan MD  Encounter Date: 2024   Encounter department: [facility]    Assessment & Plan   1. Type 2 diabetes mellitus without complication, without long-term current use of insulin (HCC)  Assessment & Plan:  Lab Results   Component Value Date    HGBA1C 9.5 (H) 2024    HGBA1C 8.4 (A) 2023     Patient was taking metformin 1,000 m BID>> will switch to extended release  Patient started on Glipizide 5 mg daily 1 month ago>> will increase to 10 mg daily  Insurance did not approve GLP1 medication  Patient declined starting an SGLT2i medication  Patient now agreeable for referral to Endocrinology  Patient strongly urged to improve lifestyle accommodations  Recheck BMP/HbA1c and plan to follow up in 3 months      Orders:  -     metFORMIN (GLUCOPHAGE-XR) 500 mg 24 hr tablet; Take 2 tablets (1,000 mg total) by mouth 2 (two) times a day with meals  -     Ambulatory Referral to Endocrinology; Future  -     Hemoglobin A1C; Future  -     Basic metabolic panel; Future  -     glipiZIDE (GLUCOTROL XL) 10 mg 24 hr tablet; Take 1 tablet (10 mg total) by mouth daily  -     Hemoglobin A1C  -     Basic metabolic panel  2. Vitamin D deficiency  Assessment & Plan:  Last vitamin D level 12  Patient completed 12 week course of high dose vitamin D and has transitioned to 1,000 IU daily  Continue vitamin D supplementation  Orders:  -     Vitamin D 25 hydroxy; Future  -     Vitamin D 25 hydroxy  3. Hypothyroidism, unspecified type  Assessment & Plan:  Last TSH (2024): 3.770  Stable  Continue levothyroxine 75 mcg daily    4. Cigarette nicotine dependence without complication  Assessment & Plan:  Currently smokes 1-2 PPD, but has been trying to quit  Continue with smoking cessation aids (nicotine patch/gum)  Trial of Wellbutrin in the past not effective  Patient declined referral for smoking  cessation program  Will continue to provide support as needed  Orders:  -     nicotine (NICODERM CQ) 7 mg/24hr TD 24 hr patch; Place 1 patch on the skin over 24 hours every 24 hours  -     nicotine polacrilex (NICORETTE) 2 mg gum; Chew 1 each (2 mg total) as needed for smoking cessation  5. Dysuria  Comments:  increased pressure and frequency  Orders:  -     UA w Reflex to Microscopic w Reflex to Culture; Future  6. Postmenopausal  Comments:  LMP in 2/2023     Follow up in 3 months to recheck diabetes management and review labs. Patient will set up appt with Endocrinology ASAP    History of Present Illness     Diabetes  She presents for her follow-up diabetic visit. She has type 2 diabetes mellitus. Her disease course has been worsening. Hypoglycemia symptoms include nervousness/anxiousness. Pertinent negatives for hypoglycemia include no confusion or dizziness. Pertinent negatives for diabetes include no blurred vision, no chest pain, no foot paresthesias, no polyuria and no visual change. Current diabetic treatment includes oral agent (dual therapy). She is compliant with treatment all of the time. She rarely participates in exercise. An ACE inhibitor/angiotensin II receptor blocker is being taken. She does not see a podiatrist.Eye exam is current.   Patient presents for a follow up visit for continued management of diabetes and hypothyroidism. Sadly, the patient's sister was recently diagnosed with stage IV liver cancer and will be undergoing treatment soon. Her sister also has a hx of breast cancer.  The patient is planning to undergo genetic testing for cancer genes given this tragic diagnosis. Due to these recent life/family stressors, the patient admits to being poorly compliant with her diet and exercise. Glipizide was added to her metformin regimen 1 month ago, however her glucose control has worsened, apparent by worsening A1c. She denies any symptoms of increased urinary frequency, blurred vision, HA,  dizziness, CP, palpitations, abdominal pain, N/V/D, paresthesia. She also denies any episodes of hypoglycemic events.     Review of Systems   Constitutional:  Negative for chills and unexpected weight change.   Eyes:  Negative for blurred vision and visual disturbance.   Respiratory:  Negative for cough, shortness of breath and wheezing.    Cardiovascular:  Negative for chest pain.   Gastrointestinal:  Negative for abdominal pain, nausea and vomiting.   Endocrine: Negative for polyuria.   Neurological:  Negative for dizziness and light-headedness.   Psychiatric/Behavioral:  Positive for dysphoric mood and sleep disturbance. Negative for confusion. The patient is nervous/anxious.      Past Medical History:   Diagnosis Date    Anxiety     Calculus of distal right ureter     last assessed 08/06/2014    Closed fracture of head of first metacarpal bone of right hand     Colostomy status (HCC) 04/05/2018    Depression     Disease of thyroid gland     Diverticulitis     Fatty liver     last assessed 01/09/2017    HLD (hyperlipidemia)     treating with diet    Hypertension     Kidney stone     Lung nodules     Migraine     last assessed 08/03/2012    Pneumonia     Pre-diabetes     last assessed 06/27/2017     Past Surgical History:   Procedure Laterality Date    APPENDECTOMY      BREAST CYST EXCISION Right     many years ago and not sure if it was right?    CHOLECYSTECTOMY  1999    laparoscopic    COLON SURGERY  01/09/2018    colostomy    COLONOSCOPY  02/2012    polyp, Dr. Zee    COLOSTOMY CLOSURE N/A 04/05/2018    Procedure: REVERSAL COLOSTOMY;  Surgeon: Walt Gardiner DO;  [facility];  Service: General    FLEXIBLE SIGMOIDOSCOPY N/A 04/05/2018    Procedure: FLEX SIGMOIDOSCOPY;  Surgeon: Walt Gardiner DO;  [facility];  Service: General    HERNIA REPAIR  1999    umbilical    LAPAROTOMY N/A 01/07/2018    Procedure: LAPAROTOMY EXPLORATORY, sigmoid colon resection, colostomy, appendectomy;   Surgeon: Walt Gardiner DO;  [facility];  Service: General    VA COLONOSCOPY FLX DX W/COLLJ SPEC WHEN PFRMD N/A 04/02/2018    Procedure: COLONOSCOPY;  Surgeon: Walt Gardiner DO;  Location: [facility];  Service: General    VA COLONOSCOPY FLX DX W/COLLJ SPEC WHEN PFRMD N/A 04/16/2019    Procedure: COLONOSCOPY;  Surgeon: Walt Gardiner DO;  [facility];  Service: General    VA EXPLORATORY LAPAROTOMY CELIOTOMY W/WO BIOPSY SPX N/A 04/05/2018    Procedure: EXPLORATORY LAPAROTOMY;  Surgeon: Walt Gardiner DO;  [facility];  Service: General    VA REPAIR FIRST ABDOMINAL WALL HERNIA N/A 11/15/2018    Procedure: INCISIONAL HERNIA REPAIR AT UMBILICUS;  Surgeon: Walt Gardiner DO;  [facility];  Service: General    REDUCTION MAMMAPLASTY Bilateral 1999    TUBAL LIGATION       Family History   Problem Relation Age of Onset    Alcohol abuse Mother     Hepatitis Mother         chronic hepatitis C virus    Heart disease Father     Heart attack Father         MI    Hypertension Father     Diverticulitis Sister     Diabetes Sister     Hypertension Sister     Cancer Brother 68        liver cancer    Breast cancer Sister 50    No Known Problems Daughter     No Known Problems Daughter      Social History     Tobacco Use    Smoking status: Every Day     Current packs/day: 1.00     Average packs/day: 1 pack/day for 28.0 years (28.0 ttl pk-yrs)     Types: Cigarettes    Smokeless tobacco: Never   Substance and Sexual Activity    Alcohol use: Not Currently     Comment: quit 5 years ago    Drug use: No    Sexual activity: Not on file     Current Outpatient Medications on File Prior to Visit   Medication Sig    albuterol (ProAir HFA) 90 mcg/act inhaler Inhale 2 puffs every 6 (six) hours as needed for wheezing or shortness of breath (cough)    ALPRAZolam (XANAX) 0.25 mg tablet Take 1 tablet (0.25 mg total) by mouth 2 (two) times a day as needed for anxiety    atorvastatin (LIPITOR) 20 mg tablet Take 1  "tablet (20 mg total) by mouth daily    ergocalciferol (VITAMIN D2) 50,000 units Take 1 capsule (50,000 Units total) by mouth once a week    glucose blood (OneTouch Ultra) test strip Use as instructed    hydroCHLOROthiazide 50 mg tablet Take 1 tablet (50 mg total) by mouth daily    levothyroxine 75 mcg tablet Take 1 tablet (75 mcg total) by mouth daily    losartan (COZAAR) 100 MG tablet Take 1 tablet (100 mg total) by mouth daily     Allergies   Allergen Reactions    Penicillins Angioedema    Amoxicillin Edema    Doxycycline Vomiting    Vicodin [Hydrocodone-Acetaminophen] GI Intolerance and Vomiting     Immunization History   Administered Date(s) Administered    COVID-19 PFIZER VACCINE 0.3 ML IM 12/23/2021    INFLUENZA 10/31/2018, 10/29/2022    Influenza Injectable, MDCK, Preservative Free, Quadrivalent, 0.5 mL 10/09/2019    Influenza, injectable, quadrivalent, preservative free 0.5 mL 10/14/2020    Influenza, recombinant, quadrivalent,injectable, preservative free 11/26/2021, 10/03/2022    Tdap 03/15/2019     Objective     /80   Pulse 98   Temp 98 °F (36.7 °C)   Resp 18   Ht 5' 7\" (1.702 m)   Wt 107 kg (236 lb 3.2 oz)   LMP 02/01/2023 (Approximate)   SpO2 97%   BMI 36.99 kg/m²     Physical Exam  Vitals and nursing note reviewed.   Constitutional:       General: She is not in acute distress.     Appearance: Normal appearance. She is not ill-appearing, toxic-appearing or diaphoretic.      Comments: Body mass index is 36.99 kg/m².     HENT:      Head: Atraumatic.      Right Ear: External ear normal.      Left Ear: External ear normal.   Eyes:      Extraocular Movements: Extraocular movements intact.      Conjunctiva/sclera: Conjunctivae normal.   Cardiovascular:      Rate and Rhythm: Normal rate and regular rhythm.      Pulses: Normal pulses.      Heart sounds: Normal heart sounds.   Pulmonary:      Effort: Pulmonary effort is normal. No respiratory distress.      Breath sounds: Normal breath sounds. "   Musculoskeletal:         General: Normal range of motion.      Cervical back: Normal range of motion.   Skin:     General: Skin is warm and dry.   Neurological:      Mental Status: She is alert and oriented to person, place, and time.   Psychiatric:         Behavior: Behavior normal.       Administrative Statements

## 2024-05-24 PROBLEM — E55.9 VITAMIN D DEFICIENCY: Status: ACTIVE | Noted: 2024-05-24

## 2024-05-24 NOTE — ASSESSMENT & PLAN NOTE
Last vitamin D level 12  Patient completed 12 week course of high dose vitamin D and has transitioned to 1,000 IU daily  Continue vitamin D supplementation

## 2024-05-24 NOTE — ASSESSMENT & PLAN NOTE
Lab Results   Component Value Date    HGBA1C 9.5 (H) 05/14/2024    HGBA1C 8.4 (A) 11/02/2023     Patient was taking metformin 1,000 m BID>> will switch to extended release  Patient started on Glipizide 5 mg daily 1 month ago>> will increase to 10 mg daily  Insurance did not approve GLP1 medication  Patient declined starting an SGLT2i medication  Patient now agreeable for referral to Endocrinology  Patient strongly urged to improve lifestyle accommodations  Recheck BMP/HbA1c and plan to follow up in 3 months

## 2024-05-24 NOTE — ASSESSMENT & PLAN NOTE
Currently smokes 1-2 PPD, but has been trying to quit  Continue with smoking cessation aids (nicotine patch/gum)  Trial of Wellbutrin in the past not effective  Patient declined referral for smoking cessation program  Will continue to provide support as needed

## 2024-05-29 DIAGNOSIS — F41.9 ANXIETY: ICD-10-CM

## 2024-05-29 RX ORDER — ALPRAZOLAM 0.25 MG/1
0.25 TABLET ORAL 2 TIMES DAILY PRN
Qty: 30 TABLET | Refills: 0 | Status: SHIPPED | OUTPATIENT
Start: 2024-05-29

## 2024-05-30 DIAGNOSIS — E03.9 HYPOTHYROIDISM, UNSPECIFIED TYPE: ICD-10-CM

## 2024-05-30 DIAGNOSIS — E78.2 MIXED HYPERLIPIDEMIA: ICD-10-CM

## 2024-05-30 RX ORDER — LEVOTHYROXINE SODIUM 0.07 MG/1
75 TABLET ORAL DAILY
Qty: 90 TABLET | Refills: 0 | Status: SHIPPED | OUTPATIENT
Start: 2024-05-30 | End: 2024-08-28

## 2024-05-30 RX ORDER — ATORVASTATIN CALCIUM 20 MG/1
20 TABLET, FILM COATED ORAL DAILY
Qty: 30 TABLET | Refills: 0 | Status: SHIPPED | OUTPATIENT
Start: 2024-05-30

## 2024-06-06 ENCOUNTER — TELEPHONE (OUTPATIENT)
Age: 54
End: 2024-06-06

## 2024-06-06 DIAGNOSIS — Z80.0 FAMILY HISTORY OF LIVER CANCER: Primary | ICD-10-CM

## 2024-06-06 DIAGNOSIS — Z80.3 FAMILY HISTORY OF BREAST CANCER: ICD-10-CM

## 2024-06-06 NOTE — TELEPHONE ENCOUNTER
Pt is wondering if she can get genetic testing done she recently lost her sister to metastatic adenocarcinoma and sisters dr said pt should get this testing done     Please advise how she would go about getting it done   Thank you

## 2024-06-07 ENCOUNTER — TELEPHONE (OUTPATIENT)
Dept: HEMATOLOGY ONCOLOGY | Facility: CLINIC | Age: 54
End: 2024-06-07

## 2024-06-07 NOTE — TELEPHONE ENCOUNTER
Patient Call    Who are you speaking with? Patient    If it is not the patient, are they listed on an active communication consent form? Yes   What is the reason for this call? Tell genetics caller is of Church heritage.   Does this require a call back? No   If a call back is required, please list best call back number [phone]    If a call back is required, advise that a message will be forwarded to their care team and someone will return their call as soon as possible.   Did you relay this information to the patient? Yes     
Home

## 2024-06-07 NOTE — TELEPHONE ENCOUNTER
I spoke with Joann to schedule their consultation with [facility].     Scheduling Outcome: Patient is scheduled for an appointment on 11/18/24 at 130 with Coni King    Personal/Family History Related to Appointment:     Personal History of Cancer: Patient reports no personal history of cancer    Family History of Cancer: Patient reports family history of sister- stomach, breast, colon; brother-bile duct ca;     Is patient of [employer] Heritage?: No    History of Genetic Testing:  Personal History of Genetic Testing: Patient report no personal history of Genetic Testing.    Family History of Genetic Testing: Patient reports that no family members have had Genetic Testing.     Patient's preferred e-mail address: [email or web address]

## 2024-06-07 NOTE — TELEPHONE ENCOUNTER
Spoke with patient and she was notified of below message   Once she gets the appointment scheduled she has to call her ins and make sure its covered

## 2024-06-15 DIAGNOSIS — Z00.6 ENCOUNTER FOR EXAMINATION FOR NORMAL COMPARISON OR CONTROL IN CLINICAL RESEARCH PROGRAM: ICD-10-CM

## 2024-06-21 DIAGNOSIS — E11.9 TYPE 2 DIABETES MELLITUS WITHOUT COMPLICATION, WITHOUT LONG-TERM CURRENT USE OF INSULIN (HCC): ICD-10-CM

## 2024-06-21 RX ORDER — METFORMIN HYDROCHLORIDE 500 MG/1
1000 TABLET, EXTENDED RELEASE ORAL 2 TIMES DAILY WITH MEALS
Qty: 180 TABLET | Refills: 1 | Status: SHIPPED | OUTPATIENT
Start: 2024-06-21

## 2024-07-05 ENCOUNTER — PATIENT MESSAGE (OUTPATIENT)
Age: 54
End: 2024-07-05

## 2024-07-10 ENCOUNTER — TELEPHONE (OUTPATIENT)
Age: 54
End: 2024-07-10

## 2024-07-10 NOTE — TELEPHONE ENCOUNTER
Pt called in to schedule new patient appointment with . warm transfer to office clerical to assist/ ask about new provider. I explained to the pt that new provider's schedule is not released just yet, but will give her information to the front staff for a return phone call. Thank you!

## 2024-07-23 ENCOUNTER — OFFICE VISIT (OUTPATIENT)
Age: 54
End: 2024-07-23
Payer: COMMERCIAL

## 2024-07-23 ENCOUNTER — APPOINTMENT (OUTPATIENT)
Dept: LAB | Facility: CLINIC | Age: 54
End: 2024-07-23
Payer: COMMERCIAL

## 2024-07-23 VITALS
SYSTOLIC BLOOD PRESSURE: 124 MMHG | WEIGHT: 232.2 LBS | TEMPERATURE: 98 F | BODY MASS INDEX: 36.37 KG/M2 | DIASTOLIC BLOOD PRESSURE: 68 MMHG | HEART RATE: 62 BPM | OXYGEN SATURATION: 97 %

## 2024-07-23 DIAGNOSIS — B37.31 VAGINAL CANDIDIASIS: ICD-10-CM

## 2024-07-23 DIAGNOSIS — E55.9 VITAMIN D DEFICIENCY: ICD-10-CM

## 2024-07-23 DIAGNOSIS — Z00.6 ENCOUNTER FOR EXAMINATION FOR NORMAL COMPARISON OR CONTROL IN CLINICAL RESEARCH PROGRAM: ICD-10-CM

## 2024-07-23 DIAGNOSIS — R30.0 DYSURIA: ICD-10-CM

## 2024-07-23 DIAGNOSIS — E11.9 TYPE 2 DIABETES MELLITUS WITHOUT COMPLICATION, WITHOUT LONG-TERM CURRENT USE OF INSULIN (HCC): ICD-10-CM

## 2024-07-23 DIAGNOSIS — E78.2 MIXED HYPERLIPIDEMIA: ICD-10-CM

## 2024-07-23 DIAGNOSIS — E11.65 UNCONTROLLED TYPE 2 DIABETES MELLITUS WITH HYPERGLYCEMIA (HCC): Primary | ICD-10-CM

## 2024-07-23 DIAGNOSIS — Z59.9 FINANCIAL DIFFICULTIES: ICD-10-CM

## 2024-07-23 DIAGNOSIS — E03.9 HYPOTHYROIDISM, UNSPECIFIED TYPE: ICD-10-CM

## 2024-07-23 LAB
25(OH)D3 SERPL-MCNC: 32.5 NG/ML (ref 30–100)
ANION GAP SERPL CALCULATED.3IONS-SCNC: 12 MMOL/L (ref 4–13)
BACTERIA UR QL AUTO: ABNORMAL /HPF
BILIRUB UR QL STRIP: NEGATIVE
BUDDING YEAST: PRESENT
BUN SERPL-MCNC: 23 MG/DL (ref 5–25)
CALCIUM SERPL-MCNC: 9.7 MG/DL (ref 8.4–10.2)
CHLORIDE SERPL-SCNC: 101 MMOL/L (ref 96–108)
CLARITY UR: ABNORMAL
CO2 SERPL-SCNC: 23 MMOL/L (ref 21–32)
COLOR UR: ABNORMAL
CREAT SERPL-MCNC: 1.3 MG/DL (ref 0.6–1.3)
EST. AVERAGE GLUCOSE BLD GHB EST-MCNC: 283 MG/DL
GFR SERPL CREATININE-BSD FRML MDRD: 47 ML/MIN/1.73SQ M
GLUCOSE P FAST SERPL-MCNC: 236 MG/DL (ref 65–99)
GLUCOSE UR STRIP-MCNC: NEGATIVE MG/DL
HBA1C MFR BLD: 11.5 %
HGB UR QL STRIP.AUTO: NEGATIVE
KETONES UR STRIP-MCNC: NEGATIVE MG/DL
LEUKOCYTE ESTERASE UR QL STRIP: ABNORMAL
MUCOUS THREADS UR QL AUTO: ABNORMAL
NITRITE UR QL STRIP: NEGATIVE
NON-SQ EPI CELLS URNS QL MICRO: ABNORMAL /HPF
PH UR STRIP.AUTO: 5.5 [PH]
POTASSIUM SERPL-SCNC: 3.3 MMOL/L (ref 3.5–5.3)
PROT UR STRIP-MCNC: NEGATIVE MG/DL
RBC #/AREA URNS AUTO: ABNORMAL /HPF
SODIUM SERPL-SCNC: 136 MMOL/L (ref 135–147)
SP GR UR STRIP.AUTO: 1.01 (ref 1–1.03)
UROBILINOGEN UR STRIP-ACNC: <2 MG/DL
WBC #/AREA URNS AUTO: ABNORMAL /HPF

## 2024-07-23 PROCEDURE — 80048 BASIC METABOLIC PNL TOTAL CA: CPT

## 2024-07-23 PROCEDURE — 82306 VITAMIN D 25 HYDROXY: CPT

## 2024-07-23 PROCEDURE — 81001 URINALYSIS AUTO W/SCOPE: CPT

## 2024-07-23 PROCEDURE — 99213 OFFICE O/P EST LOW 20 MIN: CPT

## 2024-07-23 PROCEDURE — 83036 HEMOGLOBIN GLYCOSYLATED A1C: CPT

## 2024-07-23 PROCEDURE — 36415 COLL VENOUS BLD VENIPUNCTURE: CPT

## 2024-07-23 RX ORDER — FLUCONAZOLE 150 MG/1
150 TABLET ORAL ONCE
Qty: 1 TABLET | Refills: 0 | Status: SHIPPED | OUTPATIENT
Start: 2024-07-23 | End: 2024-07-23

## 2024-07-23 SDOH — ECONOMIC STABILITY - INCOME SECURITY: PROBLEM RELATED TO HOUSING AND ECONOMIC CIRCUMSTANCES, UNSPECIFIED: Z59.9

## 2024-07-23 NOTE — PATIENT INSTRUCTIONS
"Please start taking your metformin: 2 tabs two times a day. 1000 mg twice a day       Patient Education     Type 2 diabetes   The Basics   Written by the doctors and editors at [facility]   What is type 2 diabetes? -- This is a disorder that disrupts the way the body uses sugar. It is sometimes called type 2 diabetes mellitus.  All of the cells in the body need sugar to work normally. Sugar gets into the cells with the help of a hormone called insulin. Insulin is made by the pancreas, an organ in the belly. If there is not enough insulin, or if cells in the body don't respond normally to insulin, sugar builds up in the blood. That is what happens to people with diabetes.  There are 2 different types of diabetes:   In type 1 diabetes, the pancreas makes little or no insulin.   In type 2 diabetes, the pancreas still makes some insulin, but the cells in the body stop responding normally. Eventually, the pancreas cannot make enough insulin to keep up.  Having excess body weight or obesity increases a person's risk of developing type 2 diabetes. But people without excess body weight can get diabetes, too.  What are the symptoms of type 2 diabetes? -- Type 2 diabetes usually causes no symptoms. When symptoms do happen, they include:   Needing to urinate often   Intense thirst   Blurry vision  Can diabetes lead to other health problems? -- Yes. Type 2 diabetes might not make you feel sick. But if it is not managed, it can lead to serious problems over time, such as:   Heart attacks   Strokes   Kidney disease   Vision problems (or even blindness)   Pain or loss of feeling in the hands and feet   Needing to have fingers, toes, or other body parts removed (amputated)  How do I know if I have type 2 diabetes? -- Your doctor or nurse can do a blood test. There are 2 tests that can be used for this. Both involve measuring the amount of sugar in your blood, called your \"blood sugar\" or \"blood glucose\":   One of the tests measures " "your blood sugar at the time the blood sample is taken. This test is done in the morning. You can't eat or drink anything except water for at least 8 hours before the test.   The other test shows what your average blood sugar has been for the past 2 to 3 months. This blood test is called \"hemoglobin A1C\" or just \"A1C.\" It can be checked at any time of the day, even if you have recently eaten.  How is type 2 diabetes treated? -- The goals of treatment are to manage your blood sugar and lower the risk of future problems that can happen in people with diabetes.  Treatment might include:   Lifestyle changes - This is an important part of managing diabetes. It includes eating healthy foods and getting plenty of physical activity.   Medicines - There are a few medicines that help lower blood sugar. Some people need to take pills that help the body make more insulin or that help insulin do its job. Others need insulin shots.  Depending on what medicines you take, you might need to check your blood sugar regularly at home. But not everyone with type 2 diabetes needs to do this. Your doctor or nurse will tell you if you should be checking your blood sugar, and when and how to do this.  Sometimes, people with type 2 diabetes also need medicines to help prevent problems caused by the disease. For instance, medicines used to lower blood pressure can reduce the chances of a heart attack or stroke.   General medical care - It's also important to take care of other areas of your health. This includes watching your blood pressure and cholesterol levels. You should also get certain vaccines, such as vaccines to protect against the flu and coronavirus disease 2019 (\"COVID-19\"). Some people also need a vaccine to prevent pneumonia.  Can type 2 diabetes be prevented? -- Yes. To lower your chances of getting type 2 diabetes, the most important thing you can do is eat a healthy diet and get plenty of physical activity. This can help you " lose weight if you are overweight. But eating well and being active are also good for your overall health. Even gentle activity, like walking, has benefits.  If you smoke, quitting can also lower your risk of type 2 diabetes. Quitting smoking can be difficult, but your doctor or nurse can help.  All topics are updated as new evidence becomes available and our peer review process is complete.  This topic retrieved from [employer] on: Apr 24, 2024.  Topic 31627 Version 23.0  Release: 32.3.2 - C32.113  © 2024 UpToDate, Inc. and/or its affiliates. All rights reserved.  Consumer Information Use and Disclaimer   Disclaimer: This generalized information is a limited summary of diagnosis, treatment, and/or medication information. It is not meant to be comprehensive and should be used as a tool to help the user understand and/or assess potential diagnostic and treatment options. It does NOT include all information about conditions, treatments, medications, side effects, or risks that may apply to a specific patient. It is not intended to be medical advice or a substitute for the medical advice, diagnosis, or treatment of a health care provider based on the health care provider's examination and assessment of a patient's specific and unique circumstances. Patients must speak with a health care provider for complete information about their health, medical questions, and treatment options, including any risks or benefits regarding use of medications. This information does not endorse any treatments or medications as safe, effective, or approved for treating a specific patient. UpToDate, Inc. and its affiliates disclaim any warranty or liability relating to this information or the use thereof.The use of this information is governed by the Terms of Use, available at https://www.wolterskluwer.com/en/know/clinical-effectiveness-terms. 2024© UpToDate, Inc. and its affiliates and/or licensors. All rights reserved.  Copyright   © 2024  [employer] and/or its affiliates. All rights reserved.

## 2024-07-23 NOTE — LETTER
July 23, 2024     Patient: Joann Marie  YOB: 1970  Date of Visit: 7/23/2024      To Whom it May Concern:    Joann Marie is under my professional care. Joann was seen in my office on 7/23/2024. Joann may return to work on 7/24/24 .    If you have any questions or concerns, please don't hesitate to call.         Sincerely,          Lacey Oliva MD        CC: No Recipients

## 2024-07-23 NOTE — PROGRESS NOTES
Ambulatory Visit  Name: Joann Marie      : 1970      MRN: [id number]  Encounter Provider: Lacey Oliva MD  Encounter Date: 2024   Encounter department: [facility]    Assessment & Plan   1. Uncontrolled type 2 diabetes mellitus with hyperglycemia (HCC)  -     Ambulatory referral to Diabetic Education - use to refer for diabetes group classes, individual diabetes education, medical nutrition therapy, device training; Future; Expected date: 2024  -     semaglutide, 0.25 or 0.5 mg/dose, (Ozempic, 0.25 or 0.5 MG/DOSE,) 2 mg/3 mL injection pen; Inject 0.25 mg under the skin once weekly for 28 days, THEN inject 0.5 mg under the skin once weekly  -     Ambulatory referral to social work care management program; Future; Expected date: 2024  2. Mixed hyperlipidemia  3. Hypothyroidism, unspecified type  4. Vaginal candidiasis  -     fluconazole (DIFLUCAN) 150 mg tablet; Take 1 tablet (150 mg total) by mouth once for 1 dose  5. Financial difficulties  -     Ambulatory referral to social work care management program; Future; Expected date: 2024    Pt w/ poorly controlled T2DM on metfomin 1000 mg BID, glipizide 10 mg daily. A1c today 11.8. Patient having symptomatic hyperglycemia with fatigue, polydipsia, and polyuria for several months.   - Patient had only been taking metformin 500 mg BID, rather than 1000 mg BID as ordered. Medication instructions clarified for patient.   - A1c elevated despite diet and exercise lifestyle modifications   - Add GLP-1 agonist, ozempic ordered today. Referral to social care management program to assist with cost of medication.   - Recommend adding SGLT-2i in the future.     Also endorsing symptoms of vaginal candidiasis with dysuria, white vaginal discharge, and itching in the setting of vaginal candidiasis   - One dose of fluconazole. Consider repeat if symptoms persist     Patient desires to transfer care to continue following  with graduated resident, Dr. Chauhan.     Nutrition Assessment and Intervention:     Recommended completion of food recall journal         History of Present Illness     HPI    Pt is a 53 y.o. female who presents with concern for poorly-controled blood sugar, dysuria, and fatigue for the past 3 months. Past couple weeks she has noted that her blood sugar have been 430s after a meal 200's in the evening and morning. She states that she has not been actively trying to control her sugar and exercise and diet because of recent stress and her sister passing away a few months ago. She states we has been compliant with her medication, but after further questioning she revealed she was not taking her metformin as directed. She has a PMHx of HTN, dyslipidemia, and hypothyroid for which she takes medication. She endorses a fever, headaches, white vaginal discharge but denies any SOB, chest pain, chest tightness, nausea, vomiting, diarrhea or any sick contacts. She states today will be her last visit since she plans to follow Dr. Chauhan to her practice in [place] and she she an endocrine appointment in October for her DM.     Review of Systems   Constitutional:  Positive for fever. Negative for chills.   HENT:  Negative for ear pain, sore throat, tinnitus and trouble swallowing.    Eyes:  Negative for pain and visual disturbance.   Respiratory:  Positive for cough. Negative for shortness of breath.    Cardiovascular:  Negative for chest pain and palpitations.   Gastrointestinal:  Negative for abdominal pain, diarrhea, nausea and vomiting.   Endocrine: Positive for heat intolerance, polydipsia and polyuria.   Genitourinary:  Positive for dysuria and vaginal discharge. Negative for hematuria, pelvic pain, vaginal bleeding and vaginal pain.        Stress incontinence and nocturia on occasions     Musculoskeletal:  Negative for back pain.   Skin:  Negative for color change and rash.   Neurological:  Positive for  light-headedness and headaches. Negative for syncope, weakness and numbness.   Psychiatric/Behavioral:  Positive for agitation. Negative for self-injury, sleep disturbance and suicidal ideas. The patient is nervous/anxious.    All other systems reviewed and are negative.    Medical History Reviewed by provider this encounter:  Tobacco  Allergies  Meds  Problems  Med Hx  Surg Hx  Fam Hx       Current Outpatient Medications on File Prior to Visit   Medication Sig Dispense Refill    ergocalciferol (VITAMIN D2) 50,000 units Take 1 capsule (50,000 Units total) by mouth once a week 12 capsule 0    glipiZIDE (GLUCOTROL XL) 10 mg 24 hr tablet Take 1 tablet (10 mg total) by mouth daily 90 tablet 1    glucose blood (OneTouch Ultra) test strip Use as instructed 50 strip 23    losartan (COZAAR) 100 MG tablet Take 1 tablet (100 mg total) by mouth daily 90 tablet 1    metFORMIN (GLUCOPHAGE-XR) 500 mg 24 hr tablet Take 2 tablets (1,000 mg total) by mouth 2 (two) times a day with meals 180 tablet 1    nicotine (NICODERM CQ) 7 mg/24hr TD 24 hr patch Place 1 patch on the skin over 24 hours every 24 hours 28 patch 0    nicotine polacrilex (NICORETTE) 2 mg gum Chew 1 each (2 mg total) as needed for smoking cessation 100 each 0     No current facility-administered medications on file prior to visit.      Social History     Tobacco Use    Smoking status: Every Day     Current packs/day: 1.00     Average packs/day: 1 pack/day for 28.0 years (28.0 ttl pk-yrs)     Types: Cigarettes    Smokeless tobacco: Never   Substance and Sexual Activity    Alcohol use: Not Currently     Comment: quit 5 years ago    Drug use: No    Sexual activity: Not on file     Objective     /68   Pulse 62   Temp 98 °F (36.7 °C)   Wt 105 kg (232 lb 3.2 oz)   LMP 02/01/2023 (Approximate)   SpO2 97%   BMI 36.37 kg/m²     Physical Exam  Vitals and nursing note reviewed.   Constitutional:       General: She is not in acute distress.     Appearance:  Normal appearance. She is not ill-appearing, toxic-appearing or diaphoretic.      Comments: Body mass index is 36.37 kg/m².    HENT:      Head: Normocephalic and atraumatic.      Right Ear: External ear normal.      Left Ear: External ear normal.      Nose: Nose normal. No congestion or rhinorrhea.      Mouth/Throat:      Mouth: Mucous membranes are moist.   Eyes:      General: No scleral icterus.        Right eye: No discharge.         Left eye: No discharge.      Extraocular Movements: Extraocular movements intact.      Pupils: Pupils are equal, round, and reactive to light.   Cardiovascular:      Rate and Rhythm: Normal rate and regular rhythm.      Heart sounds: Normal heart sounds. No murmur heard.     No friction rub. No gallop.   Pulmonary:      Effort: Pulmonary effort is normal. No respiratory distress.      Breath sounds: Normal breath sounds. No wheezing, rhonchi or rales.   Abdominal:      General: Abdomen is flat. Bowel sounds are normal. There is no distension.      Palpations: Abdomen is soft.      Tenderness: There is no abdominal tenderness. There is no guarding.   Musculoskeletal:         General: No swelling or deformity. Normal range of motion.      Cervical back: Normal range of motion and neck supple.      Right lower leg: No edema.      Left lower leg: No edema.   Skin:     General: Skin is warm and dry.      Capillary Refill: Capillary refill takes less than 2 seconds.      Coloration: Skin is not jaundiced.      Findings: No bruising or lesion.   Neurological:      General: No focal deficit present.      Mental Status: She is alert and oriented to person, place, and time.      Motor: No weakness.      Gait: Gait normal.   Psychiatric:         Mood and Affect: Mood normal.         Behavior: Behavior normal.         Thought Content: Thought content normal.         Judgment: Judgment normal.      Comments: Tearful       Lab Results   Component Value Date    HGBA1C 11.5 (H) 07/23/2024        Administrative Statements   I have spent a total time of 40 minutes in caring for this patient on the day of the visit/encounter including Diagnostic results, Risks and benefits of tx options, Instructions for management, Patient and family education, Importance of tx compliance, Risk factor reductions, Impressions, Documenting in the medical record, Reviewing / ordering tests, medicine, procedures  , and Obtaining or reviewing history  .          Lacey Oliva MD    PGY-3

## 2024-07-25 DIAGNOSIS — F17.210 CIGARETTE NICOTINE DEPENDENCE WITHOUT COMPLICATION: ICD-10-CM

## 2024-07-25 DIAGNOSIS — F41.9 ANXIETY: ICD-10-CM

## 2024-07-25 DIAGNOSIS — I10 ESSENTIAL HYPERTENSION: ICD-10-CM

## 2024-07-25 DIAGNOSIS — E03.9 HYPOTHYROIDISM, UNSPECIFIED TYPE: ICD-10-CM

## 2024-07-25 DIAGNOSIS — E78.2 MIXED HYPERLIPIDEMIA: ICD-10-CM

## 2024-07-25 RX ORDER — LEVOTHYROXINE SODIUM 0.07 MG/1
75 TABLET ORAL DAILY
Qty: 90 TABLET | Refills: 0 | Status: SHIPPED | OUTPATIENT
Start: 2024-07-25 | End: 2024-10-23

## 2024-07-25 RX ORDER — ALPRAZOLAM 0.25 MG/1
0.25 TABLET ORAL 2 TIMES DAILY PRN
Qty: 30 TABLET | Refills: 0 | Status: SHIPPED | OUTPATIENT
Start: 2024-07-25

## 2024-07-25 RX ORDER — ALBUTEROL SULFATE 90 UG/1
2 AEROSOL, METERED RESPIRATORY (INHALATION) EVERY 6 HOURS PRN
Qty: 8 G | Refills: 0 | Status: SHIPPED | OUTPATIENT
Start: 2024-07-25

## 2024-07-25 RX ORDER — HYDROCHLOROTHIAZIDE 50 MG/1
50 TABLET ORAL DAILY
Qty: 90 TABLET | Refills: 0 | Status: SHIPPED | OUTPATIENT
Start: 2024-07-25

## 2024-07-25 RX ORDER — ATORVASTATIN CALCIUM 20 MG/1
20 TABLET, FILM COATED ORAL DAILY
Qty: 30 TABLET | Refills: 0 | Status: SHIPPED | OUTPATIENT
Start: 2024-07-25

## 2024-07-30 ENCOUNTER — PATIENT OUTREACH (OUTPATIENT)
Age: 54
End: 2024-07-30

## 2024-07-30 NOTE — PROGRESS NOTES
Chart review indicates that an order was placed to follow up with patient regarding at risk responses to social determinants of health. Pt with poorly controlled blood sugar. Pt with recent stress of sister passing a few months ago. Notes indicate that she is transferring care to follow Dr. Chauhan to her practice in [place]. SWCM did outreach pt today to discuss above, pt did not  VM left. SWCM to follow.

## 2024-08-02 LAB
APOB+LDLR+PCSK9 GENE MUT ANL BLD/T: NOT DETECTED
BRCA1+BRCA2 DEL+DUP + FULL MUT ANL BLD/T: NOT DETECTED
MLH1+MSH2+MSH6+PMS2 GN DEL+DUP+FUL M: NOT DETECTED

## 2024-08-05 ENCOUNTER — PATIENT OUTREACH (OUTPATIENT)
Dept: CASE MANAGEMENT | Facility: OTHER | Age: 54
End: 2024-08-05

## 2024-08-05 NOTE — PROGRESS NOTES
Return call received from patient who spoke with her insurance.  Patient is not close to meeting deductible yet.  Patient was also told by her insurance that the prior-auth for ozempic .  Insurance informed patient of alternative medication (Mounjaro).  Will route to PCP/clinical staff for further follow-up with patient.

## 2024-08-05 NOTE — PROGRESS NOTES
Spoke with patient, She is scheduled with Dr Chauhan on 8/26. Will review and discuss with her at that time.

## 2024-08-05 NOTE — PROGRESS NOTES
2nd outreach attempt to patient to assess needs related to social determinants of health and cost of medications (Ozempic).  Per chart review, patient has [facility] and lives in [place].    Call placed to patient to introduce role of OP SWCM and assess needs.  Spoke with patient who confirmed she is unable to afford ozempic medication.  States she tried to utilize the savings card but cost remains high as she has not met her deductible.  OP SWCM unable to assist with deductible.  Patient ineligible for [employer] Patient Assistance Program due to having commercial insurance.  Patient understanding of this.    Provided phone number to patient for [employer] PAP to verify if there is an alternative option/discount card to utilize.  Patient called and informed OP SWCM that the PAP does not have an alternative option to provide, other than the savings card that patient tried to utilize already.    Patient asked about a generic alternative to ozempic.  Advised that patient would need to discuss this with her PCP.  Patient has endocrinology appt scheduled but this is not until 10/16.  Patient agreed for OP SWCM to outreach her PCP to inform that patient asked about a generic alternative to ozempic.  Recommended patient call her insurance to check remaining deductible.  Patient will call.    Patient denied any additional needs at this time.  Will close case but remain available to assist with future needs.

## 2024-08-15 DIAGNOSIS — E11.9 TYPE 2 DIABETES MELLITUS WITHOUT COMPLICATION, WITHOUT LONG-TERM CURRENT USE OF INSULIN (HCC): ICD-10-CM

## 2024-08-16 RX ORDER — BLOOD SUGAR DIAGNOSTIC
STRIP MISCELLANEOUS
Qty: 50 STRIP | Refills: 0 | Status: SHIPPED | OUTPATIENT
Start: 2024-08-16

## 2024-08-19 DIAGNOSIS — E78.2 MIXED HYPERLIPIDEMIA: ICD-10-CM

## 2024-08-20 RX ORDER — ATORVASTATIN CALCIUM 20 MG/1
20 TABLET, FILM COATED ORAL DAILY
Qty: 30 TABLET | Refills: 5 | Status: SHIPPED | OUTPATIENT
Start: 2024-08-20

## 2024-08-26 ENCOUNTER — OFFICE VISIT (OUTPATIENT)
Dept: INTERNAL MEDICINE CLINIC | Facility: CLINIC | Age: 54
End: 2024-08-26
Payer: COMMERCIAL

## 2024-08-26 VITALS
OXYGEN SATURATION: 100 % | HEIGHT: 67 IN | HEART RATE: 104 BPM | SYSTOLIC BLOOD PRESSURE: 114 MMHG | BODY MASS INDEX: 36.41 KG/M2 | DIASTOLIC BLOOD PRESSURE: 80 MMHG | WEIGHT: 232 LBS

## 2024-08-26 DIAGNOSIS — I10 ESSENTIAL HYPERTENSION: ICD-10-CM

## 2024-08-26 DIAGNOSIS — T50.2X5A DIURETIC-INDUCED HYPOKALEMIA: ICD-10-CM

## 2024-08-26 DIAGNOSIS — Z00.00 ANNUAL PHYSICAL EXAM: Primary | ICD-10-CM

## 2024-08-26 DIAGNOSIS — E87.6 DIURETIC-INDUCED HYPOKALEMIA: ICD-10-CM

## 2024-08-26 DIAGNOSIS — I10 BENIGN ESSENTIAL HYPERTENSION: ICD-10-CM

## 2024-08-26 DIAGNOSIS — E11.9 TYPE 2 DIABETES MELLITUS WITHOUT COMPLICATION, WITHOUT LONG-TERM CURRENT USE OF INSULIN (HCC): ICD-10-CM

## 2024-08-26 PROCEDURE — 99396 PREV VISIT EST AGE 40-64: CPT

## 2024-08-26 RX ORDER — TIRZEPATIDE 5 MG/.5ML
5 INJECTION, SOLUTION SUBCUTANEOUS WEEKLY
Qty: 4 ML | Refills: 0 | Status: SHIPPED | OUTPATIENT
Start: 2024-09-25

## 2024-08-26 RX ORDER — TIRZEPATIDE 2.5 MG/.5ML
2.5 INJECTION, SOLUTION SUBCUTANEOUS WEEKLY
Qty: 2 ML | Refills: 0 | Status: SHIPPED | OUTPATIENT
Start: 2024-08-26

## 2024-08-26 RX ORDER — LOSARTAN POTASSIUM 100 MG/1
100 TABLET ORAL DAILY
Qty: 90 TABLET | Refills: 1 | Status: SHIPPED | OUTPATIENT
Start: 2024-08-26

## 2024-08-26 NOTE — PROGRESS NOTES
Adult Annual Physical  Name: Joann Marie      : 1970      MRN: [id number]  Encounter Provider: Amanda Chauhan MD  Encounter Date: 2024   Encounter department: [facility]    Assessment & Plan   1. Annual physical exam  2. Type 2 diabetes mellitus without complication, without long-term current use of insulin ([facility])  Assessment & Plan:  Lab Results   Component Value Date    HGBA1C 11.5 (H) 2024    HGBA1C 9.5 (H) 2024     Patient was seen 1 month ago for symptomatic hyperglycemia and found to have poorly controlled blood sugar levels (400s)... Patient endorsed symptoms of blurred vision, fatigue, polyuria and polydipsia. Also treated for vaginal candidiasis  Patient was taking a lower dose of metformin than prescribed, which was addressed. She is now taking metformin XR 1000 mg twice daily + glipizide 10 mg daily  She was given a Rx for Ozempic which was declined by her insurance, therefore was never started.  Today, she does report improvement of her glucose ( ranging from 60s to mid 100s)--- no longer experiencing hyperglycemic symptoms.  Following discussion with her insurance and a , patient requests a Rx for Mounjaro, as this may be covered by her insurance.  Endocrinology appointment scheduled in 2 months--- patient desires glucose monitoring device  Repeat labs and f/u in 2 months  Orders:  -     Mounjaro 2.5 MG/0.5ML; Inject 0.5 mL (2.5 mg total) under the skin once a week  -     Mounjaro 5 MG/0.5ML; Inject 0.5 mL (5 mg total) under the skin once a week Do not start before 2024.  -     Albumin / creatinine urine ratio; Future; Expected date: 2024  -     Comprehensive metabolic panel; Future; Expected date: 2024  -     Hemoglobin A1C; Future; Expected date: 2024  -     Albumin / creatinine urine ratio  -     Comprehensive metabolic panel  -     Hemoglobin A1C  3. Benign essential hypertension  4.  Diuretic-induced hypokalemia  Assessment & Plan:  Lab Results   Component Value Date    K 3.3 (L) 07/23/2024    K 3.6 02/14/2024     Hypokalemia likely in the setting of diuretic use (hydrochlorothiazide)  Recommend potassium rich diet (handout provided)  Recheck CMP in 2 months  Consider potassium supplement if numbers do not improve vs decreasing HCTZ dose  5. Essential hypertension  Comments:  Asymptomatic  Continue losartan and hctz  Orders:  -     losartan (COZAAR) 100 MG tablet; Take 1 tablet (100 mg total) by mouth daily    Immunizations and preventive care screenings were discussed with patient today. Appropriate education was printed on patient's after visit summary.    Counseling:  Alcohol/drug use: discussed moderation in alcohol intake, the recommendations for healthy alcohol use, and avoidance of illicit drug use.  Dental Health: discussed importance of regular tooth brushing, flossing, and dental visits.  Injury prevention: discussed safety/seat belts, safety helmets, smoke detectors, carbon dioxide detectors, and smoking near bedding or upholstery.  Sexual health: discussed sexually transmitted diseases, partner selection, use of condoms, avoidance of unintended pregnancy, and contraceptive alternatives.  Exercise: the importance of regular exercise/physical activity was discussed. Recommend exercise 3-5 times per week for at least 30 minutes.      Return in about 2 months (around 10/26/2024) for Recheck DM, labs.      History of Present Illness     Adult Annual Physical:  Patient presents for annual physical.     Diet and Physical Activity:  - Diet/Nutrition: diabetic diet and portion control.  - Exercise: no formal exercise and walking.    General Health:  - Sleep: sleeps well.  - Hearing: normal hearing bilateral ears.  - Vision: no vision problems.  - Dental: no dental visits for > 1 year and brushes teeth twice daily.    /GYN Health:  - Follows with GYN: no.   - Menopause: postmenopausal.   -  History of STDs: no  - Contraception: menopause.      Review of Systems   Constitutional:  Negative for chills and unexpected weight change.   Eyes:  Negative for visual disturbance.   Respiratory:  Negative for cough, shortness of breath and wheezing.    Cardiovascular:  Negative for chest pain.   Gastrointestinal:  Negative for abdominal pain, nausea and vomiting.   Endocrine: Negative for polyuria.   Neurological:  Negative for dizziness and light-headedness.   Psychiatric/Behavioral:  Positive for dysphoric mood. Negative for confusion and sleep disturbance. The patient is not nervous/anxious.      Pertinent Medical History       Medical History Reviewed by provider this encounter:  Tobacco  Allergies  Meds  Problems  Med Hx  Surg Hx  Fam Hx       Past Medical History   Past Medical History:   Diagnosis Date   • Anxiety    • Calculus of distal right ureter     last assessed 08/06/2014   • Closed fracture of head of first metacarpal bone of right hand    • Colostomy status (HCC) 04/05/2018   • Depression    • Disease of thyroid gland    • Diverticulitis    • Fatty liver     last assessed 01/09/2017   • HLD (hyperlipidemia)     treating with diet   • Hypertension    • Kidney stone    • Lung nodules    • Migraine     last assessed 08/03/2012   • Pneumonia    • Pre-diabetes     last assessed 06/27/2017     Past Surgical History:   Procedure Laterality Date   • APPENDECTOMY     • BREAST CYST EXCISION Right     many years ago and not sure if it was right?   • CHOLECYSTECTOMY  1999    laparoscopic   • COLON SURGERY  01/09/2018    colostomy   • COLONOSCOPY  02/2012    polyp, Dr. Zee   • COLOSTOMY CLOSURE N/A 04/05/2018    Procedure: REVERSAL COLOSTOMY;  Surgeon: Walt Gardiner DO;  [facility];  Service: General   • FLEXIBLE SIGMOIDOSCOPY N/A 04/05/2018    Procedure: FLEX SIGMOIDOSCOPY;  Surgeon: Walt Gardiner DO;  [facility];  Service: General   • HERNIA REPAIR  1999    umbilical   •  LAPAROTOMY N/A 01/07/2018    Procedure: LAPAROTOMY EXPLORATORY, sigmoid colon resection, colostomy, appendectomy;  Surgeon: Walt Gardiner DO;  [facility];  Service: General   • LA COLONOSCOPY FLX DX W/COLLJ SPEC WHEN PFRMD N/A 04/02/2018    Procedure: COLONOSCOPY;  Surgeon: Walt Gardiner DO;  [facility]: [facility];  Service: General   • LA COLONOSCOPY FLX DX W/COLLJ SPEC WHEN PFRMD N/A 04/16/2019    Procedure: COLONOSCOPY;  Surgeon: Walt Gardiner DO;  [facility]: [facility];  Service: General   • LA EXPLORATORY LAPAROTOMY CELIOTOMY W/WO BIOPSY SPX N/A 04/05/2018    Procedure: EXPLORATORY LAPAROTOMY;  Surgeon: Walt Gardiner DO;  [facility];  Service: General   • LA REPAIR FIRST ABDOMINAL WALL HERNIA N/A 11/15/2018    Procedure: INCISIONAL HERNIA REPAIR AT UMBILICUS;  Surgeon: Walt Gardiner DO;  [facility];  Service: General   • REDUCTION MAMMAPLASTY Bilateral 1999   • TUBAL LIGATION       Family History   Problem Relation Age of Onset   • Alcohol abuse Mother    • Hepatitis Mother         chronic hepatitis C virus   • Heart disease Father    • Heart attack Father         MI   • Hypertension Father    • Diverticulitis Sister    • Diabetes Sister    • Hypertension Sister    • Cancer Brother 68        liver cancer   • Breast cancer Sister 50   • No Known Problems Daughter    • No Known Problems Daughter      Current Outpatient Medications on File Prior to Visit   Medication Sig Dispense Refill   • albuterol (ProAir HFA) 90 mcg/act inhaler Inhale 2 puffs every 6 (six) hours as needed for wheezing or shortness of breath (cough) 8 g 0   • ALPRAZolam (XANAX) 0.25 mg tablet Take 1 tablet (0.25 mg total) by mouth 2 (two) times a day as needed for anxiety 30 tablet 0   • atorvastatin (LIPITOR) 20 mg tablet Take 1 tablet by mouth daily 30 tablet 5   • glipiZIDE (GLUCOTROL XL) 10 mg 24 hr tablet Take 1 tablet (10 mg total) by mouth daily 90 tablet 1   • glucose blood (OneTouch Ultra) test  strip Use as instructed 50 strip 0   • hydroCHLOROthiazide 50 mg tablet Take 1 tablet (50 mg total) by mouth daily 90 tablet 0   • levothyroxine 75 mcg tablet Take 1 tablet (75 mcg total) by mouth daily 90 tablet 0   • metFORMIN (GLUCOPHAGE-XR) 500 mg 24 hr tablet Take 2 tablets (1,000 mg total) by mouth 2 (two) times a day with meals 180 tablet 1   • nicotine (NICODERM CQ) 7 mg/24hr TD 24 hr patch Place 1 patch on the skin over 24 hours every 24 hours 28 patch 0   • nicotine polacrilex (NICORETTE) 2 mg gum Chew 1 each (2 mg total) as needed for smoking cessation 100 each 0   • [DISCONTINUED] losartan (COZAAR) 100 MG tablet Take 1 tablet (100 mg total) by mouth daily 90 tablet 1   • ergocalciferol (VITAMIN D2) 50,000 units Take 1 capsule (50,000 Units total) by mouth once a week (Patient not taking: Reported on 8/26/2024) 12 capsule 0   • [DISCONTINUED] semaglutide, 0.25 or 0.5 mg/dose, (Ozempic, 0.25 or 0.5 MG/DOSE,) 2 mg/3 mL injection pen Inject 0.25 mg under the skin once weekly for 28 days, THEN inject 0.5 mg under the skin once weekly 9 mL 0     No current facility-administered medications on file prior to visit.     Allergies   Allergen Reactions   • Penicillins Angioedema   • Amoxicillin Edema   • Doxycycline Vomiting   • Vicodin [Hydrocodone-Acetaminophen] GI Intolerance and Vomiting      Current Outpatient Medications on File Prior to Visit   Medication Sig Dispense Refill   • albuterol (ProAir HFA) 90 mcg/act inhaler Inhale 2 puffs every 6 (six) hours as needed for wheezing or shortness of breath (cough) 8 g 0   • ALPRAZolam (XANAX) 0.25 mg tablet Take 1 tablet (0.25 mg total) by mouth 2 (two) times a day as needed for anxiety 30 tablet 0   • atorvastatin (LIPITOR) 20 mg tablet Take 1 tablet by mouth daily 30 tablet 5   • glipiZIDE (GLUCOTROL XL) 10 mg 24 hr tablet Take 1 tablet (10 mg total) by mouth daily 90 tablet 1   • glucose blood (OneTouch Ultra) test strip Use as instructed 50 strip 0   •  "hydroCHLOROthiazide 50 mg tablet Take 1 tablet (50 mg total) by mouth daily 90 tablet 0   • levothyroxine 75 mcg tablet Take 1 tablet (75 mcg total) by mouth daily 90 tablet 0   • metFORMIN (GLUCOPHAGE-XR) 500 mg 24 hr tablet Take 2 tablets (1,000 mg total) by mouth 2 (two) times a day with meals 180 tablet 1   • nicotine (NICODERM CQ) 7 mg/24hr TD 24 hr patch Place 1 patch on the skin over 24 hours every 24 hours 28 patch 0   • nicotine polacrilex (NICORETTE) 2 mg gum Chew 1 each (2 mg total) as needed for smoking cessation 100 each 0   • [DISCONTINUED] losartan (COZAAR) 100 MG tablet Take 1 tablet (100 mg total) by mouth daily 90 tablet 1   • ergocalciferol (VITAMIN D2) 50,000 units Take 1 capsule (50,000 Units total) by mouth once a week (Patient not taking: Reported on 8/26/2024) 12 capsule 0   • [DISCONTINUED] semaglutide, 0.25 or 0.5 mg/dose, (Ozempic, 0.25 or 0.5 MG/DOSE,) 2 mg/3 mL injection pen Inject 0.25 mg under the skin once weekly for 28 days, THEN inject 0.5 mg under the skin once weekly 9 mL 0     No current facility-administered medications on file prior to visit.      Social History     Tobacco Use   • Smoking status: Every Day     Current packs/day: 1.00     Average packs/day: 1 pack/day for 28.0 years (28.0 ttl pk-yrs)     Types: Cigarettes   • Smokeless tobacco: Never   Vaping Use   • Vaping status: Never Used   Substance and Sexual Activity   • Alcohol use: Not Currently     Comment: quit 5 years ago   • Drug use: No   • Sexual activity: Not on file       Objective     /80   Pulse 104   Ht 5' 7\" (1.702 m)   Wt 105 kg (232 lb)   LMP 02/01/2023 (Approximate)   SpO2 100%   BMI 36.34 kg/m²     Physical Exam  Vitals and nursing note reviewed.   Constitutional:       General: She is not in acute distress.     Appearance: Normal appearance. She is not ill-appearing, toxic-appearing or diaphoretic.      Comments: Body mass index is 36.34 kg/m².   HENT:      Head: Atraumatic.      Right Ear: " External ear normal.      Left Ear: External ear normal.   Eyes:      Extraocular Movements: Extraocular movements intact.      Conjunctiva/sclera: Conjunctivae normal.   Cardiovascular:      Rate and Rhythm: Normal rate and regular rhythm.      Pulses: Normal pulses.      Heart sounds: Normal heart sounds.   Pulmonary:      Effort: Pulmonary effort is normal. No respiratory distress.      Breath sounds: Normal breath sounds.   Musculoskeletal:         General: Normal range of motion.      Cervical back: Normal range of motion.   Skin:     General: Skin is warm and dry.   Neurological:      Mental Status: She is alert and oriented to person, place, and time.   Psychiatric:         Behavior: Behavior normal.

## 2024-08-26 NOTE — ASSESSMENT & PLAN NOTE
Lab Results   Component Value Date    K 3.3 (L) 07/23/2024    K 3.6 02/14/2024     Hypokalemia likely in the setting of diuretic use (hydrochlorothiazide)  Recommend potassium rich diet (handout provided)  Recheck CMP in 2 months  Consider potassium supplement if numbers do not improve vs decreasing HCTZ dose

## 2024-08-26 NOTE — ASSESSMENT & PLAN NOTE
Lab Results   Component Value Date    HGBA1C 11.5 (H) 07/23/2024    HGBA1C 9.5 (H) 05/14/2024     Patient was seen 1 month ago for symptomatic hyperglycemia and found to have poorly controlled blood sugar levels (400s)... Patient endorsed symptoms of blurred vision, fatigue, polyuria and polydipsia. Also treated for vaginal candidiasis  Patient was taking a lower dose of metformin than prescribed, which was addressed. She is now taking metformin XR 1000 mg twice daily + glipizide 10 mg daily  She was given a Rx for Ozempic which was declined by her insurance, therefore was never started.  Today, she does report improvement of her glucose ( ranging from 60s to mid 100s)--- no longer experiencing hyperglycemic symptoms.  Following discussion with her insurance and a , patient requests a Rx for Mounjaro, as this may be covered by her insurance.  Endocrinology appointment scheduled in 2 months--- patient desires glucose monitoring device  Repeat labs and f/u in 2 months

## 2024-08-26 NOTE — PATIENT INSTRUCTIONS
"Patient Education     Treatment for type 2 diabetes   The Basics   Written by the doctors and editors at [facility]   What are the goals of type 2 diabetes treatment? -- The goals of treatment for type 2 diabetes are to:   Manage your blood sugar   Prevent future health problems that can happen in people with diabetes  How is type 2 diabetes treated? -- Type 2 diabetes can be treated with:   Diet changes   Lifestyle changes   Medicines  Your doctor or nurse will work with you to make a treatment plan that is right for you.  What diet and lifestyle changes might be part of my treatment? -- As part of your treatment, your doctor or nurse might recommend that you:   Eat healthy foods   Lose weight if you have excess body weight   Get plenty of physical activity   Avoid smoking  Making these lifestyle changes is as important as taking your medicines. They will also help improve your overall health.  What medicines are used to treat type 2 diabetes? -- Different medicines can be used to treat type 2 diabetes. The first medicine that most people with type 2 diabetes take is a pill called metformin (brand name: Glucophage).  How do I know if my treatment is working? -- Your doctor can do a blood test called an \"A1C.\" This test shows what your blood sugar level has been over the past 2 to 3 months.  Another way to know if your treatment is working is to check your blood sugar level yourself. Many people with type 2 diabetes do not need to do this, but some do. It usually involves using a device called a \"blood glucose monitor.\" If your doctor recommends doing this, they will explain how and when to use the device.  What if my blood sugar level is still higher than normal? -- If your blood sugar level is still higher than normal after taking metformin for 2 to 3 months, your doctor might increase your dose. If you are already taking the highest possible dose, your doctor might suggest adding a second medicine.  Which second " medicine will I take? -- There are different medicines that your doctor can prescribe. The second medicine could be another pill that you take every day, or a shot that you give yourself once a day or once a week. The choice depends on different things, including how high your blood sugar is, your weight, your other health problems, and whether you are comfortable giving yourself a shot.  A few of these medicines can cause low blood sugar as a side effect. Symptoms of low blood sugar can include:   Sweating and shaking   Feeling hungry   Feeling worried  Low blood sugar should be treated quickly because it can cause you to pass out. Your doctor or nurse will tell you if your medicine can cause low blood sugar and how to treat it.  What is insulin? -- Insulin is a hormone normally made by the pancreas, an organ in the belly. It helps sugar get into your body's cells. In most people with type 2 diabetes, the body does not respond to insulin normally. Then, over time, the pancreas stops making enough insulin.  Most people with type 2 diabetes can manage their blood sugar with healthy eating, physical activity, and medicines. Some people need to take insulin as part of their treatment plan.  Insulin might be prescribed as a second medicine, or as the only medicine. It usually comes as a shot that people give themselves (figure 1).  If your doctor prescribes insulin, they will tell you:   Which type to use - There are different types of insulin. Some types work faster or last longer than others.   How much to use   When to use it   How to give yourself the shot   When to check your blood sugar level   How to avoid low blood sugar  If you take insulin, your dose will need to change if you get sick, have surgery, travel, or eat out. Your doctor or nurse will talk to you about when and how to change your dose.  What other treatments might I need? -- Sometimes, people with type 2 diabetes need medicines to treat or prevent  other health problems. For example, if you have high blood pressure, you might take medicine to lower your blood pressure. This can reduce your chances of having a heart attack or stroke.  When should I see my doctor or nurse? -- Most people with diabetes see their doctor or nurse every 3 or 4 months. During these visits, they will talk with you about your medicines and blood sugar levels. If your blood sugar levels are not where they should be, your doctor or nurse might make changes to your treatment plan.  Taking care of diabetes can be hard, and some people feel sad, stressed, or anxious. Let your doctor or nurse know if you are struggling, so they can help.  All topics are updated as new evidence becomes available and our peer review process is complete.  This topic retrieved from [employer] on: Feb 26, 2024.  Topic 79996 Version 11.0  Release: 32.2.4 - C32.56  © 2024 UpToDate, Inc. and/or its affiliates. All rights reserved.  figure 1: Giving insulin with a needle and syringe     To give yourself insulin using a needle and syringe:  Pinch up some skin, and quickly insert the needle. Keep the skin pinched to avoid having the insulin go into the muscle.  Push the plunger down all of the way.  Let go of the skin, and remove the needle. If you can see blood or clear fluid (insulin) where the shot went in, press on the area for 5 to 8 seconds, but do not rub.  Graphic 41238 Version 14.0  Consumer Information Use and Disclaimer   Disclaimer: This generalized information is a limited summary of diagnosis, treatment, and/or medication information. It is not meant to be comprehensive and should be used as a tool to help the user understand and/or assess potential diagnostic and treatment options. It does NOT include all information about conditions, treatments, medications, side effects, or risks that may apply to a specific patient. It is not intended to be medical advice or a substitute for the medical advice,  "diagnosis, or treatment of a health care provider based on the health care provider's examination and assessment of a patient's specific and unique circumstances. Patients must speak with a health care provider for complete information about their health, medical questions, and treatment options, including any risks or benefits regarding use of medications. This information does not endorse any treatments or medications as safe, effective, or approved for treating a specific patient. UpToDate, Inc. and its affiliates disclaim any warranty or liability relating to this information or the use thereof.The use of this information is governed by the Terms of Use, available at https://www.[employer]/en/know/clinical-effectiveness-terms. 2024© UpToDate, Inc. and its affiliates and/or licensors. All rights reserved.  Copyright   © 2024 UpToDate, Inc. and/or its affiliates. All rights reserved.    Patient Education     Routine physical for adults   The Basics   Written by the doctors and editors at [facility]   What is a physical? -- A physical is a routine visit, or \"check-up,\" with your doctor. You might also hear it called a \"wellness visit\" or \"preventive visit.\"  During each visit, the doctor will:   Ask about your physical and mental health   Ask about your habits, behaviors, and lifestyle   Do an exam   Give you vaccines if needed   Talk to you about any medicines you take   Give advice about your health   Answer your questions  Getting regular check-ups is an important part of taking care of your health. It can help your doctor find and treat any problems you have. But it's also important for preventing health problems.  A routine physical is different from a \"sick visit.\" A sick visit is when you see a doctor because of a health concern or problem. Since physicals are scheduled ahead of time, you can think about what you want to ask the doctor.  How often should I get a physical? -- It depends on your age and " "health. In general, for people age 21 years and older:   If you are younger than 50 years, you might be able to get a physical every 3 years.   If you are 50 years or older, your doctor might recommend a physical every year.  If you have an ongoing health condition, like diabetes or high blood pressure, your doctor will probably want to see you more often.  What happens during a physical? -- In general, each visit will include:   Physical exam - The doctor or nurse will check your height, weight, heart rate, and blood pressure. They will also look at your eyes and ears. They will ask about how you are feeling and whether you have any symptoms that bother you.   Medicines - It's a good idea to bring a list of all the medicines you take to each doctor visit. Your doctor will talk to you about your medicines and answer any questions. Tell them if you are having any side effects that bother you. You should also tell them if you are having trouble paying for any of your medicines.   Habits and behaviors - This includes:   Your diet   Your exercise habits   Whether you smoke, drink alcohol, or use drugs   Whether you are sexually active   Whether you feel safe at home  Your doctor will talk to you about things you can do to improve your health and lower your risk of health problems. They will also offer help and support. For example, if you want to quit smoking, they can give you advice and might prescribe medicines. If you want to improve your diet or get more physical activity, they can help you with this, too.   Lab tests, if needed - The tests you get will depend on your age and situation. For example, your doctor might want to check your:   Cholesterol   Blood sugar   Iron level   Vaccines - The recommended vaccines will depend on your age, health, and what vaccines you already had. Vaccines are very important because they can prevent certain serious or deadly infections.   Discussion of screening - \"Screening\" means " checking for diseases or other health problems before they cause symptoms. Your doctor can recommend screening based on your age, risk, and preferences. This might include tests to check for:   Cancer, such as breast, prostate, cervical, ovarian, colorectal, prostate, lung, or skin cancer   Sexually transmitted infections, such as chlamydia and gonorrhea   Mental health conditions like depression and anxiety  Your doctor will talk to you about the different types of screening tests. They can help you decide which screenings to have. They can also explain what the results might mean.   Answering questions - The physical is a good time to ask the doctor or nurse questions about your health. If needed, they can refer you to other doctors or specialists, too.  Adults older than 65 years often need other care, too. As you get older, your doctor will talk to you about:   How to prevent falling at home   Hearing or vision tests   Memory testing   How to take your medicines safely   Making sure that you have the help and support you need at home  All topics are updated as new evidence becomes available and our peer review process is complete.  This topic retrieved from [employer] on: May 02, 2024.  Topic 566793 Version 1.0  Release: 32.4.3 - C32.122  © 2024 UpToDate, Inc. and/or its affiliates. All rights reserved.  Consumer Information Use and Disclaimer   Disclaimer: This generalized information is a limited summary of diagnosis, treatment, and/or medication information. It is not meant to be comprehensive and should be used as a tool to help the user understand and/or assess potential diagnostic and treatment options. It does NOT include all information about conditions, treatments, medications, side effects, or risks that may apply to a specific patient. It is not intended to be medical advice or a substitute for the medical advice, diagnosis, or treatment of a health care provider based on the health care provider's  examination and assessment of a patient's specific and unique circumstances. Patients must speak with a health care provider for complete information about their health, medical questions, and treatment options, including any risks or benefits regarding use of medications. This information does not endorse any treatments or medications as safe, effective, or approved for treating a specific patient. UpToDate, Inc. and its affiliates disclaim any warranty or liability relating to this information or the use thereof.The use of this information is governed by the Terms of Use, available at https://www.[employer].com/en/know/clinical-effectiveness-terms. 2024© UpToDate, Inc. and its affiliates and/or licensors. All rights reserved.  Copyright   © 2024 UpToDate, Inc. and/or its affiliates. All rights reserved.

## 2024-08-28 ENCOUNTER — TELEPHONE (OUTPATIENT)
Dept: ENDOCRINOLOGY | Facility: CLINIC | Age: 54
End: 2024-08-28

## 2024-08-28 ENCOUNTER — TELEPHONE (OUTPATIENT)
Age: 54
End: 2024-08-28

## 2024-08-28 NOTE — TELEPHONE ENCOUNTER
Called patient to reschedule due to providers change in schedule. Patient requested a call from management. I offered next available and waitlist. Patient stated she has been cancelled on 3 times and has waited 8 months for this visit. I was able to schedule her with Dr. Fitch in [place] on 11-., Please contact patient as she has requested.

## 2024-08-28 NOTE — TELEPHONE ENCOUNTER
Sent a message to Silas for possible sooner appointment. Spoke with patient about request. Hope fully will be able to see her sooner.

## 2024-08-29 NOTE — TELEPHONE ENCOUNTER
Patient called back and accepted the 9-9-2024 with Dr. Reddy in [place] @ 1:40. [address].

## 2024-09-08 DIAGNOSIS — E11.9 TYPE 2 DIABETES MELLITUS WITHOUT COMPLICATION, WITHOUT LONG-TERM CURRENT USE OF INSULIN (HCC): ICD-10-CM

## 2024-09-08 RX ORDER — TIRZEPATIDE 2.5 MG/.5ML
2.5 INJECTION, SOLUTION SUBCUTANEOUS WEEKLY
Qty: 2 ML | Refills: 0 | OUTPATIENT
Start: 2024-09-08

## 2024-09-09 ENCOUNTER — CONSULT (OUTPATIENT)
Dept: ENDOCRINOLOGY | Facility: CLINIC | Age: 54
End: 2024-09-09
Payer: COMMERCIAL

## 2024-09-09 VITALS
DIASTOLIC BLOOD PRESSURE: 90 MMHG | BODY MASS INDEX: 35.63 KG/M2 | HEART RATE: 99 BPM | HEIGHT: 67 IN | SYSTOLIC BLOOD PRESSURE: 140 MMHG | OXYGEN SATURATION: 97 % | WEIGHT: 227 LBS

## 2024-09-09 DIAGNOSIS — E11.65 UNCONTROLLED TYPE 2 DIABETES MELLITUS WITH HYPERGLYCEMIA (HCC): Primary | ICD-10-CM

## 2024-09-09 DIAGNOSIS — E66.01 CLASS 2 SEVERE OBESITY WITH SERIOUS COMORBIDITY AND BODY MASS INDEX (BMI) OF 38.0 TO 38.9 IN ADULT, UNSPECIFIED OBESITY TYPE (HCC): ICD-10-CM

## 2024-09-09 DIAGNOSIS — K76.0 FATTY LIVER: ICD-10-CM

## 2024-09-09 DIAGNOSIS — E03.9 HYPOTHYROIDISM, UNSPECIFIED TYPE: ICD-10-CM

## 2024-09-09 DIAGNOSIS — I10 BENIGN ESSENTIAL HYPERTENSION: ICD-10-CM

## 2024-09-09 DIAGNOSIS — E11.9 TYPE 2 DIABETES MELLITUS WITHOUT COMPLICATION, WITHOUT LONG-TERM CURRENT USE OF INSULIN (HCC): ICD-10-CM

## 2024-09-09 PROCEDURE — 99204 OFFICE O/P NEW MOD 45 MIN: CPT | Performed by: INTERNAL MEDICINE

## 2024-09-09 NOTE — TELEPHONE ENCOUNTER
Rizwana is calling in regards to her Mounjaro. She's states that she tried to use her injections but the pens wouldn't work and the medication would come out. She reached out to the  and they advised her this was a  issue and that a new order would need to be sent to the pharmacy for them to send new.     Please advise, patient would like a call back to confirm we can send another order since her injections were faulty.     Thank you.

## 2024-09-09 NOTE — PROGRESS NOTES
Joann Marie 53 y.o. female MRN: [id number]    Encounter: [id number]      Assessment & Plan     Assessment:  This is a 53 y.o.-year-old female with diabetes with hyperglycemia.    Plan:  Diagnoses and all orders for this visit:    Uncontrolled type 2 diabetes mellitus with hyperglycemia (HCC)    Lab Results   Component Value Date    HGBA1C 11.5 (H) 07/23/2024   Hemoglobin A1c is uncontrolled however since she was started on Mounjaro 2.5 mg once a week, her blood sugars have improved and 80 to 160 mg per DL range.  Glipizide was discontinued because of hypoglycemia.  Discussed to check blood sugar twice daily and goal for blood sugar is 80 to 160 mg per DL.  She is going to increase the dose of Mounjaro to 5 mg once a week for next 2 weeks.  We discussed pathophysiology of type 2 diabetes, complications of uncontrolled diabetes and treatment plan.  Discussed r the importance of follow-up with ophthalmology and podiatry  Educated about hypoglycemia symptoms and treatment  -     Ambulatory referral to Diabetic Education - use to refer for diabetes group classes, individual diabetes education, medical nutrition therapy, device training  -     Albumin / creatinine urine ratio; Future  -     Basic metabolic panel; Future  -     Hemoglobin A1C; Future  -     Ambulatory referral to Diabetic Education; Future    Class 2 severe obesity with serious comorbidity and body mass index (BMI) of 38.0 to 38.9 in adult, unspecified obesity type (HCC)  Recommend decreasing portion sizes, encouraging healthy choices of fruits and vegetables, consuming healthier snacks and moderation in carbohydrate intake. Exercise recommendations; 3-5 times a week, the American Heart Association recommends 150 minutes a week moderate exercise    --Overweight and obesity have been associated with increased mortality and morbidity.  Associated with a reduction in life expectancy, associated with increased all cause and cardiovascular  mortality  --Metabolic risks, including increased risk of diabetes type 2, insulin resistance with hyperinsulinemia (weight loss of 5 to 7% associated with a decreased risk of type 2 diabetes among individuals with prediabetes and weight loss of 15% can lead to dramatic improvement of diabetes in nearly half of people).  Dyslipidemia, hypertension and heart disease are all increased in patients with obesity.  Along with increased risk of stroke increased risk of multiple cancer types.  Can also be associated with increased renal dysfunction, strongest risk factor for new onset chronic kidney disease.  There is also a degree of compensatory hyperfiltration to meet high in the metabolic demands of increased body weight.  This can also result in increased increased risk for nephrolithiasis.   Benign essential hypertension  BP Readings from Last 3 Encounters:   09/09/24 140/90   08/26/24 114/80   07/23/24 124/68   Blood pressure is usually well-controlled, today blood pressure is slightly elevated.  Goal for blood pressure is less than 120/80 mmHg.  Continue current management.  Follow-up with primary care physician  Type 2 diabetes mellitus without complication, without long-term current use of insulin (HCC)  Continue current regimen  Fatty liver  Glucagon like agonist therapy will also help with fatty liver  Hypothyroidism, unspecified type  Lab Results   Component Value Date    [id number] 5.76 (H) 06/24/2017    TSH 3.770 05/14/2024   Will obtain thyroid function test in 2 months as patient is on Mounjaro and is losing weight to ensure her thyroid function is okay.  Will repeat thyroid function test in 4 months before next appointment    -     T4, free; Future  -     TSH, 3rd generation; Future  -     T4, free; Future  -     TSH, 3rd generation; Future         CC: Diabetes    History of Present Illness     HPI:    Joann Marie is 53-year-old woman with medical history of type 2 diabetes obesity, hypertension,  hyperlipidemia is referred here for management of type 2 diabetes.  Diagnosed in 2018, with type 2 diabetes  Denies Hx of gestational diabetes     Diabetes course has been stable  Hemoglobin A1c uncontrolled 11.5%    Current medication include  metformin  mg, 2 tablets twice a day, patient is currently  taking Mounjaro 2.5 mg once a week, she started 2 weeks ago and has lost 4 pounds,   She checks blood sugars two times daily and her blood sugars are in  mg/dl   Denies hypoglycemia since glipizide was stopped   She denies hospitalization for hyperglycemia or hypoglycemia     Wt Readings from Last 3 Encounters:   09/09/24 103 kg (227 lb)   08/26/24 105 kg (232 lb)   07/23/24 105 kg (232 lb 3.2 oz)      Lab Results   Component Value Date    HGBA1C 11.5 (H) 07/23/2024       For hypothyroidism, she takes levothyroxine 75 mcg daily on empty stomach 1 hour before breakfast  Lab Results   Component Value Date    [id number] 5.76 (H) 06/24/2017    TSH 3.770 05/14/2024     Wt Readings from Last 3 Encounters:   09/09/24 103 kg (227 lb)   08/26/24 105 kg (232 lb)   07/23/24 105 kg (232 lb 3.2 oz)      Lab Results   Component Value Date    LDLCALC 70 11/10/2023          Lab Results   Component Value Date    HGBA1C 11.5 (H) 07/23/2024       Review of Systems   Constitutional:  Negative for activity change, diaphoresis, fatigue, fever and unexpected weight change.   HENT: Negative.     Eyes:  Negative for visual disturbance.   Respiratory:  Negative for cough, chest tightness and shortness of breath.    Cardiovascular:  Negative for chest pain, palpitations and leg swelling.   Gastrointestinal:  Negative for abdominal pain, blood in stool, constipation, diarrhea, nausea and vomiting.   Endocrine: Negative for cold intolerance, heat intolerance, polydipsia, polyphagia and polyuria.   Genitourinary:  Negative for dysuria, enuresis, frequency and urgency.   Musculoskeletal:  Negative for arthralgias and myalgias.    Skin:  Negative for pallor, rash and wound.   Allergic/Immunologic: Negative.    Neurological:  Negative for dizziness, tremors, weakness and numbness.   Hematological: Negative.    Psychiatric/Behavioral: Negative.         Historical Information   Past Medical History:   Diagnosis Date    Anxiety     Calculus of distal right ureter     last assessed 08/06/2014    Closed fracture of head of first metacarpal bone of right hand     Colostomy status (HCC) 04/05/2018    Depression     Disease of thyroid gland     Diverticulitis     Fatty liver     last assessed 01/09/2017    HLD (hyperlipidemia)     treating with diet    Hypertension     Kidney stone     Lung nodules     Migraine     last assessed 08/03/2012    Pneumonia     Pre-diabetes     last assessed 06/27/2017     Past Surgical History:   Procedure Laterality Date    APPENDECTOMY      BREAST CYST EXCISION Right     many years ago and not sure if it was right?    CHOLECYSTECTOMY  1999    laparoscopic    COLON SURGERY  01/09/2018    colostomy    COLONOSCOPY  02/2012    polyp, Dr. Zee    COLOSTOMY CLOSURE N/A 04/05/2018    Procedure: REVERSAL COLOSTOMY;  Surgeon: Walt Gardiner DO;  [facility];  Service: General    FLEXIBLE SIGMOIDOSCOPY N/A 04/05/2018    Procedure: FLEX SIGMOIDOSCOPY;  Surgeon: Walt Gardiner DO;  [facility];  Service: General    HERNIA REPAIR  1999    umbilical    LAPAROTOMY N/A 01/07/2018    Procedure: LAPAROTOMY EXPLORATORY, sigmoid colon resection, colostomy, appendectomy;  Surgeon: Walt Gardiner DO;  [facility];  Service: General    CO COLONOSCOPY FLX DX W/COLLJ SPEC WHEN PFRMD N/A 04/02/2018    Procedure: COLONOSCOPY;  Surgeon: Walt Gardiner DO;  Location: [facility];  Service: General    CO COLONOSCOPY FLX DX W/COLLJ SPEC WHEN PFRMD N/A 04/16/2019    Procedure: COLONOSCOPY;  Surgeon: Walt Gardiner DO;  [facility];  Service: General    CO EXPLORATORY LAPAROTOMY CELIOTOMY W/WO BIOPSY SPX  N/A 04/05/2018    Procedure: EXPLORATORY LAPAROTOMY;  Surgeon: Walt Gardiner DO;  [facility];  Service: General    ME REPAIR FIRST ABDOMINAL WALL HERNIA N/A 11/15/2018    Procedure: INCISIONAL HERNIA REPAIR AT UMBILICUS;  Surgeon: Walt Gardiner DO;  [facility];  Service: General    REDUCTION MAMMAPLASTY Bilateral 1999    TUBAL LIGATION       Social History   Social History     Substance and Sexual Activity   Alcohol Use Not Currently    Comment: quit 5 years ago     Social History     Substance and Sexual Activity   Drug Use No     Social History     Tobacco Use   Smoking Status Every Day    Current packs/day: 1.00    Average packs/day: 1 pack/day for 28.0 years (28.0 ttl pk-yrs)    Types: Cigarettes   Smokeless Tobacco Never     Family History:   Family History   Problem Relation Age of Onset    Alcohol abuse Mother     Hepatitis Mother         chronic hepatitis C virus    Heart disease Father     Heart attack Father         MI    Hypertension Father     Diverticulitis Sister     Diabetes Sister     Hypertension Sister     Cancer Brother 68        liver cancer    Breast cancer Sister 50    No Known Problems Daughter     No Known Problems Daughter        Meds/Allergies   Current Outpatient Medications   Medication Sig Dispense Refill    albuterol (ProAir HFA) 90 mcg/act inhaler Inhale 2 puffs every 6 (six) hours as needed for wheezing or shortness of breath (cough) 8 g 0    ALPRAZolam (XANAX) 0.25 mg tablet Take 1 tablet (0.25 mg total) by mouth 2 (two) times a day as needed for anxiety 30 tablet 0    atorvastatin (LIPITOR) 20 mg tablet Take 1 tablet by mouth daily 30 tablet 5    glucose blood (OneTouch Ultra) test strip Use as instructed 50 strip 0    hydroCHLOROthiazide 50 mg tablet Take 1 tablet (50 mg total) by mouth daily 90 tablet 0    levothyroxine 75 mcg tablet Take 1 tablet (75 mcg total) by mouth daily 90 tablet 0    losartan (COZAAR) 100 MG tablet Take 1 tablet (100 mg total) by  "mouth daily 90 tablet 1    metFORMIN (GLUCOPHAGE-XR) 500 mg 24 hr tablet Take 2 tablets (1,000 mg total) by mouth 2 (two) times a day with meals 180 tablet 1    Mounjaro 2.5 MG/0.5ML Inject 0.5 mL (2.5 mg total) under the skin once a week 2 mL 0    ergocalciferol (VITAMIN D2) 50,000 units Take 1 capsule (50,000 Units total) by mouth once a week (Patient not taking: Reported on 8/26/2024) 12 capsule 0    [START ON 9/25/2024] Mounjaro 5 MG/0.5ML Inject 0.5 mL (5 mg total) under the skin once a week Do not start before September 25, 2024. (Patient not taking: Reported on 9/9/2024 Do not start before September 25, 2024.) 4 mL 0    nicotine (NICODERM CQ) 7 mg/24hr TD 24 hr patch Place 1 patch on the skin over 24 hours every 24 hours (Patient not taking: Reported on 9/9/2024) 28 patch 0    nicotine polacrilex (NICORETTE) 2 mg gum Chew 1 each (2 mg total) as needed for smoking cessation (Patient not taking: Reported on 9/9/2024) 100 each 0     No current facility-administered medications for this visit.     Allergies   Allergen Reactions    Penicillins Angioedema    Amoxicillin Edema    Doxycycline Vomiting    Vicodin [Hydrocodone-Acetaminophen] GI Intolerance and Vomiting       Objective   Vitals: Blood pressure 140/90, pulse 99, height 5' 7\" (1.702 m), weight 103 kg (227 lb), last menstrual period 02/01/2023, SpO2 97%.    Physical Exam  Vitals reviewed.   Constitutional:       General: She is not in acute distress.     Appearance: Normal appearance. She is obese. She is not ill-appearing.   HENT:      Head: Normocephalic and atraumatic.      Nose: Nose normal.   Eyes:      Extraocular Movements: Extraocular movements intact.      Conjunctiva/sclera: Conjunctivae normal.   Pulmonary:      Effort: No respiratory distress.   Musculoskeletal:      Cervical back: Normal range of motion.   Neurological:      General: No focal deficit present.      Mental Status: She is alert and oriented to person, place, and time. " "  Psychiatric:         Mood and Affect: Mood normal.         Behavior: Behavior normal.         The history was obtained from the review of the chart, patient.    Lab Results:   Lab Results   Component Value Date/Time    Hemoglobin A1C 11.5 (H) 07/23/2024 07:11 AM    Hemoglobin A1C 9.5 (H) 05/14/2024 07:13 AM    Hemoglobin A1C 8.4 (A) 11/02/2023 02:49 PM    White Blood Cell Count 10.1 11/10/2023 06:34 AM    Hemoglobin 13.2 11/10/2023 06:34 AM    HCT 38.7 11/10/2023 06:34 AM    MCV 87 11/10/2023 06:34 AM    Platelet Count 353 11/10/2023 06:34 AM    BUN 23 07/23/2024 07:11 AM    BUN 16 02/14/2024 07:02 AM    BUN 17 11/10/2023 06:34 AM    Potassium 3.3 (L) 07/23/2024 07:11 AM    Potassium 3.6 02/14/2024 07:02 AM    Potassium 4.0 11/10/2023 06:34 AM    Chloride 101 07/23/2024 07:11 AM    Chloride 102 02/14/2024 07:02 AM    Chloride 98 11/10/2023 06:34 AM    CO2 23 07/23/2024 07:11 AM    CO2 24 02/14/2024 07:02 AM    CO2 20 11/10/2023 06:34 AM    Creatinine 1.30 07/23/2024 07:11 AM    Creatinine 1.10 (H) 02/14/2024 07:02 AM    Creatinine 1.26 (H) 11/10/2023 06:34 AM    AST 16 11/10/2023 06:34 AM    ALT 20 11/10/2023 06:34 AM    Protein, Total 6.8 11/10/2023 06:34 AM    Albumin 4.5 11/10/2023 06:34 AM    Globulin, Total 2.3 11/10/2023 06:34 AM    HDL 37 (L) 11/10/2023 06:34 AM    Triglycerides 226 (H) 11/10/2023 06:34 AM           Imaging Studies: I have personally reviewed pertinent reports.      Portions of the record may have been created with voice recognition software. Occasional wrong word or \"sound a like\" substitutions may have occurred due to the inherent limitations of voice recognition software. Read the chart carefully and recognize, using context, where substitutions have occurred.    "

## 2024-09-11 ENCOUNTER — PATIENT OUTREACH (OUTPATIENT)
Dept: CASE MANAGEMENT | Facility: OTHER | Age: 54
End: 2024-09-11

## 2024-09-11 ENCOUNTER — TELEPHONE (OUTPATIENT)
Age: 54
End: 2024-09-11

## 2024-09-11 DIAGNOSIS — Z71.89 COMPLEX CARE COORDINATION: Primary | ICD-10-CM

## 2024-09-11 DIAGNOSIS — E11.9 TYPE 2 DIABETES MELLITUS WITHOUT COMPLICATION, WITHOUT LONG-TERM CURRENT USE OF INSULIN (HCC): ICD-10-CM

## 2024-09-11 NOTE — TELEPHONE ENCOUNTER
The patient called to report that she is at the pharmacy and her medication Mounjaro 5 MG/0.5ML is not available until 09/25/2024. The patient reports that her last prescription had two injections that came bad from the factory and needs a new prescription sent to her because she has to take the medication on Saturday.     Please advice

## 2024-09-11 NOTE — PROGRESS NOTES
Outpatient Care Management Note    Care management referral- diabetic, HgbA1c 11.5%. Chart reviewed.    Noted per chart at office visit with PCP on 7/23/24 it was noted that diabetes was poorly controlled. Found that patient had not been taking Metformin as prescribed.  Ozempic ordered at this visit and referral placed to [facility] to assist with cost of medication.  Referral was also placed for diabetes education.    Per chart, patient had recent OV with PCP on 8/26/24. Patient taking Metformin at dose prescribed along with Glipizide. The Ozempic that was previously ordered was declined by her insurance and therefore never started.  Patient requested prescription for Mounjaro instead, which may be covered by insurance.  Patient to follow as well with Endocrine. Noted that patient was seen by Endocrine on 9/9/24.  Per Endocrine note, patient has stared on Mounjaro with improvement in blood sugars noted. Glipizide was discontinued due to hypoglycemia.    Per chart, diabetes medication now include Metformin and Mounjaro.    RN CM will schedule outreach attempt to assess for any patient care management needs.

## 2024-09-11 NOTE — TELEPHONE ENCOUNTER
Pharmacist called and stated patient was ordered 5 mg of Mounjaro that doesn't start until 9/25.Patient told pharmacist that it  was ok to remain on the 2.5 mg for an additional four weeks ,because the needle for the syringe was bent and patient did not receive the proper dosage of medication. Please advise.

## 2024-09-12 RX ORDER — TIRZEPATIDE 2.5 MG/.5ML
2.5 INJECTION, SOLUTION SUBCUTANEOUS WEEKLY
Qty: 2 ML | Refills: 0 | Status: SHIPPED | OUTPATIENT
Start: 2024-09-12

## 2024-09-13 ENCOUNTER — PATIENT OUTREACH (OUTPATIENT)
Dept: CASE MANAGEMENT | Facility: OTHER | Age: 54
End: 2024-09-13

## 2024-09-13 NOTE — PROGRESS NOTES
Outpatient Care Management Note    IB reminder: care management referral-diabetic, HgbA1c 11.5%.  Chart reviewed.    Patient was seen by Endocrine on 9/9/24 and has started on Mounjaro with improvement in blood sugars noted. Glipizide was discontinued due to hypoglycemia.  Diabetes medications now include Metformin and Mounjaro.    RN VIN placed outreach call and spoke briefly with patient.  Introduced self, role and reason for call.  Patient states that everything is going well and she has no concerns.  Patient reports that she has all of her medications and has no questions or concerns.    Patient asked question about labs that were ordered- question addressed.    Patient states that she is monitoring blood sugars and states that her numbers have been very good, no specifics shared.    Patient denied having any concerns or need for further RN CM outreach at this time.     RN CM will remove self from care team and close referral at this time.

## 2024-09-22 DIAGNOSIS — E11.9 TYPE 2 DIABETES MELLITUS WITHOUT COMPLICATION, WITHOUT LONG-TERM CURRENT USE OF INSULIN (HCC): ICD-10-CM

## 2024-09-23 DIAGNOSIS — E11.9 TYPE 2 DIABETES MELLITUS WITHOUT COMPLICATION, WITHOUT LONG-TERM CURRENT USE OF INSULIN (HCC): ICD-10-CM

## 2024-09-23 DIAGNOSIS — I10 ESSENTIAL HYPERTENSION: ICD-10-CM

## 2024-09-23 RX ORDER — TIRZEPATIDE 5 MG/.5ML
5 INJECTION, SOLUTION SUBCUTANEOUS WEEKLY
Qty: 4 ML | Refills: 0 | Status: SHIPPED | OUTPATIENT
Start: 2024-09-25 | End: 2024-09-25 | Stop reason: DRUGHIGH

## 2024-09-23 RX ORDER — METFORMIN HCL 500 MG
1000 TABLET, EXTENDED RELEASE 24 HR ORAL 2 TIMES DAILY WITH MEALS
Qty: 360 TABLET | Refills: 1 | Status: SHIPPED | OUTPATIENT
Start: 2024-09-23 | End: 2024-09-23 | Stop reason: SDUPTHER

## 2024-09-23 RX ORDER — LOSARTAN POTASSIUM 100 MG/1
100 TABLET ORAL DAILY
Qty: 90 TABLET | Refills: 1 | Status: SHIPPED | OUTPATIENT
Start: 2024-09-23

## 2024-09-23 RX ORDER — METFORMIN HCL 500 MG
1000 TABLET, EXTENDED RELEASE 24 HR ORAL 2 TIMES DAILY WITH MEALS
Qty: 360 TABLET | Refills: 1 | Status: SHIPPED | OUTPATIENT
Start: 2024-09-23

## 2024-09-24 ENCOUNTER — TELEPHONE (OUTPATIENT)
Dept: INTERNAL MEDICINE CLINIC | Facility: CLINIC | Age: 54
End: 2024-09-24

## 2024-09-24 ENCOUNTER — TELEPHONE (OUTPATIENT)
Age: 54
End: 2024-09-24

## 2024-09-24 ENCOUNTER — PATIENT OUTREACH (OUTPATIENT)
Dept: CASE MANAGEMENT | Facility: OTHER | Age: 54
End: 2024-09-24

## 2024-09-24 DIAGNOSIS — E11.9 TYPE 2 DIABETES MELLITUS WITHOUT COMPLICATION, WITHOUT LONG-TERM CURRENT USE OF INSULIN (HCC): Primary | ICD-10-CM

## 2024-09-24 NOTE — PROGRESS NOTES
"Outpatient Care Management Note:  Referral received from Dr. Chauhan for assistance with obtaining Mounjaro.  Reviewed chart.  Outreached by RN CM on 9/13/2024. At that time Cynthia Peter denied any needs and had obtained her Mounjaro for $25.  Prior auth was completed and approved today for increased dosage.     Call placed to Ms. Marie.  Introduced myself and my role.  Rizwana shares that when she went to fill her Mounjaro 5 mg dose today, her out of pocket cost is $236.00  Per Rizwana, the pharmacist said that is the price with the co-pay card.  Rizwana is asking if there are any other resources available.  She is also agreeable to me reaching out to the Pharmacy for more information.      Rizwana os feeling frustrated as her glucose levels have been significantly improved with the use of Mounjaro.  Supportive listening provided.     Call placed to [facility] in [place].  Spoke with Pharmacist who states that Rizwana's initial out of pocket cost was $1000.  He states that [employer] applied a coupon for $795.56 \"in the background.\"  He was unable to also use the co-pay card that she presented.  He is unsure why her out of pocket cost was $25 previously.      Call placed to [employer].  Spoke with [employer].  After 15 minutes on hold, [employer] transferred me to the savings program.  Spoke with Walker who advised that there were no other resources they could offer the patient at this time.  He confirmed she is not eligible for the prescription assistance program due to having commercial insurance.     Call placed to Rizwana to update her on above information.  Advised her I would also reach out to endocrinology to see if they have any suggestions.  She is agreeable to plan.     In basket message sent to [facility].     Dr. Chauhan updated via [employer] secure chat.   "

## 2024-09-24 NOTE — TELEPHONE ENCOUNTER
Returned patient's call and left a detailed voice message about her prescription for Mounjaro.  Patient advised to call back with any further questions.

## 2024-09-24 NOTE — TELEPHONE ENCOUNTER
PA for Mounjaro 5 MG/0.5ML SUBMITTED     via    []CMM-KEY:   [x]Rada-Case ID # [id number]  []Faxed to plan   []Other website   []Phone call Case ID #     Office notes sent, clinical questions answered. Awaiting determination    Turnaround time for your insurance to make a decision on your Prior Authorization can take 7-21 business days.

## 2024-09-24 NOTE — TELEPHONE ENCOUNTER
Patient called back stating that herself or her  Walt Marie (ce) will come in during the week to .    Please advise, thank you   99

## 2024-09-24 NOTE — TELEPHONE ENCOUNTER
PA for Mounjaro 5 MG/0.5ML APPROVED     Date(s) approved 9- - 9-        Patient advised by          [x][employer]ive[employer] Message  [x]Phone call   []LMOM  []L/M to call office as no active Communication consent on file  []Unable to leave detailed message as VM not approved on Communication consent       Pharmacy advised by    [x]Fax  []Phone call    Approval letter scanned into [employer]

## 2024-09-25 ENCOUNTER — TELEPHONE (OUTPATIENT)
Dept: INTERNAL MEDICINE CLINIC | Facility: CLINIC | Age: 54
End: 2024-09-25

## 2024-09-25 NOTE — TELEPHONE ENCOUNTER
Patient's spouse picked up Mounjaro samples from the office today.    LOT # [id number] x 2 boxes  Mounjaro 2.5mg/0.5mL  2025 - Sept    Patient advised to follow up with  and Endocrinology for further instructions and options

## 2024-09-26 ENCOUNTER — PATIENT OUTREACH (OUTPATIENT)
Dept: CASE MANAGEMENT | Facility: OTHER | Age: 54
End: 2024-09-26

## 2024-09-26 NOTE — PROGRESS NOTES
Outpatient Care Management Note:  Received return call from Rizwana.  Made her aware of response from endocrinology.  Encouraged her to call to discuss alternative medications.  Rizwana shares that she received 2 months of samples from her PCP and has an appointment next month and will discuss at that time.  No other needs at this time.

## 2024-09-26 NOTE — PROGRESS NOTES
Outpatient Care Management Note:  Received response from endocrinology.  They have no additional resources to help Rizwana obtain her Mounjaro.  They recommend trying a different medication.  Dr. Chauhan was copied on the message.     Outreach call attempted to Rizwana with an update. Message left for patient to please return call.  Contact information left on message.

## 2024-10-10 ENCOUNTER — PATIENT OUTREACH (OUTPATIENT)
Dept: CASE MANAGEMENT | Facility: OTHER | Age: 54
End: 2024-10-10

## 2024-10-10 NOTE — PROGRESS NOTES
Outpatient Care Management Note:  Received call from Rizwana.  She is trying to schedule lab work and is unable.  Discussed that it can only be scheduled 2 days in advance.  She is concerned that if she does not schedule it, she will be late arriving to work.  Advised her to log on early 2 days prior to the labs being drawn to schedule.  Verbalized understanding.     Continues to have Mounjaro samples from PCP office. Glucose levels remain good on the medication.  No needs at this time.

## 2024-10-18 ENCOUNTER — LAB (OUTPATIENT)
Dept: LAB | Facility: CLINIC | Age: 54
End: 2024-10-18
Payer: COMMERCIAL

## 2024-10-18 DIAGNOSIS — E03.9 HYPOTHYROIDISM, UNSPECIFIED TYPE: ICD-10-CM

## 2024-10-18 DIAGNOSIS — E11.9 TYPE 2 DIABETES MELLITUS WITHOUT COMPLICATION, UNSPECIFIED WHETHER LONG TERM INSULIN USE (HCC): Primary | ICD-10-CM

## 2024-10-18 LAB
ALBUMIN SERPL BCG-MCNC: 4.4 G/DL (ref 3.5–5)
ALP SERPL-CCNC: 58 U/L (ref 34–104)
ALT SERPL W P-5'-P-CCNC: 18 U/L (ref 7–52)
ANION GAP SERPL CALCULATED.3IONS-SCNC: 12 MMOL/L (ref 4–13)
AST SERPL W P-5'-P-CCNC: 15 U/L (ref 13–39)
BILIRUB SERPL-MCNC: 0.56 MG/DL (ref 0.2–1)
BUN SERPL-MCNC: 17 MG/DL (ref 5–25)
CALCIUM SERPL-MCNC: 9.9 MG/DL (ref 8.4–10.2)
CHLORIDE SERPL-SCNC: 102 MMOL/L (ref 96–108)
CO2 SERPL-SCNC: 27 MMOL/L (ref 21–32)
CREAT SERPL-MCNC: 1.41 MG/DL (ref 0.6–1.3)
CREAT UR-MCNC: 148.2 MG/DL
EST. AVERAGE GLUCOSE BLD GHB EST-MCNC: 148 MG/DL
GFR SERPL CREATININE-BSD FRML MDRD: 42 ML/MIN/1.73SQ M
GLUCOSE P FAST SERPL-MCNC: 97 MG/DL (ref 65–99)
HBA1C MFR BLD: 6.8 %
MICROALBUMIN UR-MCNC: 20.8 MG/L
MICROALBUMIN/CREAT 24H UR: 14 MG/G CREATININE (ref 0–30)
POTASSIUM SERPL-SCNC: 3.9 MMOL/L (ref 3.5–5.3)
PROT SERPL-MCNC: 6.9 G/DL (ref 6.4–8.4)
SODIUM SERPL-SCNC: 141 MMOL/L (ref 135–147)
T4 FREE SERPL-MCNC: 1.01 NG/DL (ref 0.61–1.12)
TSH SERPL DL<=0.05 MIU/L-ACNC: 3.6 UIU/ML (ref 0.45–4.5)

## 2024-10-18 PROCEDURE — 84443 ASSAY THYROID STIM HORMONE: CPT

## 2024-10-18 PROCEDURE — 84439 ASSAY OF FREE THYROXINE: CPT

## 2024-10-18 PROCEDURE — 80053 COMPREHEN METABOLIC PANEL: CPT

## 2024-10-18 PROCEDURE — 82043 UR ALBUMIN QUANTITATIVE: CPT

## 2024-10-18 PROCEDURE — 83036 HEMOGLOBIN GLYCOSYLATED A1C: CPT

## 2024-10-18 PROCEDURE — 36415 COLL VENOUS BLD VENIPUNCTURE: CPT

## 2024-10-18 PROCEDURE — 82570 ASSAY OF URINE CREATININE: CPT

## 2024-10-25 ENCOUNTER — OFFICE VISIT (OUTPATIENT)
Dept: INTERNAL MEDICINE CLINIC | Facility: CLINIC | Age: 54
End: 2024-10-25
Payer: COMMERCIAL

## 2024-10-25 VITALS
SYSTOLIC BLOOD PRESSURE: 108 MMHG | HEART RATE: 63 BPM | HEIGHT: 67 IN | WEIGHT: 218.6 LBS | BODY MASS INDEX: 34.31 KG/M2 | DIASTOLIC BLOOD PRESSURE: 70 MMHG | OXYGEN SATURATION: 99 %

## 2024-10-25 DIAGNOSIS — E11.9 TYPE 2 DIABETES MELLITUS WITHOUT COMPLICATION, WITHOUT LONG-TERM CURRENT USE OF INSULIN (HCC): Primary | ICD-10-CM

## 2024-10-25 DIAGNOSIS — I10 BENIGN ESSENTIAL HYPERTENSION: ICD-10-CM

## 2024-10-25 PROCEDURE — 99213 OFFICE O/P EST LOW 20 MIN: CPT

## 2024-10-25 RX ORDER — GLIPIZIDE 10 MG/1
10 TABLET ORAL
Qty: 60 TABLET | Refills: 2 | Status: SHIPPED | OUTPATIENT
Start: 2024-10-25 | End: 2025-01-23

## 2024-10-25 NOTE — ASSESSMENT & PLAN NOTE
Lab Results   Component Value Date    HGBA1C 6.8 (H) 10/18/2024    HGBA1C 11.5 (H) 07/23/2024     -Glucose control significantly improved after starting Mounjaro injection 2 to 3 months ago (only has one dose of sample remaining--- unable to continue d/t cost)  -Will be transitioning to glipizide 10 mg twice daily (once Mounjaro medication runs out)  -Weight loss of approximately 14 pounds  -Also continues to take metformin 1000 mg twice daily  -DM foot exam completed: Risk category 0  -F/U in 3-4 months for continued surveillance    Orders:    glipiZIDE (GLUCOTROL) 10 mg tablet; Take 1 tablet (10 mg total) by mouth 2 (two) times a day before meals

## 2024-10-25 NOTE — ASSESSMENT & PLAN NOTE
- BP today 108/70   -Current regimen includes losartan 100 mg and hydrochlorothiazide 50 mg  -Recommend decreasing HCTZ to 25 mg daily  -Continue checking blood pressure at home     Return in about 4 months (around 2/25/2025) for Recheck.

## 2024-10-25 NOTE — PROGRESS NOTES
Ambulatory Visit  Name: Joann Marie      : 1970      MRN: [id number]  Encounter Provider: Amanda Chauhan MD  Encounter Date: 10/25/2024   Encounter department: [facility] INTERNAL MEDICINE    Assessment & Plan  Type 2 diabetes mellitus without complication, without long-term current use of insulin ([facility])  Lab Results   Component Value Date    HGBA1C 6.8 (H) 10/18/2024    HGBA1C 11.5 (H) 2024     -Glucose control significantly improved after starting Mounjaro injection 2 to 3 months ago (only has one dose of sample remaining--- unable to continue d/t cost)  -Will be transitioning to glipizide 10 mg twice daily (once Mounjaro medication runs out)  -Weight loss of approximately 14 pounds  -Also continues to take metformin 1000 mg twice daily  -DM foot exam completed: Risk category 0  -F/U in 3-4 months for continued surveillance    Orders:  •  glipiZIDE (GLUCOTROL) 10 mg tablet; Take 1 tablet (10 mg total) by mouth 2 (two) times a day before meals    Benign essential hypertension  - BP today 108/70   -Current regimen includes losartan 100 mg and hydrochlorothiazide 50 mg  -Recommend decreasing HCTZ to 25 mg daily  -Continue checking blood pressure at home     Return in about 4 months (around 2025) for Recheck.       History of Present Illness     HPI  Rizwana returns for a follow-up visit for continued management of diabetes and to review recent labs.  She has been doing well after starting on Mounjaro several months ago.   Notes improvement of tingling/numbness of lower extremity and toes.  Greater than 10 pound weight loss in 2 months.  Denies any side effects as well as other concerns at this time.    History obtained from : patient    Review of Systems   Constitutional:  Negative for chills and unexpected weight change.   HENT:  Negative for congestion.    Eyes:  Negative for visual disturbance.   Respiratory:  Negative for cough, shortness of breath and wheezing.     Cardiovascular:  Negative for chest pain.   Gastrointestinal:  Negative for abdominal pain, nausea and vomiting.   Endocrine: Negative for polyuria.   Musculoskeletal:  Negative for back pain.   Neurological:  Negative for dizziness and light-headedness.   Psychiatric/Behavioral:  Negative for confusion, dysphoric mood and sleep disturbance. The patient is not nervous/anxious.        Pertinent Medical History         Medical History Reviewed by provider this encounter:  Tobacco  Allergies  Meds  Problems  Med Hx  Surg Hx  Fam Hx       Past Medical History   Past Medical History:   Diagnosis Date   • Anxiety    • Calculus of distal right ureter     last assessed 08/06/2014   • Closed fracture of head of first metacarpal bone of right hand    • Colostomy status (HCC) 04/05/2018   • Depression    • Disease of thyroid gland    • Diverticulitis    • Fatty liver     last assessed 01/09/2017   • HLD (hyperlipidemia)     treating with diet   • Hypertension    • Kidney stone    • Lung nodules    • Migraine     last assessed 08/03/2012   • Pneumonia    • Pre-diabetes     last assessed 06/27/2017     Past Surgical History:   Procedure Laterality Date   • APPENDECTOMY     • BREAST CYST EXCISION Right     many years ago and not sure if it was right?   • CHOLECYSTECTOMY  1999    laparoscopic   • COLON SURGERY  01/09/2018    colostomy   • COLONOSCOPY  02/2012    polyp, Dr. Zee   • COLOSTOMY CLOSURE N/A 04/05/2018    Procedure: REVERSAL COLOSTOMY;  Surgeon: Walt Gardiner DO;  [facility];  Service: General   • FLEXIBLE SIGMOIDOSCOPY N/A 04/05/2018    Procedure: FLEX SIGMOIDOSCOPY;  Surgeon: Walt Gardiner DO;  [facility];  Service: General   • HERNIA REPAIR  1999    umbilical   • LAPAROTOMY N/A 01/07/2018    Procedure: LAPAROTOMY EXPLORATORY, sigmoid colon resection, colostomy, appendectomy;  Surgeon: Walt Gardiner DO;  [facility];  Service: General   • NE COLONOSCOPY FLX DX W/COLLJ  SPEC WHEN PFRMD N/A 04/02/2018    Procedure: COLONOSCOPY;  Surgeon: Walt Gardiner DO;  Location: [facility];  Service: General   • TX COLONOSCOPY FLX DX W/COLLJ SPEC WHEN PFRMD N/A 04/16/2019    Procedure: COLONOSCOPY;  Surgeon: Walt Gardiner DO;  [facility];  Service: General   • TX EXPLORATORY LAPAROTOMY CELIOTOMY W/WO BIOPSY SPX N/A 04/05/2018    Procedure: EXPLORATORY LAPAROTOMY;  Surgeon: Walt Gardiner DO;  [facility];  Service: General   • TX REPAIR FIRST ABDOMINAL WALL HERNIA N/A 11/15/2018    Procedure: INCISIONAL HERNIA REPAIR AT UMBILICUS;  Surgeon: Walt Gardiner DO;  [facility];  Service: General   • REDUCTION MAMMAPLASTY Bilateral 1999   • TUBAL LIGATION       Family History   Problem Relation Age of Onset   • Alcohol abuse Mother    • Hepatitis Mother         chronic hepatitis C virus   • Heart disease Father    • Heart attack Father         MI   • Hypertension Father    • Diverticulitis Sister    • Diabetes Sister    • Hypertension Sister    • Cancer Brother 68        liver cancer   • Breast cancer Sister 50   • No Known Problems Daughter    • No Known Problems Daughter      Current Outpatient Medications on File Prior to Visit   Medication Sig Dispense Refill   • albuterol (ProAir HFA) 90 mcg/act inhaler Inhale 2 puffs every 6 (six) hours as needed for wheezing or shortness of breath (cough) 8 g 0   • ALPRAZolam (XANAX) 0.25 mg tablet Take 1 tablet (0.25 mg total) by mouth 2 (two) times a day as needed for anxiety 30 tablet 0   • atorvastatin (LIPITOR) 20 mg tablet Take 1 tablet by mouth daily 30 tablet 5   • glucose blood (OneTouch Ultra) test strip Use as instructed 50 strip 0   • hydroCHLOROthiazide 50 mg tablet Take 1 tablet (50 mg total) by mouth daily 90 tablet 0   • levothyroxine 75 mcg tablet Take 1 tablet (75 mcg total) by mouth daily 90 tablet 0   • losartan (COZAAR) 100 MG tablet Take 1 tablet (100 mg total) by mouth daily 90 tablet 1   • metFORMIN  (GLUCOPHAGE-XR) 500 mg 24 hr tablet Take 2 tablets (1,000 mg total) by mouth 2 (two) times a day with meals 360 tablet 1   • tirzepatide (Mounjaro) 5 MG/0.5ML Inject 0.5 mL (5 mg total) under the skin every 7 days 2 mL 5   • ergocalciferol (VITAMIN D2) 50,000 units Take 1 capsule (50,000 Units total) by mouth once a week (Patient not taking: Reported on 8/26/2024) 12 capsule 0     No current facility-administered medications on file prior to visit.     Allergies   Allergen Reactions   • Penicillins Angioedema   • Amoxicillin Edema   • Doxycycline Vomiting   • Vicodin [Hydrocodone-Acetaminophen] GI Intolerance and Vomiting      Current Outpatient Medications on File Prior to Visit   Medication Sig Dispense Refill   • albuterol (ProAir HFA) 90 mcg/act inhaler Inhale 2 puffs every 6 (six) hours as needed for wheezing or shortness of breath (cough) 8 g 0   • ALPRAZolam (XANAX) 0.25 mg tablet Take 1 tablet (0.25 mg total) by mouth 2 (two) times a day as needed for anxiety 30 tablet 0   • atorvastatin (LIPITOR) 20 mg tablet Take 1 tablet by mouth daily 30 tablet 5   • glucose blood (OneTouch Ultra) test strip Use as instructed 50 strip 0   • hydroCHLOROthiazide 50 mg tablet Take 1 tablet (50 mg total) by mouth daily 90 tablet 0   • levothyroxine 75 mcg tablet Take 1 tablet (75 mcg total) by mouth daily 90 tablet 0   • losartan (COZAAR) 100 MG tablet Take 1 tablet (100 mg total) by mouth daily 90 tablet 1   • metFORMIN (GLUCOPHAGE-XR) 500 mg 24 hr tablet Take 2 tablets (1,000 mg total) by mouth 2 (two) times a day with meals 360 tablet 1   • tirzepatide (Mounjaro) 5 MG/0.5ML Inject 0.5 mL (5 mg total) under the skin every 7 days 2 mL 5   • ergocalciferol (VITAMIN D2) 50,000 units Take 1 capsule (50,000 Units total) by mouth once a week (Patient not taking: Reported on 8/26/2024) 12 capsule 0     No current facility-administered medications on file prior to visit.      Social History     Tobacco Use   • Smoking status:  "Every Day     Current packs/day: 1.00     Average packs/day: 1 pack/day for 28.0 years (28.0 ttl pk-yrs)     Types: Cigarettes   • Smokeless tobacco: Never   Vaping Use   • Vaping status: Never Used   Substance and Sexual Activity   • Alcohol use: Not Currently     Comment: quit 5 years ago   • Drug use: No   • Sexual activity: Not on file         Objective     /70   Pulse 63   Ht 5' 7\" (1.702 m)   Wt 99.2 kg (218 lb 9.6 oz)   LMP 02/01/2023 (Approximate)   SpO2 99%   BMI 34.24 kg/m²     Physical Exam  Vitals and nursing note reviewed.   Constitutional:       General: She is not in acute distress.     Appearance: Normal appearance. She is not ill-appearing, toxic-appearing or diaphoretic.      Comments: Body mass index is 34.24 kg/m².   HENT:      Head: Atraumatic.      Right Ear: External ear normal.      Left Ear: External ear normal.   Eyes:      Extraocular Movements: Extraocular movements intact.      Conjunctiva/sclera: Conjunctivae normal.   Cardiovascular:      Rate and Rhythm: Normal rate and regular rhythm.      Pulses: Normal pulses. no weak pulses.           Dorsalis pedis pulses are 2+ on the right side and 2+ on the left side.        Posterior tibial pulses are 2+ on the right side and 2+ on the left side.      Heart sounds: Normal heart sounds.   Pulmonary:      Effort: Pulmonary effort is normal. No respiratory distress.      Breath sounds: Normal breath sounds.   Musculoskeletal:         General: Normal range of motion.      Cervical back: Normal range of motion.   Feet:      Right foot:      Skin integrity: No ulcer, skin breakdown, erythema, warmth, callus or dry skin.      Left foot:      Skin integrity: No ulcer, skin breakdown, erythema, warmth, callus or dry skin.   Skin:     General: Skin is warm and dry.   Neurological:      Mental Status: She is alert and oriented to person, place, and time.   Psychiatric:         Behavior: Behavior normal.   Diabetic Foot Exam    Patient's " shoes and socks removed.    Right Foot/Ankle   Right Foot Inspection  Skin Exam: skin normal and skin intact. No dry skin, no warmth, no callus, no erythema, no maceration, no abnormal color, no pre-ulcer, no ulcer and no callus.     Toe Exam: ROM and strength within normal limits.     Sensory   Vibration: intact  Proprioception: intact  Monofilament testing: intact    Vascular  The right DP pulse is 2+. The right PT pulse is 2+.     Left Foot/Ankle  Left Foot Inspection  Skin Exam: skin normal and skin intact. No dry skin, no warmth, no erythema, no maceration, normal color, no pre-ulcer, no ulcer and no callus.     Toe Exam: ROM and strength within normal limits.     Sensory   Vibration: intact  Proprioception: intact  Monofilament testing: intact    Vascular  The left DP pulse is 2+. The left PT pulse is 2+.     Assign Risk Category  No deformity present  No loss of protective sensation  No weak pulses  Risk: 0

## 2024-11-05 ENCOUNTER — PATIENT OUTREACH (OUTPATIENT)
Dept: CASE MANAGEMENT | Facility: OTHER | Age: 54
End: 2024-11-05

## 2024-11-05 NOTE — PROGRESS NOTES
Outpatient Care Management Note:  Received call from Rizwana concerning a recent bill she received for lab work.  Rizwana states it does not seem that her insurance was billed.  She has attempted to contact the billing department but the call has dropped several times.     Attempted 3 way call.  Received survey message and then call was dropped.     Offered to email billing with her concern and request that they call her.  Rizwana is agreeable to that plan and requests a call after 4:00 when she is done work so she does not miss the call.     E-mail sent to billing [email or web address].

## 2024-11-06 DIAGNOSIS — E11.9 TYPE 2 DIABETES MELLITUS WITHOUT COMPLICATION, WITHOUT LONG-TERM CURRENT USE OF INSULIN (HCC): ICD-10-CM

## 2024-11-07 RX ORDER — METFORMIN HYDROCHLORIDE 500 MG/1
1000 TABLET, EXTENDED RELEASE ORAL 2 TIMES DAILY WITH MEALS
Qty: 360 TABLET | Refills: 1 | Status: SHIPPED | OUTPATIENT
Start: 2024-11-07

## 2024-11-17 DIAGNOSIS — I10 ESSENTIAL HYPERTENSION: ICD-10-CM

## 2024-11-17 DIAGNOSIS — E03.9 HYPOTHYROIDISM, UNSPECIFIED TYPE: ICD-10-CM

## 2024-11-19 RX ORDER — LEVOTHYROXINE SODIUM 75 UG/1
75 TABLET ORAL DAILY
Qty: 90 TABLET | Refills: 0 | Status: SHIPPED | OUTPATIENT
Start: 2024-11-19

## 2024-11-19 RX ORDER — HYDROCHLOROTHIAZIDE 50 MG/1
50 TABLET ORAL DAILY
Qty: 90 TABLET | Refills: 0 | Status: SHIPPED | OUTPATIENT
Start: 2024-11-19

## 2024-11-19 NOTE — TELEPHONE ENCOUNTER
I called patient's daughter, he did well once they were home with the catheter. Urine is clear yellow.     He restarted eliquis again, monitor for hematuria at home.     She is helping empty the bag throughout the day.     We discussed CT findings and recommendations again with repeat renal US in 1 week and then see  who will review CT findings again with .     It can then be decided if he will need further follow-up with  for prostate and possible bladder stone procedure.     Lucia/Clary- can you please reach out to alyssa (daughter) today and help her get a nj bag- the hospital only gave leg bags.   She lives in [place] so hoping she can swing by [facility] after work today.     Deepti- please schedule f/u with  in about 7-10 days. He needs a renal US in the hospital in 1 week and should have this done prior. Cysto in the office should also be discussed for hematuria work-up.   Can you please help arrange   Pt transferred to [facility]

## 2024-11-25 NOTE — ANESTHESIA PREPROCEDURE EVALUATION
Review of Systems/Medical History      No history of anesthetic complications     Cardiovascular  Hyperlipidemia, Hypertension ,    Pulmonary  Smoker , ,        GI/Hepatic  Negative GI/hepatic ROS          Negative  ROS        Endo/Other  History of thyroid disease ,      GYN       Hematology  Negative hematology ROS      Musculoskeletal  Obesity ,        Neurology  Negative neurology ROS      Psychology           Physical Exam    Airway    Mallampati score: II  TM Distance: >3 FB  Neck ROM: full     Dental   upper dentures,     Cardiovascular      Pulmonary      Other Findings        Anesthesia Plan  ASA Score- 2 Emergent    Anesthesia Type- general with ASA Monitors  Additional Monitors:   Airway Plan: ETT  Comment: General anesthesia, endotracheal tube; standard ASA monitors  Risks and benefits discussed with patient; patient consented and agrees to proceed  I saw and evaluated the patient  If seen with CRNA, we have discussed the anesthetic plan and I am in agreement that the plan is appropriate for the patient  PIV x2  UPT neg 1/3/2018  Plan Factors-    Induction- intravenous  Postoperative Plan- Plan for postoperative opioid use  Informed Consent- Anesthetic plan and risks discussed with patient  (M6) obeys commands

## 2024-12-06 DIAGNOSIS — F41.9 ANXIETY: ICD-10-CM

## 2024-12-06 DIAGNOSIS — E11.9 TYPE 2 DIABETES MELLITUS WITHOUT COMPLICATION, WITHOUT LONG-TERM CURRENT USE OF INSULIN (HCC): ICD-10-CM

## 2024-12-06 RX ORDER — METFORMIN HYDROCHLORIDE 500 MG/1
1000 TABLET, EXTENDED RELEASE ORAL 2 TIMES DAILY WITH MEALS
Qty: 360 TABLET | Refills: 0 | OUTPATIENT
Start: 2024-12-06

## 2024-12-06 RX ORDER — ALPRAZOLAM 0.25 MG/1
0.25 TABLET ORAL 2 TIMES DAILY PRN
Qty: 30 TABLET | Refills: 0 | Status: SHIPPED | OUTPATIENT
Start: 2024-12-06

## 2024-12-15 ENCOUNTER — PATIENT MESSAGE (OUTPATIENT)
Dept: INTERNAL MEDICINE CLINIC | Facility: CLINIC | Age: 54
End: 2024-12-15

## 2024-12-16 DIAGNOSIS — E78.2 MIXED HYPERLIPIDEMIA: Primary | ICD-10-CM

## 2024-12-17 ENCOUNTER — PATIENT MESSAGE (OUTPATIENT)
Dept: INTERNAL MEDICINE CLINIC | Facility: CLINIC | Age: 54
End: 2024-12-17

## 2024-12-17 DIAGNOSIS — E11.9 TYPE 2 DIABETES MELLITUS WITHOUT COMPLICATION, WITHOUT LONG-TERM CURRENT USE OF INSULIN (HCC): ICD-10-CM

## 2024-12-17 RX ORDER — GLIPIZIDE 10 MG/1
10 TABLET ORAL
Qty: 60 TABLET | Refills: 0 | Status: CANCELLED | OUTPATIENT
Start: 2024-12-17 | End: 2025-03-17

## 2024-12-18 ENCOUNTER — APPOINTMENT (OUTPATIENT)
Dept: LAB | Facility: CLINIC | Age: 54
End: 2024-12-18
Payer: COMMERCIAL

## 2024-12-18 DIAGNOSIS — E78.2 MIXED HYPERLIPIDEMIA: ICD-10-CM

## 2024-12-18 LAB
CHOLEST SERPL-MCNC: 149 MG/DL (ref ?–200)
HDLC SERPL-MCNC: 37 MG/DL
LDLC SERPL CALC-MCNC: 67 MG/DL (ref 0–100)
NONHDLC SERPL-MCNC: 112 MG/DL
TRIGL SERPL-MCNC: 224 MG/DL (ref ?–150)

## 2024-12-18 PROCEDURE — 80061 LIPID PANEL: CPT

## 2024-12-18 PROCEDURE — 36415 COLL VENOUS BLD VENIPUNCTURE: CPT

## 2025-01-10 ENCOUNTER — PATIENT OUTREACH (OUTPATIENT)
Dept: CASE MANAGEMENT | Facility: OTHER | Age: 55
End: 2025-01-10

## 2025-01-10 ENCOUNTER — TELEPHONE (OUTPATIENT)
Age: 55
End: 2025-01-10

## 2025-01-10 DIAGNOSIS — E11.9 TYPE 2 DIABETES MELLITUS WITHOUT COMPLICATION, WITHOUT LONG-TERM CURRENT USE OF INSULIN (HCC): ICD-10-CM

## 2025-01-10 NOTE — TELEPHONE ENCOUNTER
Pt called requesting that the PCP submit a PA for the following med since she has new insurance:    Mounjaro    Please advise     Pharm is confirmed

## 2025-01-10 NOTE — PROGRESS NOTES
Outpatient Care Management Note:  Received call form Rizwana to make me aware that she received a call from Yaritza and cost of Mounjaro is $30.      No further needs at this time.  Advised her that I would make Dr. Chauhan aware.

## 2025-01-10 NOTE — PROGRESS NOTES
Outpatient Care Management Note:  Received call from Rizwana.  She shares that she changed her insurance for this year to a higher tier.  She did this because she was told by the representative  that Mounjaro would be $30 after a prior authorization was completed.  She is wondering if this can be verified.      Advised her that I can see that a new prescription was sent to her pharmacy and a request for prior authorization was requested.  Discussed that if the out of pocket was more than the $30, but less than $200, coupon card should be able to be used.     Rizwana is asking if I can follow up on the status of the prior authorization and provide an update as she is anxious to restart the medication as she had good results when taking it.  Advised her I would review her chart on Monday and call her.  Also advised her it can take up to several weeks for the authorization to be approved.  Verbalized understanding.     Chart review reminder sent.

## 2025-01-10 NOTE — TELEPHONE ENCOUNTER
PA needed for  Tirzepatide 2.5 MG/0.5ML SOAJ       Please resubmit with pt's new insurance    Thank you

## 2025-01-10 NOTE — TELEPHONE ENCOUNTER
Please confirm if her insurance has been updated, the pa team is unable to update insurance information. Chart still shows [facility]

## 2025-01-13 NOTE — TELEPHONE ENCOUNTER
PA Mounjaro 2.5MG/0.5ML SUBMITTED     to [employer]      via    [x]CMM-KEY: [id number]  []Surescripts-Case ID #    []Availity-Auth ID #  NDC #    []Faxed to plan   []Other website    []Phone call Case ID #      []PA sent as URGENT    All office notes, labs and other pertaining documents and studies sent. Clinical questions answered. Awaiting determination from insurance company.     Turnaround time for your insurance to make a decision on your Prior Authorization can take 7-21 business days.

## 2025-01-14 NOTE — TELEPHONE ENCOUNTER
PA Mounjaro 2.5MG/0.5ML CANCELLED     due to     [x]Approval on file-dates approved 9/24/24 - 9/24/25  []Medication already on Formulary  []Brand Name Preferred  []Patient no longer covered by insurance

## 2025-01-23 ENCOUNTER — PATIENT MESSAGE (OUTPATIENT)
Dept: INTERNAL MEDICINE CLINIC | Facility: CLINIC | Age: 55
End: 2025-01-23

## 2025-01-23 ENCOUNTER — TELEPHONE (OUTPATIENT)
Dept: INTERNAL MEDICINE CLINIC | Facility: CLINIC | Age: 55
End: 2025-01-23

## 2025-01-27 NOTE — TELEPHONE ENCOUNTER
PA Mounjaro 5MG/0.5ML SUBMITTED     to [employer]      via    [x]CMM-KEY: [id number]  []Surescripts-Case ID #    []Availity-Auth ID #  NDC #    []Faxed to plan   []Other website    []Phone call Case ID #      []PA sent as URGENT    All office notes, labs and other pertaining documents and studies sent. Clinical questions answered. Awaiting determination from insurance company.     Turnaround time for your insurance to make a decision on your Prior Authorization can take 7-21 business days.

## 2025-01-30 NOTE — TELEPHONE ENCOUNTER
Pt reached out via [employer].  Can we verify this message?  Thank you    Rizwana Ferreira [facility] (supporting Yinka Junior MA (You))  26 minutes ago  [facility] said 5.0 has been approved number is [id number] not sure if you would need this. Please let me know if you need me to do anything else

## 2025-01-30 NOTE — TELEPHONE ENCOUNTER
PA Mounjaro 5MG/0.5ML APPROVED     Date(s) approved until 2/10/25    Case # [id number]     Patient advised by          [x][employer] Message - see prev. message  []Phone call   []LMOM  []L/M to call office as no active Communication consent on file  []Unable to leave detailed message as VM not approved on Communication consent       Pharmacy advised by    [x]Fax  []Phone call

## 2025-02-05 ENCOUNTER — RESULTS FOLLOW-UP (OUTPATIENT)
Dept: ENDOCRINOLOGY | Facility: CLINIC | Age: 55
End: 2025-02-05

## 2025-02-05 ENCOUNTER — APPOINTMENT (OUTPATIENT)
Dept: LAB | Facility: CLINIC | Age: 55
End: 2025-02-05
Payer: COMMERCIAL

## 2025-02-05 DIAGNOSIS — E11.65 UNCONTROLLED TYPE 2 DIABETES MELLITUS WITH HYPERGLYCEMIA (HCC): ICD-10-CM

## 2025-02-05 DIAGNOSIS — E11.9 TYPE 2 DIABETES MELLITUS WITHOUT COMPLICATION, WITHOUT LONG-TERM CURRENT USE OF INSULIN (HCC): ICD-10-CM

## 2025-02-05 DIAGNOSIS — E03.9 HYPOTHYROIDISM, UNSPECIFIED TYPE: ICD-10-CM

## 2025-02-05 DIAGNOSIS — N18.32 STAGE 3B CHRONIC KIDNEY DISEASE (HCC): ICD-10-CM

## 2025-02-05 LAB
ANION GAP SERPL CALCULATED.3IONS-SCNC: 8 MMOL/L (ref 4–13)
BUN SERPL-MCNC: 15 MG/DL (ref 5–25)
CALCIUM SERPL-MCNC: 9.9 MG/DL (ref 8.4–10.2)
CHLORIDE SERPL-SCNC: 105 MMOL/L (ref 96–108)
CO2 SERPL-SCNC: 26 MMOL/L (ref 21–32)
CREAT SERPL-MCNC: 1.18 MG/DL (ref 0.6–1.3)
EST. AVERAGE GLUCOSE BLD GHB EST-MCNC: 123 MG/DL
GFR SERPL CREATININE-BSD FRML MDRD: 52 ML/MIN/1.73SQ M
GLUCOSE P FAST SERPL-MCNC: 96 MG/DL (ref 65–99)
HBA1C MFR BLD: 5.9 %
POTASSIUM SERPL-SCNC: 4.1 MMOL/L (ref 3.5–5.3)
SODIUM SERPL-SCNC: 139 MMOL/L (ref 135–147)
T4 FREE SERPL-MCNC: 1.18 NG/DL (ref 0.61–1.12)
TSH SERPL DL<=0.05 MIU/L-ACNC: 2.98 UIU/ML (ref 0.45–4.5)

## 2025-02-05 PROCEDURE — 83036 HEMOGLOBIN GLYCOSYLATED A1C: CPT

## 2025-02-05 PROCEDURE — 84443 ASSAY THYROID STIM HORMONE: CPT

## 2025-02-05 PROCEDURE — 82570 ASSAY OF URINE CREATININE: CPT

## 2025-02-05 PROCEDURE — 84439 ASSAY OF FREE THYROXINE: CPT

## 2025-02-05 PROCEDURE — 80048 BASIC METABOLIC PNL TOTAL CA: CPT

## 2025-02-05 PROCEDURE — 36415 COLL VENOUS BLD VENIPUNCTURE: CPT

## 2025-02-05 PROCEDURE — 82043 UR ALBUMIN QUANTITATIVE: CPT

## 2025-02-06 LAB
CREAT UR-MCNC: 216.1 MG/DL
MICROALBUMIN UR-MCNC: 10.3 MG/L
MICROALBUMIN/CREAT 24H UR: 5 MG/G CREATININE (ref 0–30)

## 2025-02-17 ENCOUNTER — OFFICE VISIT (OUTPATIENT)
Dept: ENDOCRINOLOGY | Facility: CLINIC | Age: 55
End: 2025-02-17
Payer: COMMERCIAL

## 2025-02-17 VITALS
OXYGEN SATURATION: 98 % | WEIGHT: 218 LBS | BODY MASS INDEX: 34.21 KG/M2 | HEART RATE: 84 BPM | HEIGHT: 67 IN | SYSTOLIC BLOOD PRESSURE: 128 MMHG | DIASTOLIC BLOOD PRESSURE: 84 MMHG

## 2025-02-17 DIAGNOSIS — E11.9 TYPE 2 DIABETES MELLITUS WITHOUT COMPLICATION, WITHOUT LONG-TERM CURRENT USE OF INSULIN (HCC): Primary | ICD-10-CM

## 2025-02-17 DIAGNOSIS — E03.9 ACQUIRED HYPOTHYROIDISM: ICD-10-CM

## 2025-02-17 DIAGNOSIS — E78.2 MIXED HYPERLIPIDEMIA: ICD-10-CM

## 2025-02-17 PROCEDURE — 99214 OFFICE O/P EST MOD 30 MIN: CPT | Performed by: PHYSICIAN ASSISTANT

## 2025-02-17 RX ORDER — METFORMIN HYDROCHLORIDE 500 MG/1
500 TABLET, EXTENDED RELEASE ORAL 2 TIMES DAILY WITH MEALS
Qty: 180 TABLET | Refills: 1 | Status: SHIPPED | OUTPATIENT
Start: 2025-02-17

## 2025-02-17 NOTE — PROGRESS NOTES
Patient Progress Note      CC: DM, hypothyroidism     Referring Provider  No referring provider defined for this encounter.     History of Present Illness:   Joann Marie is a 54 y.o. female with a history of type 2 diabetes without long term use of insulin. Diabetes course has been stable. Denies recent illness or hospitalizations. Denies recent severe hypoglycemic or severe hyperglycemic episodes. Denies any issues with her current regimen. Home glucose monitoring: are performed regularly, every other day    She reports readings are mostly in the low 100s mg/dl. The highest she has seen in the past 6 months is 141 mg/dl.     Current regimen: Mounjaro 5 mg weekly,  Metformin 1000 mg twice a day  compliant most of the time, denies any side effects from medications.  Injects in: abdomen. Rotates sites: Yes  Hypoglycemic episodes: No, rare  H/o of hypoglycemia causing hospitalization or Intervention such as glucagon injection  or ambulance call :  No  Hypoglycemia symptoms: jitteriness  Treatment of hypoglycemia: discussed treatment    Medic alert tag: recommended: Yes    Diabetes education: Not recently  Diet: 6 small meals. Timing of meals is predictable.   Diabetic diet compliance:  compliant most of the time  Activity: Daily activity is predictable: Yes.  She uses a foot pedal while working and walks her dog.    Ophthamology: February 2023. She states she went last year. She is due this year.  Podiatry: October 2024    Has hypertension: on ACE inhibitor/ARB, compliant most of the time  Has hyperlipidemia: on statin - tolerating well, no myalgias. compliant most of the time, denies any side effects from medications.  Thyroid disorders: Hypothyroidism.  Taking levothyroxine 75 mcg 1 tablet daily  History of pancreatitis: No    Patient Active Problem List   Diagnosis    HLD (hyperlipidemia)    Colon, diverticulosis    Hypothyroidism    Anxiety    Benign essential hypertension    Fatty liver    Lung nodules     Nicotine dependence    Class 2 severe obesity with serious comorbidity and body mass index (BMI) of 38.0 to 38.9 in adult (HCC)    Type 2 diabetes mellitus without complication, without long-term current use of insulin (HCC)    H/O partial resection of colon    Depression    IBS (irritable bowel syndrome)    Vitamin D deficiency    Diuretic-induced hypokalemia      Past Medical History:   Diagnosis Date    Anxiety     Calculus of distal right ureter     last assessed 08/06/2014    Closed fracture of head of first metacarpal bone of right hand     Colostomy status (HCC) 04/05/2018    Depression     Disease of thyroid gland     Diverticulitis     Fatty liver     last assessed 01/09/2017    HLD (hyperlipidemia)     treating with diet    Hypertension     Kidney stone     Lung nodules     Migraine     last assessed 08/03/2012    Pneumonia     Pre-diabetes     last assessed 06/27/2017      Past Surgical History:   Procedure Laterality Date    APPENDECTOMY      BREAST CYST EXCISION Right     many years ago and not sure if it was right?    CHOLECYSTECTOMY  1999    laparoscopic    COLON SURGERY  01/09/2018    colostomy    COLONOSCOPY  02/2012    polyp, Dr. Zee    COLOSTOMY CLOSURE N/A 04/05/2018    Procedure: REVERSAL COLOSTOMY;  Surgeon: Walt Gardiner DO;  [facility];  Service: General    FLEXIBLE SIGMOIDOSCOPY N/A 04/05/2018    Procedure: FLEX SIGMOIDOSCOPY;  Surgeon: Walt Gardiner DO;  [facility];  Service: General    HERNIA REPAIR  1999    umbilical    LAPAROTOMY N/A 01/07/2018    Procedure: LAPAROTOMY EXPLORATORY, sigmoid colon resection, colostomy, appendectomy;  Surgeon: Walt Gardiner DO;  [facility];  Service: General    TN COLONOSCOPY FLX DX W/COLLJ SPEC WHEN PFRMD N/A 04/02/2018    Procedure: COLONOSCOPY;  Surgeon: Walt Gardiner DO;  [facility];  Service: General    TN COLONOSCOPY FLX DX W/COLLJ SPEC WHEN PFRMD N/A 04/16/2019    Procedure: COLONOSCOPY;  Surgeon:  Walt Gardiner DO;  [facility];  Service: General    MS EXPLORATORY LAPAROTOMY CELIOTOMY W/WO BIOPSY SPX N/A 04/05/2018    Procedure: EXPLORATORY LAPAROTOMY;  Surgeon: Walt Gardiner DO;  [facility];  Service: General    MS REPAIR FIRST ABDOMINAL WALL HERNIA N/A 11/15/2018    Procedure: INCISIONAL HERNIA REPAIR AT UMBILICUS;  Surgeon: Walt Gardiner DO;  [facility];  Service: General    REDUCTION MAMMAPLASTY Bilateral 1999    TUBAL LIGATION        Family History   Problem Relation Age of Onset    Alcohol abuse Mother     Hepatitis Mother         chronic hepatitis C virus    Heart disease Father     Heart attack Father         MI    Hypertension Father     Diverticulitis Sister     Diabetes Sister     Hypertension Sister     Cancer Brother 68        liver cancer    Breast cancer Sister 50    No Known Problems Daughter     No Known Problems Daughter      Social History     Tobacco Use    Smoking status: Every Day     Current packs/day: 1.00     Average packs/day: 1 pack/day for 28.0 years (28.0 ttl pk-yrs)     Types: Cigarettes    Smokeless tobacco: Never   Substance Use Topics    Alcohol use: Not Currently     Comment: quit 5 years ago     Allergies   Allergen Reactions    Penicillins Angioedema    Amoxicillin Edema    Doxycycline Vomiting    Vicodin [Hydrocodone-Acetaminophen] GI Intolerance and Vomiting         Current Outpatient Medications:     albuterol (ProAir HFA) 90 mcg/act inhaler, Inhale 2 puffs every 6 (six) hours as needed for wheezing or shortness of breath (cough), Disp: 8 g, Rfl: 0    ALPRAZolam (XANAX) 0.25 mg tablet, Take 1 tablet (0.25 mg total) by mouth 2 (two) times a day as needed for anxiety, Disp: 30 tablet, Rfl: 0    atorvastatin (LIPITOR) 20 mg tablet, Take 1 tablet by mouth daily, Disp: 30 tablet, Rfl: 5    glucose blood (OneTouch Ultra) test strip, Use as instructed, Disp: 50 strip, Rfl: 0    levothyroxine 75 mcg tablet, Take 1 tablet by mouth daily, Disp:  "90 tablet, Rfl: 0    losartan (COZAAR) 100 MG tablet, Take 1 tablet (100 mg total) by mouth daily, Disp: 90 tablet, Rfl: 1    metFORMIN (GLUCOPHAGE-XR) 500 mg 24 hr tablet, Take 1 tablet (500 mg total) by mouth 2 (two) times a day with meals, Disp: 180 tablet, Rfl: 1    Tirzepatide (Mounjaro) 5 MG/0.5ML SOAJ, Inject 5 mg under the skin once a week, Disp: 2 mL, Rfl: 2    ergocalciferol (VITAMIN D2) 50,000 units, Take 1 capsule (50,000 Units total) by mouth once a week (Patient not taking: Reported on 8/26/2024), Disp: 12 capsule, Rfl: 0  Review of Systems   Constitutional:  Negative for activity change, appetite change, fatigue and unexpected weight change.   HENT:  Negative for trouble swallowing.    Eyes:  Negative for visual disturbance.   Respiratory:  Negative for shortness of breath.    Cardiovascular:  Negative for chest pain and palpitations.   Gastrointestinal:  Negative for constipation and diarrhea.   Endocrine: Negative for cold intolerance, heat intolerance, polydipsia and polyuria.   Musculoskeletal: Negative.    Skin:  Negative for wound.   Neurological:  Negative for tremors and numbness.   Psychiatric/Behavioral: Negative.         Physical Exam:  Body mass index is 34.14 kg/m².  /84   Pulse 84   Ht 5' 7\" (1.702 m)   Wt 98.9 kg (218 lb)   LMP 02/01/2023 (Approximate)   SpO2 98%   BMI 34.14 kg/m²    Wt Readings from Last 3 Encounters:   02/17/25 98.9 kg (218 lb)   10/25/24 99.2 kg (218 lb 9.6 oz)   09/09/24 103 kg (227 lb)       Physical Exam  Vitals and nursing note reviewed.   Constitutional:       Appearance: She is well-developed.   HENT:      Head: Normocephalic.   Eyes:      General: No scleral icterus.  Neck:      Thyroid: No thyromegaly.   Cardiovascular:      Rate and Rhythm: Normal rate and regular rhythm.      Pulses:           Radial pulses are 2+ on the right side and 2+ on the left side.      Heart sounds: No murmur heard.  Pulmonary:      Effort: Pulmonary effort is normal. " No respiratory distress.      Breath sounds: Normal breath sounds. No wheezing.   Musculoskeletal:      Cervical back: Neck supple.   Skin:     General: Skin is warm and dry.   Neurological:      Mental Status: She is alert.           Labs:   Component      Latest Ref Rng 10/18/2024 12/18/2024 2/5/2025   Sodium      135 - 147 mmol/L 141   139    Potassium      3.5 - 5.3 mmol/L 3.9   4.1    Chloride      96 - 108 mmol/L 102   105    Carbon Dioxide      21 - 32 mmol/L 27   26    ANION GAP      4 - 13 mmol/L 12   8    BUN      5 - 25 mg/dL 17   15    Creatinine      0.60 - 1.30 mg/dL 1.41 (H)   1.18    GLUCOSE, FASTING      65 - 99 mg/dL 97   96    Calcium      8.4 - 10.2 mg/dL 9.9   9.9    AST      13 - 39 U/L 15      ALT      7 - 52 U/L 18      ALK PHOS      34 - 104 U/L 58      Total Protein      6.4 - 8.4 g/dL 6.9      Albumin      3.5 - 5.0 g/dL 4.4      Total Bilirubin      0.20 - 1.00 mg/dL 0.56      GFR, Calculated      ml/min/1.73sq m 42   52    Cholesterol      See Comment mg/dL  149     Triglycerides      See Comment mg/dL  224 (H)     HDL      >=50 mg/dL  37 (L)     LDL Calculated      0 - 100 mg/dL  67     Non-HDL Cholesterol      mg/dl  112     EXT Creatinine Urine      Reference range not established. mg/dL 148.2   216.1    Albumin,U,Random      <20.0 mg/L 20.8 (H)   10.3    Albumin Creat Ratio      0 - 30 mg/g creatinine 14   5    Hemoglobin A1C      Normal 4.0-5.6%; PreDiabetic 5.7-6.4%; Diabetic >=6.5%; Glycemic control for adults with diabetes <7.0% % 6.8 (H)   5.9 (H)    eAG, EST AVG Glucose      mg/dl 148   123    FREE T4      0.61 - 1.12 ng/dL 1.01   1.18 (H)    TSH 3RD GENERATON      0.450 - 4.500 uIU/mL 3.595   2.977       Legend:  (H) High  (L) Low      Plan:    Diagnoses and all orders for this visit:    Type 2 diabetes mellitus without complication, without long-term current use of insulin (HCC)  HGA1C 5.9%. Improved.  Treatment regimen: decrease metformin to 500 mg BID. After taking a month  of the Mounjaro 5 mg weekly, she will call to get her dose increased to 7.5 mg weekly.  Episodes of hypoglycemia can lead to permanent disability and death.  Discussed risks/complications associated with uncontrolled diabetes.    Advised to adhere to diabetic diet, and recommended staying active/exercising routinely as tolerated.  Keep carbohydrates consistent to limit blood glucose fluctuations.  Advised to call if blood sugars less than 70 mg/dl or over 300 mg/dl.   Check blood glucose 1-2 times a day  Discussed symptoms and treatment of hypoglycemia.   Recommended routine follow-up with podiatry and ophthalmology.   Send log in 2 weeks.    Ordered blood work to complete prior to next visit.  -     Hemoglobin A1C; Future  -     Albumin / creatinine urine ratio; Future  -     Basic metabolic panel; Future  -     metFORMIN (GLUCOPHAGE-XR) 500 mg 24 hr tablet; Take 1 tablet (500 mg total) by mouth 2 (two) times a day with meals    Mixed hyperlipidemia  LDL previously 67  On statin therapy  Managed by PCP    Acquired hypothyroidism  TSH 2.977 and free T4 1.18  Continue current dose of levothyroxine  Monitor labs  -     T4, free; Future  -     TSH, 3rd generation; Future         Discussed with the patient diagnosis and treatment and all questions fully answered. She will call me if any problems arise.    Counseled patient on diagnostic results, prognosis, risk and benefit of treatment options, instruction for management, importance of treatment compliance, risk factor reduction and impressions.      Katrin Lauren PA-C

## 2025-02-17 NOTE — PATIENT INSTRUCTIONS
Hypoglycemia instructions   Joann Cynthia  2/17/2025  [id number]    Low Blood Sugar    Steps to treat low blood sugar.    1. Test blood sugar if you have symptoms of low blood sugar:   Low Blood Sugar Symptoms:  o Sweaty  o Dizzy  o Rapid heartbeat  o Shaky  o Bad mood  o Hungry      2. Treat blood sugar less than 70 with 15 grams of fast-acting carbohydrate:   Examples of 15 grams Fast-Acting Carbohydrate:  o 4 oz juice  o 4 oz regular soda  o 3-4 glucose tablets (chew)  o 3-4 hard candies (chew)          3.  Wait 15 minutes and test your blood sugar again     4. If blood sugar is less than 100, repeat steps 2-3.    5. When your blood sugar is 100 or more, eat a snack if it will be longer than one hour until your next meal. The snack should be 15 grams of carbohydrate and a protein:   Examples of snacks:  o ½ sandwich  o 6 crackers with cheese  o Piece of fruit with cheese or peanut butter  o 6 crackers with peanut butter

## 2025-02-20 DIAGNOSIS — E78.2 MIXED HYPERLIPIDEMIA: ICD-10-CM

## 2025-02-20 DIAGNOSIS — E03.9 HYPOTHYROIDISM, UNSPECIFIED TYPE: ICD-10-CM

## 2025-02-20 RX ORDER — ATORVASTATIN CALCIUM 20 MG/1
20 TABLET, FILM COATED ORAL DAILY
Qty: 30 TABLET | Refills: 5 | Status: SHIPPED | OUTPATIENT
Start: 2025-02-20

## 2025-02-21 RX ORDER — LEVOTHYROXINE SODIUM 75 UG/1
75 TABLET ORAL DAILY
Qty: 90 TABLET | Refills: 1 | Status: SHIPPED | OUTPATIENT
Start: 2025-02-21

## 2025-02-28 ENCOUNTER — OFFICE VISIT (OUTPATIENT)
Dept: INTERNAL MEDICINE CLINIC | Facility: CLINIC | Age: 55
End: 2025-02-28
Payer: COMMERCIAL

## 2025-02-28 VITALS
BODY MASS INDEX: 33.9 KG/M2 | WEIGHT: 216 LBS | TEMPERATURE: 97.9 F | SYSTOLIC BLOOD PRESSURE: 138 MMHG | OXYGEN SATURATION: 100 % | HEART RATE: 62 BPM | RESPIRATION RATE: 18 BRPM | DIASTOLIC BLOOD PRESSURE: 76 MMHG | HEIGHT: 67 IN

## 2025-02-28 DIAGNOSIS — Z00.00 ANNUAL PHYSICAL EXAM: Primary | ICD-10-CM

## 2025-02-28 DIAGNOSIS — I10 BENIGN ESSENTIAL HYPERTENSION: ICD-10-CM

## 2025-02-28 DIAGNOSIS — F41.9 ANXIETY: ICD-10-CM

## 2025-02-28 DIAGNOSIS — F17.210 CIGARETTE NICOTINE DEPENDENCE WITHOUT COMPLICATION: ICD-10-CM

## 2025-02-28 DIAGNOSIS — E11.9 TYPE 2 DIABETES MELLITUS WITHOUT COMPLICATION, WITHOUT LONG-TERM CURRENT USE OF INSULIN (HCC): ICD-10-CM

## 2025-02-28 DIAGNOSIS — E03.9 ACQUIRED HYPOTHYROIDISM: ICD-10-CM

## 2025-02-28 PROCEDURE — 99214 OFFICE O/P EST MOD 30 MIN: CPT

## 2025-02-28 PROCEDURE — 99396 PREV VISIT EST AGE 40-64: CPT

## 2025-02-28 NOTE — PROGRESS NOTES
Adult Annual Physical  Name: Joann Marie      : 1970      MRN: [id number]  Encounter Provider: Amanda Chauhan MD  Encounter Date: 2025   Encounter department: [facility] INTERNAL MEDICINE    Assessment & Plan  Annual physical exam  Reviewed recent labs       Benign essential hypertension  Better controlled  BP today 138/76  Continue losartan 100 mg       Acquired hypothyroidism  Stable  Continue levothyroxine 75 mcg       Type 2 diabetes mellitus without complication, without long-term current use of insulin (HCC)    Lab Results   Component Value Date    HGBA1C 5.9 (H) 2025     Very well controlled  Taking Mounjaro and metformin       Anxiety  Takes Xanax prn       Cigarette nicotine dependence without complication  Has desire to quit but remains in pre contemplative stage for now         Immunizations and preventive care screenings were discussed with patient today. Appropriate education was printed on patient's after visit summary.    Counseling:  Alcohol/drug use: discussed moderation in alcohol intake, the recommendations for healthy alcohol use, and avoidance of illicit drug use.  Dental Health: discussed importance of regular tooth brushing, flossing, and dental visits.  Injury prevention: discussed safety/seat belts, safety helmets, smoke detectors, carbon monoxide detectors, and smoking near bedding or upholstery.  Sexual health: discussed sexually transmitted diseases, partner selection, use of condoms, avoidance of unintended pregnancy, and contraceptive alternatives.  Exercise: the importance of regular exercise/physical activity was discussed. Recommend exercise 3-5 times per week for at least 30 minutes.       Depression Screening and Follow-up Plan: Patient's depression screening was positive with a PHQ-9 score of 6.   Patient with underlying depression and was advised to continue current medications as prescribed.     Return in about 6 months (around 2025)  for Recheck chronic medical.    History of Present Illness     Adult Annual Physical:  Patient presents for annual physical. Presents for a follow up visit to recheck chronic conditions. Doing well.     Diet and Physical Activity:  - Diet/Nutrition: well balanced diet, diabetic diet, low carb diet, low fat diet, limited junk food and consuming 3-5 servings of fruits/vegetables daily.  - Exercise: walking, 5-7 times a week on average and strength training exercises.    Depression Screening:    - PHQ-9 Score: 6    General Health:  - Sleep: sleeps poorly.  - Hearing: normal hearing bilateral ears.  - Vision: wears glasses and most recent eye exam < 1 year ago.  - Dental: no dental visits for > 1 year. need new dentures    /GYN Health:  - Follows with GYN: no.     Advanced Care Planning:  - Has an advanced directive?: no    - Has a durable medical POA?: no    - ACP document given to patient?: no      Review of Systems   Constitutional:  Negative for chills and unexpected weight change.   HENT:  Negative for congestion.    Eyes:  Negative for visual disturbance.   Respiratory:  Negative for cough, shortness of breath and wheezing.    Cardiovascular:  Negative for chest pain.   Gastrointestinal:  Negative for abdominal pain, nausea and vomiting.   Endocrine: Negative for polyuria.   Musculoskeletal:  Negative for back pain.   Neurological:  Negative for dizziness and light-headedness.   Psychiatric/Behavioral:  Negative for confusion, dysphoric mood and sleep disturbance. The patient is not nervous/anxious.      Pertinent Medical History          Medical History Reviewed by provider this encounter:  Tobacco  Allergies  Meds  Problems  Med Hx  Surg Hx  Fam Hx     .  Past Medical History   Past Medical History:   Diagnosis Date   • Anxiety    • Calculus of distal right ureter     last assessed 08/06/2014   • Closed fracture of head of first metacarpal bone of right hand    • Colostomy status ([facility]) 04/05/2018   •  Depression    • Disease of thyroid gland    • Diverticulitis    • Fatty liver     last assessed 01/09/2017   • HLD (hyperlipidemia)     treating with diet   • Hypertension    • Kidney stone    • Lung nodules    • Migraine     last assessed 08/03/2012   • Pneumonia    • Pre-diabetes     last assessed 06/27/2017     Past Surgical History:   Procedure Laterality Date   • APPENDECTOMY     • BREAST CYST EXCISION Right     many years ago and not sure if it was right?   • CHOLECYSTECTOMY  1999    laparoscopic   • COLON SURGERY  01/09/2018    colostomy   • COLONOSCOPY  02/2012    polyp, Dr. Zee   • COLOSTOMY CLOSURE N/A 04/05/2018    Procedure: REVERSAL COLOSTOMY;  Surgeon: Walt Gardiner DO;  [facility];  Service: General   • FLEXIBLE SIGMOIDOSCOPY N/A 04/05/2018    Procedure: FLEX SIGMOIDOSCOPY;  Surgeon: Walt Gardiner DO;  [facility];  Service: General   • HERNIA REPAIR  1999    umbilical   • LAPAROTOMY N/A 01/07/2018    Procedure: LAPAROTOMY EXPLORATORY, sigmoid colon resection, colostomy, appendectomy;  Surgeon: Walt Gardiner DO;  [facility];  Service: General   • MT COLONOSCOPY FLX DX W/COLLJ SPEC WHEN PFRMD N/A 04/02/2018    Procedure: COLONOSCOPY;  Surgeon: Walt Gardiner DO;  [facility];  Service: General   • MT COLONOSCOPY FLX DX W/COLLJ SPEC WHEN PFRMD N/A 04/16/2019    Procedure: COLONOSCOPY;  Surgeon: Walt Gardiner DO;  [facility];  Service: General   • MT EXPLORATORY LAPAROTOMY CELIOTOMY W/WO BIOPSY SPX N/A 04/05/2018    Procedure: EXPLORATORY LAPAROTOMY;  Surgeon: Walt Gardiner DO;  [facility];  Service: General   • MT REPAIR FIRST ABDOMINAL WALL HERNIA N/A 11/15/2018    Procedure: INCISIONAL HERNIA REPAIR AT UMBILICUS;  Surgeon: Walt Gardiner DO;  [facility];  Service: General   • REDUCTION MAMMAPLASTY Bilateral 1999   • TUBAL LIGATION       Family History   Problem Relation Age of Onset   • Alcohol abuse Mother    •  Hepatitis Mother         chronic hepatitis C virus   • Heart disease Father    • Heart attack Father         MI   • Hypertension Father    • Diverticulitis Sister    • Diabetes Sister    • Hypertension Sister    • Cancer Brother 68        liver cancer   • Breast cancer Sister 50   • No Known Problems Daughter    • No Known Problems Daughter       reports that she has been smoking cigarettes. She has a 28 pack-year smoking history. She has never used smokeless tobacco. She reports that she does not currently use alcohol. She reports that she does not use drugs.  Current Outpatient Medications   Medication Instructions   • albuterol (ProAir HFA) 90 mcg/act inhaler 2 puffs, Inhalation, Every 6 hours PRN   • ALPRAZolam (XANAX) 0.25 mg, Oral, 2 times daily PRN   • atorvastatin (LIPITOR) 20 mg, Oral, Daily   • glucose blood (OneTouch Ultra) test strip Use as instructed   • levothyroxine 75 mcg, Oral, Daily   • losartan (COZAAR) 100 mg, Oral, Daily   • metFORMIN (GLUCOPHAGE-XR) 500 mg, Oral, 2 times daily with meals   • Mounjaro 7.5 mg, Subcutaneous, Weekly     Allergies   Allergen Reactions   • Penicillins Angioedema   • Amoxicillin Edema   • Doxycycline Vomiting   • Vicodin [Hydrocodone-Acetaminophen] GI Intolerance and Vomiting      Current Outpatient Medications on File Prior to Visit   Medication Sig Dispense Refill   • albuterol (ProAir HFA) 90 mcg/act inhaler Inhale 2 puffs every 6 (six) hours as needed for wheezing or shortness of breath (cough) 8 g 0   • ALPRAZolam (XANAX) 0.25 mg tablet Take 1 tablet (0.25 mg total) by mouth 2 (two) times a day as needed for anxiety 30 tablet 0   • atorvastatin (LIPITOR) 20 mg tablet Take 1 tablet by mouth daily 30 tablet 5   • glucose blood (OneTouch Ultra) test strip Use as instructed 50 strip 0   • levothyroxine 75 mcg tablet take 1 taBlet by mouth daily 90 tablet 1   • losartan (COZAAR) 100 MG tablet Take 1 tablet (100 mg total) by mouth daily 90 tablet 1   • Mounjaro 7.5  "MG/0.5ML SOAJ Inject 7.5 mg under the skin once a week 2 mL 2     No current facility-administered medications on file prior to visit.      Social History     Tobacco Use   • Smoking status: Every Day     Current packs/day: 1.00     Average packs/day: 1 pack/day for 28.0 years (28.0 ttl pk-yrs)     Types: Cigarettes   • Smokeless tobacco: Never   Vaping Use   • Vaping status: Never Used   Substance and Sexual Activity   • Alcohol use: Not Currently     Comment: quit 5 years ago   • Drug use: No   • Sexual activity: Not on file       Objective   /76   Pulse 62   Temp 97.9 °F (36.6 °C)   Resp 18   Ht 5' 7\" (1.702 m)   Wt 98 kg (216 lb)   LMP 02/01/2023 (Approximate)   SpO2 100%   BMI 33.83 kg/m²     Physical Exam  Vitals and nursing note reviewed.   Constitutional:       General: She is not in acute distress.     Appearance: Normal appearance. She is not ill-appearing, toxic-appearing or diaphoretic.   HENT:      Head: Normocephalic and atraumatic.      Right Ear: There is impacted cerumen.      Left Ear: Tympanic membrane, ear canal and external ear normal. There is no impacted cerumen.      Nose: Nose normal. No congestion or rhinorrhea.      Mouth/Throat:      Mouth: Mucous membranes are moist.      Pharynx: Oropharynx is clear. No oropharyngeal exudate or posterior oropharyngeal erythema.   Eyes:      Extraocular Movements: Extraocular movements intact.      Conjunctiva/sclera: Conjunctivae normal.   Cardiovascular:      Rate and Rhythm: Normal rate and regular rhythm.      Pulses: Normal pulses.      Heart sounds: Normal heart sounds.   Pulmonary:      Effort: Pulmonary effort is normal. No respiratory distress.      Breath sounds: Normal breath sounds.   Abdominal:      Palpations: Abdomen is soft.      Tenderness: There is no abdominal tenderness.   Musculoskeletal:         General: Normal range of motion.      Cervical back: Normal range of motion and neck supple. No tenderness.      Right lower " leg: No edema.      Left lower leg: No edema.   Lymphadenopathy:      Cervical: No cervical adenopathy.   Skin:     General: Skin is warm and dry.   Neurological:      Mental Status: She is alert and oriented to person, place, and time.   Psychiatric:         Mood and Affect: Mood normal.         Behavior: Behavior normal.

## 2025-02-28 NOTE — PATIENT INSTRUCTIONS
"Patient Education     Routine physical for adults   The Basics   Written by the doctors and editors at [facility]   What is a physical? -- A physical is a routine visit, or \"check-up,\" with your doctor. You might also hear it called a \"wellness visit\" or \"preventive visit.\"  During each visit, the doctor will:   Ask about your physical and mental health   Ask about your habits, behaviors, and lifestyle   Do an exam   Give you vaccines if needed   Talk to you about any medicines you take   Give advice about your health   Answer your questions  Getting regular check-ups is an important part of taking care of your health. It can help your doctor find and treat any problems you have. But it's also important for preventing health problems.  A routine physical is different from a \"sick visit.\" A sick visit is when you see a doctor because of a health concern or problem. Since physicals are scheduled ahead of time, you can think about what you want to ask the doctor.  How often should I get a physical? -- It depends on your age and health. In general, for people age 21 years and older:   If you are younger than 50 years, you might be able to get a physical every 3 years.   If you are 50 years or older, your doctor might recommend a physical every year.  If you have an ongoing health condition, like diabetes or high blood pressure, your doctor will probably want to see you more often.  What happens during a physical? -- In general, each visit will include:   Physical exam - The doctor or nurse will check your height, weight, heart rate, and blood pressure. They will also look at your eyes and ears. They will ask about how you are feeling and whether you have any symptoms that bother you.   Medicines - It's a good idea to bring a list of all the medicines you take to each doctor visit. Your doctor will talk to you about your medicines and answer any questions. Tell them if you are having any side effects that bother you. You " "should also tell them if you are having trouble paying for any of your medicines.   Habits and behaviors - This includes:   Your diet   Your exercise habits   Whether you smoke, drink alcohol, or use drugs   Whether you are sexually active   Whether you feel safe at home  Your doctor will talk to you about things you can do to improve your health and lower your risk of health problems. They will also offer help and support. For example, if you want to quit smoking, they can give you advice and might prescribe medicines. If you want to improve your diet or get more physical activity, they can help you with this, too.   Lab tests, if needed - The tests you get will depend on your age and situation. For example, your doctor might want to check your:   Cholesterol   Blood sugar   Iron level   Vaccines - The recommended vaccines will depend on your age, health, and what vaccines you already had. Vaccines are very important because they can prevent certain serious or deadly infections.   Discussion of screening - \"Screening\" means checking for diseases or other health problems before they cause symptoms. Your doctor can recommend screening based on your age, risk, and preferences. This might include tests to check for:   Cancer, such as breast, prostate, cervical, ovarian, colorectal, prostate, lung, or skin cancer   Sexually transmitted infections, such as chlamydia and gonorrhea   Mental health conditions like depression and anxiety  Your doctor will talk to you about the different types of screening tests. They can help you decide which screenings to have. They can also explain what the results might mean.   Answering questions - The physical is a good time to ask the doctor or nurse questions about your health. If needed, they can refer you to other doctors or specialists, too.  Adults older than 65 years often need other care, too. As you get older, your doctor will talk to you about:   How to prevent falling at " home   Hearing or vision tests   Memory testing   How to take your medicines safely   Making sure that you have the help and support you need at home  All topics are updated as new evidence becomes available and our peer review process is complete.  This topic retrieved from [employer] on: May 02, 2024.  Topic 803710 Version 1.0  Release: 32.4.3 - C32.122  © 2024 UpToDate, Inc. and/or its affiliates. All rights reserved.  Consumer Information Use and Disclaimer   Disclaimer: This generalized information is a limited summary of diagnosis, treatment, and/or medication information. It is not meant to be comprehensive and should be used as a tool to help the user understand and/or assess potential diagnostic and treatment options. It does NOT include all information about conditions, treatments, medications, side effects, or risks that may apply to a specific patient. It is not intended to be medical advice or a substitute for the medical advice, diagnosis, or treatment of a health care provider based on the health care provider's examination and assessment of a patient's specific and unique circumstances. Patients must speak with a health care provider for complete information about their health, medical questions, and treatment options, including any risks or benefits regarding use of medications. This information does not endorse any treatments or medications as safe, effective, or approved for treating a specific patient. UpToDate, Inc. and its affiliates disclaim any warranty or liability relating to this information or the use thereof.The use of this information is governed by the Terms of Use, available at https://www.woltersGoodocuwer.com/en/know/clinical-effectiveness-terms. 2024© UpToDate, Inc. and its affiliates and/or licensors. All rights reserved.  Copyright   © 2024 [employer], [employer]. and/or its affiliates. All rights reserved.

## 2025-03-01 NOTE — ASSESSMENT & PLAN NOTE
Lab Results   Component Value Date    HGBA1C 5.9 (H) 02/05/2025     Very well controlled  Taking Mounjaro and metformin

## 2025-03-07 DIAGNOSIS — E11.9 TYPE 2 DIABETES MELLITUS WITHOUT COMPLICATION, WITHOUT LONG-TERM CURRENT USE OF INSULIN (HCC): ICD-10-CM

## 2025-03-07 RX ORDER — METFORMIN HYDROCHLORIDE 500 MG/1
500 TABLET, EXTENDED RELEASE ORAL 2 TIMES DAILY WITH MEALS
Qty: 180 TABLET | Refills: 0 | Status: SHIPPED | OUTPATIENT
Start: 2025-03-07

## 2025-03-17 DIAGNOSIS — I10 ESSENTIAL HYPERTENSION: ICD-10-CM

## 2025-03-18 RX ORDER — LOSARTAN POTASSIUM 100 MG/1
100 TABLET ORAL DAILY
Qty: 90 TABLET | Refills: 1 | Status: SHIPPED | OUTPATIENT
Start: 2025-03-18

## 2025-03-20 DIAGNOSIS — E11.9 TYPE 2 DIABETES MELLITUS WITHOUT COMPLICATION, WITHOUT LONG-TERM CURRENT USE OF INSULIN (HCC): ICD-10-CM

## 2025-03-20 RX ORDER — TIRZEPATIDE 7.5 MG/.5ML
7.5 INJECTION, SOLUTION SUBCUTANEOUS WEEKLY
Qty: 2 ML | Refills: 0 | Status: CANCELLED | OUTPATIENT
Start: 2025-03-20

## 2025-03-24 ENCOUNTER — PATIENT MESSAGE (OUTPATIENT)
Dept: ENDOCRINOLOGY | Facility: CLINIC | Age: 55
End: 2025-03-24

## 2025-03-24 RX ORDER — TIRZEPATIDE 10 MG/.5ML
10 INJECTION, SOLUTION SUBCUTANEOUS WEEKLY
Qty: 2 ML | Refills: 0 | OUTPATIENT
Start: 2025-03-24

## 2025-03-25 ENCOUNTER — TELEPHONE (OUTPATIENT)
Dept: INTERNAL MEDICINE CLINIC | Facility: CLINIC | Age: 55
End: 2025-03-25

## 2025-03-25 NOTE — TELEPHONE ENCOUNTER
VIELKA Burrows 7.5 MG/0.5ML SUBMITTED     to [employer]     via    []CMM-KEY:    [x]Surescripts-Case ID # [id number]  []Availity-Auth ID #  NDC #    []Faxed to plan   []Other website    []Phone call Case ID #      []PA sent as URGENT    All office notes, labs and other pertaining documents and studies sent. Clinical questions answered. Awaiting determination from insurance company.     Turnaround time for your insurance to make a decision on your Prior Authorization can take 7-21 business days.

## 2025-03-26 NOTE — TELEPHONE ENCOUNTER
PA Mounjaro 7.5 MG/0.5ML APPROVED         Patient advised by          [x]MyChart Message  []Phone call   []LMOM  []L/M to call office as no active Communication consent on file  []Unable to leave detailed message as VM not approved on Communication consent       Pharmacy advised by    [x]Fax  []Phone call  []Secure Chat    Specialty Pharmacy    []

## 2025-04-24 DIAGNOSIS — Z12.31 ENCOUNTER FOR SCREENING MAMMOGRAM FOR BREAST CANCER: Primary | ICD-10-CM

## 2025-04-28 DIAGNOSIS — E11.9 TYPE 2 DIABETES MELLITUS WITHOUT COMPLICATION, WITHOUT LONG-TERM CURRENT USE OF INSULIN (HCC): ICD-10-CM

## 2025-04-28 RX ORDER — METFORMIN HYDROCHLORIDE 500 MG/1
500 TABLET, EXTENDED RELEASE ORAL 2 TIMES DAILY WITH MEALS
Qty: 180 TABLET | Refills: 1 | Status: SHIPPED | OUTPATIENT
Start: 2025-04-28

## 2025-05-07 DIAGNOSIS — E11.9 TYPE 2 DIABETES MELLITUS WITHOUT COMPLICATION, WITHOUT LONG-TERM CURRENT USE OF INSULIN (HCC): ICD-10-CM

## 2025-05-07 DIAGNOSIS — F41.9 ANXIETY: ICD-10-CM

## 2025-05-07 RX ORDER — ALPRAZOLAM 0.25 MG
0.25 TABLET ORAL 2 TIMES DAILY PRN
Qty: 30 TABLET | Refills: 0 | Status: SHIPPED | OUTPATIENT
Start: 2025-05-07

## 2025-05-07 RX ORDER — BLOOD SUGAR DIAGNOSTIC
STRIP MISCELLANEOUS
Qty: 50 STRIP | Refills: 0 | Status: SHIPPED | OUTPATIENT
Start: 2025-05-07

## 2025-05-14 DIAGNOSIS — E11.9 TYPE 2 DIABETES MELLITUS WITHOUT COMPLICATION, WITHOUT LONG-TERM CURRENT USE OF INSULIN (HCC): ICD-10-CM

## 2025-05-15 RX ORDER — TIRZEPATIDE 7.5 MG/.5ML
7.5 INJECTION, SOLUTION SUBCUTANEOUS WEEKLY
Qty: 2 ML | Refills: 0 | Status: SHIPPED | OUTPATIENT
Start: 2025-05-15

## 2025-05-20 DIAGNOSIS — E78.2 MIXED HYPERLIPIDEMIA: ICD-10-CM

## 2025-05-21 ENCOUNTER — HOSPITAL ENCOUNTER (OUTPATIENT)
Facility: HOSPITAL | Age: 55
Discharge: HOME | End: 2025-05-21
Payer: COMMERCIAL

## 2025-05-21 DIAGNOSIS — Z12.31 ENCOUNTER FOR SCREENING MAMMOGRAM FOR MALIGNANT NEOPLASM OF BREAST: ICD-10-CM

## 2025-05-21 PROCEDURE — 77067 SCR MAMMO BI INCL CAD: CPT

## 2025-05-21 PROCEDURE — 77063 BREAST TOMOSYNTHESIS BI: CPT

## 2025-05-21 RX ORDER — ATORVASTATIN CALCIUM 20 MG/1
20 TABLET, FILM COATED ORAL DAILY
Qty: 30 TABLET | Refills: 0 | OUTPATIENT
Start: 2025-05-21

## 2025-06-03 ENCOUNTER — RESULTS FOLLOW-UP (OUTPATIENT)
Dept: INTERNAL MEDICINE CLINIC | Facility: CLINIC | Age: 55
End: 2025-06-03

## 2025-06-09 LAB
LEFT EYE DIABETIC RETINOPATHY: NORMAL
RIGHT EYE DIABETIC RETINOPATHY: NORMAL

## 2025-06-12 DIAGNOSIS — E11.9 TYPE 2 DIABETES MELLITUS WITHOUT COMPLICATION, WITHOUT LONG-TERM CURRENT USE OF INSULIN (HCC): ICD-10-CM

## 2025-06-12 RX ORDER — TIRZEPATIDE 7.5 MG/.5ML
7.5 INJECTION, SOLUTION SUBCUTANEOUS WEEKLY
Qty: 2 ML | Refills: 0 | Status: SHIPPED | OUTPATIENT
Start: 2025-06-12

## 2025-07-11 DIAGNOSIS — E11.9 TYPE 2 DIABETES MELLITUS WITHOUT COMPLICATION, WITHOUT LONG-TERM CURRENT USE OF INSULIN (HCC): ICD-10-CM

## 2025-07-14 DIAGNOSIS — E11.9 TYPE 2 DIABETES MELLITUS WITHOUT COMPLICATION, WITHOUT LONG-TERM CURRENT USE OF INSULIN (HCC): ICD-10-CM

## 2025-07-14 RX ORDER — TIRZEPATIDE 7.5 MG/.5ML
7.5 INJECTION, SOLUTION SUBCUTANEOUS WEEKLY
Qty: 2 ML | Refills: 0 | Status: SHIPPED | OUTPATIENT
Start: 2025-07-14 | End: 2025-07-25

## 2025-07-16 RX ORDER — METFORMIN HYDROCHLORIDE 500 MG/1
500 TABLET, EXTENDED RELEASE ORAL 2 TIMES DAILY WITH MEALS
Qty: 60 TABLET | Refills: 5 | Status: SHIPPED | OUTPATIENT
Start: 2025-07-16

## 2025-07-24 ENCOUNTER — PATIENT MESSAGE (OUTPATIENT)
Age: 55
End: 2025-07-24

## 2025-07-24 DIAGNOSIS — E11.9 TYPE 2 DIABETES MELLITUS WITHOUT COMPLICATION, WITHOUT LONG-TERM CURRENT USE OF INSULIN (HCC): Primary | ICD-10-CM

## 2025-07-25 RX ORDER — TIRZEPATIDE 10 MG/.5ML
10 INJECTION, SOLUTION SUBCUTANEOUS WEEKLY
Qty: 2 ML | Refills: 0 | Status: SHIPPED | OUTPATIENT
Start: 2025-07-25

## 2025-07-31 DIAGNOSIS — E03.9 HYPOTHYROIDISM, UNSPECIFIED TYPE: ICD-10-CM

## 2025-07-31 DIAGNOSIS — E78.2 MIXED HYPERLIPIDEMIA: ICD-10-CM

## 2025-08-01 RX ORDER — ATORVASTATIN CALCIUM 20 MG/1
20 TABLET, FILM COATED ORAL DAILY
Qty: 30 TABLET | Refills: 0 | Status: SHIPPED | OUTPATIENT
Start: 2025-08-01

## 2025-08-01 RX ORDER — LEVOTHYROXINE SODIUM 75 UG/1
75 TABLET ORAL DAILY
Qty: 90 TABLET | Refills: 1 | Status: SHIPPED | OUTPATIENT
Start: 2025-08-01

## 2025-08-12 ENCOUNTER — TELEMEDICINE (OUTPATIENT)
Age: 55
End: 2025-08-12
Payer: COMMERCIAL

## 2025-08-14 ENCOUNTER — APPOINTMENT (OUTPATIENT)
Dept: LAB | Facility: CLINIC | Age: 55
End: 2025-08-14
Attending: PHYSICIAN ASSISTANT
Payer: COMMERCIAL

## (undated) DEVICE — SUT VICRYL 0 REEL 54 IN J287G

## (undated) DEVICE — PROXIMATE LINEAR CUTTER RELOAD, BLUE, 75MM: Brand: PROXIMATE

## (undated) DEVICE — PLUMEPEN PRO 10FT

## (undated) DEVICE — CONTOUR CURVED CUTTER STAPLER: Brand: CONTOUR

## (undated) DEVICE — LIGHT HANDLE COVER SLEEVE DISP BLUE STELLAR

## (undated) DEVICE — TUBING SUCTION 5MM X 12 FT

## (undated) DEVICE — SCD SEQUENTIAL COMPRESSION COMFORT SLEEVE MEDIUM KNEE LENGTH: Brand: KENDALL SCD

## (undated) DEVICE — SUT PDS II 1 CTX 36 IN Z371T

## (undated) DEVICE — JP CHANNEL DRAIN 19FR, FULL FLUTES: Brand: JACKSON-PRATT

## (undated) DEVICE — CHLORAPREP HI-LITE 26ML ORANGE

## (undated) DEVICE — VIAL DECANTER

## (undated) DEVICE — ENSEAL 20 CM SHAFT, LARGE JAW: Brand: ENSEAL X1

## (undated) DEVICE — TRAY FOLEY 16FR URIMETER SURESTEP

## (undated) DEVICE — SUT VICRYL 2-0 SH 27 IN UNDYED J417H

## (undated) DEVICE — INTENDED FOR TISSUE SEPARATION, AND OTHER PROCEDURES THAT REQUIRE A SHARP SURGICAL BLADE TO PUNCTURE OR CUT.: Brand: BARD-PARKER SAFETY BLADES SIZE 10, STERILE

## (undated) DEVICE — SUT SILK 0 SH 30 IN K834H

## (undated) DEVICE — SUT VICRYL 2-0 REEL 54 IN J286G

## (undated) DEVICE — SPONGE LAP 18 X 18 IN

## (undated) DEVICE — MAYO STAND COVER: Brand: CONVERTORS

## (undated) DEVICE — NEEDLE HYPO 22G X 1-1/2 IN

## (undated) DEVICE — TOWEL SET X-RAY

## (undated) DEVICE — NEEDLE 22 G X 1 1/2 SAFETY

## (undated) DEVICE — POOLE SUCTION HANDLE: Brand: CARDINAL HEALTH

## (undated) DEVICE — GLOVE SRG BIOGEL ORTHOPEDIC 8

## (undated) DEVICE — MEDI-VAC YANK SUCT HNDL W/TPRD BULBOUS TIP: Brand: CARDINAL HEALTH

## (undated) DEVICE — TELFA NON-ADHERENT ABSORBENT DRESSING: Brand: TELFA

## (undated) DEVICE — 1820 FOAM BLOCK NEEDLE COUNTER: Brand: DEVON

## (undated) DEVICE — ABDOMINAL PAD: Brand: DERMACEA

## (undated) DEVICE — SUT SILK 2-0 SH 30 IN K833H

## (undated) DEVICE — INTENDED FOR TISSUE SEPARATION, AND OTHER PROCEDURES THAT REQUIRE A SHARP SURGICAL BLADE TO PUNCTURE OR CUT.: Brand: BARD-PARKER SAFETY BLADES SIZE 15, STERILE

## (undated) DEVICE — JACKSON-PRATT 100CC BULB RESERVOIR: Brand: CARDINAL HEALTH

## (undated) DEVICE — SUT PDS II 3-0 SH 27 IN Z316H

## (undated) DEVICE — BULB SYRINGE,IRRIGATION WITH PROTECTIVE CAP: Brand: DOVER

## (undated) DEVICE — TOWEL SURG XR DETECT GREEN STRL RFD

## (undated) DEVICE — DRAIN SPONGES,6 PLY: Brand: EXCILON

## (undated) DEVICE — PROXIMATE SKIN STAPLERS (35 WIDE) CONTAINS 35 STAINLESS STEEL STAPLES (FIXED HEAD): Brand: PROXIMATE

## (undated) DEVICE — OSTO POUCH W/POST OP KIT 22 X 33MM FLANGE 57MM

## (undated) DEVICE — SUT MONOCRYL 4-0 PS-2 27 IN Y426H

## (undated) DEVICE — BOWL: 16OZ PEELPOUCH 75/CS 16/PLT: Brand: MEDEGEN MEDICAL PRODUCTS, LLC

## (undated) DEVICE — SUT VICRYL 0 CT-1 27 IN J260H

## (undated) DEVICE — DRESSING TELFA 2 X 3 IN STRL

## (undated) DEVICE — DRAPE EQUIPMENT RF WAND

## (undated) DEVICE — GAUZE SPONGES,16 PLY: Brand: CURITY

## (undated) DEVICE — ENDOSCOPIC CURVED INTRALUMINAL STAPLER (ILS) 24 TITANIUM ADJUSTABLE HEIGHT STAPLES

## (undated) DEVICE — BETHLEHEM UNIVERSAL MINOR GEN: Brand: CARDINAL HEALTH

## (undated) DEVICE — 3000CC GUARDIAN II: Brand: GUARDIAN

## (undated) DEVICE — ADHESIVE SKN CLSR HISTOACRYL FLEX 0.5ML LF

## (undated) DEVICE — PROXIMATE LINEAR STAPLER RELOADS, 60MM: Brand: PROXIMATE

## (undated) DEVICE — PROXIMATE RELOADABLE LINEAR STAPLER, 60MM: Brand: PROXIMATE

## (undated) DEVICE — PROXIMATE RELOADABLE LINEAR CUTTER WITH SAFETY LOCK-OUT, 75MM: Brand: PROXIMATE

## (undated) DEVICE — STERILE MAJOR GENERAL PACK: Brand: CARDINAL HEALTH

## (undated) DEVICE — SEPRA FILM 6 X 5

## (undated) DEVICE — 3M™ IOBAN™ 2 ANTIMICROBIAL INCISE DRAPE 6650EZ: Brand: IOBAN™ 2

## (undated) DEVICE — PAD GROUNDING ADULT